# Patient Record
Sex: MALE | Race: WHITE | Employment: OTHER | URBAN - NONMETROPOLITAN AREA
[De-identification: names, ages, dates, MRNs, and addresses within clinical notes are randomized per-mention and may not be internally consistent; named-entity substitution may affect disease eponyms.]

---

## 2017-02-01 ENCOUNTER — OFFICE VISIT (OUTPATIENT)
Dept: FAMILY MEDICINE CLINIC | Age: 67
End: 2017-02-01
Payer: MEDICARE

## 2017-02-01 ENCOUNTER — HOSPITAL ENCOUNTER (OUTPATIENT)
Dept: GENERAL RADIOLOGY | Age: 67
Discharge: HOME | End: 2017-02-01
Payer: MEDICARE

## 2017-02-01 DIAGNOSIS — M48.9 CERVICAL SPINE DISEASE: ICD-10-CM

## 2017-02-01 DIAGNOSIS — Z91.09 ENVIRONMENTAL ALLERGIES: ICD-10-CM

## 2017-02-01 DIAGNOSIS — M54.50 LUMBAR SPINE PAIN: ICD-10-CM

## 2017-02-01 DIAGNOSIS — E55.9 VITAMIN D DEFICIENCY: ICD-10-CM

## 2017-02-01 PROCEDURE — 99214 OFFICE O/P EST MOD 30 MIN: CPT | Performed by: FAMILY MEDICINE

## 2017-02-01 PROCEDURE — G8484 FLU IMMUNIZE NO ADMIN: HCPCS | Performed by: FAMILY MEDICINE

## 2017-02-01 PROCEDURE — 3017F COLORECTAL CA SCREEN DOC REV: CPT | Performed by: FAMILY MEDICINE

## 2017-02-01 PROCEDURE — 72131 CT LUMBAR SPINE W/O DYE: CPT

## 2017-02-01 PROCEDURE — 1123F ACP DISCUSS/DSCN MKR DOCD: CPT | Performed by: FAMILY MEDICINE

## 2017-02-01 PROCEDURE — G8427 DOCREV CUR MEDS BY ELIG CLIN: HCPCS | Performed by: FAMILY MEDICINE

## 2017-02-01 PROCEDURE — 1036F TOBACCO NON-USER: CPT | Performed by: FAMILY MEDICINE

## 2017-02-01 PROCEDURE — G8419 CALC BMI OUT NRM PARAM NOF/U: HCPCS | Performed by: FAMILY MEDICINE

## 2017-02-01 PROCEDURE — 4040F PNEUMOC VAC/ADMIN/RCVD: CPT | Performed by: FAMILY MEDICINE

## 2017-02-01 RX ORDER — ERGOCALCIFEROL 1.25 MG/1
50000 CAPSULE ORAL
Qty: 30 CAPSULE | Refills: 0 | Status: SHIPPED | OUTPATIENT
Start: 2017-02-01 | End: 2017-05-02 | Stop reason: SDUPTHER

## 2017-02-01 RX ORDER — HYDROCODONE BITARTRATE AND ACETAMINOPHEN 7.5; 325 MG/1; MG/1
1 TABLET ORAL EVERY 4 HOURS PRN
Qty: 150 TABLET | Refills: 0 | Status: SHIPPED | OUTPATIENT
Start: 2017-02-01 | End: 2017-02-21 | Stop reason: SDUPTHER

## 2017-02-01 RX ORDER — FLUTICASONE PROPIONATE 50 MCG
SPRAY, SUSPENSION (ML) NASAL
Qty: 3 BOTTLE | Refills: 1 | Status: SHIPPED | OUTPATIENT
Start: 2017-02-01 | End: 2017-07-10 | Stop reason: SDUPTHER

## 2017-02-01 RX ORDER — AZITHROMYCIN 250 MG/1
TABLET, FILM COATED ORAL
Qty: 1 PACKET | Refills: 0 | Status: SHIPPED | OUTPATIENT
Start: 2017-02-01 | End: 2017-02-11

## 2017-02-01 RX ORDER — GABAPENTIN 400 MG/1
400 CAPSULE ORAL 4 TIMES DAILY
Qty: 360 CAPSULE | Refills: 1 | Status: SHIPPED | OUTPATIENT
Start: 2017-02-01 | End: 2017-07-25 | Stop reason: SDUPTHER

## 2017-02-17 DIAGNOSIS — I10 ESSENTIAL HYPERTENSION: ICD-10-CM

## 2017-02-17 RX ORDER — LISINOPRIL 20 MG/1
TABLET ORAL
Qty: 90 TABLET | Refills: 0 | Status: SHIPPED | OUTPATIENT
Start: 2017-02-17 | End: 2017-05-02 | Stop reason: SDUPTHER

## 2017-02-21 ENCOUNTER — HOSPITAL ENCOUNTER (OUTPATIENT)
Dept: PAIN MANAGEMENT | Age: 67
Discharge: HOME | End: 2017-02-21
Payer: MEDICARE

## 2017-02-21 VITALS
OXYGEN SATURATION: 91 % | DIASTOLIC BLOOD PRESSURE: 82 MMHG | BODY MASS INDEX: 19.99 KG/M2 | SYSTOLIC BLOOD PRESSURE: 132 MMHG | HEART RATE: 101 BPM | RESPIRATION RATE: 20 BRPM | TEMPERATURE: 98.9 F | WEIGHT: 120 LBS | HEIGHT: 65 IN

## 2017-02-21 DIAGNOSIS — M25.519 NECK AND SHOULDER PAIN: ICD-10-CM

## 2017-02-21 DIAGNOSIS — M54.2 NECK AND SHOULDER PAIN: ICD-10-CM

## 2017-02-21 DIAGNOSIS — R51.9 FREQUENT HEADACHES: ICD-10-CM

## 2017-02-21 DIAGNOSIS — R51.9 CHRONIC DAILY HEADACHE: ICD-10-CM

## 2017-02-21 DIAGNOSIS — G44.229 CHRONIC TENSION-TYPE HEADACHE, NOT INTRACTABLE: ICD-10-CM

## 2017-02-21 DIAGNOSIS — M48.9 CERVICAL SPINE DISEASE: ICD-10-CM

## 2017-02-21 DIAGNOSIS — M54.50 LUMBAR SPINE PAIN: ICD-10-CM

## 2017-02-21 PROCEDURE — 64646 CHEMODENERV TRUNK MUSC 1-5: CPT

## 2017-02-21 PROCEDURE — 64405 NJX AA&/STRD GR OCPL NRV: CPT

## 2017-02-21 PROCEDURE — 2500000003 HC RX 250 WO HCPCS

## 2017-02-21 PROCEDURE — 6360000002 HC RX W HCPCS

## 2017-02-21 PROCEDURE — 64616 CHEMODENERV MUSC NECK DYSTON: CPT

## 2017-02-21 RX ORDER — HYDROCODONE BITARTRATE AND ACETAMINOPHEN 7.5; 325 MG/1; MG/1
1 TABLET ORAL EVERY 4 HOURS PRN
Qty: 150 TABLET | Refills: 0 | Status: SHIPPED | OUTPATIENT
Start: 2017-03-03 | End: 2017-04-26 | Stop reason: SDUPTHER

## 2017-02-21 RX ORDER — TIZANIDINE 4 MG/1
TABLET ORAL
Qty: 270 TABLET | Refills: 2 | Status: SHIPPED | OUTPATIENT
Start: 2017-02-21 | End: 2018-03-28 | Stop reason: SDUPTHER

## 2017-02-21 RX ORDER — BUPIVACAINE HYDROCHLORIDE 2.5 MG/ML
INJECTION, SOLUTION EPIDURAL; INFILTRATION; INTRACAUDAL
Status: COMPLETED | OUTPATIENT
Start: 2017-02-21 | End: 2017-02-21

## 2017-02-21 RX ADMIN — BUPIVACAINE HYDROCHLORIDE 20 ML: 2.5 INJECTION, SOLUTION EPIDURAL; INFILTRATION; INTRACAUDAL at 15:05

## 2017-02-21 ASSESSMENT — PAIN DESCRIPTION - PAIN TYPE: TYPE: CHRONIC PAIN

## 2017-02-21 ASSESSMENT — PAIN DESCRIPTION - LOCATION: LOCATION: NECK;HEAD

## 2017-02-21 ASSESSMENT — PAIN DESCRIPTION - ONSET: ONSET: ON-GOING

## 2017-02-21 ASSESSMENT — PAIN SCALES - GENERAL: PAINLEVEL_OUTOF10: 7

## 2017-02-21 ASSESSMENT — PAIN DESCRIPTION - PROGRESSION: CLINICAL_PROGRESSION: NOT CHANGED

## 2017-02-21 ASSESSMENT — PAIN DESCRIPTION - DESCRIPTORS: DESCRIPTORS: ACHING;CONSTANT;RADIATING;THROBBING

## 2017-02-21 ASSESSMENT — PAIN DESCRIPTION - FREQUENCY: FREQUENCY: CONTINUOUS

## 2017-02-21 ASSESSMENT — PAIN DESCRIPTION - ORIENTATION: ORIENTATION: UPPER

## 2017-02-28 RX ORDER — PRAVASTATIN SODIUM 40 MG
TABLET ORAL
Qty: 90 TABLET | Refills: 0 | Status: SHIPPED | OUTPATIENT
Start: 2017-02-28 | End: 2017-05-02 | Stop reason: SDUPTHER

## 2017-02-28 RX ORDER — LEVOTHYROXINE SODIUM 0.07 MG/1
TABLET ORAL
Qty: 90 TABLET | Refills: 0 | Status: SHIPPED | OUTPATIENT
Start: 2017-02-28 | End: 2017-03-30 | Stop reason: SDUPTHER

## 2017-03-03 ASSESSMENT — ENCOUNTER SYMPTOMS
BACK PAIN: 1
SORE THROAT: 0
SINUS PRESSURE: 1
BOWEL INCONTINENCE: 0
ABDOMINAL PAIN: 0
TROUBLE SWALLOWING: 0
COUGH: 1

## 2017-03-07 ENCOUNTER — OFFICE VISIT (OUTPATIENT)
Dept: FAMILY MEDICINE CLINIC | Age: 67
End: 2017-03-07
Payer: MEDICARE

## 2017-03-07 ENCOUNTER — HOSPITAL ENCOUNTER (OUTPATIENT)
Dept: GENERAL RADIOLOGY | Age: 67
Discharge: HOME | End: 2017-03-07
Payer: MEDICARE

## 2017-03-07 VITALS
HEART RATE: 83 BPM | OXYGEN SATURATION: 79 % | DIASTOLIC BLOOD PRESSURE: 66 MMHG | HEIGHT: 65 IN | TEMPERATURE: 98.4 F | BODY MASS INDEX: 20.16 KG/M2 | WEIGHT: 121 LBS | SYSTOLIC BLOOD PRESSURE: 110 MMHG

## 2017-03-07 DIAGNOSIS — R79.81 LOW O2 SATURATION: ICD-10-CM

## 2017-03-07 DIAGNOSIS — R05.9 COUGH: ICD-10-CM

## 2017-03-07 DIAGNOSIS — J01.10 ACUTE FRONTAL SINUSITIS, RECURRENCE NOT SPECIFIED: ICD-10-CM

## 2017-03-07 DIAGNOSIS — R05.9 COUGH: Primary | ICD-10-CM

## 2017-03-07 PROCEDURE — 99213 OFFICE O/P EST LOW 20 MIN: CPT | Performed by: FAMILY MEDICINE

## 2017-03-07 PROCEDURE — 1036F TOBACCO NON-USER: CPT | Performed by: FAMILY MEDICINE

## 2017-03-07 PROCEDURE — G8484 FLU IMMUNIZE NO ADMIN: HCPCS | Performed by: FAMILY MEDICINE

## 2017-03-07 PROCEDURE — G8427 DOCREV CUR MEDS BY ELIG CLIN: HCPCS | Performed by: FAMILY MEDICINE

## 2017-03-07 PROCEDURE — G8419 CALC BMI OUT NRM PARAM NOF/U: HCPCS | Performed by: FAMILY MEDICINE

## 2017-03-07 PROCEDURE — 3017F COLORECTAL CA SCREEN DOC REV: CPT | Performed by: FAMILY MEDICINE

## 2017-03-07 PROCEDURE — 1123F ACP DISCUSS/DSCN MKR DOCD: CPT | Performed by: FAMILY MEDICINE

## 2017-03-07 PROCEDURE — 71020 XR CHEST STANDARD TWO VW: CPT

## 2017-03-07 PROCEDURE — 4040F PNEUMOC VAC/ADMIN/RCVD: CPT | Performed by: FAMILY MEDICINE

## 2017-03-07 RX ORDER — CEPHALEXIN 500 MG/1
500 CAPSULE ORAL 3 TIMES DAILY
Qty: 30 CAPSULE | Refills: 0 | Status: SHIPPED | OUTPATIENT
Start: 2017-03-07 | End: 2017-05-02 | Stop reason: ALTCHOICE

## 2017-03-07 ASSESSMENT — ENCOUNTER SYMPTOMS
DIARRHEA: 0
SORE THROAT: 1
SHORTNESS OF BREATH: 1
BACK PAIN: 1
COUGH: 1
RHINORRHEA: 1
ABDOMINAL PAIN: 0
SINUS PRESSURE: 1
CONSTIPATION: 0

## 2017-04-03 RX ORDER — LEVOTHYROXINE SODIUM 0.07 MG/1
TABLET ORAL
Qty: 30 TABLET | Refills: 0 | Status: SHIPPED | OUTPATIENT
Start: 2017-04-03 | End: 2017-05-02 | Stop reason: SDUPTHER

## 2017-04-26 ENCOUNTER — HOSPITAL ENCOUNTER (OUTPATIENT)
Dept: PAIN MANAGEMENT | Age: 67
Discharge: HOME | End: 2017-04-26
Payer: MEDICARE

## 2017-04-26 VITALS
OXYGEN SATURATION: 89 % | RESPIRATION RATE: 20 BRPM | SYSTOLIC BLOOD PRESSURE: 145 MMHG | HEART RATE: 88 BPM | TEMPERATURE: 97.6 F | BODY MASS INDEX: 19.97 KG/M2 | WEIGHT: 117 LBS | HEIGHT: 64 IN | DIASTOLIC BLOOD PRESSURE: 77 MMHG

## 2017-04-26 DIAGNOSIS — M48.9 CERVICAL SPINE DISEASE: ICD-10-CM

## 2017-04-26 DIAGNOSIS — R51.9 FREQUENT HEADACHES: ICD-10-CM

## 2017-04-26 DIAGNOSIS — M25.519 NECK AND SHOULDER PAIN: ICD-10-CM

## 2017-04-26 DIAGNOSIS — M54.50 LUMBAR SPINE PAIN: ICD-10-CM

## 2017-04-26 DIAGNOSIS — M54.2 NECK AND SHOULDER PAIN: ICD-10-CM

## 2017-04-26 DIAGNOSIS — G44.229 CHRONIC TENSION-TYPE HEADACHE, NOT INTRACTABLE: ICD-10-CM

## 2017-04-26 DIAGNOSIS — R51.9 CHRONIC DAILY HEADACHE: ICD-10-CM

## 2017-04-26 PROCEDURE — 99213 OFFICE O/P EST LOW 20 MIN: CPT

## 2017-04-26 PROCEDURE — 80307 DRUG TEST PRSMV CHEM ANLYZR: CPT

## 2017-04-26 RX ORDER — HYDROCODONE BITARTRATE AND ACETAMINOPHEN 7.5; 325 MG/1; MG/1
1 TABLET ORAL EVERY 4 HOURS PRN
Qty: 150 TABLET | Refills: 0 | Status: SHIPPED | OUTPATIENT
Start: 2017-04-26 | End: 2017-06-05 | Stop reason: SDUPTHER

## 2017-04-26 ASSESSMENT — ACTIVITIES OF DAILY LIVING (ADL): EFFECT OF PAIN ON DAILY ACTIVITIES: DAILY ACTIVITY

## 2017-04-26 ASSESSMENT — PAIN DESCRIPTION - ORIENTATION: ORIENTATION: LOWER;MID

## 2017-04-26 ASSESSMENT — PAIN SCALES - GENERAL: PAINLEVEL_OUTOF10: 8

## 2017-04-26 ASSESSMENT — PAIN DESCRIPTION - DESCRIPTORS: DESCRIPTORS: ACHING;CONSTANT;THROBBING;HEADACHE

## 2017-04-26 ASSESSMENT — PAIN DESCRIPTION - PROGRESSION: CLINICAL_PROGRESSION: NOT CHANGED

## 2017-04-26 ASSESSMENT — PAIN DESCRIPTION - FREQUENCY: FREQUENCY: CONTINUOUS

## 2017-04-26 ASSESSMENT — PAIN DESCRIPTION - LOCATION: LOCATION: NECK;HEAD;BACK

## 2017-04-26 ASSESSMENT — PAIN DESCRIPTION - ONSET: ONSET: ON-GOING

## 2017-04-26 ASSESSMENT — PAIN DESCRIPTION - DIRECTION: RADIATING_TOWARDS: HEADACHES

## 2017-04-26 ASSESSMENT — PAIN DESCRIPTION - PAIN TYPE: TYPE: CHRONIC PAIN

## 2017-04-28 LAB
AMPHETAMINES, URINE: NEGATIVE NG/ML
BARBITURATES, URINE: NEGATIVE NG/ML
BENZODIAZEPINES, URINE: NEGATIVE NG/ML
CANNABINOIDS, URINE: NEGATIVE NG/ML
COCAINE METABOLITE, URINE: NEGATIVE NG/ML
CREATININE, URINE: 97.1 MG/DL (ref 20–300)
ETHANOL U, QUAN: NEGATIVE %
FENTANYL URINE: NEGATIVE PG/ML
MEPERIDINE, UR: NEGATIVE NG/ML
METHADONE SCREEN, URINE: NEGATIVE NG/ML
OPIATES, URINE: NEGATIVE NG/ML
OXYCODONE/OXYMORPHONE, UR: NEGATIVE NG/ML
PH, URINE: 5.6 (ref 4.5–8.9)
PHENCYCLIDINE, URINE: NEGATIVE NG/ML
PROPOXYPHENE, URINE: NEGATIVE NG/ML

## 2017-05-02 ENCOUNTER — OFFICE VISIT (OUTPATIENT)
Dept: FAMILY MEDICINE CLINIC | Age: 67
End: 2017-05-02
Payer: MEDICARE

## 2017-05-02 VITALS
SYSTOLIC BLOOD PRESSURE: 108 MMHG | TEMPERATURE: 98.1 F | WEIGHT: 118 LBS | BODY MASS INDEX: 20.25 KG/M2 | OXYGEN SATURATION: 91 % | HEART RATE: 98 BPM | DIASTOLIC BLOOD PRESSURE: 54 MMHG | RESPIRATION RATE: 16 BRPM

## 2017-05-02 DIAGNOSIS — Z12.5 SCREENING FOR PROSTATE CANCER: ICD-10-CM

## 2017-05-02 DIAGNOSIS — E78.5 HYPERLIPIDEMIA, UNSPECIFIED HYPERLIPIDEMIA TYPE: ICD-10-CM

## 2017-05-02 DIAGNOSIS — E55.9 VITAMIN D DEFICIENCY: ICD-10-CM

## 2017-05-02 DIAGNOSIS — E03.8 OTHER SPECIFIED HYPOTHYROIDISM: Primary | ICD-10-CM

## 2017-05-02 DIAGNOSIS — I10 ESSENTIAL HYPERTENSION: ICD-10-CM

## 2017-05-02 PROCEDURE — 4040F PNEUMOC VAC/ADMIN/RCVD: CPT | Performed by: FAMILY MEDICINE

## 2017-05-02 PROCEDURE — 1123F ACP DISCUSS/DSCN MKR DOCD: CPT | Performed by: FAMILY MEDICINE

## 2017-05-02 PROCEDURE — G8427 DOCREV CUR MEDS BY ELIG CLIN: HCPCS | Performed by: FAMILY MEDICINE

## 2017-05-02 PROCEDURE — G8419 CALC BMI OUT NRM PARAM NOF/U: HCPCS | Performed by: FAMILY MEDICINE

## 2017-05-02 PROCEDURE — 1036F TOBACCO NON-USER: CPT | Performed by: FAMILY MEDICINE

## 2017-05-02 PROCEDURE — 3017F COLORECTAL CA SCREEN DOC REV: CPT | Performed by: FAMILY MEDICINE

## 2017-05-02 PROCEDURE — 99214 OFFICE O/P EST MOD 30 MIN: CPT | Performed by: FAMILY MEDICINE

## 2017-05-02 RX ORDER — LISINOPRIL 20 MG/1
TABLET ORAL
Qty: 90 TABLET | Refills: 3 | Status: SHIPPED | OUTPATIENT
Start: 2017-05-02 | End: 2018-05-07 | Stop reason: SDUPTHER

## 2017-05-02 RX ORDER — PRAVASTATIN SODIUM 40 MG
TABLET ORAL
Qty: 90 TABLET | Refills: 3 | Status: SHIPPED | OUTPATIENT
Start: 2017-05-02 | End: 2018-06-04 | Stop reason: SDUPTHER

## 2017-05-02 RX ORDER — LEVOTHYROXINE SODIUM 0.07 MG/1
TABLET ORAL
Qty: 90 TABLET | Refills: 3 | Status: SHIPPED | OUTPATIENT
Start: 2017-05-02 | End: 2018-02-01

## 2017-05-02 RX ORDER — ERGOCALCIFEROL 1.25 MG/1
50000 CAPSULE ORAL
Qty: 30 CAPSULE | Refills: 0 | Status: SHIPPED | OUTPATIENT
Start: 2017-05-02 | End: 2019-04-23

## 2017-05-02 ASSESSMENT — ENCOUNTER SYMPTOMS
TROUBLE SWALLOWING: 0
BLURRED VISION: 0
COUGH: 0
SINUS PRESSURE: 0
DIARRHEA: 0
SHORTNESS OF BREATH: 0
CONSTIPATION: 0
SORE THROAT: 0
BOWEL INCONTINENCE: 0
BACK PAIN: 1

## 2017-05-02 ASSESSMENT — COPD QUESTIONNAIRES: COPD: 1

## 2017-05-23 DIAGNOSIS — E78.5 HYPERLIPIDEMIA, UNSPECIFIED HYPERLIPIDEMIA TYPE: ICD-10-CM

## 2017-05-23 DIAGNOSIS — I10 ESSENTIAL HYPERTENSION: ICD-10-CM

## 2017-05-23 DIAGNOSIS — E03.8 OTHER SPECIFIED HYPOTHYROIDISM: ICD-10-CM

## 2017-05-23 DIAGNOSIS — E55.9 VITAMIN D DEFICIENCY: ICD-10-CM

## 2017-05-23 DIAGNOSIS — Z12.5 SCREENING FOR PROSTATE CANCER: ICD-10-CM

## 2017-05-23 LAB
ALBUMIN SERPL-MCNC: 3.7 G/DL (ref 3.5–5.2)
ALP BLD-CCNC: 80 U/L (ref 40–130)
ALT SERPL-CCNC: 13 U/L (ref 5–41)
ANION GAP SERPL CALCULATED.3IONS-SCNC: 12 MMOL/L (ref 7–19)
AST SERPL-CCNC: 21 U/L (ref 5–40)
BACTERIA: NEGATIVE /HPF
BASOPHILS ABSOLUTE: 0.1 K/UL (ref 0–0.2)
BASOPHILS RELATIVE PERCENT: 0.7 % (ref 0–1)
BILIRUB SERPL-MCNC: <0.2 MG/DL (ref 0.2–1.2)
BILIRUBIN URINE: NEGATIVE
BLOOD, URINE: NEGATIVE
BUN BLDV-MCNC: 11 MG/DL (ref 8–23)
CALCIUM SERPL-MCNC: 9.1 MG/DL (ref 8.8–10.2)
CHLORIDE BLD-SCNC: 104 MMOL/L (ref 98–111)
CHOLESTEROL, TOTAL: 130 MG/DL (ref 160–199)
CLARITY: CLEAR
CO2: 28 MMOL/L (ref 22–29)
COLOR: YELLOW
CREAT SERPL-MCNC: 1 MG/DL (ref 0.5–1.2)
EOSINOPHILS ABSOLUTE: 0.3 K/UL (ref 0–0.6)
EOSINOPHILS RELATIVE PERCENT: 3.9 % (ref 0–5)
EPITHELIAL CELLS, UA: 0 /HPF (ref 0–5)
GFR NON-AFRICAN AMERICAN: >60
GLUCOSE BLD-MCNC: 121 MG/DL (ref 74–109)
GLUCOSE URINE: NEGATIVE MG/DL
HCT VFR BLD CALC: 40.8 % (ref 42–52)
HDLC SERPL-MCNC: 65 MG/DL (ref 55–121)
HEMOGLOBIN: 12.6 G/DL (ref 14–18)
HYALINE CASTS: 0 /HPF (ref 0–8)
KETONES, URINE: NEGATIVE MG/DL
LDL CHOLESTEROL CALCULATED: 39 MG/DL
LEUKOCYTE ESTERASE, URINE: ABNORMAL
LYMPHOCYTES ABSOLUTE: 1.9 K/UL (ref 1.1–4.5)
LYMPHOCYTES RELATIVE PERCENT: 26.5 % (ref 20–40)
MCH RBC QN AUTO: 29.2 PG (ref 27–31)
MCHC RBC AUTO-ENTMCNC: 30.9 G/DL (ref 33–37)
MCV RBC AUTO: 94.7 FL (ref 80–94)
MONOCYTES ABSOLUTE: 0.7 K/UL (ref 0–0.9)
MONOCYTES RELATIVE PERCENT: 9.3 % (ref 0–10)
NEUTROPHILS ABSOLUTE: 4.2 K/UL (ref 1.5–7.5)
NEUTROPHILS RELATIVE PERCENT: 59.5 % (ref 50–65)
NITRITE, URINE: NEGATIVE
PDW BLD-RTO: 13.8 % (ref 11.5–14.5)
PH UA: 6
PLATELET # BLD: 203 K/UL (ref 130–400)
PMV BLD AUTO: 11 FL (ref 7.4–10.4)
POTASSIUM SERPL-SCNC: 4 MMOL/L (ref 3.5–5)
PROSTATE SPECIFIC ANTIGEN: 3.3 NG/ML (ref 0–4)
PROTEIN UA: NEGATIVE MG/DL
RBC # BLD: 4.31 M/UL (ref 4.7–6.1)
RBC UA: 0 /HPF (ref 0–4)
SODIUM BLD-SCNC: 144 MMOL/L (ref 136–145)
SPECIFIC GRAVITY UA: 1.02
T4 FREE: 1.2 NG/ML (ref 0.9–1.7)
TOTAL PROTEIN: 6.5 G/DL (ref 6.6–8.7)
TRIGL SERPL-MCNC: 128 MG/DL (ref 150–199)
TSH SERPL DL<=0.05 MIU/L-ACNC: 1.42 UIU/ML (ref 0.27–4.2)
UROBILINOGEN, URINE: 1 E.U./DL
VITAMIN D 25-HYDROXY: 60 NG/ML
WBC # BLD: 7.1 K/UL (ref 4.8–10.8)
WBC UA: 0 /HPF (ref 0–5)

## 2017-05-25 LAB — URINE CULTURE, ROUTINE: NORMAL

## 2017-05-26 ENCOUNTER — TELEPHONE (OUTPATIENT)
Dept: FAMILY MEDICINE CLINIC | Age: 67
End: 2017-05-26

## 2017-06-05 ENCOUNTER — HOSPITAL ENCOUNTER (OUTPATIENT)
Dept: PAIN MANAGEMENT | Age: 67
Discharge: HOME | End: 2017-06-05
Payer: MEDICARE

## 2017-06-05 VITALS
DIASTOLIC BLOOD PRESSURE: 70 MMHG | HEIGHT: 65 IN | RESPIRATION RATE: 20 BRPM | OXYGEN SATURATION: 95 % | HEART RATE: 88 BPM | TEMPERATURE: 99 F | BODY MASS INDEX: 19.66 KG/M2 | SYSTOLIC BLOOD PRESSURE: 130 MMHG | WEIGHT: 118 LBS

## 2017-06-05 DIAGNOSIS — M54.50 LUMBAR SPINE PAIN: ICD-10-CM

## 2017-06-05 DIAGNOSIS — M48.9 CERVICAL SPINE DISEASE: ICD-10-CM

## 2017-06-05 DIAGNOSIS — R51.9 CHRONIC DAILY HEADACHE: Chronic | ICD-10-CM

## 2017-06-05 PROCEDURE — 64405 NJX AA&/STRD GR OCPL NRV: CPT

## 2017-06-05 PROCEDURE — 64646 CHEMODENERV TRUNK MUSC 1-5: CPT

## 2017-06-05 PROCEDURE — 64612 DESTROY NERVE FACE MUSCLE: CPT

## 2017-06-05 PROCEDURE — 64616 CHEMODENERV MUSC NECK DYSTON: CPT

## 2017-06-05 PROCEDURE — 2500000003 HC RX 250 WO HCPCS

## 2017-06-05 RX ORDER — BUPIVACAINE HYDROCHLORIDE 2.5 MG/ML
INJECTION, SOLUTION EPIDURAL; INFILTRATION; INTRACAUDAL
Status: COMPLETED | OUTPATIENT
Start: 2017-06-05 | End: 2017-06-05

## 2017-06-05 RX ORDER — HYDROCODONE BITARTRATE AND ACETAMINOPHEN 7.5; 325 MG/1; MG/1
1 TABLET ORAL 2 TIMES DAILY PRN
Qty: 60 TABLET | Refills: 0 | Status: SHIPPED | OUTPATIENT
Start: 2017-06-05 | End: 2017-07-12 | Stop reason: SDUPTHER

## 2017-06-05 RX ORDER — HYDROCODONE BITARTRATE AND ACETAMINOPHEN 7.5; 325 MG/1; MG/1
1 TABLET ORAL EVERY 4 HOURS PRN
Qty: 150 TABLET | Refills: 0 | Status: SHIPPED | OUTPATIENT
Start: 2017-06-05 | End: 2017-06-05 | Stop reason: DRUGHIGH

## 2017-06-05 RX ADMIN — BUPIVACAINE HYDROCHLORIDE 20 ML: 2.5 INJECTION, SOLUTION EPIDURAL; INFILTRATION; INTRACAUDAL at 13:53

## 2017-06-05 ASSESSMENT — PAIN DESCRIPTION - DESCRIPTORS: DESCRIPTORS: ACHING;CONSTANT;RADIATING

## 2017-06-05 ASSESSMENT — PAIN DESCRIPTION - LOCATION: LOCATION: NECK;HEAD

## 2017-06-05 ASSESSMENT — PAIN DESCRIPTION - ORIENTATION: ORIENTATION: UPPER

## 2017-06-05 ASSESSMENT — PAIN DESCRIPTION - ONSET: ONSET: ON-GOING

## 2017-06-05 ASSESSMENT — PAIN DESCRIPTION - PAIN TYPE: TYPE: CHRONIC PAIN

## 2017-06-05 ASSESSMENT — PAIN DESCRIPTION - FREQUENCY: FREQUENCY: CONTINUOUS

## 2017-06-05 ASSESSMENT — PAIN DESCRIPTION - PROGRESSION: CLINICAL_PROGRESSION: NOT CHANGED

## 2017-06-05 ASSESSMENT — PAIN SCALES - GENERAL: PAINLEVEL_OUTOF10: 3

## 2017-06-23 ENCOUNTER — OFFICE VISIT (OUTPATIENT)
Dept: OTOLARYNGOLOGY | Facility: CLINIC | Age: 67
End: 2017-06-23

## 2017-06-23 VITALS
WEIGHT: 115 LBS | DIASTOLIC BLOOD PRESSURE: 78 MMHG | HEIGHT: 64 IN | SYSTOLIC BLOOD PRESSURE: 134 MMHG | BODY MASS INDEX: 19.63 KG/M2 | TEMPERATURE: 98 F | RESPIRATION RATE: 20 BRPM | HEART RATE: 82 BPM

## 2017-06-23 DIAGNOSIS — H61.23 BILATERAL IMPACTED CERUMEN: Primary | ICD-10-CM

## 2017-06-23 PROBLEM — H61.20 CERUMEN IMPACTION: Status: ACTIVE | Noted: 2017-06-23

## 2017-06-23 PROCEDURE — 69210 REMOVE IMPACTED EAR WAX UNI: CPT | Performed by: PHYSICIAN ASSISTANT

## 2017-06-23 RX ORDER — GUAIFENESIN AND DEXTROMETHORPHAN HYDROBROMIDE 400; 20 MG/1; MG/1
TABLET ORAL
COMMUNITY

## 2017-06-23 RX ORDER — LISINOPRIL 20 MG/1
TABLET ORAL
COMMUNITY
Start: 2017-05-02

## 2017-06-23 RX ORDER — ERGOCALCIFEROL 1.25 MG/1
CAPSULE ORAL
COMMUNITY
Start: 2017-05-02

## 2017-06-23 RX ORDER — TIZANIDINE 4 MG/1
TABLET ORAL
COMMUNITY
Start: 2017-02-21

## 2017-06-23 RX ORDER — GUAIFENESIN 400 MG/1
TABLET ORAL
COMMUNITY

## 2017-06-23 RX ORDER — LEVOTHYROXINE SODIUM 0.07 MG/1
TABLET ORAL
COMMUNITY
Start: 2017-05-02

## 2017-06-23 RX ORDER — ALBUTEROL SULFATE 90 UG/1
AEROSOL, METERED RESPIRATORY (INHALATION)
COMMUNITY
Start: 2017-02-28

## 2017-06-23 RX ORDER — ASCORBIC ACID 500 MG
TABLET ORAL
COMMUNITY

## 2017-06-23 RX ORDER — HYDROCODONE BITARTRATE AND ACETAMINOPHEN 7.5; 325 MG/1; MG/1
TABLET ORAL
COMMUNITY
Start: 2017-06-05

## 2017-06-23 RX ORDER — GABAPENTIN 400 MG/1
CAPSULE ORAL
COMMUNITY
Start: 2017-02-01

## 2017-06-23 RX ORDER — PRAVASTATIN SODIUM 40 MG
TABLET ORAL
COMMUNITY
Start: 2017-05-02

## 2017-06-23 RX ORDER — FLUTICASONE PROPIONATE 50 MCG
SPRAY, SUSPENSION (ML) NASAL
COMMUNITY
Start: 2017-02-01

## 2017-06-23 NOTE — PROGRESS NOTES
Procedure Note    Pre-operative Diagnosis: Cerumen impaction/bilateral    Post-operative Diagnosis: same    Anesthesia: none    Procedure:  Binocular ear microscopy with cerumen removal    Procedure Details:    The patient was placed supine on the procedure table. Using a speculum, the ear was examined with a microscope.     Condition:  Stable.  Patient tolerated procedure well.    Complications:  None

## 2017-07-10 DIAGNOSIS — Z91.09 ENVIRONMENTAL ALLERGIES: ICD-10-CM

## 2017-07-10 RX ORDER — FLUTICASONE PROPIONATE 50 MCG
SPRAY, SUSPENSION (ML) NASAL
Qty: 3 BOTTLE | Refills: 0 | Status: SHIPPED | OUTPATIENT
Start: 2017-07-10 | End: 2017-09-29 | Stop reason: SDUPTHER

## 2017-07-12 ENCOUNTER — HOSPITAL ENCOUNTER (OUTPATIENT)
Dept: PAIN MANAGEMENT | Age: 67
Discharge: HOME | End: 2017-07-12
Payer: MEDICARE

## 2017-07-12 VITALS
SYSTOLIC BLOOD PRESSURE: 111 MMHG | WEIGHT: 110 LBS | RESPIRATION RATE: 18 BRPM | DIASTOLIC BLOOD PRESSURE: 60 MMHG | OXYGEN SATURATION: 91 % | BODY MASS INDEX: 18.78 KG/M2 | HEIGHT: 64 IN | TEMPERATURE: 98.1 F | HEART RATE: 73 BPM

## 2017-07-12 DIAGNOSIS — G44.229 CHRONIC TENSION-TYPE HEADACHE, NOT INTRACTABLE: ICD-10-CM

## 2017-07-12 DIAGNOSIS — M25.519 NECK AND SHOULDER PAIN: ICD-10-CM

## 2017-07-12 DIAGNOSIS — R51.9 CHRONIC DAILY HEADACHE: ICD-10-CM

## 2017-07-12 DIAGNOSIS — R51.9 FREQUENT HEADACHES: ICD-10-CM

## 2017-07-12 DIAGNOSIS — M54.2 NECK AND SHOULDER PAIN: ICD-10-CM

## 2017-07-12 PROCEDURE — 99213 OFFICE O/P EST LOW 20 MIN: CPT

## 2017-07-12 RX ORDER — HYDROCODONE BITARTRATE AND ACETAMINOPHEN 7.5; 325 MG/1; MG/1
1 TABLET ORAL 2 TIMES DAILY PRN
Qty: 60 TABLET | Refills: 0 | Status: SHIPPED | OUTPATIENT
Start: 2017-07-12 | End: 2017-09-07 | Stop reason: SDUPTHER

## 2017-07-12 ASSESSMENT — PAIN DESCRIPTION - FREQUENCY: FREQUENCY: CONTINUOUS

## 2017-07-12 ASSESSMENT — PAIN DESCRIPTION - ONSET: ONSET: ON-GOING

## 2017-07-12 ASSESSMENT — PAIN DESCRIPTION - PROGRESSION: CLINICAL_PROGRESSION: NOT CHANGED

## 2017-07-12 ASSESSMENT — PAIN SCALES - GENERAL: PAINLEVEL_OUTOF10: 2

## 2017-07-12 ASSESSMENT — ACTIVITIES OF DAILY LIVING (ADL): EFFECT OF PAIN ON DAILY ACTIVITIES: LIMITS ACTIVITY

## 2017-07-12 ASSESSMENT — PAIN DESCRIPTION - DIRECTION: RADIATING_TOWARDS: HEADACHES

## 2017-07-12 ASSESSMENT — PAIN DESCRIPTION - PAIN TYPE: TYPE: CHRONIC PAIN

## 2017-07-12 ASSESSMENT — PAIN DESCRIPTION - DESCRIPTORS: DESCRIPTORS: ACHING;CONSTANT;THROBBING;HEADACHE

## 2017-07-12 ASSESSMENT — PAIN DESCRIPTION - ORIENTATION: ORIENTATION: LOWER;MID

## 2017-07-12 ASSESSMENT — PAIN DESCRIPTION - LOCATION: LOCATION: BACK;NECK;HEAD

## 2017-07-25 ENCOUNTER — HOSPITAL ENCOUNTER (OUTPATIENT)
Dept: GENERAL RADIOLOGY | Age: 67
Discharge: HOME | End: 2017-07-25
Payer: MEDICARE

## 2017-07-25 ENCOUNTER — OFFICE VISIT (OUTPATIENT)
Dept: FAMILY MEDICINE CLINIC | Age: 67
End: 2017-07-25
Payer: MEDICARE

## 2017-07-25 VITALS
DIASTOLIC BLOOD PRESSURE: 70 MMHG | OXYGEN SATURATION: 95 % | WEIGHT: 115 LBS | HEART RATE: 89 BPM | HEIGHT: 63 IN | BODY MASS INDEX: 20.38 KG/M2 | SYSTOLIC BLOOD PRESSURE: 138 MMHG | TEMPERATURE: 98.7 F | RESPIRATION RATE: 16 BRPM

## 2017-07-25 DIAGNOSIS — J44.9 CHRONIC OBSTRUCTIVE PULMONARY DISEASE, UNSPECIFIED COPD TYPE (HCC): ICD-10-CM

## 2017-07-25 DIAGNOSIS — R89.9 ABNORMAL LABORATORY TEST: ICD-10-CM

## 2017-07-25 DIAGNOSIS — I10 ESSENTIAL HYPERTENSION: ICD-10-CM

## 2017-07-25 DIAGNOSIS — R94.31 ABNORMAL EKG: ICD-10-CM

## 2017-07-25 DIAGNOSIS — M54.50 LUMBAR SPINE PAIN: ICD-10-CM

## 2017-07-25 DIAGNOSIS — M81.0 OSTEOPOROSIS: ICD-10-CM

## 2017-07-25 DIAGNOSIS — M25.572 LEFT ANKLE PAIN, UNSPECIFIED CHRONICITY: ICD-10-CM

## 2017-07-25 DIAGNOSIS — J32.9 OTHER SINUSITIS: ICD-10-CM

## 2017-07-25 DIAGNOSIS — R09.89 DECREASED CAROTID PULSE: ICD-10-CM

## 2017-07-25 DIAGNOSIS — J44.9 CHRONIC OBSTRUCTIVE PULMONARY DISEASE, UNSPECIFIED COPD TYPE (HCC): Primary | ICD-10-CM

## 2017-07-25 LAB
BILIRUBIN URINE: NEGATIVE
BLOOD, URINE: NEGATIVE
CLARITY: CLEAR
COLOR: YELLOW
GLUCOSE URINE: NEGATIVE MG/DL
KETONES, URINE: NEGATIVE MG/DL
LEUKOCYTE ESTERASE, URINE: NEGATIVE
NITRITE, URINE: NEGATIVE
PH UA: 6
PROTEIN UA: NEGATIVE MG/DL
SPECIFIC GRAVITY UA: 1.01
UROBILINOGEN, URINE: 0.2 E.U./DL

## 2017-07-25 PROCEDURE — 71020 XR CHEST STANDARD TWO VW: CPT

## 2017-07-25 PROCEDURE — 99397 PER PM REEVAL EST PAT 65+ YR: CPT | Performed by: FAMILY MEDICINE

## 2017-07-25 PROCEDURE — 93000 ELECTROCARDIOGRAM COMPLETE: CPT | Performed by: FAMILY MEDICINE

## 2017-07-25 PROCEDURE — 73610 X-RAY EXAM OF ANKLE: CPT

## 2017-07-25 RX ORDER — GABAPENTIN 400 MG/1
400 CAPSULE ORAL DAILY
Qty: 90 CAPSULE | Refills: 1 | Status: SHIPPED | OUTPATIENT
Start: 2017-07-25 | End: 2017-09-27 | Stop reason: ALTCHOICE

## 2017-07-25 RX ORDER — GABAPENTIN 100 MG/1
100 CAPSULE ORAL 4 TIMES DAILY
COMMUNITY
End: 2017-07-25 | Stop reason: SDUPTHER

## 2017-07-25 RX ORDER — ALBUTEROL SULFATE 90 UG/1
AEROSOL, METERED RESPIRATORY (INHALATION)
Qty: 54 G | Refills: 5 | Status: SHIPPED | OUTPATIENT
Start: 2017-07-25 | End: 2018-02-01 | Stop reason: SDUPTHER

## 2017-07-25 RX ORDER — GABAPENTIN 100 MG/1
100 CAPSULE ORAL 4 TIMES DAILY
Qty: 360 CAPSULE | Refills: 1 | Status: SHIPPED | OUTPATIENT
Start: 2017-07-25 | End: 2018-02-01 | Stop reason: SDUPTHER

## 2017-07-25 ASSESSMENT — ENCOUNTER SYMPTOMS
EYES NEGATIVE: 1
SHORTNESS OF BREATH: 1
CHEST TIGHTNESS: 0
ABDOMINAL PAIN: 0
BACK PAIN: 1
SINUS PRESSURE: 1
GASTROINTESTINAL NEGATIVE: 1
COUGH: 1

## 2017-07-26 RX ORDER — AZITHROMYCIN 250 MG/1
TABLET, FILM COATED ORAL
Qty: 1 PACKET | Refills: 0 | Status: SHIPPED | OUTPATIENT
Start: 2017-07-26 | End: 2017-08-05

## 2017-08-08 ENCOUNTER — TELEPHONE (OUTPATIENT)
Dept: FAMILY MEDICINE CLINIC | Age: 67
End: 2017-08-08

## 2017-08-08 DIAGNOSIS — Z79.899 MEDICATION MANAGEMENT: ICD-10-CM

## 2017-08-08 DIAGNOSIS — E55.9 VITAMIN D DEFICIENCY: Primary | ICD-10-CM

## 2017-08-09 ENCOUNTER — TELEPHONE (OUTPATIENT)
Dept: FAMILY MEDICINE CLINIC | Age: 67
End: 2017-08-09

## 2017-08-24 ENCOUNTER — HOSPITAL ENCOUNTER (OUTPATIENT)
Dept: GENERAL RADIOLOGY | Age: 67
Discharge: HOME | End: 2017-08-24
Payer: MEDICARE

## 2017-08-24 DIAGNOSIS — R09.89 DECREASED CAROTID PULSE: ICD-10-CM

## 2017-08-24 DIAGNOSIS — Z79.899 MEDICATION MANAGEMENT: ICD-10-CM

## 2017-08-24 DIAGNOSIS — E55.9 VITAMIN D DEFICIENCY: ICD-10-CM

## 2017-08-24 LAB
ALBUMIN SERPL-MCNC: 3.7 G/DL (ref 3.5–5.2)
ALP BLD-CCNC: 76 U/L (ref 40–130)
ALT SERPL-CCNC: 12 U/L (ref 5–41)
AST SERPL-CCNC: 18 U/L (ref 5–40)
BILIRUB SERPL-MCNC: <0.2 MG/DL (ref 0.2–1.2)
BILIRUBIN DIRECT: 0.1 MG/DL (ref 0–0.3)
BILIRUBIN, INDIRECT: 0.1 MG/DL (ref 0.1–1)
CHOLESTEROL, TOTAL: 156 MG/DL (ref 160–199)
HDLC SERPL-MCNC: 61 MG/DL (ref 55–121)
LDL CHOLESTEROL CALCULATED: 80 MG/DL
TOTAL PROTEIN: 7.2 G/DL (ref 6.6–8.7)
TRIGL SERPL-MCNC: 77 MG/DL (ref 150–199)
VITAMIN D 25-HYDROXY: 47.3 NG/ML

## 2017-08-24 PROCEDURE — 93880 EXTRACRANIAL BILAT STUDY: CPT

## 2017-09-06 ENCOUNTER — TELEPHONE (OUTPATIENT)
Dept: CARDIOLOGY | Age: 67
End: 2017-09-06

## 2017-09-07 ENCOUNTER — HOSPITAL ENCOUNTER (OUTPATIENT)
Dept: PAIN MANAGEMENT | Age: 67
Discharge: HOME | End: 2017-09-07
Payer: MEDICARE

## 2017-09-07 VITALS
HEIGHT: 64 IN | BODY MASS INDEX: 20.32 KG/M2 | TEMPERATURE: 98.6 F | DIASTOLIC BLOOD PRESSURE: 75 MMHG | WEIGHT: 119 LBS | RESPIRATION RATE: 20 BRPM | SYSTOLIC BLOOD PRESSURE: 125 MMHG | OXYGEN SATURATION: 93 % | HEART RATE: 81 BPM

## 2017-09-07 DIAGNOSIS — R51.9 FREQUENT HEADACHES: ICD-10-CM

## 2017-09-07 DIAGNOSIS — M54.2 NECK AND SHOULDER PAIN: ICD-10-CM

## 2017-09-07 DIAGNOSIS — R51.9 CHRONIC DAILY HEADACHE: ICD-10-CM

## 2017-09-07 DIAGNOSIS — M25.519 NECK AND SHOULDER PAIN: ICD-10-CM

## 2017-09-07 DIAGNOSIS — G44.229 CHRONIC TENSION-TYPE HEADACHE, NOT INTRACTABLE: ICD-10-CM

## 2017-09-07 PROCEDURE — 2500000003 HC RX 250 WO HCPCS

## 2017-09-07 PROCEDURE — 6360000002 HC RX W HCPCS

## 2017-09-07 PROCEDURE — 64616 CHEMODENERV MUSC NECK DYSTON: CPT

## 2017-09-07 PROCEDURE — 20553 NJX 1/MLT TRIGGER POINTS 3/>: CPT

## 2017-09-07 PROCEDURE — 64646 CHEMODENERV TRUNK MUSC 1-5: CPT

## 2017-09-07 RX ORDER — TRIAMCINOLONE ACETONIDE 40 MG/ML
INJECTION, SUSPENSION INTRA-ARTICULAR; INTRAMUSCULAR
Status: COMPLETED | OUTPATIENT
Start: 2017-09-07 | End: 2017-09-07

## 2017-09-07 RX ORDER — BUPIVACAINE HYDROCHLORIDE 2.5 MG/ML
INJECTION, SOLUTION EPIDURAL; INFILTRATION; INTRACAUDAL
Status: COMPLETED | OUTPATIENT
Start: 2017-09-07 | End: 2017-09-07

## 2017-09-07 RX ORDER — BUPIVACAINE HYDROCHLORIDE 5 MG/ML
INJECTION, SOLUTION EPIDURAL; INTRACAUDAL
Status: COMPLETED | OUTPATIENT
Start: 2017-09-07 | End: 2017-09-07

## 2017-09-07 RX ORDER — HYDROCODONE BITARTRATE AND ACETAMINOPHEN 7.5; 325 MG/1; MG/1
1 TABLET ORAL 2 TIMES DAILY PRN
Qty: 60 TABLET | Refills: 0 | Status: SHIPPED | OUTPATIENT
Start: 2017-09-07 | End: 2017-10-10 | Stop reason: SDUPTHER

## 2017-09-07 RX ADMIN — TRIAMCINOLONE ACETONIDE 40 MG: 40 INJECTION, SUSPENSION INTRA-ARTICULAR; INTRAMUSCULAR at 15:15

## 2017-09-07 RX ADMIN — BUPIVACAINE HYDROCHLORIDE 10 ML: 2.5 INJECTION, SOLUTION EPIDURAL; INFILTRATION; INTRACAUDAL at 15:20

## 2017-09-07 RX ADMIN — BUPIVACAINE HYDROCHLORIDE 19 ML: 5 INJECTION, SOLUTION EPIDURAL; INTRACAUDAL at 15:15

## 2017-09-07 ASSESSMENT — PAIN - FUNCTIONAL ASSESSMENT: PAIN_FUNCTIONAL_ASSESSMENT: 0-10

## 2017-09-07 ASSESSMENT — ACTIVITIES OF DAILY LIVING (ADL): EFFECT OF PAIN ON DAILY ACTIVITIES: DAILY CHORES AND ACTIVITIES

## 2017-09-07 ASSESSMENT — PAIN DESCRIPTION - DESCRIPTORS: DESCRIPTORS: ACHING;CONSTANT;THROBBING;RADIATING

## 2017-09-20 ENCOUNTER — OFFICE VISIT (OUTPATIENT)
Dept: PRIMARY CARE CLINIC | Age: 67
End: 2017-09-20
Payer: MEDICARE

## 2017-09-20 VITALS
OXYGEN SATURATION: 90 % | HEART RATE: 85 BPM | BODY MASS INDEX: 19.33 KG/M2 | DIASTOLIC BLOOD PRESSURE: 74 MMHG | HEIGHT: 65 IN | WEIGHT: 116 LBS | SYSTOLIC BLOOD PRESSURE: 138 MMHG | TEMPERATURE: 97.9 F

## 2017-09-20 DIAGNOSIS — Z23 NEED FOR INFLUENZA VACCINATION: ICD-10-CM

## 2017-09-20 DIAGNOSIS — H90.3 SENSORINEURAL HEARING LOSS (SNHL) OF BOTH EARS: ICD-10-CM

## 2017-09-20 DIAGNOSIS — H54.40 BLIND RIGHT EYE: ICD-10-CM

## 2017-09-20 DIAGNOSIS — K40.90 RIGHT INGUINAL HERNIA: ICD-10-CM

## 2017-09-20 DIAGNOSIS — E78.2 MIXED HYPERLIPIDEMIA: ICD-10-CM

## 2017-09-20 DIAGNOSIS — I10 ESSENTIAL HYPERTENSION: ICD-10-CM

## 2017-09-20 DIAGNOSIS — R09.02 HYPOXIA: ICD-10-CM

## 2017-09-20 DIAGNOSIS — N18.2 CKD (CHRONIC KIDNEY DISEASE), STAGE II: ICD-10-CM

## 2017-09-20 DIAGNOSIS — Z92.89 HISTORY OF USE OF HEARING AID IN RIGHT EAR: ICD-10-CM

## 2017-09-20 DIAGNOSIS — J44.9 CHRONIC OBSTRUCTIVE PULMONARY DISEASE, UNSPECIFIED COPD TYPE (HCC): Primary | ICD-10-CM

## 2017-09-20 PROCEDURE — 4040F PNEUMOC VAC/ADMIN/RCVD: CPT | Performed by: PEDIATRICS

## 2017-09-20 PROCEDURE — G0008 ADMIN INFLUENZA VIRUS VAC: HCPCS | Performed by: PEDIATRICS

## 2017-09-20 PROCEDURE — G8427 DOCREV CUR MEDS BY ELIG CLIN: HCPCS | Performed by: PEDIATRICS

## 2017-09-20 PROCEDURE — 1036F TOBACCO NON-USER: CPT | Performed by: PEDIATRICS

## 2017-09-20 PROCEDURE — G8926 SPIRO NO PERF OR DOC: HCPCS | Performed by: PEDIATRICS

## 2017-09-20 PROCEDURE — 3023F SPIROM DOC REV: CPT | Performed by: PEDIATRICS

## 2017-09-20 PROCEDURE — G8420 CALC BMI NORM PARAMETERS: HCPCS | Performed by: PEDIATRICS

## 2017-09-20 PROCEDURE — 90662 IIV NO PRSV INCREASED AG IM: CPT | Performed by: PEDIATRICS

## 2017-09-20 PROCEDURE — 99215 OFFICE O/P EST HI 40 MIN: CPT | Performed by: PEDIATRICS

## 2017-09-20 PROCEDURE — 1123F ACP DISCUSS/DSCN MKR DOCD: CPT | Performed by: PEDIATRICS

## 2017-09-20 PROCEDURE — 3017F COLORECTAL CA SCREEN DOC REV: CPT | Performed by: PEDIATRICS

## 2017-09-20 ASSESSMENT — ENCOUNTER SYMPTOMS
DIARRHEA: 0
CHEST TIGHTNESS: 0
COUGH: 0
SORE THROAT: 0
VOMITING: 0
WHEEZING: 0
NAUSEA: 0
SHORTNESS OF BREATH: 0
BACK PAIN: 0
ABDOMINAL PAIN: 0

## 2017-09-26 ENCOUNTER — TELEPHONE (OUTPATIENT)
Dept: CARDIOLOGY | Age: 67
End: 2017-09-26

## 2017-09-27 ENCOUNTER — OFFICE VISIT (OUTPATIENT)
Dept: CARDIOLOGY | Age: 67
End: 2017-09-27
Payer: MEDICARE

## 2017-09-27 VITALS
SYSTOLIC BLOOD PRESSURE: 132 MMHG | WEIGHT: 117 LBS | DIASTOLIC BLOOD PRESSURE: 60 MMHG | HEIGHT: 64 IN | BODY MASS INDEX: 19.97 KG/M2 | HEART RATE: 84 BPM

## 2017-09-27 DIAGNOSIS — Z82.49 FAMILY HISTORY OF EARLY CAD: ICD-10-CM

## 2017-09-27 DIAGNOSIS — I10 ESSENTIAL HYPERTENSION: Primary | ICD-10-CM

## 2017-09-27 DIAGNOSIS — R06.02 SOB (SHORTNESS OF BREATH): ICD-10-CM

## 2017-09-27 PROCEDURE — 1123F ACP DISCUSS/DSCN MKR DOCD: CPT | Performed by: CLINICAL NURSE SPECIALIST

## 2017-09-27 PROCEDURE — 1036F TOBACCO NON-USER: CPT | Performed by: CLINICAL NURSE SPECIALIST

## 2017-09-27 PROCEDURE — 4040F PNEUMOC VAC/ADMIN/RCVD: CPT | Performed by: CLINICAL NURSE SPECIALIST

## 2017-09-27 PROCEDURE — G8427 DOCREV CUR MEDS BY ELIG CLIN: HCPCS | Performed by: CLINICAL NURSE SPECIALIST

## 2017-09-27 PROCEDURE — 3017F COLORECTAL CA SCREEN DOC REV: CPT | Performed by: CLINICAL NURSE SPECIALIST

## 2017-09-27 PROCEDURE — G8420 CALC BMI NORM PARAMETERS: HCPCS | Performed by: CLINICAL NURSE SPECIALIST

## 2017-09-27 PROCEDURE — 99203 OFFICE O/P NEW LOW 30 MIN: CPT | Performed by: CLINICAL NURSE SPECIALIST

## 2017-09-29 DIAGNOSIS — Z91.09 ENVIRONMENTAL ALLERGIES: ICD-10-CM

## 2017-09-29 RX ORDER — FLUTICASONE PROPIONATE 50 MCG
SPRAY, SUSPENSION (ML) NASAL
Qty: 3 BOTTLE | Refills: 3 | Status: SHIPPED | OUTPATIENT
Start: 2017-09-29 | End: 2017-10-11 | Stop reason: SDUPTHER

## 2017-10-10 ENCOUNTER — HOSPITAL ENCOUNTER (OUTPATIENT)
Dept: PAIN MANAGEMENT | Age: 67
Discharge: HOME | End: 2017-10-10
Payer: MEDICARE

## 2017-10-10 VITALS
BODY MASS INDEX: 19.66 KG/M2 | HEART RATE: 84 BPM | WEIGHT: 118 LBS | RESPIRATION RATE: 20 BRPM | HEIGHT: 65 IN | DIASTOLIC BLOOD PRESSURE: 70 MMHG | SYSTOLIC BLOOD PRESSURE: 112 MMHG | OXYGEN SATURATION: 93 % | TEMPERATURE: 98.9 F

## 2017-10-10 PROCEDURE — 99213 OFFICE O/P EST LOW 20 MIN: CPT

## 2017-10-10 RX ORDER — HYDROCODONE BITARTRATE AND ACETAMINOPHEN 7.5; 325 MG/1; MG/1
1 TABLET ORAL 2 TIMES DAILY PRN
Qty: 60 TABLET | Refills: 0 | Status: SHIPPED | OUTPATIENT
Start: 2017-10-10 | End: 2018-03-28 | Stop reason: SDUPTHER

## 2017-10-10 ASSESSMENT — PAIN SCALES - GENERAL: PAINLEVEL_OUTOF10: 8

## 2017-10-10 ASSESSMENT — ACTIVITIES OF DAILY LIVING (ADL): EFFECT OF PAIN ON DAILY ACTIVITIES: LIMITS ACTIVITIES

## 2017-10-10 ASSESSMENT — PAIN DESCRIPTION - PROGRESSION: CLINICAL_PROGRESSION: NOT CHANGED

## 2017-10-10 ASSESSMENT — PAIN DESCRIPTION - PAIN TYPE: TYPE: CHRONIC PAIN

## 2017-10-10 ASSESSMENT — PAIN DESCRIPTION - FREQUENCY: FREQUENCY: CONTINUOUS

## 2017-10-10 ASSESSMENT — PAIN DESCRIPTION - DESCRIPTORS: DESCRIPTORS: ACHING

## 2017-10-10 ASSESSMENT — PAIN DESCRIPTION - ONSET: ONSET: ON-GOING

## 2017-10-10 ASSESSMENT — PAIN DESCRIPTION - LOCATION: LOCATION: NECK

## 2017-10-10 NOTE — PROGRESS NOTES
Nursing Admission Record    Current Issues / Falls / ER Visits:  No    Percentage of Pain Relief after Last Procedure:  50 %    How long lasted:  kamila [id number]    Radiology exams received during the last 12 months: No       When                                               Where       Imaging on chart: No         Imaging records requested: No  MRI exams received in the past 2 years:  No  Physical therapy during the last 6 months: No       When:                                              Where   Labs during the last 12 months: No    Education Provided:  [x] Review of Kim Kurtis  [] Agreement Review  [] Compliance Issues Discussed    [] Cognitive Behavior Needs [x] Exercise [] Review of Test [] Financial Issues  [x] Tobacco/Alcohol Use [x] Teaching [] New Patient [] Picture Obtained    Physician Plan:  [] Outgoing Referral  [] Pharmacy Consult  [] Test Ordered   [] Obtained Test Results / Consult Notes  [] UDS due at next visit, verified per EPIC      [] Suspected Physical Abuse or Suicide Risk assessed - IF YES COMPLETE QUESTIONS BELOW    If any of the following questions are answered yes - contact attending physician for referral:    Has been considering harming self to escape stress, pain problems? [] YES  [x] NO  Has a suicide plan? [] YES  [] NO  Has attempted suicide in the past?   [] YES  [] NO  Has a close friend or family member who committed suicide? [] YES  [] NO    Patient Referred To :      Additional Notes:    Assessment Completed by:  Electronically signed by Gregg Sorenson RN on 10/10/2017 at 2:35 PM

## 2017-10-11 DIAGNOSIS — J44.9 CHRONIC OBSTRUCTIVE PULMONARY DISEASE, UNSPECIFIED COPD TYPE (HCC): Primary | ICD-10-CM

## 2017-10-11 DIAGNOSIS — Z91.09 ENVIRONMENTAL ALLERGIES: ICD-10-CM

## 2017-10-11 DIAGNOSIS — R06.02 SHORTNESS OF BREATH: ICD-10-CM

## 2017-10-11 RX ORDER — FLUTICASONE PROPIONATE 50 MCG
2 SPRAY, SUSPENSION (ML) NASAL DAILY
Qty: 3 BOTTLE | Refills: 3 | Status: SHIPPED | OUTPATIENT
Start: 2017-10-11 | End: 2018-02-01 | Stop reason: SDUPTHER

## 2017-10-12 ENCOUNTER — HOSPITAL ENCOUNTER (OUTPATIENT)
Dept: GENERAL RADIOLOGY | Age: 67
Discharge: HOME | End: 2017-10-12
Payer: MEDICARE

## 2017-10-12 DIAGNOSIS — R06.02 SHORTNESS OF BREATH: ICD-10-CM

## 2017-10-12 DIAGNOSIS — J44.9 CHRONIC OBSTRUCTIVE PULMONARY DISEASE, UNSPECIFIED COPD TYPE (HCC): ICD-10-CM

## 2017-10-12 PROCEDURE — 71020 XR CHEST STANDARD TWO VW: CPT

## 2017-10-16 ENCOUNTER — TELEPHONE (OUTPATIENT)
Dept: PRIMARY CARE CLINIC | Age: 67
End: 2017-10-16

## 2018-02-01 ENCOUNTER — OFFICE VISIT (OUTPATIENT)
Dept: PRIMARY CARE CLINIC | Age: 68
End: 2018-02-01
Payer: MEDICARE

## 2018-02-01 VITALS
WEIGHT: 123 LBS | BODY MASS INDEX: 21 KG/M2 | HEIGHT: 64 IN | TEMPERATURE: 97.9 F | SYSTOLIC BLOOD PRESSURE: 120 MMHG | DIASTOLIC BLOOD PRESSURE: 78 MMHG | HEART RATE: 77 BPM | OXYGEN SATURATION: 96 %

## 2018-02-01 DIAGNOSIS — F33.41 RECURRENT MAJOR DEPRESSIVE DISORDER, IN PARTIAL REMISSION (HCC): ICD-10-CM

## 2018-02-01 DIAGNOSIS — Z12.5 SCREENING FOR PROSTATE CANCER: ICD-10-CM

## 2018-02-01 DIAGNOSIS — Z13.6 SCREENING FOR AAA (ABDOMINAL AORTIC ANEURYSM): ICD-10-CM

## 2018-02-01 DIAGNOSIS — M81.8 OTHER OSTEOPOROSIS WITHOUT CURRENT PATHOLOGICAL FRACTURE: ICD-10-CM

## 2018-02-01 DIAGNOSIS — J44.9 CHRONIC OBSTRUCTIVE PULMONARY DISEASE, UNSPECIFIED COPD TYPE (HCC): ICD-10-CM

## 2018-02-01 DIAGNOSIS — Z91.09 ENVIRONMENTAL ALLERGIES: ICD-10-CM

## 2018-02-01 DIAGNOSIS — E78.2 MIXED HYPERLIPIDEMIA: ICD-10-CM

## 2018-02-01 DIAGNOSIS — E55.9 VITAMIN D DEFICIENCY: ICD-10-CM

## 2018-02-01 DIAGNOSIS — M54.50 LUMBAR SPINE PAIN: ICD-10-CM

## 2018-02-01 DIAGNOSIS — N18.2 CKD (CHRONIC KIDNEY DISEASE), STAGE II: ICD-10-CM

## 2018-02-01 DIAGNOSIS — I10 ESSENTIAL HYPERTENSION: Primary | ICD-10-CM

## 2018-02-01 DIAGNOSIS — F41.9 ANXIETY: ICD-10-CM

## 2018-02-01 DIAGNOSIS — E03.9 ACQUIRED HYPOTHYROIDISM: ICD-10-CM

## 2018-02-01 DIAGNOSIS — M47.26 OSTEOARTHRITIS OF SPINE WITH RADICULOPATHY, LUMBAR REGION: ICD-10-CM

## 2018-02-01 PROCEDURE — 3017F COLORECTAL CA SCREEN DOC REV: CPT | Performed by: PEDIATRICS

## 2018-02-01 PROCEDURE — 99214 OFFICE O/P EST MOD 30 MIN: CPT | Performed by: PEDIATRICS

## 2018-02-01 PROCEDURE — 1036F TOBACCO NON-USER: CPT | Performed by: PEDIATRICS

## 2018-02-01 PROCEDURE — 1123F ACP DISCUSS/DSCN MKR DOCD: CPT | Performed by: PEDIATRICS

## 2018-02-01 PROCEDURE — 3023F SPIROM DOC REV: CPT | Performed by: PEDIATRICS

## 2018-02-01 PROCEDURE — G8484 FLU IMMUNIZE NO ADMIN: HCPCS | Performed by: PEDIATRICS

## 2018-02-01 PROCEDURE — G8427 DOCREV CUR MEDS BY ELIG CLIN: HCPCS | Performed by: PEDIATRICS

## 2018-02-01 PROCEDURE — G8420 CALC BMI NORM PARAMETERS: HCPCS | Performed by: PEDIATRICS

## 2018-02-01 PROCEDURE — G8926 SPIRO NO PERF OR DOC: HCPCS | Performed by: PEDIATRICS

## 2018-02-01 PROCEDURE — 4040F PNEUMOC VAC/ADMIN/RCVD: CPT | Performed by: PEDIATRICS

## 2018-02-01 RX ORDER — LEVOTHYROXINE SODIUM 0.07 MG/1
TABLET ORAL
Qty: 90 TABLET | Refills: 3 | Status: SHIPPED | OUTPATIENT
Start: 2018-02-01 | End: 2019-01-31 | Stop reason: SDUPTHER

## 2018-02-01 RX ORDER — GABAPENTIN 100 MG/1
100 CAPSULE ORAL 4 TIMES DAILY
Qty: 360 CAPSULE | Refills: 1 | Status: SHIPPED | OUTPATIENT
Start: 2018-02-01 | End: 2018-08-01 | Stop reason: SDUPTHER

## 2018-02-01 RX ORDER — ALBUTEROL SULFATE 90 UG/1
AEROSOL, METERED RESPIRATORY (INHALATION)
Qty: 54 G | Refills: 5 | Status: SHIPPED | OUTPATIENT
Start: 2018-02-01 | End: 2018-08-01 | Stop reason: SDUPTHER

## 2018-02-01 RX ORDER — FLUTICASONE PROPIONATE 50 MCG
2 SPRAY, SUSPENSION (ML) NASAL DAILY
Qty: 3 BOTTLE | Refills: 3 | Status: SHIPPED | OUTPATIENT
Start: 2018-02-01 | End: 2019-05-23

## 2018-02-01 ASSESSMENT — ENCOUNTER SYMPTOMS
ABDOMINAL PAIN: 0
DIARRHEA: 0
CHEST TIGHTNESS: 0
VOMITING: 0
BACK PAIN: 0
SHORTNESS OF BREATH: 0
COUGH: 0
SORE THROAT: 0
WHEEZING: 0
NAUSEA: 0

## 2018-02-01 NOTE — PROGRESS NOTES
[address] [facility], [address]  Phone [phone]   Fax [phone]      OFFICE VISIT: 2018    Monique Schroeder- : 1950      [facility]  Reason For Visit:  Taz Jin is a 79 y.o. Health Maintenance pneumo-done  AAA-needs  Date of Most Recent Physical:  2017  Medicare Health Risk Assessment Form completed and in chart? 2017    The patient presents today for 6 month follow up    Wants you to take over his Norco   He has been going to pain management for this. Subjective:   Monique Schroeder is a 79 y.o. male with hypertension. Current Outpatient Prescriptions   Medication Sig Dispense Refill    fluticasone (FLONASE) 50 MCG/ACT nasal spray 2 sprays by Nasal route daily 3 Bottle 3    gabapentin (NEURONTIN) 100 MG capsule Take 1 capsule by mouth 4 times daily for 90 days. 360 capsule 1    levothyroxine (SYNTHROID) 75 MCG tablet TAKE 1 TABLET BY MOUTH DAILY 90 tablet 3    albuterol sulfate HFA (VENTOLIN HFA) 108 (90 Base) MCG/ACT inhaler INHALE 2 PUFFS BY MOUTH EVERY 6 HOURS AS NEEDED FOR WHEEZING 54 g 5    tiotropium (SPIRIVA HANDIHALER) 18 MCG inhalation capsule Inhale 1 capsule into the lungs daily 30 capsule 3    HYDROcodone-acetaminophen (NORCO) 7.5-325 MG per tablet Take 1 tablet by mouth 2 times daily as needed for Pain . 60 tablet 0    pravastatin (PRAVACHOL) 40 MG tablet TAKE 1 TABLET BY MOUTH DAILY 90 tablet 3    lisinopril (PRINIVIL;ZESTRIL) 20 MG tablet TAKE 1 TABLET BY MOUTH DAILY 90 tablet 3    vitamin D (ERGOCALCIFEROL) 17395 UNITS CAPS capsule Take 1 capsule by mouth every 14 days 30 capsule 0    tiZANidine (ZANAFLEX) 4 MG tablet TAKE 1 TABLET BY MOUTH EVERY 8 HOURS AS NEEDED 270 tablet 2    ADVAIR DISKUS 250-50 MCG/DOSE AEPB Inhale 1 puff into the lungs 2 times daily  12    diclofenac sodium 1 % GEL Apply 4 g topically 4 times daily as needed 1 Tube 5    guaiFENesin 400 MG tablet Take 400 mg by mouth daily       OXYGEN Inhale  into the lungs. treatment until next office visit  I have reviewed the following with the Mr. Mejia   Lab Review   Orders Only on 08/24/2017   Component Date Value    Total Protein 08/24/2017 7.2     Alb 08/24/2017 3.7     Alkaline Phosphatase 08/24/2017 76     ALT 08/24/2017 12     AST 08/24/2017 18     Total Bilirubin 08/24/2017 <0.2     Bilirubin, Direct 08/24/2017 0.1     Bilirubin, Indirect 08/24/2017 0.1     Cholesterol, Total 08/24/2017 156*    Triglycerides 08/24/2017 77*    HDL 08/24/2017 61     LDL Calculated 08/24/2017 80     Vit D, 25-Hydroxy 08/24/2017 47.3      Copies of these are in the chart. Current Outpatient Prescriptions   Medication Sig Dispense Refill    fluticasone (FLONASE) 50 MCG/ACT nasal spray 2 sprays by Nasal route daily 3 Bottle 3    gabapentin (NEURONTIN) 100 MG capsule Take 1 capsule by mouth 4 times daily for 90 days. 360 capsule 1    levothyroxine (SYNTHROID) 75 MCG tablet TAKE 1 TABLET BY MOUTH DAILY 90 tablet 3    albuterol sulfate HFA (VENTOLIN HFA) 108 (90 Base) MCG/ACT inhaler INHALE 2 PUFFS BY MOUTH EVERY 6 HOURS AS NEEDED FOR WHEEZING 54 g 5    tiotropium (SPIRIVA HANDIHALER) 18 MCG inhalation capsule Inhale 1 capsule into the lungs daily 30 capsule 3    HYDROcodone-acetaminophen (NORCO) 7.5-325 MG per tablet Take 1 tablet by mouth 2 times daily as needed for Pain .  60 tablet 0    pravastatin (PRAVACHOL) 40 MG tablet TAKE 1 TABLET BY MOUTH DAILY 90 tablet 3    lisinopril (PRINIVIL;ZESTRIL) 20 MG tablet TAKE 1 TABLET BY MOUTH DAILY 90 tablet 3    vitamin D (ERGOCALCIFEROL) 51352 UNITS CAPS capsule Take 1 capsule by mouth every 14 days 30 capsule 0    tiZANidine (ZANAFLEX) 4 MG tablet TAKE 1 TABLET BY MOUTH EVERY 8 HOURS AS NEEDED 270 tablet 2    ADVAIR DISKUS 250-50 MCG/DOSE AEPB Inhale 1 puff into the lungs 2 times daily  12    diclofenac sodium 1 % GEL Apply 4 g topically 4 times daily as needed 1 Tube 5    guaiFENesin 400 MG tablet Take 400 mg by mouth daily Right Ear: External ear normal.   Left Ear: External ear normal.   Nose: Nose normal.   Mouth/Throat: Oropharynx is clear and moist.   Eyes: Conjunctivae and EOM are normal. Pupils are equal, round, and reactive to light. No scleral icterus. Neck: Normal range of motion. Neck supple. No JVD present. Carotid bruit is not present. No thyromegaly present. Cardiovascular: Normal rate, regular rhythm, S1 normal, S2 normal and normal heart sounds. No extrasystoles are present. PMI is not displaced. Exam reveals no gallop and no friction rub. No murmur heard. Pulmonary/Chest: Effort normal and breath sounds normal. No respiratory distress. He has no wheezes. He has no rhonchi. He has no rales. Abdominal: Soft. Bowel sounds are normal. There is no hepatosplenomegaly. There is no tenderness. There is no rebound, no guarding and no CVA tenderness. Genitourinary:   Genitourinary Comments: Exam deferred   Musculoskeletal: Normal range of motion. He exhibits no edema or tenderness. Lymphadenopathy:     He has no cervical adenopathy. Neurological: He is alert and oriented to person, place, and time. He has normal strength. No sensory deficit (no numbness or tingling). Skin: Skin is warm and dry. No rash noted. Psychiatric: He has a normal mood and affect. ASSESSMENT      ICD-10-CM ICD-9-CM    1. Essential hypertension I10 401.9 Comprehensive Metabolic Panel      CBC Auto Differential   2. Chronic obstructive pulmonary disease, unspecified COPD type (HCC) J44.9 496 albuterol sulfate HFA (VENTOLIN HFA) 108 (90 Base) MCG/ACT inhaler   3. Mixed hyperlipidemia E78.2 272.2 Lipid Panel   4. CKD (chronic kidney disease), stage II N18.2 585.2    5. Osteoarthritis of spine with radiculopathy, lumbar region M47.26 721.3    6. Acquired hypothyroidism E03.9 244.9 TSH without Reflex      T4, Free      levothyroxine (SYNTHROID) 75 MCG tablet   7.  Other osteoporosis without current pathological fracture M81.8 733.09    8. Vitamin D deficiency E55.9 268.9    9. Recurrent major depressive disorder, in partial remission (HCC) F33.41 296.35    10. Anxiety F41.9 300.00    11. Screening for AAA (abdominal aortic aneurysm) Z13.6 V81.2 US SCREENING FOR AAA   12. Screening for prostate cancer Z12.5 V76.44 PSA, Diagnostic   13. Environmental allergies Z91.09 V15.09 fluticasone (FLONASE) 50 MCG/ACT nasal spray   14. Lumbar spine pain M54.5 724.2 gabapentin (NEURONTIN) 100 MG capsule       PLAN      ICD-10-CM ICD-9-CM    1. Essential hypertension I10 401.9 Comprehensive Metabolic Panel      CBC Auto Differential   2. Chronic obstructive pulmonary disease, unspecified COPD type ([facility]) J44.9 496 albuterol sulfate HFA (VENTOLIN HFA) 108 (90 Base) MCG/ACT inhaler   3. Mixed hyperlipidemia E78.2 272.2 Lipid Panel   4. CKD (chronic kidney disease), stage II N18.2 585.2    5. Osteoarthritis of spine with radiculopathy, lumbar region M47.26 721.3    6. Acquired hypothyroidism E03.9 244.9 TSH without Reflex      T4, Free      levothyroxine (SYNTHROID) 75 MCG tablet   7. Other osteoporosis without current pathological fracture M81.8 733.09    8. Vitamin D deficiency E55.9 268.9    9. Recurrent major depressive disorder, in partial remission (HCC) F33.41 296.35    10. Anxiety F41.9 300.00    11. Screening for AAA (abdominal aortic aneurysm) Z13.6 V81.2 US SCREENING FOR AAA   12. Screening for prostate cancer Z12.5 V76.44 PSA, Diagnostic   13. Environmental allergies Z91.09 V15.09 fluticasone (FLONASE) 50 MCG/ACT nasal spray   14. Lumbar spine pain M54.5 724.2 gabapentin (NEURONTIN) 100 MG capsule       Orders Placed This Encounter   Procedures    US SCREENING FOR AAA    Comprehensive Metabolic Panel    CBC Auto Differential    TSH without Reflex    T4, Free    PSA, Diagnostic    Lipid Panel        Return in about 6 months (around 8/1/2018). There are no Patient Instructions on file for this visit.                 Additional

## 2018-02-13 ENCOUNTER — HOSPITAL ENCOUNTER (OUTPATIENT)
Dept: GENERAL RADIOLOGY | Age: 68
Discharge: HOME | End: 2018-02-13
Payer: MEDICARE

## 2018-02-13 ENCOUNTER — TELEPHONE (OUTPATIENT)
Dept: PRIMARY CARE CLINIC | Age: 68
End: 2018-02-13

## 2018-02-13 DIAGNOSIS — Z12.5 SCREENING FOR PROSTATE CANCER: ICD-10-CM

## 2018-02-13 DIAGNOSIS — Z13.6 SCREENING FOR AAA (ABDOMINAL AORTIC ANEURYSM): ICD-10-CM

## 2018-02-13 DIAGNOSIS — E03.9 ACQUIRED HYPOTHYROIDISM: ICD-10-CM

## 2018-02-13 DIAGNOSIS — I10 ESSENTIAL HYPERTENSION: ICD-10-CM

## 2018-02-13 DIAGNOSIS — E78.2 MIXED HYPERLIPIDEMIA: ICD-10-CM

## 2018-02-13 LAB
ALBUMIN SERPL-MCNC: 3.9 G/DL (ref 3.5–5.2)
ALP BLD-CCNC: 102 U/L (ref 40–130)
ALT SERPL-CCNC: 12 U/L (ref 5–41)
ANION GAP SERPL CALCULATED.3IONS-SCNC: 15 MMOL/L (ref 7–19)
AST SERPL-CCNC: 23 U/L (ref 5–40)
BASOPHILS ABSOLUTE: 0 K/UL (ref 0–0.2)
BASOPHILS RELATIVE PERCENT: 0.6 % (ref 0–1)
BILIRUB SERPL-MCNC: 0.3 MG/DL (ref 0.2–1.2)
BUN BLDV-MCNC: 14 MG/DL (ref 8–23)
CALCIUM SERPL-MCNC: 9.2 MG/DL (ref 8.8–10.2)
CHLORIDE BLD-SCNC: 99 MMOL/L (ref 98–111)
CHOLESTEROL, TOTAL: 139 MG/DL (ref 160–199)
CO2: 27 MMOL/L (ref 22–29)
CREAT SERPL-MCNC: 1.2 MG/DL (ref 0.5–1.2)
EOSINOPHILS ABSOLUTE: 0.2 K/UL (ref 0–0.6)
EOSINOPHILS RELATIVE PERCENT: 3.1 % (ref 0–5)
GFR NON-AFRICAN AMERICAN: >60
GLUCOSE BLD-MCNC: 71 MG/DL (ref 74–109)
HCT VFR BLD CALC: 40.1 % (ref 42–52)
HDLC SERPL-MCNC: 57 MG/DL (ref 55–121)
HEMOGLOBIN: 12.4 G/DL (ref 14–18)
LDL CHOLESTEROL CALCULATED: 70 MG/DL
LYMPHOCYTES ABSOLUTE: 1.7 K/UL (ref 1.1–4.5)
LYMPHOCYTES RELATIVE PERCENT: 27.4 % (ref 20–40)
MCH RBC QN AUTO: 29 PG (ref 27–31)
MCHC RBC AUTO-ENTMCNC: 30.9 G/DL (ref 33–37)
MCV RBC AUTO: 93.7 FL (ref 80–94)
MONOCYTES ABSOLUTE: 0.7 K/UL (ref 0–0.9)
MONOCYTES RELATIVE PERCENT: 10.5 % (ref 0–10)
NEUTROPHILS ABSOLUTE: 3.6 K/UL (ref 1.5–7.5)
NEUTROPHILS RELATIVE PERCENT: 58.2 % (ref 50–65)
PDW BLD-RTO: 13.8 % (ref 11.5–14.5)
PLATELET # BLD: 180 K/UL (ref 130–400)
PMV BLD AUTO: 10.6 FL (ref 9.4–12.4)
POTASSIUM SERPL-SCNC: 4.6 MMOL/L (ref 3.5–5)
PROSTATE SPECIFIC ANTIGEN: 3.24 NG/ML (ref 0–4)
RBC # BLD: 4.28 M/UL (ref 4.7–6.1)
SODIUM BLD-SCNC: 141 MMOL/L (ref 136–145)
T4 FREE: 1.3 NG/DL (ref 0.9–1.7)
TOTAL PROTEIN: 7 G/DL (ref 6.6–8.7)
TRIGL SERPL-MCNC: 61 MG/DL (ref 0–149)
TSH SERPL DL<=0.05 MIU/L-ACNC: 1.58 UIU/ML (ref 0.27–4.2)
WBC # BLD: 6.2 K/UL (ref 4.8–10.8)

## 2018-02-13 PROCEDURE — 76706 US ABDL AORTA SCREEN AAA: CPT

## 2018-02-15 ENCOUNTER — TELEPHONE (OUTPATIENT)
Dept: PRIMARY CARE CLINIC | Age: 68
End: 2018-02-15

## 2018-02-15 NOTE — TELEPHONE ENCOUNTER
----- Message from Reina Cooper DO sent at 2/13/2018  8:14 PM CST -----  Your metabolic profile is normal.  This includes kidney and liver functions as well as electrolytes. Lipids are normal.  Thyroid is normal.  PSA is 3.2 for which is similar to 8 months ago.   CBC is stable

## 2018-03-14 NOTE — TELEPHONE ENCOUNTER
Received fax from pharmacy requesting refill on pts medication(s). Pt was last seen in office on 2/1/2018  and has a follow up scheduled for 8/1/2018. Will send request to  Dr. Keisha Lara  for authorization.      Requested Prescriptions     Pending Prescriptions Disp Refills    tiotropium (SPIRIVA HANDIHALER) 18 MCG inhalation capsule 30 capsule 3     Sig: Inhale 1 capsule into the lungs daily

## 2018-03-28 ENCOUNTER — HOSPITAL ENCOUNTER (OUTPATIENT)
Dept: PAIN MANAGEMENT | Age: 68
Discharge: HOME | End: 2018-03-28
Payer: MEDICARE

## 2018-03-28 VITALS
RESPIRATION RATE: 20 BRPM | WEIGHT: 125 LBS | HEIGHT: 65 IN | OXYGEN SATURATION: 94 % | TEMPERATURE: 97.6 F | SYSTOLIC BLOOD PRESSURE: 159 MMHG | HEART RATE: 74 BPM | DIASTOLIC BLOOD PRESSURE: 84 MMHG | BODY MASS INDEX: 20.83 KG/M2

## 2018-03-28 DIAGNOSIS — M54.50 LUMBAR SPINE PAIN: ICD-10-CM

## 2018-03-28 DIAGNOSIS — G44.229 CHRONIC TENSION-TYPE HEADACHE, NOT INTRACTABLE: ICD-10-CM

## 2018-03-28 DIAGNOSIS — M25.519 NECK AND SHOULDER PAIN: ICD-10-CM

## 2018-03-28 DIAGNOSIS — R51.9 CHRONIC DAILY HEADACHE: ICD-10-CM

## 2018-03-28 DIAGNOSIS — R51.9 FREQUENT HEADACHES: ICD-10-CM

## 2018-03-28 DIAGNOSIS — M54.2 NECK AND SHOULDER PAIN: ICD-10-CM

## 2018-03-28 PROCEDURE — 99213 OFFICE O/P EST LOW 20 MIN: CPT

## 2018-03-28 PROCEDURE — 80307 DRUG TEST PRSMV CHEM ANLYZR: CPT

## 2018-03-28 RX ORDER — HYDROCODONE BITARTRATE AND ACETAMINOPHEN 7.5; 325 MG/1; MG/1
1 TABLET ORAL 2 TIMES DAILY PRN
Qty: 60 TABLET | Refills: 0 | Status: SHIPPED | OUTPATIENT
Start: 2018-03-28 | End: 2018-04-24 | Stop reason: SDUPTHER

## 2018-03-28 RX ORDER — TIZANIDINE 4 MG/1
TABLET ORAL
Qty: 270 TABLET | Refills: 2 | Status: SHIPPED | OUTPATIENT
Start: 2018-03-28 | End: 2019-08-20

## 2018-03-28 ASSESSMENT — PAIN DESCRIPTION - ORIENTATION: ORIENTATION: LOWER

## 2018-03-28 ASSESSMENT — PAIN DESCRIPTION - LOCATION: LOCATION: BACK;NECK;HEAD

## 2018-03-28 ASSESSMENT — PAIN DESCRIPTION - FREQUENCY: FREQUENCY: CONTINUOUS

## 2018-03-28 ASSESSMENT — PAIN DESCRIPTION - ONSET: ONSET: ON-GOING

## 2018-03-28 ASSESSMENT — PAIN DESCRIPTION - PROGRESSION: CLINICAL_PROGRESSION: NOT CHANGED

## 2018-03-28 ASSESSMENT — PAIN SCALES - GENERAL: PAINLEVEL_OUTOF10: 8

## 2018-03-28 ASSESSMENT — PAIN DESCRIPTION - PAIN TYPE: TYPE: CHRONIC PAIN

## 2018-03-28 ASSESSMENT — ACTIVITIES OF DAILY LIVING (ADL): EFFECT OF PAIN ON DAILY ACTIVITIES: DAILY CHORES AND ACTIVITIES

## 2018-03-28 NOTE — PROCEDURES
MCG/ACT nasal spray 2 sprays by Nasal route daily 3 Bottle 3    gabapentin (NEURONTIN) 100 MG capsule Take 1 capsule by mouth 4 times daily for 90 days. 360 capsule 1    levothyroxine (SYNTHROID) 75 MCG tablet TAKE 1 TABLET BY MOUTH DAILY 90 tablet 3    albuterol sulfate HFA (VENTOLIN HFA) 108 (90 Base) MCG/ACT inhaler INHALE 2 PUFFS BY MOUTH EVERY 6 HOURS AS NEEDED FOR WHEEZING 54 g 5    HYDROcodone-acetaminophen (NORCO) 7.5-325 MG per tablet Take 1 tablet by mouth 2 times daily as needed for Pain . 60 tablet 0    pravastatin (PRAVACHOL) 40 MG tablet TAKE 1 TABLET BY MOUTH DAILY 90 tablet 3    lisinopril (PRINIVIL;ZESTRIL) 20 MG tablet TAKE 1 TABLET BY MOUTH DAILY 90 tablet 3    vitamin D (ERGOCALCIFEROL) 24441 UNITS CAPS capsule Take 1 capsule by mouth every 14 days 30 capsule 0    tiZANidine (ZANAFLEX) 4 MG tablet TAKE 1 TABLET BY MOUTH EVERY 8 HOURS AS NEEDED 270 tablet 2    ADVAIR DISKUS 250-50 MCG/DOSE AEPB Inhale 1 puff into the lungs 2 times daily  12    diclofenac sodium 1 % GEL Apply 4 g topically 4 times daily as needed 1 Tube 5    guaiFENesin 400 MG tablet Take 400 mg by mouth daily       OXYGEN Inhale  into the lungs. 2 liters per nasal cannula at hs      mupirocin (BACTROBAN) 2 % ointment Apply topically 3 times daily. 15 g 0     No current facility-administered medications for this encounter. Allergies  Asa [aspirin]    Family History  family history includes Cancer in his sister; Diabetes in his mother, sister, and sister; High Blood Pressure in his mother and sister; Pacemaker in his maternal aunt; Stroke in his maternal aunt. Social History  Social History     Social History    Marital status:       Spouse name: N/A    Number of children: N/A    Years of education: N/A     Social History Main Topics    Smoking status: Former Smoker     Types: Cigarettes     Quit date: 5/16/2012    Smokeless tobacco: Never Used    Alcohol use No    Drug use: No    Sexual

## 2018-03-28 NOTE — PROGRESS NOTES
Nursing Admission Record    Current Issues / Falls / ER Visits:  No    Percentage of Pain Relief after Last Procedure:  85 %    How long lasted:  2 months    Radiology exams received during the last 12 months: No       When na                                              Where na       Imaging on chart: No         Imaging records requested: No  MRI exams received in the past 2 years:  No  Physical therapy during the last 6 months: No       When: na                                             Where  na  Labs during the last 12 months: Yes    Education Provided:  [x] Review of [place]  [] Agreement Review  [] Compliance Issues Discussed    [] Cognitive Behavior Needs [x] Exercise [] Review of Test [] Financial Issues  [x] Tobacco/Alcohol Use [x] Teaching [] New Patient [] Picture Obtained    Physician Plan:  [] Outgoing Referral  [] Pharmacy Consult  [x] Test Ordered   [] Obtained Test Results / Consult Notes  [] UDS due at next visit, verified per EPIC      [] Suspected Physical Abuse or Suicide Risk assessed - IF YES COMPLETE QUESTIONS BELOW    If any of the following questions are answered yes - contact attending physician for referral:    Has been considering harming self to escape stress, pain problems? [] YES  [x] NO  Has a suicide plan? [] YES  [x] NO  Has attempted suicide in the past?   [] YES  [x] NO  Has a close friend or family member who committed suicide? [] YES  [x] NO    Patient Referred To :      Additional Notes:    Assessment Completed by:  Electronically signed by Cosme Jefferson RN on 3/28/2018 at 1:44 PM

## 2018-03-30 LAB
AMPHETAMINES, URINE: NEGATIVE NG/ML
BARBITURATES, URINE: NEGATIVE NG/ML
BENZODIAZEPINES, URINE: NEGATIVE NG/ML
CANNABINOIDS, URINE: NEGATIVE NG/ML
COCAINE METABOLITE, URINE: NEGATIVE NG/ML
CREATININE, URINE: 23.8 MG/DL (ref 20–300)
ETHANOL U, QUAN: NEGATIVE %
FENTANYL URINE: NEGATIVE PG/ML
MEPERIDINE, UR: NEGATIVE NG/ML
METHADONE SCREEN, URINE: NEGATIVE NG/ML
OPIATES, URINE: NEGATIVE NG/ML
OXYCODONE/OXYMORPHONE, UR: NEGATIVE NG/ML
PH, URINE: 6 (ref 4.5–8.9)
PHENCYCLIDINE, URINE: NEGATIVE NG/ML
PROPOXYPHENE, URINE: NEGATIVE NG/ML

## 2018-04-17 LAB
ANION GAP SERPL CALCULATED.3IONS-SCNC: 14 MMOL/L (ref 7–19)
BASOPHILS ABSOLUTE: 0 K/UL (ref 0–0.2)
BASOPHILS RELATIVE PERCENT: 0.6 % (ref 0–1)
BUN BLDV-MCNC: 11 MG/DL (ref 8–23)
CALCIUM SERPL-MCNC: 9.5 MG/DL (ref 8.8–10.2)
CHLORIDE BLD-SCNC: 93 MMOL/L (ref 98–111)
CO2: 27 MMOL/L (ref 22–29)
CREAT SERPL-MCNC: 1 MG/DL (ref 0.5–1.2)
CREATININE URINE: 62.8 MG/DL (ref 4.2–622)
EOSINOPHILS ABSOLUTE: 0.2 K/UL (ref 0–0.6)
EOSINOPHILS RELATIVE PERCENT: 2.3 % (ref 0–5)
GFR NON-AFRICAN AMERICAN: >60
GLUCOSE BLD-MCNC: 73 MG/DL (ref 74–109)
HCT VFR BLD CALC: 41.6 % (ref 42–52)
HEMOGLOBIN: 12.9 G/DL (ref 14–18)
LYMPHOCYTES ABSOLUTE: 1.2 K/UL (ref 1.1–4.5)
LYMPHOCYTES RELATIVE PERCENT: 18.1 % (ref 20–40)
MCH RBC QN AUTO: 28.8 PG (ref 27–31)
MCHC RBC AUTO-ENTMCNC: 31 G/DL (ref 33–37)
MCV RBC AUTO: 92.9 FL (ref 80–94)
MICROALBUMIN UR-MCNC: 3.3 MG/DL (ref 0–19)
MICROALBUMIN/CREAT UR-RTO: 52.5 MG/G
MONOCYTES ABSOLUTE: 0.6 K/UL (ref 0–0.9)
MONOCYTES RELATIVE PERCENT: 9.3 % (ref 0–10)
NEUTROPHILS ABSOLUTE: 4.5 K/UL (ref 1.5–7.5)
NEUTROPHILS RELATIVE PERCENT: 69.2 % (ref 50–65)
PDW BLD-RTO: 12.8 % (ref 11.5–14.5)
PLATELET # BLD: 203 K/UL (ref 130–400)
PMV BLD AUTO: 10.3 FL (ref 9.4–12.4)
POTASSIUM SERPL-SCNC: 4 MMOL/L (ref 3.5–5)
RBC # BLD: 4.48 M/UL (ref 4.7–6.1)
SODIUM BLD-SCNC: 134 MMOL/L (ref 136–145)
WBC # BLD: 6.5 K/UL (ref 4.8–10.8)

## 2018-04-24 ENCOUNTER — HOSPITAL ENCOUNTER (OUTPATIENT)
Dept: PAIN MANAGEMENT | Age: 68
Discharge: HOME | End: 2018-04-24
Payer: MEDICARE

## 2018-04-24 VITALS
HEIGHT: 60 IN | TEMPERATURE: 99 F | OXYGEN SATURATION: 97 % | WEIGHT: 118 LBS | DIASTOLIC BLOOD PRESSURE: 86 MMHG | BODY MASS INDEX: 23.16 KG/M2 | HEART RATE: 68 BPM | SYSTOLIC BLOOD PRESSURE: 159 MMHG

## 2018-04-24 PROCEDURE — 99213 OFFICE O/P EST LOW 20 MIN: CPT

## 2018-04-24 ASSESSMENT — PAIN DESCRIPTION - ONSET: ONSET: ON-GOING

## 2018-04-24 ASSESSMENT — PAIN DESCRIPTION - FREQUENCY: FREQUENCY: CONTINUOUS

## 2018-04-24 ASSESSMENT — PAIN SCALES - GENERAL: PAINLEVEL_OUTOF10: 8

## 2018-04-24 ASSESSMENT — PAIN DESCRIPTION - ORIENTATION: ORIENTATION: LOWER

## 2018-04-24 ASSESSMENT — ACTIVITIES OF DAILY LIVING (ADL): EFFECT OF PAIN ON DAILY ACTIVITIES: DAILY CHORES AND ACTIVITIES

## 2018-04-24 ASSESSMENT — PAIN DESCRIPTION - PAIN TYPE: TYPE: CHRONIC PAIN

## 2018-04-24 ASSESSMENT — PAIN DESCRIPTION - LOCATION: LOCATION: BACK;NECK;HEAD

## 2018-04-24 ASSESSMENT — PAIN DESCRIPTION - PROGRESSION: CLINICAL_PROGRESSION: NOT CHANGED

## 2018-04-26 ENCOUNTER — CARE COORDINATION (OUTPATIENT)
Dept: CARE COORDINATION | Age: 68
End: 2018-04-26

## 2018-05-01 ENCOUNTER — CARE COORDINATION (OUTPATIENT)
Dept: CARE COORDINATION | Age: 68
End: 2018-05-01

## 2018-05-04 ENCOUNTER — CARE COORDINATION (OUTPATIENT)
Dept: CARE COORDINATION | Age: 68
End: 2018-05-04

## 2018-05-07 ENCOUNTER — OFFICE VISIT (OUTPATIENT)
Dept: PRIMARY CARE CLINIC | Age: 68
End: 2018-05-07
Payer: MEDICARE

## 2018-05-07 ENCOUNTER — CARE COORDINATION (OUTPATIENT)
Dept: CARE COORDINATION | Age: 68
End: 2018-05-07

## 2018-05-07 VITALS
WEIGHT: 118 LBS | BODY MASS INDEX: 20.14 KG/M2 | HEART RATE: 116 BPM | TEMPERATURE: 98.6 F | OXYGEN SATURATION: 90 % | SYSTOLIC BLOOD PRESSURE: 128 MMHG | DIASTOLIC BLOOD PRESSURE: 74 MMHG | HEIGHT: 64 IN

## 2018-05-07 DIAGNOSIS — Z11.1 SCREENING FOR TUBERCULOSIS: ICD-10-CM

## 2018-05-07 DIAGNOSIS — I10 ESSENTIAL HYPERTENSION: Primary | ICD-10-CM

## 2018-05-07 PROCEDURE — 1036F TOBACCO NON-USER: CPT | Performed by: NURSE PRACTITIONER

## 2018-05-07 PROCEDURE — 1123F ACP DISCUSS/DSCN MKR DOCD: CPT | Performed by: NURSE PRACTITIONER

## 2018-05-07 PROCEDURE — 86580 TB INTRADERMAL TEST: CPT | Performed by: NURSE PRACTITIONER

## 2018-05-07 PROCEDURE — 3017F COLORECTAL CA SCREEN DOC REV: CPT | Performed by: NURSE PRACTITIONER

## 2018-05-07 PROCEDURE — G8420 CALC BMI NORM PARAMETERS: HCPCS | Performed by: NURSE PRACTITIONER

## 2018-05-07 PROCEDURE — 4040F PNEUMOC VAC/ADMIN/RCVD: CPT | Performed by: NURSE PRACTITIONER

## 2018-05-07 PROCEDURE — G8427 DOCREV CUR MEDS BY ELIG CLIN: HCPCS | Performed by: NURSE PRACTITIONER

## 2018-05-07 PROCEDURE — 99213 OFFICE O/P EST LOW 20 MIN: CPT | Performed by: NURSE PRACTITIONER

## 2018-05-07 RX ORDER — LISINOPRIL 20 MG/1
TABLET ORAL
Qty: 90 TABLET | Refills: 3 | Status: SHIPPED | OUTPATIENT
Start: 2018-05-07 | End: 2019-02-02 | Stop reason: SDUPTHER

## 2018-05-07 ASSESSMENT — ENCOUNTER SYMPTOMS
WHEEZING: 0
COUGH: 1
SHORTNESS OF BREATH: 1
BACK PAIN: 0
DIARRHEA: 0
ABDOMINAL PAIN: 0
VOMITING: 0
SORE THROAT: 0
NAUSEA: 0
CHEST TIGHTNESS: 0

## 2018-05-08 ENCOUNTER — CARE COORDINATION (OUTPATIENT)
Dept: CARE COORDINATION | Age: 68
End: 2018-05-08

## 2018-05-10 ENCOUNTER — CARE COORDINATION (OUTPATIENT)
Dept: CARE COORDINATION | Age: 68
End: 2018-05-10

## 2018-05-23 ENCOUNTER — CARE COORDINATION (OUTPATIENT)
Dept: CARE COORDINATION | Age: 68
End: 2018-05-23

## 2018-06-04 DIAGNOSIS — E78.5 HYPERLIPIDEMIA, UNSPECIFIED HYPERLIPIDEMIA TYPE: ICD-10-CM

## 2018-06-04 RX ORDER — PRAVASTATIN SODIUM 40 MG
TABLET ORAL
Qty: 90 TABLET | Refills: 0 | Status: SHIPPED | OUTPATIENT
Start: 2018-06-04 | End: 2019-04-23

## 2018-07-10 ENCOUNTER — HOSPITAL ENCOUNTER (OUTPATIENT)
Dept: PAIN MANAGEMENT | Age: 68
Discharge: HOME | End: 2018-07-10
Payer: MEDICARE

## 2018-07-10 VITALS
SYSTOLIC BLOOD PRESSURE: 132 MMHG | HEART RATE: 71 BPM | RESPIRATION RATE: 20 BRPM | OXYGEN SATURATION: 93 % | WEIGHT: 115 LBS | TEMPERATURE: 97.7 F | DIASTOLIC BLOOD PRESSURE: 80 MMHG | HEIGHT: 60 IN | BODY MASS INDEX: 22.58 KG/M2

## 2018-07-10 DIAGNOSIS — M50.30 DDD (DEGENERATIVE DISC DISEASE), CERVICAL: Primary | ICD-10-CM

## 2018-07-10 DIAGNOSIS — R51.9 CHRONIC DAILY HEADACHE: Chronic | ICD-10-CM

## 2018-07-10 PROCEDURE — 2500000003 HC RX 250 WO HCPCS

## 2018-07-10 PROCEDURE — 64405 NJX AA&/STRD GR OCPL NRV: CPT

## 2018-07-10 PROCEDURE — 6360000002 HC RX W HCPCS

## 2018-07-10 PROCEDURE — 64612 DESTROY NERVE FACE MUSCLE: CPT

## 2018-07-10 PROCEDURE — 64646 CHEMODENERV TRUNK MUSC 1-5: CPT

## 2018-07-10 PROCEDURE — 64616 CHEMODENERV MUSC NECK DYSTON: CPT

## 2018-07-10 RX ORDER — HYDROCODONE BITARTRATE AND ACETAMINOPHEN 7.5; 325 MG/1; MG/1
1 TABLET ORAL DAILY PRN
Qty: 30 TABLET | Refills: 0 | Status: SHIPPED | OUTPATIENT
Start: 2018-07-10 | End: 2018-08-09

## 2018-07-10 RX ORDER — BUPIVACAINE HYDROCHLORIDE 2.5 MG/ML
INJECTION, SOLUTION EPIDURAL; INFILTRATION; INTRACAUDAL
Status: COMPLETED | OUTPATIENT
Start: 2018-07-10 | End: 2018-07-10

## 2018-07-10 RX ADMIN — BUPIVACAINE HYDROCHLORIDE 20 ML: 2.5 INJECTION, SOLUTION EPIDURAL; INFILTRATION; INTRACAUDAL at 13:55

## 2018-07-10 ASSESSMENT — PAIN - FUNCTIONAL ASSESSMENT: PAIN_FUNCTIONAL_ASSESSMENT: 0-10

## 2018-07-10 NOTE — PROCEDURES
Marissa Guerrero is a 79 y.o. male patient. 1. Chronic daily headache      Past Medical History:   Diagnosis Date    Arthritis     CKD (chronic kidney disease), stage II     COPD [facility]chronic obstructive pulmonary disease) [facility])     Frequent headaches 2/10/2016    Head trauma     golf club injury at age 11-16 years old    Hypertension     Hypertension     Hypothyroidism     Metal plate in skull     no mri's    Mixed hyperlipidemia 9/20/2017    Neck and shoulder pain 2/10/2016    Osteoporosis     Polio     hx    Vitamin D deficiency      Blood pressure 132/80, pulse 71, temperature 97.7 °F (36.5 °C), resp. rate 20, height 5' (1.524 m), weight 115 lb (52.2 kg), SpO2 93 %. Procedures  Date: 7/10/2018    REASON FOR VISIT: Principal Problem:    Chronic daily headache  Resolved Problems:    * No resolved hospital problems. *      PROCEDURE:  Botox Injection  [] Moderate Sedation    DESCRIPTION OF PROCEDURE:    After obtaining informed consent, the skin was sterilely prepped. Then [] 10ml [x] 20ml [] 30ml of [x]  0.25% Bupivacaine [] Saline was mixed with with 200  units of [x] Botox A []  Botox B     This was divided into tense spasmic areas or trigger points in the trunk:     []  Erector spinae   []  Latissimus   []  Paraspinous  []  Rhomboids  [x]  Trapezius  []  Supraspinatus  []  infraspinatus      This was divided into tense spasmic areas or trigger points in the neck:     [x] Splenius  [x] Scalenes  [x] Trapezius     [] Right [] Left [x] Bilateral occipital nerves. Facial nerve distribution:  [] Right [] Left [x] Bilateral Procerus  [] Right [] Left [x] Bilateral   [] Right [] Left [x] Bilateral Frontalis  [] Right [] Left [x] BilateralTemporalis    There were no complications.         DIAGNOSES:  [] Muscle tension and spasms   [] Myofascial Pain Syndrome  [] Cervicalgia  [x] Chronic Daily Tension Headaches  [] Migraine Headaches  [] Other    PLAN:  [x] Will return to office in 3 month(s)

## 2018-07-10 NOTE — PROGRESS NOTES
Procedure:  Level of Consciousness: [x]Alert []Oriented []Disoriented []Lethargic  Anxiety Level: [x]Calm []Anxious []Depressed []Other  Skin: [x]Warm [x]Dry []Cool []Moist []Intact []Other  Cardiovascular: []Palpitations: [x]Never []Occasionally []Frequently  Chest Pain: [x]No []Yes  Respiratory:  []Unlabored []Labored []Cough ([] Productive []Unproductive)  HCG Required: []No []Yes   Results: []Negative []Positive  Knowledge Level:        [x]Patient/Other verbalized understanding of pre-procedure instructions. []Assessment of post-op care needs (transportation, responsible caregiver)        []Able to discuss health care problems and how to deal with it.   Factors that Affect Teaching:        Language Barrier: [x]No []Yes - why:        Hearing Loss:        []No [x]Yes            Corrective Device:  [x]Yes []No        Vision Loss:           []No [x]Yes            Corrective Device:  [x]Yes []No        Memory Loss:       [x]No []Yes            []Short Term []Long Term  Motivational Level:  []Asks Questions                  []Extremely Anxious       []Seems Interested               []Seems Uninterested                  []Denies need for Education  Risk for Injury:  [x]Patient oriented to person, place and time  []History of frequent falls/loss of balance  Nutritional:  []Change in appetite   []Weight Gain   []Weight Loss  Functional:  []Requires assistance with ADL's

## 2018-08-01 ENCOUNTER — OFFICE VISIT (OUTPATIENT)
Dept: PRIMARY CARE CLINIC | Age: 68
End: 2018-08-01
Payer: MEDICARE

## 2018-08-01 VITALS
OXYGEN SATURATION: 94 % | HEIGHT: 65 IN | HEART RATE: 80 BPM | TEMPERATURE: 100 F | BODY MASS INDEX: 20.23 KG/M2 | DIASTOLIC BLOOD PRESSURE: 78 MMHG | SYSTOLIC BLOOD PRESSURE: 128 MMHG | WEIGHT: 121.4 LBS

## 2018-08-01 DIAGNOSIS — Z91.09 ENVIRONMENTAL ALLERGIES: ICD-10-CM

## 2018-08-01 DIAGNOSIS — E78.2 MIXED HYPERLIPIDEMIA: ICD-10-CM

## 2018-08-01 DIAGNOSIS — J44.1 COPD EXACERBATION (HCC): ICD-10-CM

## 2018-08-01 DIAGNOSIS — M54.50 LUMBAR SPINE PAIN: ICD-10-CM

## 2018-08-01 DIAGNOSIS — I10 ESSENTIAL HYPERTENSION: ICD-10-CM

## 2018-08-01 DIAGNOSIS — J44.9 CHRONIC OBSTRUCTIVE PULMONARY DISEASE, UNSPECIFIED COPD TYPE (HCC): Primary | ICD-10-CM

## 2018-08-01 DIAGNOSIS — E03.9 ACQUIRED HYPOTHYROIDISM: ICD-10-CM

## 2018-08-01 DIAGNOSIS — F41.9 ANXIETY: ICD-10-CM

## 2018-08-01 DIAGNOSIS — M50.30 DDD (DEGENERATIVE DISC DISEASE), CERVICAL: ICD-10-CM

## 2018-08-01 PROCEDURE — 1123F ACP DISCUSS/DSCN MKR DOCD: CPT | Performed by: PEDIATRICS

## 2018-08-01 PROCEDURE — G8427 DOCREV CUR MEDS BY ELIG CLIN: HCPCS | Performed by: PEDIATRICS

## 2018-08-01 PROCEDURE — 99214 OFFICE O/P EST MOD 30 MIN: CPT | Performed by: PEDIATRICS

## 2018-08-01 PROCEDURE — 1036F TOBACCO NON-USER: CPT | Performed by: PEDIATRICS

## 2018-08-01 PROCEDURE — 3023F SPIROM DOC REV: CPT | Performed by: PEDIATRICS

## 2018-08-01 PROCEDURE — 1101F PT FALLS ASSESS-DOCD LE1/YR: CPT | Performed by: PEDIATRICS

## 2018-08-01 PROCEDURE — G8420 CALC BMI NORM PARAMETERS: HCPCS | Performed by: PEDIATRICS

## 2018-08-01 PROCEDURE — 4040F PNEUMOC VAC/ADMIN/RCVD: CPT | Performed by: PEDIATRICS

## 2018-08-01 PROCEDURE — 3017F COLORECTAL CA SCREEN DOC REV: CPT | Performed by: PEDIATRICS

## 2018-08-01 PROCEDURE — G8926 SPIRO NO PERF OR DOC: HCPCS | Performed by: PEDIATRICS

## 2018-08-01 RX ORDER — DOXYCYCLINE HYCLATE 100 MG/1
100 CAPSULE ORAL 2 TIMES DAILY
Qty: 20 CAPSULE | Refills: 0 | Status: SHIPPED | OUTPATIENT
Start: 2018-08-01 | End: 2018-08-11

## 2018-08-01 RX ORDER — ALBUTEROL SULFATE 90 UG/1
AEROSOL, METERED RESPIRATORY (INHALATION)
Qty: 54 G | Refills: 5 | Status: SHIPPED | OUTPATIENT
Start: 2018-08-01 | End: 2018-11-30 | Stop reason: ALTCHOICE

## 2018-08-01 RX ORDER — GABAPENTIN 100 MG/1
100 CAPSULE ORAL 4 TIMES DAILY
Qty: 360 CAPSULE | Refills: 1 | Status: SHIPPED | OUTPATIENT
Start: 2018-08-01 | End: 2019-02-02 | Stop reason: SDUPTHER

## 2018-08-01 ASSESSMENT — ENCOUNTER SYMPTOMS
NAUSEA: 0
BACK PAIN: 0
ABDOMINAL PAIN: 0
WHEEZING: 0
SORE THROAT: 0
CHEST TIGHTNESS: 0
DIARRHEA: 0
SHORTNESS OF BREATH: 1
COUGH: 1
VOMITING: 0

## 2018-08-01 NOTE — PROGRESS NOTES
normal and normal heart sounds. No extrasystoles are present. PMI is not displaced. Exam reveals no gallop and no friction rub. No murmur heard. Pulmonary/Chest: Effort normal and breath sounds normal. No respiratory distress. He has no wheezes. He has no rhonchi. He has no rales. Abdominal: Soft. Bowel sounds are normal. There is no hepatosplenomegaly. There is no tenderness. There is no rebound, no guarding and no CVA tenderness. Genitourinary:   Genitourinary Comments: Exam deferred   Musculoskeletal: Normal range of motion. He exhibits no edema or tenderness. Lymphadenopathy:     He has no cervical adenopathy. Neurological: He is alert and oriented to person, place, and time. He has normal strength. No sensory deficit (no numbness or tingling). Skin: Skin is warm and dry. No rash noted. Psychiatric: He has a normal mood and affect. ASSESSMENT      ICD-10-CM ICD-9-CM    1. Chronic obstructive pulmonary disease, unspecified COPD type (HCC) J44.9 496 albuterol sulfate HFA (VENTOLIN HFA) 108 (90 Base) MCG/ACT inhaler   2. COPD exacerbation (HCC) J44.1 491.21 doxycycline hyclate (VIBRAMYCIN) 100 MG capsule   3. Essential hypertension I10 401.9 CBC Auto Differential      Comprehensive Metabolic Panel      Microalbumin / Creatinine Urine Ratio   4. Mixed hyperlipidemia E78.2 272.2 Lipid Panel   5. Acquired hypothyroidism E03.9 244.9 T4, Free      TSH without Reflex   6. DDD (degenerative disc disease), cervical M50.30 722.4    7. Lumbar spine pain M54.5 724.2 gabapentin (NEURONTIN) 100 MG capsule   8. Anxiety F41.9 300.00    9. Environmental allergies Z91.09 V15.09        PLAN      ICD-10-CM ICD-9-CM    1. Chronic obstructive pulmonary disease, unspecified COPD type (HCC) J44.9 496 albuterol sulfate HFA (VENTOLIN HFA) 108 (90 Base) MCG/ACT inhaler   2. COPD exacerbation (HCC) J44.1 491.21 doxycycline hyclate (VIBRAMYCIN) 100 MG capsule   3.  Essential hypertension I10 401.9 CBC Auto Differential      Comprehensive Metabolic Panel      Microalbumin / Creatinine Urine Ratio   4. Mixed hyperlipidemia E78.2 272.2 Lipid Panel   5. Acquired hypothyroidism E03.9 244.9 T4, Free      TSH without Reflex   6. DDD (degenerative disc disease), cervical M50.30 722.4 Stable on present regimen   7. Lumbar spine pain M54.5 724.2 gabapentin (NEURONTIN) 100 MG capsule   8. Anxiety F41.9 300.00 Occasional occurance   9. Environmental allergies Z91.09 V15.09 Doing ok with flonase. Orders Placed This Encounter   Procedures    CBC Auto Differential    Comprehensive Metabolic Panel    Lipid Panel    T4, Free    TSH without Reflex    Microalbumin / Creatinine Urine Ratio        No Follow-up on file.

## 2018-08-24 ENCOUNTER — CARE COORDINATION (OUTPATIENT)
Dept: CARE COORDINATION | Age: 68
End: 2018-08-24

## 2018-08-24 NOTE — CARE COORDINATION
8/1/18   B Yevette Baumgarten, DO   pravastatin (PRAVACHOL) 40 MG tablet TAKE 1 TABLET BY MOUTH DAILY 6/4/18   Lyla Im, APRAZRA   lisinopril (PRINIVIL;ZESTRIL) 20 MG tablet TAKE 1 TABLET BY MOUTH DAILY 5/7/18   Radha Bonilla Paniagua, APRN   Umeclidinium Bromide 62.5 MCG/INH AEPB Inhale 1 puff into the lungs daily 4/11/18   JanessaGELY Maier   tiZANidine (ZANAFLEX) 4 MG tablet TAKE 1 TABLET BY MOUTH EVERY 8 HOURS AS NEEDED 3/28/18   Norma Cyr MD   fluticasone [facility]) 50 MCG/ACT nasal spray 2 sprays by Nasal route daily 2/1/18   B Yevette Baumgarten, DO   levothyroxine (SYNTHROID) 75 MCG tablet TAKE 1 TABLET BY MOUTH DAILY 2/1/18   B Yevette Baumgarten, DO   vitamin D (ERGOCALCIFEROL) 66857 UNITS CAPS capsule Take 1 capsule by mouth every 14 days 5/2/17   Favian Mckeon MD   ADVAIR DISKUS 250-50 MCG/DOSE AEPB Inhale 1 puff into the lungs 2 times daily 9/21/16   Historical Provider, MD   diclofenac sodium 1 % GEL Apply 4 g topically 4 times daily as needed 6/23/16   Favian Mckeon MD   guaiFENesin 400 MG tablet Take 400 mg by mouth daily     Historical Provider, MD   OXYGEN Inhale  into the lungs. 2 liters per nasal cannula at hs    Historical Provider, MD   mupirocin (BACTROBAN) 2 % ointment Apply topically 3 times daily.  5/16/14   Favian Mckeon MD       Future Appointments  Date Time Provider Beba Dawson   10/30/2018 1:30 PM Concepcion Merrill, [address] [place] [address]

## 2018-09-10 ENCOUNTER — CARE COORDINATION (OUTPATIENT)
Dept: CARE COORDINATION | Age: 68
End: 2018-09-10

## 2018-09-10 NOTE — CARE COORDINATION
Ambulatory Care Coordination Note  9/10/2018  CM Risk Score: 2  Niko Mortality Risk Score: 3.27    ACC: Gillian Quintanilla, RN    Summary Note: Keegan Londono is satisfied living at [facility] with his mother. His aunt, Nico Traore, is assisting him with Medicaid application for pt's mother. Keegan Londono is caring for his mother and also keeping his own medical appts and paying their bills. Michelle Day stated she is supporting Keegan Londono and his mother as he and his mother will allow. Care Coordination Interventions    Program Enrollment:  Rising Risk  Referral from Primary Care Provider:  No  Suggested Interventions and Community Resources  Transportation Support:  Completed  Zone Management Tools: In Process (Comment: 9/10/18: Michelle Shay reports she is trying to help settle some medical costs for Veena's time at [facility]. She is working)  Other Services or Interventions:  9/10/18: Per Elzie Bamberger is doing well and is diligently taking care of his mother. Goals Addressed             Most Recent    [facility] Goal   On track (9/10/2018)             I will complete application for assistance to Transportation Assistance, Medication Assistance and public housing assistance for medical transport, med cost help, improved housing. Barriers: impairment:  Pt unable to read, financial and lack of support  Plan for overcoming my barriers:   [address] [facility], [place] [address], [facility] as able in order to assist with completion of applications  Confidence: 10/10  Anticipated Goal Completion Date: 7/25/18            Prior to Admission medications    Medication Sig Start Date End Date Taking? Authorizing Provider   gabapentin (NEURONTIN) 100 MG capsule Take 1 capsule by mouth 4 times daily for 90 days. . 8/1/18 10/30/18  B Yazmin Janice, DO   albuterol sulfate HFA (VENTOLIN HFA) 108 (90 Base) MCG/ACT inhaler INHALE 2 PUFFS BY MOUTH EVERY 6 HOURS AS NEEDED FOR WHEEZING 8/1/18   B Yazmin Janice, DO   pravastatin (PRAVACHOL) 40 MG tablet TAKE 1 TABLET BY MOUTH DAILY 6/4/18   GELY Mccollum   lisinopril (PRINIVIL;ZESTRIL) 20 MG tablet TAKE 1 TABLET BY MOUTH DAILY 5/7/18   Tom PaniaguaGELY   Umeclidinium Bromide 62.5 MCG/INH AEPB Inhale 1 puff into the lungs daily 4/11/18   GELY Steele   tiZANidine (ZANAFLEX) 4 MG tablet TAKE 1 TABLET BY MOUTH EVERY 8 HOURS AS NEEDED 3/28/18   Eleni Lewis MD   fluticasone Amara Addie) 50 MCG/ACT nasal spray 2 sprays by Nasal route daily 2/1/18   B Dearl Beecham, DO   levothyroxine (SYNTHROID) 75 MCG tablet TAKE 1 TABLET BY MOUTH DAILY 2/1/18   B Dearl Beecham, DO   vitamin D (ERGOCALCIFEROL) 57454 UNITS CAPS capsule Take 1 capsule by mouth every 14 days 5/2/17   Yoli Parrish MD   ADVAIR DISKUS 250-50 MCG/DOSE AEPB Inhale 1 puff into the lungs 2 times daily 9/21/16   Historical Provider, MD   diclofenac sodium 1 % GEL Apply 4 g topically 4 times daily as needed 6/23/16   Yoli Parrish MD   guaiFENesin 400 MG tablet Take 400 mg by mouth daily     Historical Provider, MD   OXYGEN Inhale  into the lungs. 2 liters per nasal cannula at hs    Historical Provider, MD   mupirocin (BACTROBAN) 2 % ointment Apply topically 3 times daily.  5/16/14   Yoli Parrish MD       Future Appointments  Date Time Provider Beba Dawson   10/30/2018 1:30 PM [address] [facility],[address], [address] [facility]

## 2018-10-11 RX ORDER — UMECLIDINIUM 62.5 UG/1
AEROSOL, POWDER ORAL
Qty: 1 EACH | Refills: 11 | Status: SHIPPED | OUTPATIENT
Start: 2018-10-11 | End: 2018-11-10 | Stop reason: SDUPTHER

## 2018-10-30 ENCOUNTER — OFFICE VISIT (OUTPATIENT)
Dept: PRIMARY CARE CLINIC | Age: 68
End: 2018-10-30
Payer: MEDICARE

## 2018-10-30 ENCOUNTER — CARE COORDINATION (OUTPATIENT)
Dept: CARE COORDINATION | Age: 68
End: 2018-10-30

## 2018-10-30 VITALS
TEMPERATURE: 99.6 F | DIASTOLIC BLOOD PRESSURE: 80 MMHG | WEIGHT: 121.25 LBS | HEIGHT: 65 IN | HEART RATE: 75 BPM | SYSTOLIC BLOOD PRESSURE: 150 MMHG | BODY MASS INDEX: 20.2 KG/M2 | OXYGEN SATURATION: 75 %

## 2018-10-30 DIAGNOSIS — J44.9 CHRONIC OBSTRUCTIVE PULMONARY DISEASE, UNSPECIFIED COPD TYPE (HCC): ICD-10-CM

## 2018-10-30 DIAGNOSIS — Z23 NEED FOR INFLUENZA VACCINATION: ICD-10-CM

## 2018-10-30 DIAGNOSIS — E03.9 ACQUIRED HYPOTHYROIDISM: ICD-10-CM

## 2018-10-30 DIAGNOSIS — E78.2 MIXED HYPERLIPIDEMIA: ICD-10-CM

## 2018-10-30 DIAGNOSIS — Z00.00 VISIT FOR PREVENTIVE HEALTH EXAMINATION: Primary | ICD-10-CM

## 2018-10-30 DIAGNOSIS — M47.26 OSTEOARTHRITIS OF SPINE WITH RADICULOPATHY, LUMBAR REGION: ICD-10-CM

## 2018-10-30 DIAGNOSIS — Z91.09 ENVIRONMENTAL ALLERGIES: ICD-10-CM

## 2018-10-30 DIAGNOSIS — I10 ESSENTIAL HYPERTENSION: ICD-10-CM

## 2018-10-30 DIAGNOSIS — Z87.891 PERSONAL HISTORY OF TOBACCO USE: ICD-10-CM

## 2018-10-30 PROBLEM — R51.9 CHRONIC DAILY HEADACHE: Chronic | Status: RESOLVED | Noted: 2017-09-07 | Resolved: 2018-10-30

## 2018-10-30 PROBLEM — R51.9 CHRONIC DAILY HEADACHE: Chronic | Status: RESOLVED | Noted: 2017-06-05 | Resolved: 2018-10-30

## 2018-10-30 PROBLEM — R51.9 CHRONIC DAILY HEADACHE: Chronic | Status: RESOLVED | Noted: 2017-02-21 | Resolved: 2018-10-30

## 2018-10-30 PROCEDURE — G0008 ADMIN INFLUENZA VIRUS VAC: HCPCS | Performed by: PEDIATRICS

## 2018-10-30 PROCEDURE — 99214 OFFICE O/P EST MOD 30 MIN: CPT | Performed by: PEDIATRICS

## 2018-10-30 PROCEDURE — G8420 CALC BMI NORM PARAMETERS: HCPCS | Performed by: PEDIATRICS

## 2018-10-30 PROCEDURE — 3017F COLORECTAL CA SCREEN DOC REV: CPT | Performed by: PEDIATRICS

## 2018-10-30 PROCEDURE — 4040F PNEUMOC VAC/ADMIN/RCVD: CPT | Performed by: PEDIATRICS

## 2018-10-30 PROCEDURE — 1036F TOBACCO NON-USER: CPT | Performed by: PEDIATRICS

## 2018-10-30 PROCEDURE — 3023F SPIROM DOC REV: CPT | Performed by: PEDIATRICS

## 2018-10-30 PROCEDURE — 90662 IIV NO PRSV INCREASED AG IM: CPT | Performed by: PEDIATRICS

## 2018-10-30 PROCEDURE — G8926 SPIRO NO PERF OR DOC: HCPCS | Performed by: PEDIATRICS

## 2018-10-30 PROCEDURE — G8427 DOCREV CUR MEDS BY ELIG CLIN: HCPCS | Performed by: PEDIATRICS

## 2018-10-30 PROCEDURE — 1101F PT FALLS ASSESS-DOCD LE1/YR: CPT | Performed by: PEDIATRICS

## 2018-10-30 PROCEDURE — 1123F ACP DISCUSS/DSCN MKR DOCD: CPT | Performed by: PEDIATRICS

## 2018-10-30 PROCEDURE — G0439 PPPS, SUBSEQ VISIT: HCPCS | Performed by: PEDIATRICS

## 2018-10-30 PROCEDURE — G8482 FLU IMMUNIZE ORDER/ADMIN: HCPCS | Performed by: PEDIATRICS

## 2018-10-30 ASSESSMENT — ENCOUNTER SYMPTOMS
DIARRHEA: 0
SHORTNESS OF BREATH: 1
BACK PAIN: 0
VOMITING: 0
CHEST TIGHTNESS: 0
WHEEZING: 0
NAUSEA: 0
ABDOMINAL PAIN: 0
DYSPNEA ASSOCIATED WITH: EXERTION
SORE THROAT: 0
COUGH: 1

## 2018-10-30 ASSESSMENT — PATIENT HEALTH QUESTIONNAIRE - PHQ9
SUM OF ALL RESPONSES TO PHQ QUESTIONS 1-9: 0
SUM OF ALL RESPONSES TO PHQ QUESTIONS 1-9: 0

## 2018-10-30 ASSESSMENT — LIFESTYLE VARIABLES: HOW OFTEN DO YOU HAVE A DRINK CONTAINING ALCOHOL: 0

## 2018-10-30 ASSESSMENT — ANXIETY QUESTIONNAIRES: GAD7 TOTAL SCORE: 0

## 2018-10-30 NOTE — PROGRESS NOTES
Psychiatric: He has a normal mood and affect. ASSESSMENT      ICD-10-CM    1. Visit for preventive health examination Z00.00    2. Chronic obstructive pulmonary disease, unspecified COPD type ([facility] J44.9 CT chest diagnostic low dose   3. Essential hypertension I10    4. Mixed hyperlipidemia E78.2    5. Acquired hypothyroidism E03.9    6. Osteoarthritis of spine with radiculopathy, lumbar region M47.26    7. Environmental allergies Z91.09    8. Personal history of tobacco use Z87.891    9. Need for influenza vaccination Z23        PLAN      ICD-10-CM    1. Visit for preventive health examination Z00.00 Routine age-appropriate anticipatory guidance was provided. Annual wellness visit was performed in a separate note and issues were addressed as identified. He does have labs pending as of 8/1/2018 and 4/17/2018      2. Chronic obstructive pulmonary disease, unspecified COPD type ([facility] J44.9 CT chest diagnostic low dose  He has significant COPD and tobacco abuse history. 3. Essential hypertension I10 Blood pressure was elevated today as well. He does not monitor his blood pressure. There are some discrepancies in his blood pressure medication. He believes he is taking it correctly   We'll keep things the same for now and if still elevated on next evaluation, we will adjust his medications appropriately    4. Mixed hyperlipidemia E78.2 Need to recheck lipids   As of last evaluation in February 2018, his lipids were excellently controlled. 5. Acquired hypothyroidism E03.9 This was normalized also as of February 2018    6. Osteoarthritis of spine with radiculopathy, lumbar region M47.26 He is not on any nonsteroidal anti-inflammatories due to aspirin allergy and potential cross-reactivity. He is no longer using his diclofenac gel topically daily  He feels that his arthritis is controlled well enough. He does not feel that he needs any additional medications    7.  Environmental allergies Z91.09 Controlled at present    8. Personal history of tobacco use Z87.891 CTs screening of the lungs        Orders Placed This Encounter   Procedures    CT chest diagnostic low dose    INFLUENZA, HIGH DOSE, 65 YRS +, IM, PF, PREFILL SYR, 0.5ML (FLUZONE HD)        Return in about 3 months (around 2019) for Medicare Annual Wellness Visit in 1 year, 27. Medicare Annual Wellness Visit  Name: Donald Snell Date: 10/30/2018   MRN: [id number] Sex: Male   Age: 76 y.o. Ethnicity: Non-/Non    : 1950 Race: Murlean Opitz is here for Medicare AWV; COPD (3 month follow up. ); Hypertension (Pt reports his BP is alright. He hasn't been checking it. ); Flu Vaccine (Would like to get this today. ); Foot Problem (Pt c/o bilateral foot burning. ); and Health Maintenance (Flu shot, Pneumonia shot, )    Screenings for behavioral, psychosocial and functional/safety risks, and cognitive dysfunction are all negative except as indicated below. These results, as well as other patient data from the [employer] form, are documented in Flowsheets linked to this Encounter. Allergies   Allergen Reactions    Asa [Aspirin] Nausea Only       Prior to Visit Medications    Medication Sig Taking? Authorizing Provider   INCRUSE ELLIPTA 62.5 MCG/INH AEPB INAHLE 1 PUFF INTO THE LUNGS DAILY Yes VITOR Castellanos DO   gabapentin (NEURONTIN) 100 MG capsule Take 1 capsule by mouth 4 times daily for 90 days. Radha Anderson, DO   albuterol sulfate HFA (VENTOLIN HFA) 108 (90 Base) MCG/ACT inhaler INHALE 2 PUFFS BY MOUTH EVERY 6 HOURS AS NEEDED FOR WHEEZING Yes VITOR Castellanos, DO   pravastatin (PRAVACHOL) 40 MG tablet TAKE 1 TABLET BY MOUTH DAILY Yes GELY Aldana   lisinopril (PRINIVIL;ZESTRIL) 20 MG tablet TAKE 1 TABLET BY MOUTH DAILY Yes GELY Sullivan   tiZANidine (ZANAFLEX) 4 MG tablet TAKE 1 TABLET BY MOUTH EVERY 8 HOURS AS NEEDED Yes Serge Brenner eyesight?: No  Have you had an eye exam within the past year?: Yes  Hearing/Vision Interventions:  · Hearing concerns: Additional information was provided an audiology referral was offered    ADL:  ADLs  In the past 7 days, did you need help from others to perform any of the following everyday activities?: None  In the past 7 days, did you need help from others to take care of any of the following?: XZERES, Telephone Use  ADL Interventions:  · Referred for Care Coordination    Personalized Preventive Plan   Current Health Maintenance Status  Immunization History   Administered Date(s) Administered    Influenza Vaccine, unspecified formulation 10/08/2015    Influenza, High Dose (Fluzone 65 yrs and older) 09/20/2017, 10/30/2018    Influenza, Stacia Daniele, 3 yrs and older, IM, PF (Fluzone 3 yrs and older or Afluria 5 yrs and older) 10/20/2016    PPD Test 05/07/2018    Pneumococcal 13-valent Conjugate ([id number]) 11/17/2016    Pneumococcal Polysaccharide ([id number]) 09/27/2011        Health Maintenance   Topic Date Due    DTaP/Tdap/Td vaccine (1 - Tdap) 10/17/1969    Shingles Vaccine (1 of 2 - 2 Dose Series) 10/17/2000    Low dose CT lung screening  10/17/2005    Pneumococcal low/med risk (2 of 2 - PPSV23) 11/17/2017    TSH testing  02/13/2019    Potassium monitoring  04/17/2019    Creatinine monitoring  04/17/2019    Lipid screen  02/13/2023    Colon cancer screen colonoscopy  10/21/2023    Flu vaccine  Completed    AAA screen  Completed    Hepatitis C screen  Addressed     Recommendations for Preventive Services Due: see orders and patient instructions/AVS.  .   Recommended screening schedule for the next 5-10 years is provided to the patient in written form: see Patient Instructions/AVS.

## 2018-11-01 DIAGNOSIS — Z87.891 PERSONAL HISTORY OF TOBACCO USE: Primary | ICD-10-CM

## 2018-11-01 PROCEDURE — G0296 VISIT TO DETERM LDCT ELIG: HCPCS | Performed by: PEDIATRICS

## 2018-11-02 ENCOUNTER — TELEPHONE (OUTPATIENT)
Dept: PRIMARY CARE CLINIC | Age: 68
End: 2018-11-02

## 2018-11-06 ENCOUNTER — TELEPHONE (OUTPATIENT)
Dept: PRIMARY CARE CLINIC | Age: 68
End: 2018-11-06

## 2018-11-06 ENCOUNTER — CARE COORDINATION (OUTPATIENT)
Dept: CARE COORDINATION | Age: 68
End: 2018-11-06

## 2018-11-06 NOTE — CARE COORDINATION
Ambulatory Care Coordination Note  11/6/2018  CM Risk Score: 2  Niko Mortality Risk Score: 4    ACC: Maura Shields, RN    Summary Note: ACC received call from THE [facility] and his mother about a test that was canceled. ACC reviewed chart, explained to pt and his mother that provider will need to order a different test for his lung screening. ACC further contacted pt's cousin, Michelle Gallo, to verify her awareness of this change. Michelle Gallo requested to be contacted for appts, procedures, tests, etc. She gave permission for her phone # for be placed in Snap Shot with note for contact purposes. She lives in [place] and must make arrangements to get to pt and carry pt to appts. ACC placed note in EMR and notified radiology and phone MA for Dr. Aquilino Chin office. Care Coordination Interventions    Program Enrollment:  Rising Risk  Referral from Primary Care Provider:  No  Suggested Interventions and Community Resources  Transportation Support:  Completed  Zone Management Tools: In Process (Comment: 10/30/18: Met w THE [facility] and cousin, Michelle Gallo during his appt today. Wearing oxygen, but travel pack is empty. Placed on office tank per PCP.)  Other Services or Interventions:  11/6/18: Pt concerned about a canceled test         Goals Addressed             Most Recent     COMPLETED: Community Resource Goal   No change (11/6/2018)             I will meet with  for Medicare. Barriers: impairment:  hearing  Plan for overcoming my barriers:   Pt gave permission for [facility] to speak with his cousin, Michelle Gallo  Confidence: 10/10  Anticipated Goal Completion Date: 1/30/18       Wellness Goal                Patient Self-Management Goal for Health Maintenance  Goal: I will schedule a yearly preventative care visit.   Barriers: impairment:  hearing  Plan for overcoming my barriers:   Pt will contact [facility] for concerns about his healthcare  Pt will allow contact with his mother or his cousin for appts (he is [facility])  Confidence: 9/10  Anticipated

## 2018-11-12 NOTE — TELEPHONE ENCOUNTER
Pt seen 10/30/18 with a follow up on 11/4/19.       Requested Prescriptions     Pending Prescriptions Disp Refills    INCRUSE ELLIPTA 62.5 MCG/INH AEPB [Pharmacy Med Name: Savannah Dickson 62.5 MCG INH]  3     Sig: INAHLE 1 PUFF INTO THE LUNGS DAILY

## 2018-11-27 ENCOUNTER — CARE COORDINATION (OUTPATIENT)
Dept: CARE COORDINATION | Age: 68
End: 2018-11-27

## 2018-11-27 NOTE — CARE COORDINATION
Ambulatory Care Coordination Note  11/27/2018  CM Risk Score: 2  Niko Mortality Risk Score: 4    ACC: Ramírez Larson, RN    Summary Note: ACC spoke to pt's mother, Rosina Lopez. Pt was asleep at time of call. Per Rosina Lopez, pt has been coughing more. [place] told his mother he did not feel good this morning. Rosina Lopez stated [place] went without his oxygen supply for about 3 days until it was delivered. Pt now has oxygen. ACC asked pt's mother to have Stone call [facility] if he is having increased resp symptoms. Mother voiced understanding and stated she will have pt call ACC. Care Coordination Interventions    Program Enrollment:  Rising Risk  Referral from Primary Care Provider:  No  Suggested Interventions and Community Resources  Other Services: In Process (Comment: 11/27/18: Called radiology re: pt to have CT lung screening, left VM about scheduling this for ptl)  Transportation Support:  Completed  Zone Management Tools: In Process (Comment: 11/27/18: Pt's mother discussed pt's COPD sx and oxygen use.)         Goals Addressed             Most Recent     Wellness Goal   On track (11/27/2018)             Patient Self-Management Goal for Health Maintenance  Goal: I will schedule a yearly preventative care visit. Barriers: impairment:  hearing  Plan for overcoming my barriers:   Pt will contact [facility] for concerns about his healthcare  Pt will allow contact with his mother or his cousin for appts (he is [facility])  Confidence: 9/10  Anticipated Goal Completion Date: 2/6/18            Prior to Admission medications    Medication Sig Start Date End Date Taking? Authorizing Provider   Umeclidinium Bromide (INCRUSE ELLIPTA) 62.5 MCG/INH AEPB Inhale 1 puff into the lungs daily 11/12/18   GELY Jefferson   gabapentin (NEURONTIN) 100 MG capsule Take 1 capsule by mouth 4 times daily for 90 days. . 8/1/18 10/30/18  VITOR Arreguin DO   albuterol sulfate HFA (VENTOLIN HFA) 108 (90 Base) MCG/ACT inhaler INHALE 2 PUFFS BY MOUTH EVERY

## 2018-11-30 ENCOUNTER — CARE COORDINATION (OUTPATIENT)
Dept: CARE COORDINATION | Age: 68
End: 2018-11-30

## 2018-11-30 RX ORDER — ALBUTEROL SULFATE 90 UG/1
2 AEROSOL, METERED RESPIRATORY (INHALATION) 4 TIMES DAILY PRN
Qty: 1 INHALER | Refills: 5 | Status: SHIPPED | OUTPATIENT
Start: 2018-11-30 | End: 2019-04-23 | Stop reason: SDUPTHER

## 2018-12-17 ENCOUNTER — CARE COORDINATION (OUTPATIENT)
Dept: CARE COORDINATION | Age: 68
End: 2018-12-17

## 2018-12-20 ENCOUNTER — OFFICE VISIT (OUTPATIENT)
Dept: PRIMARY CARE CLINIC | Age: 68
End: 2018-12-20
Payer: MEDICARE

## 2018-12-20 ENCOUNTER — CARE COORDINATION (OUTPATIENT)
Dept: CARE COORDINATION | Age: 68
End: 2018-12-20

## 2018-12-20 VITALS
HEART RATE: 85 BPM | TEMPERATURE: 100 F | HEIGHT: 65 IN | WEIGHT: 124.5 LBS | DIASTOLIC BLOOD PRESSURE: 74 MMHG | SYSTOLIC BLOOD PRESSURE: 128 MMHG | BODY MASS INDEX: 20.74 KG/M2

## 2018-12-20 DIAGNOSIS — J01.00 ACUTE NON-RECURRENT MAXILLARY SINUSITIS: Primary | ICD-10-CM

## 2018-12-20 DIAGNOSIS — J44.1 COPD EXACERBATION (HCC): ICD-10-CM

## 2018-12-20 PROCEDURE — 1123F ACP DISCUSS/DSCN MKR DOCD: CPT | Performed by: PEDIATRICS

## 2018-12-20 PROCEDURE — 3023F SPIROM DOC REV: CPT | Performed by: PEDIATRICS

## 2018-12-20 PROCEDURE — G8482 FLU IMMUNIZE ORDER/ADMIN: HCPCS | Performed by: PEDIATRICS

## 2018-12-20 PROCEDURE — 4040F PNEUMOC VAC/ADMIN/RCVD: CPT | Performed by: PEDIATRICS

## 2018-12-20 PROCEDURE — G8427 DOCREV CUR MEDS BY ELIG CLIN: HCPCS | Performed by: PEDIATRICS

## 2018-12-20 PROCEDURE — 1101F PT FALLS ASSESS-DOCD LE1/YR: CPT | Performed by: PEDIATRICS

## 2018-12-20 PROCEDURE — G8926 SPIRO NO PERF OR DOC: HCPCS | Performed by: PEDIATRICS

## 2018-12-20 PROCEDURE — 99213 OFFICE O/P EST LOW 20 MIN: CPT | Performed by: PEDIATRICS

## 2018-12-20 PROCEDURE — G8420 CALC BMI NORM PARAMETERS: HCPCS | Performed by: PEDIATRICS

## 2018-12-20 PROCEDURE — 3017F COLORECTAL CA SCREEN DOC REV: CPT | Performed by: PEDIATRICS

## 2018-12-20 PROCEDURE — 1036F TOBACCO NON-USER: CPT | Performed by: PEDIATRICS

## 2018-12-20 RX ORDER — DOXYCYCLINE HYCLATE 100 MG/1
100 CAPSULE ORAL 2 TIMES DAILY
Qty: 20 CAPSULE | Refills: 0 | Status: SHIPPED | OUTPATIENT
Start: 2018-12-20 | End: 2018-12-30

## 2018-12-20 RX ORDER — METHYLPREDNISOLONE 4 MG/1
TABLET ORAL
Qty: 1 KIT | Refills: 0 | Status: SHIPPED | OUTPATIENT
Start: 2018-12-20 | End: 2018-12-26

## 2018-12-20 ASSESSMENT — ENCOUNTER SYMPTOMS
ABDOMINAL PAIN: 0
SINUS PAIN: 1
COUGH: 1
SORE THROAT: 1
NAUSEA: 0
SHORTNESS OF BREATH: 1
CHEST TIGHTNESS: 1
RHINORRHEA: 1
DYSPNEA ASSOCIATED WITH: EXERTION
BACK PAIN: 1
WHEEZING: 1
VOMITING: 0
SINUS PRESSURE: 1
DIARRHEA: 0
GASTROINTESTINAL NEGATIVE: 1

## 2018-12-20 NOTE — PROGRESS NOTES
[address] [facility], [address]  Phone [phone]   Fax [phone]      OFFICE VISIT: 2018    Bernadine Mejia-: 1950      HPI  Reason For Visit:  Carlton Larios is a 76 y.o. Health Maintenance    Headache (started Tuesday. Pt states that he was out doing laundry Monday night and woke up sick Tuesday morning. ); Otalgia (right ear pain); Pharyngitis (this has been present since Tuesday morning. ); Nasal Congestion (bloody mucus); Cough (pt is coughing up yellow phlegm); and Shortness of Breath (can't walk very fast cause he gets out of breath. Gets tired out really easy. )      Patient presents with complaints of headache, ear pain, sore throat, nasal congestion with bloody drainage and yellow thick phlegm. He also complains of significant shortness of breath since the onset of this illness. He has baseline severe COPD with frequent exacerbations. He states that he does not smoke at all  He smells strong of cigarette smoke. height is 5' 4.5\" (1.638 m) and weight is 124 lb 8 oz (56.5 kg). His temporal temperature is 100 °F (37.8 °C). His blood pressure is 128/74 and his pulse is 85. Body mass index is 21.04 kg/m². I have reviewed the following with the Mr. Mejia   Lab Review  No visits with results within 6 Month(s) from this visit. Latest known visit with results is:   Orders Only on 2018   Component Date Value    Sodium 2018 134*    Potassium 2018 4.0     Chloride 2018 93*    CO2 2018 27     Anion Gap 2018 14     Glucose 2018 73*    BUN 2018 11     CREATININE 2018 1.0     GFR Non- 2018 >60     Calcium 2018 9.5      Copies of these are in the chart.     Current Outpatient Prescriptions   Medication Sig Dispense Refill    doxycycline hyclate (VIBRAMYCIN) 100 MG capsule Take 1 capsule by mouth 2 times daily for 10 days 20 capsule 0    methylPREDNISolone (MEDROL

## 2018-12-20 NOTE — CARE COORDINATION
Ambulatory Care Coordination Note  12/20/2018  CM Risk Score: 2  Niko Mortality Risk Score: 4    ACC: Hunter Monson, BRYANT    Summary Note: ACC spoke to THE [facility] by phone. Review:  THE [facility] reported he has had increased drainage and now is having some bloody nasal discharge. He reported increased coughing. THE [facility] has COPD. Plan:  ACC advised pt to schedule and appt and he agreed. Assisted to get pt Same Day with PCP this afternoon. Pt repeated back his appt time. ACC called back a second time and reminded pt's mother of pt's appt this afternoon. Care Coordination Interventions    Program Enrollment:  Rising Risk  Referral from Primary Care Provider:  No  Suggested Interventions and Community Resources  Transportation Support:  Completed  Zone Management Tools: In Process (Comment: 12/20/18: Spoke on the phone w THE [facility] about URI sx.)         Goals Addressed             Most Recent     Wellness Goal   On track (12/20/2018)             Patient Self-Management Goal for Health Maintenance  Goal: I will schedule a yearly preventative care visit. Barriers: impairment:  hearing  Plan for overcoming my barriers:   Pt will contact [facility] for concerns about his healthcare  Pt will allow contact with his mother or his cousin for appts (he is [facility])  Confidence: 9/10  Anticipated Goal Completion Date: 2/6/18            Prior to Admission medications    Medication Sig Start Date End Date Taking? Authorizing Provider   albuterol sulfate  (90 Base) MCG/ACT inhaler Inhale 2 puffs into the lungs 4 times daily as needed for Wheezing 11/30/18   B Neoma Sabine DO   Umeclidinium Bromide (INCRUSE ELLIPTA) 62.5 MCG/INH AEPB Inhale 1 puff into the lungs daily 11/12/18   Eloisa Cooks, APRN   gabapentin (NEURONTIN) 100 MG capsule Take 1 capsule by mouth 4 times daily for 90 days. . 8/1/18 10/30/18  VITOR Regalado DO   pravastatin (PRAVACHOL) 40 MG tablet TAKE 1 TABLET BY MOUTH DAILY 6/4/18   Goyo Tineo

## 2018-12-24 ENCOUNTER — CARE COORDINATION (OUTPATIENT)
Dept: CARE COORDINATION | Age: 68
End: 2018-12-24

## 2019-01-08 ENCOUNTER — CARE COORDINATION (OUTPATIENT)
Dept: CARE COORDINATION | Age: 69
End: 2019-01-08

## 2019-01-31 DIAGNOSIS — E03.9 ACQUIRED HYPOTHYROIDISM: ICD-10-CM

## 2019-01-31 RX ORDER — LEVOTHYROXINE SODIUM 0.07 MG/1
75 TABLET ORAL DAILY
Qty: 90 TABLET | Refills: 3 | Status: SHIPPED | OUTPATIENT
Start: 2019-01-31

## 2019-02-02 DIAGNOSIS — M54.50 LUMBAR SPINE PAIN: ICD-10-CM

## 2019-02-02 DIAGNOSIS — I10 ESSENTIAL HYPERTENSION: ICD-10-CM

## 2019-02-04 RX ORDER — GABAPENTIN 100 MG/1
100 CAPSULE ORAL 4 TIMES DAILY
Qty: 360 CAPSULE | Refills: 1 | Status: SHIPPED | OUTPATIENT
Start: 2019-02-04 | End: 2019-04-26

## 2019-02-04 RX ORDER — LISINOPRIL 20 MG/1
20 TABLET ORAL DAILY
Qty: 90 TABLET | Refills: 3 | Status: SHIPPED | OUTPATIENT
Start: 2019-02-04 | End: 2019-09-05 | Stop reason: SDUPTHER

## 2019-02-25 ENCOUNTER — HOSPITAL ENCOUNTER (OUTPATIENT)
Dept: GENERAL RADIOLOGY | Age: 69
Discharge: HOME | End: 2019-02-25
Payer: MEDICARE

## 2019-02-25 DIAGNOSIS — Z87.891 PERSONAL HISTORY OF TOBACCO USE: ICD-10-CM

## 2019-02-25 PROCEDURE — G0297 LDCT FOR LUNG CA SCREEN: HCPCS

## 2019-02-26 ENCOUNTER — TELEPHONE (OUTPATIENT)
Dept: PRIMARY CARE CLINIC | Age: 69
End: 2019-02-26

## 2019-02-26 NOTE — TELEPHONE ENCOUNTER
I have attempted without success to contact this patient by phone to discuss results and I left a message on answering machine.

## 2019-04-17 ENCOUNTER — CARE COORDINATION (OUTPATIENT)
Dept: CARE COORDINATION | Age: 69
End: 2019-04-17

## 2019-04-17 NOTE — CARE COORDINATION
ACC received call from Stone's cousin/emergency contact: Bhanu Fails:    General Assessment    Do you have any symptoms that are causing concern?:  Yes  Progression since Onset:  Gradually Worsening  Reported Symptoms:  Shortness of Breath, Other (Comment: Wheezing, poor exertional tolerance.)       Review:  Rodrigo Santacruz said Heidi Worrell had puffy face and feet at her visit to [facility] on 4/17. Stone told Rodrigo Santacruz he is out of oxygen. Pt cannot carry the portable oxygen for himself. Rodrigo Santacruz said it is too heavy for him to carry. Rodrigo Santacruz tried to assist with some financial needs, pay bills and f/u on bank account change per Stone's request.  She too him to [employer] [facility] office. Rodrigo Santacruz is concerned that neither Stone or his mother can adequately care for their own daily needs - both physically and financially. She cannot provide additional support and feels they may need intervention/ongoing supervision to remain safe. Plan:  ACC will speak with [facility] for options to provide or request support for Stone and his mother. ACC will f/u with Rodrigo Santacruz.

## 2019-04-18 ENCOUNTER — CARE COORDINATION (OUTPATIENT)
Dept: CARE COORDINATION | Age: 69
End: 2019-04-18

## 2019-04-18 ASSESSMENT — ENCOUNTER SYMPTOMS: DYSPNEA ASSOCIATED WITH: MINIMAL EXERTION

## 2019-04-18 NOTE — CARE COORDINATION
ACC notified PCP, Dr. Rodrigo Huang, of recent conversation with Stone's emergency contact, Nimo Stinson. Relayed her concern that pt is not well appearing but he declined to schedule a sooner appt for evaluation. Also relayed the documentation in chart that Maame Cid felt pt is not able to provide adequate care for his elderly mother who he lives with. Pt does have a scheduled f/u on 4/23 and ACC relayed to PCP that if deficits or concerns are valid at time of that evaluation, referral can be made for state level assessment of guardianship need by office staff. Dr. Rodrigo Huang voiced understanding. ACC attempted to reach THE [facility] again this afternoon, left a second voicemail and reminded pt of his appt next week.     Future Appointments   Date Time Provider Beba Dawson   4/23/2019  3:30 PM DO Haley Tran [facility]   11/4/2019  1:30 PM [address], [facility]
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
## 2019-04-19 ENCOUNTER — CARE COORDINATION (OUTPATIENT)
Dept: CARE COORDINATION | Age: 69
End: 2019-04-19

## 2019-04-19 ASSESSMENT — ENCOUNTER SYMPTOMS: DYSPNEA ASSOCIATED WITH: EXERTION

## 2019-04-19 NOTE — CARE COORDINATION
get f/u appt and screening lung CT - both overdue)            Prior to Admission medications    Medication Sig Start Date End Date Taking? Authorizing Provider   lisinopril (PRINIVIL;ZESTRIL) 20 MG tablet Take 1 tablet by mouth daily 2/4/19   B Simone Peres, DO   gabapentin (NEURONTIN) 100 MG capsule Take 1 capsule by mouth 4 times daily for 180 days. . 2/4/19 8/3/19  B Simone Faster, DO   levothyroxine (SYNTHROID) 75 MCG tablet Take 1 tablet by mouth Daily TAKE 1 TABLET BY MOUTH DAILY 1/31/19   VITOR Peres, DO   albuterol sulfate  (90 Base) MCG/ACT inhaler Inhale 2 puffs into the lungs 4 times daily as needed for Wheezing 11/30/18   VITOR Peres, DO   Umeclidinium Bromide (INCRUSE ELLIPTA) 62.5 MCG/INH AEPB Inhale 1 puff into the lungs daily 11/12/18   GELY Peres   pravastatin (PRAVACHOL) 40 MG tablet TAKE 1 TABLET BY MOUTH DAILY 6/4/18   GELY Skinner   tiZANidine (ZANAFLEX) 4 MG tablet TAKE 1 TABLET BY MOUTH EVERY 8 HOURS AS NEEDED 3/28/18   Layne Luther MD   fluticasone [facility]) 50 MCG/ACT nasal spray 2 sprays by Nasal route daily 2/1/18   VITOR Peres DO   vitamin D (ERGOCALCIFEROL) 08665 UNITS CAPS capsule Take 1 capsule by mouth every 14 days 5/2/17   Lynette Ruelas MD   ADVAIR DISKUS 250-50 MCG/DOSE AEPB Inhale 1 puff into the lungs 2 times daily 9/21/16   Historical Provider, MD   guaiFENesin 400 MG tablet Take 400 mg by mouth daily     Historical Provider, MD   OXYGEN Inhale  into the lungs. 2 liters per nasal cannula at     Historical Provider, MD   mupirocin (BACTROBAN) 2 % ointment Apply topically 3 times daily.  5/16/14   Lynette Ruelas MD       Future Appointments   Date Time Provider Beba Dawson   4/23/2019  3:30 PM DO Haley Salvador [facility]   11/4/2019  1:30 PM VITOR Peres DO [place]      and   COPD Assessment    Does the patient understand envrionmental exposure?:  Yes  Is the patient able to verbalize Rescue vs. Long Acting medications?:  Yes  Does the patient have a nebulizer?:  No  Does the patient use a space with inhaled medications?:  No            Symptoms:      Symptom course:  no change  Breathlessness:  exertion           Feet swollen, been taking water pills  - mther's.   Took 2 yesterday  Said feeling better

## 2019-04-23 ENCOUNTER — OFFICE VISIT (OUTPATIENT)
Dept: PRIMARY CARE CLINIC | Age: 69
End: 2019-04-23
Payer: MEDICARE

## 2019-04-23 ENCOUNTER — CARE COORDINATION (OUTPATIENT)
Dept: CARE COORDINATION | Age: 69
End: 2019-04-23

## 2019-04-23 VITALS
TEMPERATURE: 100.4 F | DIASTOLIC BLOOD PRESSURE: 70 MMHG | HEART RATE: 107 BPM | OXYGEN SATURATION: 91 % | BODY MASS INDEX: 20.83 KG/M2 | WEIGHT: 125 LBS | HEIGHT: 65 IN | SYSTOLIC BLOOD PRESSURE: 146 MMHG

## 2019-04-23 DIAGNOSIS — I49.9 IRREGULARLY IRREGULAR HEART RHYTHM: ICD-10-CM

## 2019-04-23 DIAGNOSIS — J44.9 CHRONIC OBSTRUCTIVE PULMONARY DISEASE, UNSPECIFIED COPD TYPE (HCC): ICD-10-CM

## 2019-04-23 DIAGNOSIS — R60.9 PERIPHERAL EDEMA: Primary | ICD-10-CM

## 2019-04-23 DIAGNOSIS — E78.2 MIXED HYPERLIPIDEMIA: ICD-10-CM

## 2019-04-23 DIAGNOSIS — E03.9 ACQUIRED HYPOTHYROIDISM: ICD-10-CM

## 2019-04-23 DIAGNOSIS — R73.9 HYPERGLYCEMIA: ICD-10-CM

## 2019-04-23 DIAGNOSIS — I10 ESSENTIAL HYPERTENSION: ICD-10-CM

## 2019-04-23 DIAGNOSIS — F33.41 RECURRENT MAJOR DEPRESSIVE DISORDER IN PARTIAL REMISSION (HCC): ICD-10-CM

## 2019-04-23 DIAGNOSIS — E55.9 VITAMIN D DEFICIENCY: ICD-10-CM

## 2019-04-23 DIAGNOSIS — K59.09 CHRONIC CONSTIPATION: ICD-10-CM

## 2019-04-23 PROCEDURE — G8926 SPIRO NO PERF OR DOC: HCPCS | Performed by: PEDIATRICS

## 2019-04-23 PROCEDURE — 99214 OFFICE O/P EST MOD 30 MIN: CPT | Performed by: PEDIATRICS

## 2019-04-23 PROCEDURE — G8420 CALC BMI NORM PARAMETERS: HCPCS | Performed by: PEDIATRICS

## 2019-04-23 PROCEDURE — 1036F TOBACCO NON-USER: CPT | Performed by: PEDIATRICS

## 2019-04-23 PROCEDURE — 4040F PNEUMOC VAC/ADMIN/RCVD: CPT | Performed by: PEDIATRICS

## 2019-04-23 PROCEDURE — 3017F COLORECTAL CA SCREEN DOC REV: CPT | Performed by: PEDIATRICS

## 2019-04-23 PROCEDURE — 1123F ACP DISCUSS/DSCN MKR DOCD: CPT | Performed by: PEDIATRICS

## 2019-04-23 PROCEDURE — 3023F SPIROM DOC REV: CPT | Performed by: PEDIATRICS

## 2019-04-23 PROCEDURE — G8428 CUR MEDS NOT DOCUMENT: HCPCS | Performed by: PEDIATRICS

## 2019-04-23 PROCEDURE — 93000 ELECTROCARDIOGRAM COMPLETE: CPT | Performed by: PEDIATRICS

## 2019-04-23 RX ORDER — FUROSEMIDE 40 MG/1
40 TABLET ORAL DAILY
Qty: 60 TABLET | Refills: 3 | Status: SHIPPED | OUTPATIENT
Start: 2019-04-23

## 2019-04-23 RX ORDER — METOPROLOL SUCCINATE 25 MG/1
25 TABLET, EXTENDED RELEASE ORAL DAILY
Qty: 30 TABLET | Refills: 3 | Status: SHIPPED | OUTPATIENT
Start: 2019-04-23 | End: 2019-10-01 | Stop reason: SDUPTHER

## 2019-04-23 RX ORDER — ALBUTEROL SULFATE 90 UG/1
2 AEROSOL, METERED RESPIRATORY (INHALATION) 4 TIMES DAILY PRN
Qty: 1 INHALER | Refills: 5 | Status: SHIPPED | OUTPATIENT
Start: 2019-04-23

## 2019-04-23 ASSESSMENT — ENCOUNTER SYMPTOMS
CHEST TIGHTNESS: 1
SINUS PRESSURE: 1
SHORTNESS OF BREATH: 1
VOMITING: 0
DYSPNEA ASSOCIATED WITH: MINIMAL EXERTION
EYE DISCHARGE: 0
NAUSEA: 0
GASTROINTESTINAL NEGATIVE: 1
ABDOMINAL PAIN: 0
EYE REDNESS: 1
SORE THROAT: 1
FACIAL SWELLING: 1
WHEEZING: 1
RHINORRHEA: 1
DIARRHEA: 0
BACK PAIN: 1
COUGH: 1
SINUS PAIN: 1
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
## 2019-04-23 NOTE — CARE COORDINATION
Ambulatory Care Coordination Note  4/23/2019  CM Risk Score: 5  Niko Mortality Risk Score: 4    ACC: Magaly Dos Santos, RN    Summary Note: ACC met with Shahnaz Anaya today during his appt. Review:  Shahnaz Anaya brought his medications today. He is using his portable oxygen but it is not continuous and pt was noted to be [place] walking to exam room, MA placed pt on continuous oxygen - SpO2 at 91% on 3L. Shahnaz Anaya said he uses his regular oxygen when he is at home and it is continuous flow which works better for him. Shahnaz Anaya stated he is not smoking, though clothing has strong smell of smoke. He has noticed new onset edema of his face and BLE, worse in right lower leg. Shahnaz Anaya has not had any labs done since last summer. Plan:  ACC reviewed with PCP that Shahnaz Anaya took a couple of Lasix last week from his mother's supply and felt this helped him feel better. PCP discussed need to reduce salt in diet and stop adding salt when he cooks. Stone voiced understanding. ACC urged that pt obtain his labs asap as PCP has placed new orders today - very important to help evaluate why pt has new edema onset. Provided pt with basic guideline for reducing salt in his diet. Will review at next encounter. ACC will f/u on pt end of week for symptom mgmt, verify pt is aware of results and recommendations - he is extremely [place] and does not communicate well over the phone. Care Coordination Interventions    Program Enrollment:  Rising Risk  Referral from Primary Care Provider:  No  Suggested Interventions and Community Resources  Transportation Support:  Completed  Zone Management Tools: In Process (Comment: 4/19/19: ACC attempted to review pt's resp sx and reports of observed facial and feet swelling.  Difficult conversation r/t pt very [place].)         Goals Addressed                 This Visit's Progress     Wellness Goal   Improving     Patient Self-Management Goal for Health Maintenance  Goal: I will schedule a yearly preventative care

## 2019-04-26 ENCOUNTER — APPOINTMENT (OUTPATIENT)
Dept: ULTRASOUND IMAGING | Age: 69
DRG: 812 | End: 2019-04-26
Payer: MEDICARE

## 2019-04-26 ENCOUNTER — APPOINTMENT (OUTPATIENT)
Dept: CT IMAGING | Age: 69
DRG: 812 | End: 2019-04-26
Payer: MEDICARE

## 2019-04-26 ENCOUNTER — HOSPITAL ENCOUNTER (INPATIENT)
Age: 69
LOS: 2 days | Discharge: HOME | DRG: 812 | End: 2019-04-28
Attending: INTERNAL MEDICINE | Admitting: HOSPITALIST
Payer: MEDICARE

## 2019-04-26 ENCOUNTER — APPOINTMENT (OUTPATIENT)
Dept: GENERAL RADIOLOGY | Age: 69
DRG: 812 | End: 2019-04-26
Payer: MEDICARE

## 2019-04-26 DIAGNOSIS — D64.9 ANEMIA, UNSPECIFIED TYPE: Primary | ICD-10-CM

## 2019-04-26 PROBLEM — R79.89 ELEVATED BRAIN NATRIURETIC PEPTIDE (BNP) LEVEL: Status: ACTIVE | Noted: 2019-04-26

## 2019-04-26 PROBLEM — I49.9 IRREGULAR HEART RHYTHM: Status: ACTIVE | Noted: 2019-04-26

## 2019-04-26 LAB
ABO/RH: NORMAL
ALBUMIN SERPL-MCNC: 3.2 G/DL (ref 3.5–5.2)
ALP BLD-CCNC: 73 U/L (ref 40–130)
ALT SERPL-CCNC: 15 U/L (ref 5–41)
ANION GAP SERPL CALCULATED.3IONS-SCNC: 12 MMOL/L (ref 7–19)
ANISOCYTOSIS: ABNORMAL
ANTIBODY SCREEN: NORMAL
APTT: 31 SEC (ref 26–36.2)
AST SERPL-CCNC: 30 U/L (ref 5–40)
BASE EXCESS ARTERIAL: 9.2 MMOL/L (ref -2–2)
BASOPHILS ABSOLUTE: 0 K/UL (ref 0–0.2)
BASOPHILS RELATIVE PERCENT: 0.5 % (ref 0–1)
BILIRUB SERPL-MCNC: 0.3 MG/DL (ref 0.2–1.2)
BILIRUBIN URINE: NEGATIVE
BLOOD BANK DISPENSE STATUS: NORMAL
BLOOD BANK DISPENSE STATUS: NORMAL
BLOOD BANK PRODUCT CODE: NORMAL
BLOOD BANK PRODUCT CODE: NORMAL
BLOOD, URINE: NEGATIVE
BPU ID: NORMAL
BPU ID: NORMAL
BUN BLDV-MCNC: 27 MG/DL (ref 8–23)
CALCIUM SERPL-MCNC: 8.3 MG/DL (ref 8.8–10.2)
CARBOXYHEMOGLOBIN ARTERIAL: 4.6 % (ref 0–5)
CHLORIDE BLD-SCNC: 93 MMOL/L (ref 98–111)
CLARITY: CLEAR
CO2: 32 MMOL/L (ref 22–29)
COLOR: YELLOW
CREAT SERPL-MCNC: 1.4 MG/DL (ref 0.5–1.2)
DESCRIPTION BLOOD BANK: NORMAL
DESCRIPTION BLOOD BANK: NORMAL
EKG P AXIS: 64 DEGREES
EKG P-R INTERVAL: 120 MS
EKG Q-T INTERVAL: 380 MS
EKG QRS DURATION: 80 MS
EKG QTC CALCULATION (BAZETT): 372 MS
EKG T AXIS: 42 DEGREES
EOSINOPHILS ABSOLUTE: 0.1 K/UL (ref 0–0.6)
EOSINOPHILS RELATIVE PERCENT: 1.8 % (ref 0–5)
FERRITIN: 5.6 NG/ML (ref 30–400)
GFR NON-AFRICAN AMERICAN: 50
GLUCOSE BLD-MCNC: 82 MG/DL (ref 74–109)
GLUCOSE URINE: NEGATIVE MG/DL
HCO3 ARTERIAL: 35.9 MMOL/L (ref 22–26)
HCT VFR BLD CALC: 21.4 % (ref 42–52)
HCT VFR BLD CALC: 22.4 % (ref 42–52)
HCT VFR BLD CALC: 33.8 % (ref 42–52)
HEMOGLOBIN, ART, EXTENDED: 5.5 G/DL (ref 14–18)
HEMOGLOBIN: 5.6 G/DL (ref 14–18)
HEMOGLOBIN: 9.7 G/DL (ref 14–18)
HYPOCHROMIA: ABNORMAL
INR BLD: 1.24 (ref 0.88–1.18)
IRON SATURATION: 4 % (ref 14–50)
IRON: 15 UG/DL (ref 59–158)
KETONES, URINE: NEGATIVE MG/DL
LACTIC ACID: 1.7 MMOL/L (ref 0.5–1.9)
LEUKOCYTE ESTERASE, URINE: NEGATIVE
LV EF: 55 %
LVEF MODALITY: NORMAL
LYMPHOCYTES ABSOLUTE: 0.9 K/UL (ref 1.1–4.5)
LYMPHOCYTES RELATIVE PERCENT: 14.4 % (ref 20–40)
MCH RBC QN AUTO: 19.6 PG (ref 27–31)
MCH RBC QN AUTO: 22.8 PG (ref 27–31)
MCHC RBC AUTO-ENTMCNC: 26.2 G/DL (ref 33–37)
MCHC RBC AUTO-ENTMCNC: 28.7 G/DL (ref 33–37)
MCV RBC AUTO: 74.8 FL (ref 80–94)
MCV RBC AUTO: 79.3 FL (ref 80–94)
METHEMOGLOBIN ARTERIAL: 1.5 %
MICROCYTES: ABNORMAL
MONOCYTES ABSOLUTE: 0.7 K/UL (ref 0–0.9)
MONOCYTES RELATIVE PERCENT: 10.8 % (ref 0–10)
NEUTROPHILS ABSOLUTE: 4.4 K/UL (ref 1.5–7.5)
NEUTROPHILS RELATIVE PERCENT: 72.2 % (ref 50–65)
NITRITE, URINE: NEGATIVE
O2 CONTENT ARTERIAL: 7.4 ML/DL
O2 SAT, ARTERIAL: 93.3 %
O2 THERAPY: ABNORMAL
OVALOCYTES: ABNORMAL
PCO2 ARTERIAL: 68 MMHG (ref 35–45)
PDW BLD-RTO: 16 % (ref 11.5–14.5)
PDW BLD-RTO: 19.9 % (ref 11.5–14.5)
PH ARTERIAL: 7.33 (ref 7.35–7.45)
PH UA: 6 (ref 5–8)
PLATELET # BLD: 115 K/UL (ref 130–400)
PLATELET # BLD: 118 K/UL (ref 130–400)
PLATELET SLIDE REVIEW: ABNORMAL
PLATELET SLIDE REVIEW: ABNORMAL
PMV BLD AUTO: 11.8 FL (ref 9.4–12.4)
PMV BLD AUTO: 13 FL (ref 9.4–12.4)
PO2 ARTERIAL: 92 MMHG (ref 80–100)
POLYCHROMASIA: ABNORMAL
POTASSIUM REFLEX MAGNESIUM: 4.8 MMOL/L (ref 3.5–5)
POTASSIUM, WHOLE BLOOD: 4.3
PRO-BNP: 1193 PG/ML (ref 0–900)
PROTEIN UA: NEGATIVE MG/DL
PROTHROMBIN TIME: 15 SEC (ref 12–14.6)
RBC # BLD: 2.86 M/UL (ref 4.7–6.1)
RBC # BLD: 4.26 M/UL (ref 4.7–6.1)
RETICULOCYTE ABSOLUTE COUNT: 0.04 M/UL (ref 0.03–0.12)
RETICULOCYTE COUNT PCT: 1.34 % (ref 0.5–1.5)
SODIUM BLD-SCNC: 137 MMOL/L (ref 136–145)
SPECIFIC GRAVITY UA: 1.01 (ref 1–1.03)
TOTAL IRON BINDING CAPACITY: 410 UG/DL (ref 250–400)
TOTAL PROTEIN: 5.6 G/DL (ref 6.6–8.7)
TROPONIN: <0.01 NG/ML (ref 0–0.03)
URINE REFLEX TO CULTURE: NORMAL
UROBILINOGEN, URINE: 1 E.U./DL
WBC # BLD: 6.1 K/UL (ref 4.8–10.8)
WBC # BLD: 7.5 K/UL (ref 4.8–10.8)

## 2019-04-26 PROCEDURE — 2580000003 HC RX 258: Performed by: NURSE PRACTITIONER

## 2019-04-26 PROCEDURE — 86850 RBC ANTIBODY SCREEN: CPT

## 2019-04-26 PROCEDURE — 83880 ASSAY OF NATRIURETIC PEPTIDE: CPT

## 2019-04-26 PROCEDURE — 2700000000 HC OXYGEN THERAPY PER DAY

## 2019-04-26 PROCEDURE — 85027 COMPLETE CBC AUTOMATED: CPT

## 2019-04-26 PROCEDURE — 85610 PROTHROMBIN TIME: CPT

## 2019-04-26 PROCEDURE — 76700 US EXAM ABDOM COMPLETE: CPT

## 2019-04-26 PROCEDURE — 80053 COMPREHEN METABOLIC PANEL: CPT

## 2019-04-26 PROCEDURE — 6360000002 HC RX W HCPCS: Performed by: INTERNAL MEDICINE

## 2019-04-26 PROCEDURE — 94664 DEMO&/EVAL PT USE INHALER: CPT

## 2019-04-26 PROCEDURE — 99285 EMERGENCY DEPT VISIT HI MDM: CPT

## 2019-04-26 PROCEDURE — C9113 INJ PANTOPRAZOLE SODIUM, VIA: HCPCS | Performed by: INTERNAL MEDICINE

## 2019-04-26 PROCEDURE — 83540 ASSAY OF IRON: CPT

## 2019-04-26 PROCEDURE — 71045 X-RAY EXAM CHEST 1 VIEW: CPT

## 2019-04-26 PROCEDURE — 1210000000 HC MED SURG R&B

## 2019-04-26 PROCEDURE — 85045 AUTOMATED RETICULOCYTE COUNT: CPT

## 2019-04-26 PROCEDURE — 6360000004 HC RX CONTRAST MEDICATION: Performed by: NURSE PRACTITIONER

## 2019-04-26 PROCEDURE — 82803 BLOOD GASES ANY COMBINATION: CPT

## 2019-04-26 PROCEDURE — 85730 THROMBOPLASTIN TIME PARTIAL: CPT

## 2019-04-26 PROCEDURE — 84484 ASSAY OF TROPONIN QUANT: CPT

## 2019-04-26 PROCEDURE — 84132 ASSAY OF SERUM POTASSIUM: CPT

## 2019-04-26 PROCEDURE — 2580000003 HC RX 258: Performed by: INTERNAL MEDICINE

## 2019-04-26 PROCEDURE — 6360000004 HC RX CONTRAST MEDICATION: Performed by: HOSPITALIST

## 2019-04-26 PROCEDURE — 81003 URINALYSIS AUTO W/O SCOPE: CPT

## 2019-04-26 PROCEDURE — 93005 ELECTROCARDIOGRAM TRACING: CPT

## 2019-04-26 PROCEDURE — 6360000002 HC RX W HCPCS: Performed by: PHYSICIAN ASSISTANT

## 2019-04-26 PROCEDURE — C8928 TTE W OR W/O FOL W/CON,STRES: HCPCS

## 2019-04-26 PROCEDURE — 30233N1 TRANSFUSION OF NONAUTOLOGOUS RED BLOOD CELLS INTO PERIPHERAL VEIN, PERCUTANEOUS APPROACH: ICD-10-PCS | Performed by: HOSPITALIST

## 2019-04-26 PROCEDURE — P9016 RBC LEUKOCYTES REDUCED: HCPCS

## 2019-04-26 PROCEDURE — 86900 BLOOD TYPING SEROLOGIC ABO: CPT

## 2019-04-26 PROCEDURE — 94640 AIRWAY INHALATION TREATMENT: CPT

## 2019-04-26 PROCEDURE — 99222 1ST HOSP IP/OBS MODERATE 55: CPT | Performed by: PHYSICIAN ASSISTANT

## 2019-04-26 PROCEDURE — 83550 IRON BINDING TEST: CPT

## 2019-04-26 PROCEDURE — 99221 1ST HOSP IP/OBS SF/LOW 40: CPT | Performed by: INTERNAL MEDICINE

## 2019-04-26 PROCEDURE — 86901 BLOOD TYPING SEROLOGIC RH(D): CPT

## 2019-04-26 PROCEDURE — 83605 ASSAY OF LACTIC ACID: CPT

## 2019-04-26 PROCEDURE — 36430 TRANSFUSION BLD/BLD COMPNT: CPT

## 2019-04-26 PROCEDURE — 36600 WITHDRAWAL OF ARTERIAL BLOOD: CPT

## 2019-04-26 PROCEDURE — 2580000003 HC RX 258: Performed by: PHYSICIAN ASSISTANT

## 2019-04-26 PROCEDURE — 99285 EMERGENCY DEPT VISIT HI MDM: CPT | Performed by: NURSE PRACTITIONER

## 2019-04-26 PROCEDURE — 74177 CT ABD & PELVIS W/CONTRAST: CPT

## 2019-04-26 PROCEDURE — 85025 COMPLETE CBC W/AUTO DIFF WBC: CPT

## 2019-04-26 PROCEDURE — 82728 ASSAY OF FERRITIN: CPT

## 2019-04-26 PROCEDURE — 6370000000 HC RX 637 (ALT 250 FOR IP): Performed by: PHYSICIAN ASSISTANT

## 2019-04-26 PROCEDURE — C8929 TTE W OR WO FOL WCON,DOPPLER: HCPCS

## 2019-04-26 PROCEDURE — 6370000000 HC RX 637 (ALT 250 FOR IP): Performed by: NURSE PRACTITIONER

## 2019-04-26 PROCEDURE — 36415 COLL VENOUS BLD VENIPUNCTURE: CPT

## 2019-04-26 PROCEDURE — 86923 COMPATIBILITY TEST ELECTRIC: CPT

## 2019-04-26 RX ORDER — IPRATROPIUM BROMIDE AND ALBUTEROL SULFATE 2.5; .5 MG/3ML; MG/3ML
1 SOLUTION RESPIRATORY (INHALATION)
Status: DISCONTINUED | OUTPATIENT
Start: 2019-04-26 | End: 2019-04-26

## 2019-04-26 RX ORDER — 0.9 % SODIUM CHLORIDE 0.9 %
1000 INTRAVENOUS SOLUTION INTRAVENOUS ONCE
Status: COMPLETED | OUTPATIENT
Start: 2019-04-26 | End: 2019-04-26

## 2019-04-26 RX ORDER — FLUTICASONE PROPIONATE 50 MCG
2 SPRAY, SUSPENSION (ML) NASAL DAILY
Status: DISCONTINUED | OUTPATIENT
Start: 2019-04-26 | End: 2019-04-28 | Stop reason: HOSPADM

## 2019-04-26 RX ORDER — SODIUM CHLORIDE 0.9 % (FLUSH) 0.9 %
10 SYRINGE (ML) INJECTION PRN
Status: DISCONTINUED | OUTPATIENT
Start: 2019-04-26 | End: 2019-04-28 | Stop reason: HOSPADM

## 2019-04-26 RX ORDER — ONDANSETRON 2 MG/ML
4 INJECTION INTRAMUSCULAR; INTRAVENOUS EVERY 6 HOURS PRN
Status: DISCONTINUED | OUTPATIENT
Start: 2019-04-26 | End: 2019-04-28 | Stop reason: HOSPADM

## 2019-04-26 RX ORDER — PANTOPRAZOLE SODIUM 40 MG/10ML
40 INJECTION, POWDER, LYOPHILIZED, FOR SOLUTION INTRAVENOUS 2 TIMES DAILY
Status: DISCONTINUED | OUTPATIENT
Start: 2019-04-26 | End: 2019-04-28 | Stop reason: HOSPADM

## 2019-04-26 RX ORDER — SODIUM CHLORIDE 0.9 % (FLUSH) 0.9 %
10 SYRINGE (ML) INJECTION EVERY 12 HOURS
Status: DISCONTINUED | OUTPATIENT
Start: 2019-04-26 | End: 2019-04-26

## 2019-04-26 RX ORDER — LEVOTHYROXINE SODIUM 0.07 MG/1
75 TABLET ORAL DAILY
Status: DISCONTINUED | OUTPATIENT
Start: 2019-04-26 | End: 2019-04-28 | Stop reason: HOSPADM

## 2019-04-26 RX ORDER — 0.9 % SODIUM CHLORIDE 0.9 %
10 VIAL (ML) INJECTION DAILY
Status: DISCONTINUED | OUTPATIENT
Start: 2019-04-26 | End: 2019-04-28 | Stop reason: HOSPADM

## 2019-04-26 RX ORDER — GUAIFENESIN 200 MG/1
400 TABLET ORAL DAILY
Status: DISCONTINUED | OUTPATIENT
Start: 2019-04-26 | End: 2019-04-28 | Stop reason: HOSPADM

## 2019-04-26 RX ORDER — 0.9 % SODIUM CHLORIDE 0.9 %
250 INTRAVENOUS SOLUTION INTRAVENOUS ONCE
Status: DISCONTINUED | OUTPATIENT
Start: 2019-04-26 | End: 2019-04-28 | Stop reason: HOSPADM

## 2019-04-26 RX ORDER — ALBUTEROL SULFATE 2.5 MG/3ML
2.5 SOLUTION RESPIRATORY (INHALATION) 4 TIMES DAILY
Status: DISCONTINUED | OUTPATIENT
Start: 2019-04-26 | End: 2019-04-28 | Stop reason: HOSPADM

## 2019-04-26 RX ORDER — SODIUM CHLORIDE 0.9 % (FLUSH) 0.9 %
10 SYRINGE (ML) INJECTION EVERY 12 HOURS SCHEDULED
Status: DISCONTINUED | OUTPATIENT
Start: 2019-04-26 | End: 2019-04-28 | Stop reason: HOSPADM

## 2019-04-26 RX ORDER — IPRATROPIUM BROMIDE AND ALBUTEROL SULFATE 2.5; .5 MG/3ML; MG/3ML
1 SOLUTION RESPIRATORY (INHALATION) EVERY 4 HOURS PRN
Status: DISCONTINUED | OUTPATIENT
Start: 2019-04-26 | End: 2019-04-28 | Stop reason: HOSPADM

## 2019-04-26 RX ORDER — PANTOPRAZOLE SODIUM 40 MG/10ML
80 INJECTION, POWDER, LYOPHILIZED, FOR SOLUTION INTRAVENOUS ONCE
Status: COMPLETED | OUTPATIENT
Start: 2019-04-26 | End: 2019-04-26

## 2019-04-26 RX ORDER — 0.9 % SODIUM CHLORIDE 0.9 %
250 INTRAVENOUS SOLUTION INTRAVENOUS ONCE
Status: COMPLETED | OUTPATIENT
Start: 2019-04-26 | End: 2019-04-26

## 2019-04-26 RX ORDER — IPRATROPIUM BROMIDE AND ALBUTEROL SULFATE 2.5; .5 MG/3ML; MG/3ML
1 SOLUTION RESPIRATORY (INHALATION) ONCE
Status: COMPLETED | OUTPATIENT
Start: 2019-04-26 | End: 2019-04-26

## 2019-04-26 RX ORDER — ACETAMINOPHEN 325 MG/1
650 TABLET ORAL EVERY 4 HOURS PRN
Status: DISCONTINUED | OUTPATIENT
Start: 2019-04-26 | End: 2019-04-28 | Stop reason: HOSPADM

## 2019-04-26 RX ORDER — BUDESONIDE 0.25 MG/2ML
0.5 INHALANT ORAL 2 TIMES DAILY
Status: DISCONTINUED | OUTPATIENT
Start: 2019-04-26 | End: 2019-04-28 | Stop reason: HOSPADM

## 2019-04-26 RX ORDER — ALBUTEROL SULFATE 90 UG/1
2 AEROSOL, METERED RESPIRATORY (INHALATION) 4 TIMES DAILY PRN
Status: DISCONTINUED | OUTPATIENT
Start: 2019-04-26 | End: 2019-04-28 | Stop reason: HOSPADM

## 2019-04-26 RX ORDER — TIZANIDINE 4 MG/1
4 TABLET ORAL EVERY 8 HOURS PRN
Status: DISCONTINUED | OUTPATIENT
Start: 2019-04-26 | End: 2019-04-28 | Stop reason: HOSPADM

## 2019-04-26 RX ADMIN — SODIUM CHLORIDE 250 ML: 9 INJECTION, SOLUTION INTRAVENOUS at 06:26

## 2019-04-26 RX ADMIN — ALBUTEROL SULFATE 2.5 MG: 2.5 SOLUTION RESPIRATORY (INHALATION) at 11:12

## 2019-04-26 RX ADMIN — TIOTROPIUM BROMIDE INHALATION SPRAY 2 PUFF: 3.12 SPRAY, METERED RESPIRATORY (INHALATION) at 16:19

## 2019-04-26 RX ADMIN — FLUTICASONE PROPIONATE 2 SPRAY: 50 SPRAY, METERED NASAL at 14:34

## 2019-04-26 RX ADMIN — PERFLUTREN 1.65 MG: 6.52 INJECTION, SUSPENSION INTRAVENOUS at 13:30

## 2019-04-26 RX ADMIN — BUDESONIDE 250 MCG: 0.25 SUSPENSION RESPIRATORY (INHALATION) at 19:22

## 2019-04-26 RX ADMIN — IPRATROPIUM BROMIDE AND ALBUTEROL SULFATE 1 AMPULE: .5; 3 SOLUTION RESPIRATORY (INHALATION) at 01:04

## 2019-04-26 RX ADMIN — IOPAMIDOL 90 ML: 755 INJECTION, SOLUTION INTRAVENOUS at 01:23

## 2019-04-26 RX ADMIN — PANTOPRAZOLE SODIUM 8 MG/HR: 40 INJECTION, POWDER, FOR SOLUTION INTRAVENOUS at 05:48

## 2019-04-26 RX ADMIN — ALBUTEROL SULFATE 2.5 MG: 2.5 SOLUTION RESPIRATORY (INHALATION) at 19:21

## 2019-04-26 RX ADMIN — PANTOPRAZOLE SODIUM 40 MG: 40 INJECTION, POWDER, FOR SOLUTION INTRAVENOUS at 22:24

## 2019-04-26 RX ADMIN — PANTOPRAZOLE SODIUM 40 MG: 40 INJECTION, POWDER, FOR SOLUTION INTRAVENOUS at 14:34

## 2019-04-26 RX ADMIN — ALBUTEROL SULFATE 2.5 MG: 2.5 SOLUTION RESPIRATORY (INHALATION) at 15:14

## 2019-04-26 RX ADMIN — SODIUM CHLORIDE 1000 ML: 9 INJECTION, SOLUTION INTRAVENOUS at 01:23

## 2019-04-26 RX ADMIN — PANTOPRAZOLE SODIUM 80 MG: 40 INJECTION, POWDER, FOR SOLUTION INTRAVENOUS at 06:08

## 2019-04-26 RX ADMIN — BUDESONIDE 500 MCG: 0.25 SUSPENSION RESPIRATORY (INHALATION) at 11:13

## 2019-04-26 RX ADMIN — LEVOTHYROXINE SODIUM 75 MCG: 75 TABLET ORAL at 14:34

## 2019-04-26 RX ADMIN — Medication 10 ML: at 22:25

## 2019-04-26 RX ADMIN — Medication 10 ML: at 06:28

## 2019-04-26 RX ADMIN — GUAIFENESIN 400 MG: 200 TABLET ORAL at 14:34

## 2019-04-26 RX ADMIN — Medication 10 ML: at 14:36

## 2019-04-26 ASSESSMENT — ENCOUNTER SYMPTOMS
PHOTOPHOBIA: 0
CONSTIPATION: 0
STRIDOR: 0
APNEA: 0
EYE DISCHARGE: 0
ABDOMINAL PAIN: 0
BACK PAIN: 0
NAUSEA: 0
COUGH: 1
SHORTNESS OF BREATH: 1
COLOR CHANGE: 0
RECTAL PAIN: 0
EYE REDNESS: 0
ABDOMINAL DISTENTION: 0
DIARRHEA: 0
EYE PAIN: 0
VOMITING: 0
SINUS PAIN: 0
WHEEZING: 0
SORE THROAT: 0
BLOOD IN STOOL: 0
CHEST TIGHTNESS: 0
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
## 2019-04-26 NOTE — PROGRESS NOTES
4/26/2019  2:05 AM - Madina Enrique Incoming Lab Results From [employer] Value Ref Range & Units Status Collected Lab   pH, Arterial 7.330Low   7.350 - 7.450 Final 04/26/2019  2:04 AM [facility] Lab   pCO2, Arterial 68. 0High   35.0 - 45.0 mmHg Final 04/26/2019  2:04 AM [facility] Lab   pO2, Arterial 92.0  80.0 - 100.0 mmHg Final 04/26/2019  2:04 AM [facility] Lab   HCO3, Arterial 35. 9High   22.0 - 26.0 mmol/L Final 04/26/2019  2:04 AM [facility] Excess, Arterial 9.2High   -2.0 - 2.0 mmol/L Final 04/26/2019  2:04 AM [facility] Lab   Hemoglobin, Art, Extended 5.5Low Panic   14.0 - 18.0 g/dL Final 04/26/2019  2:04 AM [facility] Lab   O2 Sat, Arterial 93.3  >92 % Final 04/26/2019  2:04 AM [facility] Lab   Carboxyhgb, Arterial 4.6  0.0 - 5.0 % Final 04/26/2019  2:04 AM [facility] Lab        0.0-1.5   (Smokers 1.5-5.0)    Methemoglobin, Arterial 1.5  <1.5 % Final 04/26/2019  2:04 AM [facility] Lab   O2 Content, Arterial 7.4  Not Established mL/dL Final 04/26/2019  2:04 AM [facility]   O2 Therapy Unknown   Final 04/26/2019  2:04 AM [facility] Lab   Testing Performed By     Lab - Abbreviation     3 LPM N/C   RIGHT RADIAL AT+

## 2019-04-26 NOTE — ED PROVIDER NOTES
I have seen the patient with Henri. NP. 76year old ill appearing male presents to the ED with dyspnea. Labs reviewed a hemoglobin/hemocrit of 5.4/21.4. He denies any dark tarry stools, No obvious cause of anemia. His abdomen is soft and non tender. No hematuria. He denies any colostomy or endoscopy. No anticoagulation. He does report taking ibuprofen daily. Hemoccult negative, CT shows no specific findings. He will be infused with 2 units of packed red blood cell. We will add iron studies we will do a TICB, Ferritin studies. Reticulate. Patient will be admitted for IV blood transfusion. I discussed the case with Dr. Sandie Acevedo he agrees for hospital admission.           Eriberto Conn, APRN  04/26/19 [id number]

## 2019-04-26 NOTE — CONSULTS
[facility] OF [facility] JOAN Mendoza 78, [address] [facility]                                  CONSULTATION    PATIENT NAME: Eloy Stephenson                       :        1950  MED REC NO:   [id number]                              ROOM:       [facility]  ACCOUNT NO:   [de-identified]                           ADMIT DATE: 2019  PROVIDER:     Frances Mir MD    CONSULT DATE:  2019    HISTORY OF PRESENT ILLNESS:  This is a very pleasant 49-year-old white  male, who came to the emergency room complaining of dyspnea, weakness,  and fatigue that had progressed over the last several weeks to months. He felt like his heart rate was irregular and was sensing some  palpitations and swelling in his distal lower extremities. He could not  walk across the room and his friends encouraged him to seek medical  attention in the ER. In the ER, initial workup revealed a hemoglobin of  5.6 with an elevated BMP, thrombocytopenia, and iron deficiency  including low iron and ferritin. The patient states that his stools  have been brown, he has not seen any blood in the stool; however, he has  never had a GI evaluation. Hemoccult stool in the emergency room was  reported as negative. GI was asked to see for anemia/iron deficient. PAST MEDICAL HISTORY:  The patient's past medical history is significant  for arthritis, chronic kidney disease, and COPD. The patient's COPD is  advanced and he requires oxygen at home. Previous head trauma,  hypertension, hypothyroidism, osteoporosis, hyperlipidemia, history of  polio, history of vitamin D deficiency. PAST SURGICAL HISTORY:  Includes previous ankle surgery. MEDICATIONS AT HOME:  Include albuterol, Toprol, Lasix, lisinopril,  Synthroid, Ellipta, Zanaflex, Flonase, guaifenesin, home oxygen. ALLERGIES:  Include ASPIRIN. SOCIAL AND FAMILY HISTORY:  Well outlined in Epic and reviewed.   He has  a significant smoking history and continues to smoke despite his  advanced COPD. He denies use of alcohol. REVIEW OF SYSTEMS:  CONSTITUTIONAL:  Weak, tired and fatigue. Weight has been stable. There has been no syncope. EYES:  Denies eye pain, visual changes, double vision, blurred vision. EAR, NOSE, AND THROAT:  Denies ringing in ears, nose bleeds, sore  throat, pain with swallowing. CARDIOVASCULAR:  Denies chest pain, pain with exertion, palpitations,  fainting. RESPIRATORY:  Significant shortness of breath, oxygen dependent with  cough and sputum production. GASTROINTESTINAL:  No history of GI evaluation. URINARY:  Denies incontinence, frequency, urgency, nocturia, pain,  discomfort. MUSCULOSKELETAL:  Muscle weakness and edema. NEUROLOGICAL:  Denies seizure, focal paralysis, numbness or tingling. HEMATOLOGIC/LYMPHATIC:  Denies known history of anemia, easy bleeding or  bruising, petechia. SKIN:  Denies itching, rashes, nodules, eczema. LABORATORY STUDIES:  Otherwise not mentioned in the HPI include a low  ferritin of 5.6, iron of 15. INR of 1.25. Sodium is 137, potassium is  4.8, BUN is 27, creatinine is 1.4. Liver function tests are within  normal limits. CBC:  His white count is 6100, hemoglobin and hematocrit  is 5.6 and 21.4, platelet count is decreased at 115,000. Differential  was significant for elevated neutrophils, decreased lymphocytes,  elevated monocytes with anisocytosis, microcytosis, polychromasia,  hypochromasia, and occasional ovalocytes. PHYSICAL EXAMINATION:  GENERAL:  On exam, he is a thin, chronically ill-appearing 68-year-old  white male, who is short of breath at rest with nasal oxygen. VITAL SIGNS:  Stable. He is afebrile with temperature of 98.6,  respirations 18, /60, pulse 52. HEENT:  Head is normocephalic, atraumatic. Pupils are equal, reactive  to light and accommodation. EOMs are intact. Conjunctivae are pale. Sclerae are nonicteric. NECK:  Supple without thyromegaly.   No carotid bruits. Trachea is in  the midline. LUNGS:  With decreased breath sounds bilaterally. There are inspiratory  and expiratory wheezes. Respiratory excursion is equal.  HEART:  Cardiovascular reveals regular rate and rhythm with occasional  premature beat. ABDOMEN:  Soft, scaphoid with active bowel sounds. No masses are  appreciated. No areas of tenderness are noted. EXTREMITIES:  Without edema. SKIN:  Warm and dry without rashes. IMPRESSION:  1. Profound anemia with abnormal differential and thrombocytopenia. This combined with minimally elevated INR would suggest either  nutritional deficiency versus underlying occult cirrhosis. 2.  Significant advanced COPD. The patient is currently not a candidate  for conscious sedation and endoscopic evaluation pending improvement in  his pulmonary status. 3.  Hemoccult negative stools. We will repeat x3 given iron deficiency. PLAN:  1. Maximize cardiopulmonary status. 2.  Agree with transfusion. 3.  Consider Hematology evaluation. 4.  Repeat stool Hemoccult, possible panendoscopy when pulmonary status  improved. Thank you for asking me to see this patient.           Carlton Ly MD    D: 04/26/2019 11:51:48      T: 04/26/2019 11:54:23     [email or web address]  Job#: [id number]     Doc#: [id number]    CC:
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
## 2019-04-26 NOTE — ED NOTES
Bed: 09  Expected date:   Expected time:   Means of arrival: [facility]  Comments:  EMS: puneet Reyes RN  04/26/19 7604

## 2019-04-26 NOTE — CARE COORDINATION
[address] Transitions Interview     2019    Patient: Duc Quach Patient : 1950   MRN: [id number]    RARS: Readmission Risk Score: 13         Spoke with:Stone Mejia        Readmission Risk  Patient Active Problem List   Diagnosis    Hypertension    Hypothyroidism    Osteoporosis    CKD (chronic kidney disease), stage II    Vitamin D deficiency    Lumbar spine pain    Cervical spine disease    COPD (chronic obstructive pulmonary disease) (HCC)    Anxiety    Depression    OA (osteoarthritis of spine)    Neck and shoulder pain    Frequent headaches    Mixed hyperlipidemia    Chronic daily headache    Anemia    Irregular heart rhythm    Elevated brain natriuretic peptide (BNP) level       Inpatient Assessment  Care Transitions Summary    Care Transitions Inpatient Review  Medication Review  Do you have all of your prescriptions and are they filled?:  Yes   Barriers to Medication Adherence:  None  Are you able to afford your medications?:  With Assistance  What assistance programs is the patient using?:  Patient assistance with pharmaceutical company  How often do you have difficulty taking your medications?:  I always take them as prescribed. Housing Review  Who do you live with?:  Parent(s)  Are you an active caregiver in your home?:  Yes  For whom are you the caregiver?:  mother  Does the person that you care for see a [facility] PCP?:  Yes  Who is the PCP of the person that you care for?:  Cyndi Wyatt, [address] you have a ?:  No  Do you have a [facility]?:  No  Durable Medical Equipment  Patient Home Equipment:  Oxygen  Functional Review  Ability to seek help/take action for Emergent/Urgent situations i.e. fire, crime, inclement weather or health crisis. :  Independent  Ability handle personal hygiene needs (bathing/dressing/grooming): Independent  Ability to manage medications:   Independent  Ability to prepare food:  Needs Assistance  Ability to maintain home (clean home, laundry):  Needs Assistance  Ability to drive and/or has transportation:  Independent  Ability to do shopping:  Needs Assistance  Ability to manage finances:  Needs Assistance  Is patient able to live independently?:  Yes  Hearing and Vision  Visual Impairment:  Visual impairment (Glasses/contacts)  Care Transitions Interventions         Follow Up: Met at bedside with patient and discussed CTC process. Contact information given. Patient is agreeable with appropriate follow up as indicated after discharge. Patient had been sleeping and was not fully awake. Was not very talkative and did not offer a lot of information. Will follow along while here and after discharge as indicated. Future Appointments   Date Time Provider Beba Dawson   5/23/2019  4:30 PM [address] [facility] [place] [facility]   11/4/2019  1:30 PM B Felix Escalona  [place] [facility]  There are no preventive care reminders to display for this patient.     Adal Lui RN

## 2019-04-26 NOTE — ED NOTES
Consent for blood signed by PT     Melvin Mendoza, [facility]  04/26/19 9619

## 2019-04-27 LAB
ANION GAP SERPL CALCULATED.3IONS-SCNC: 11 MMOL/L (ref 7–19)
BUN BLDV-MCNC: 21 MG/DL (ref 8–23)
CALCIUM SERPL-MCNC: 8.8 MG/DL (ref 8.8–10.2)
CHLORIDE BLD-SCNC: 93 MMOL/L (ref 98–111)
CO2: 33 MMOL/L (ref 22–29)
CREAT SERPL-MCNC: 1.1 MG/DL (ref 0.5–1.2)
GFR NON-AFRICAN AMERICAN: >60
GLUCOSE BLD-MCNC: 131 MG/DL (ref 74–109)
HCT VFR BLD CALC: 33.5 % (ref 42–52)
HEMOGLOBIN: 9.5 G/DL (ref 14–18)
MCH RBC QN AUTO: 22.3 PG (ref 27–31)
MCHC RBC AUTO-ENTMCNC: 28.4 G/DL (ref 33–37)
MCV RBC AUTO: 78.6 FL (ref 80–94)
PDW BLD-RTO: 19.9 % (ref 11.5–14.5)
PLATELET # BLD: 124 K/UL (ref 130–400)
PMV BLD AUTO: 10.9 FL (ref 9.4–12.4)
POTASSIUM SERPL-SCNC: 3.9 MMOL/L (ref 3.5–5)
RBC # BLD: 4.26 M/UL (ref 4.7–6.1)
SODIUM BLD-SCNC: 137 MMOL/L (ref 136–145)
TSH SERPL DL<=0.05 MIU/L-ACNC: 3.18 UIU/ML (ref 0.27–4.2)
WBC # BLD: 8 K/UL (ref 4.8–10.8)

## 2019-04-27 PROCEDURE — 1210000000 HC MED SURG R&B

## 2019-04-27 PROCEDURE — 2700000000 HC OXYGEN THERAPY PER DAY

## 2019-04-27 PROCEDURE — C9113 INJ PANTOPRAZOLE SODIUM, VIA: HCPCS | Performed by: INTERNAL MEDICINE

## 2019-04-27 PROCEDURE — 6360000002 HC RX W HCPCS: Performed by: PHYSICIAN ASSISTANT

## 2019-04-27 PROCEDURE — 2580000003 HC RX 258: Performed by: INTERNAL MEDICINE

## 2019-04-27 PROCEDURE — 99232 SBSQ HOSP IP/OBS MODERATE 35: CPT | Performed by: INTERNAL MEDICINE

## 2019-04-27 PROCEDURE — 6360000002 HC RX W HCPCS: Performed by: INTERNAL MEDICINE

## 2019-04-27 PROCEDURE — 85027 COMPLETE CBC AUTOMATED: CPT

## 2019-04-27 PROCEDURE — 80048 BASIC METABOLIC PNL TOTAL CA: CPT

## 2019-04-27 PROCEDURE — 2580000003 HC RX 258: Performed by: PHYSICIAN ASSISTANT

## 2019-04-27 PROCEDURE — 6370000000 HC RX 637 (ALT 250 FOR IP): Performed by: HOSPITALIST

## 2019-04-27 PROCEDURE — 94640 AIRWAY INHALATION TREATMENT: CPT

## 2019-04-27 PROCEDURE — 6370000000 HC RX 637 (ALT 250 FOR IP): Performed by: PHYSICIAN ASSISTANT

## 2019-04-27 PROCEDURE — 99232 SBSQ HOSP IP/OBS MODERATE 35: CPT | Performed by: HOSPITALIST

## 2019-04-27 PROCEDURE — 84443 ASSAY THYROID STIM HORMONE: CPT

## 2019-04-27 RX ORDER — METOPROLOL SUCCINATE 25 MG/1
25 TABLET, EXTENDED RELEASE ORAL DAILY
Status: DISCONTINUED | OUTPATIENT
Start: 2019-04-27 | End: 2019-04-28 | Stop reason: HOSPADM

## 2019-04-27 RX ORDER — LISINOPRIL 20 MG/1
20 TABLET ORAL DAILY
Status: DISCONTINUED | OUTPATIENT
Start: 2019-04-27 | End: 2019-04-28 | Stop reason: HOSPADM

## 2019-04-27 RX ADMIN — PANTOPRAZOLE SODIUM 40 MG: 40 INJECTION, POWDER, FOR SOLUTION INTRAVENOUS at 08:07

## 2019-04-27 RX ADMIN — Medication 10 ML: at 10:43

## 2019-04-27 RX ADMIN — LEVOTHYROXINE SODIUM 75 MCG: 75 TABLET ORAL at 05:33

## 2019-04-27 RX ADMIN — PANTOPRAZOLE SODIUM 40 MG: 40 INJECTION, POWDER, FOR SOLUTION INTRAVENOUS at 22:14

## 2019-04-27 RX ADMIN — FLUTICASONE PROPIONATE 2 SPRAY: 50 SPRAY, METERED NASAL at 08:07

## 2019-04-27 RX ADMIN — ALBUTEROL SULFATE 2.5 MG: 2.5 SOLUTION RESPIRATORY (INHALATION) at 11:00

## 2019-04-27 RX ADMIN — ACETAMINOPHEN 650 MG: 325 TABLET ORAL at 05:33

## 2019-04-27 RX ADMIN — BUDESONIDE 250 MCG: 0.25 SUSPENSION RESPIRATORY (INHALATION) at 07:17

## 2019-04-27 RX ADMIN — METOPROLOL SUCCINATE 25 MG: 25 TABLET, EXTENDED RELEASE ORAL at 22:09

## 2019-04-27 RX ADMIN — TIOTROPIUM BROMIDE INHALATION SPRAY 2 PUFF: 3.12 SPRAY, METERED RESPIRATORY (INHALATION) at 08:07

## 2019-04-27 RX ADMIN — LISINOPRIL 20 MG: 20 TABLET ORAL at 22:09

## 2019-04-27 RX ADMIN — ACETAMINOPHEN 650 MG: 325 TABLET ORAL at 22:09

## 2019-04-27 RX ADMIN — ONDANSETRON 4 MG: 2 INJECTION INTRAMUSCULAR; INTRAVENOUS at 10:43

## 2019-04-27 RX ADMIN — Medication 10 ML: at 08:07

## 2019-04-27 RX ADMIN — ALBUTEROL SULFATE 2.5 MG: 2.5 SOLUTION RESPIRATORY (INHALATION) at 14:57

## 2019-04-27 RX ADMIN — ALBUTEROL SULFATE 2.5 MG: 2.5 SOLUTION RESPIRATORY (INHALATION) at 07:16

## 2019-04-27 RX ADMIN — GUAIFENESIN 400 MG: 200 TABLET ORAL at 08:07

## 2019-04-27 RX ADMIN — ALBUTEROL SULFATE 2.5 MG: 2.5 SOLUTION RESPIRATORY (INHALATION) at 19:25

## 2019-04-27 RX ADMIN — BUDESONIDE 500 MCG: 0.25 SUSPENSION RESPIRATORY (INHALATION) at 19:26

## 2019-04-27 RX ADMIN — Medication 10 ML: at 08:08

## 2019-04-27 RX ADMIN — Medication 10 ML: at 22:15

## 2019-04-27 ASSESSMENT — PAIN SCALES - GENERAL
PAINLEVEL_OUTOF10: 5
PAINLEVEL_OUTOF10: 4
PAINLEVEL_OUTOF10: 0

## 2019-04-27 NOTE — PROGRESS NOTES
Nebulization BID    And    albuterol  2.5 mg Nebulization 4x daily    pantoprazole  40 mg Intravenous BID    And    sodium chloride (PF)  10 mL Intravenous Daily    tiotropium  2 puff Inhalation Daily     ipratropium-albuterol, sodium chloride flush, acetaminophen, ondansetron, albuterol sulfate HFA, tiZANidine  DIET CLEAR LIQUID;         Lab and other Data:     Recent Labs     04/26/19  0021 04/26/19  1053 04/27/19  0223   WBC 6.1 7.5 8.0   HGB 5.6* 9.7* 9.5*   * 118* 124*     Recent Labs     04/26/19  0021 04/26/19  0204 04/27/19  0223     --  137   K 4.8 4.3 3.9   CL 93*  --  93*   CO2 32*  --  33*   BUN 27*  --  21   CREATININE 1.4*  --  1.1   GLUCOSE 82  --  131*     Recent Labs     04/26/19 0021   AST 30   ALT 15   BILITOT 0.3   ALKPHOS 73     Troponin T:   Recent Labs     04/26/19 0021   TROPONINI <0.01     Pro-BNP: No results for input(s): BNP in the last 72 hours. INR:   Recent Labs     04/26/19 0021   INR 1.24*     ABGs:   Lab Results   Component Value Date    PHART 7.330 04/26/2019    PO2ART 92.0 04/26/2019    [id number] 68.0 04/26/2019     UA:  Recent Labs     04/26/19  0103   COLORU YELLOW   PHUR 6.0   CLARITYU Clear   SPECGRAV 1.012   LEUKOCYTESUR Negative   UROBILINOGEN 1.0   BILIRUBINUR Negative   BLOODU Negative   GLUCOSEU Negative       Imaging:   US ABDOMEN COMPLETE   Final Result   1. Negative abdominal ultrasound. Signed by Dr Cosme Aguiar on 4/26/2019 3:29 PM      CT ABDOMEN PELVIS W IV CONTRAST Additional Contrast? None   Final Result   The changes in and around the gallbladder may represent   acalculus cholecystitis. Further follow-up is recommended. A nonobstructed right inguinal hernia. Enlarged prostate. Other findings as above. Signed by Dr Destiny Villa on 4/26/2019 8:21 AM      XR CHEST PORTABLE   Final Result   1. No radiographic evidence of acute cardiopulmonary process.        Signed by Dr Kelsey Caldwell on 4/26/2019 6:57 AM        Micro: No results found

## 2019-04-27 NOTE — PROGRESS NOTES
GI  - PROGRESS NOTE    Admit Date: 4/26/2019             Chief Complaint: H/H 9.5 after 2 units PRBC. Patient being seen for:       DIET CLEAR LIQUID;                         Bowel movement: no black or bloody stools    Pain:None  Nausea:None    Intake/Output Summary (Last 24 hours) at 4/27/2019 1315  Last data filed at 4/27/2019 1045  Gross per 24 hour   Intake 1755 ml   Output 1400 ml   Net 355 ml               Lab /Radiology:   Scheduled Meds: Reviewed          -----------------------------------------------------------------          Recent Labs     04/26/19  0021 04/26/19  0405 04/26/19  1053 04/27/19 0223   WBC 6.1  --  7.5 8.0   RBC 2.86*  --  4.26* 4.26*   HGB 5.6*  --  9.7* 9.5*   HCT 21.4* 22.4* 33.8* 33.5*   *  --  118* 124*     Recent Labs     04/26/19  0021 04/26/19  0204 04/27/19 0223     --  137   K 4.8 4.3 3.9   ANIONGAP 12  --  11   CL 93*  --  93*   CO2 32*  --  33*   BUN 27*  --  21   CREATININE 1.4*  --  1.1   GLUCOSE 82  --  131*   CALCIUM 8.3*  --  8.8     Recent Labs     04/26/19 0021   AST 30   ALT 15   BILITOT 0.3   ALKPHOS 73     No results for input(s): AMYLASE, LIPASE in the last 72 hours. Troponin T:   Recent Labs     04/26/19 0021   TROPONINI <0.01     Pro-BNP: No results for input(s): BNP in the last 72 hours. INR:   Recent Labs     04/26/19 0021   INR 1.24*             Physical Exam:   Vitals: /70   Pulse 95   Temp 98.1 °F (36.7 °C) (Temporal)   Resp 18   Ht 5' 4\" (1.626 m)   Wt 115 lb 9.6 oz (52.4 kg)   SpO2 (!) 87%   BMI 19.84 kg/m²     HEENT: Pupils equal, round, reactive to light       Neck supple. Trachea midline. No crepitus  Lungs: breath sounds bilaterally. Normal excursion  Heart[de-identified]    RRR without heave or thrill  Abdomen: soft, non-tender; bowel sounds normal; no masses,  no organomegaly. No encarcerated hernia detected.   No organomegaly detected  Extremities: no cyanosis or clubbing  Lymphatic: No significant lymph node enlargement papable in the neck , groin or umbilicus  Neurologic: alert. oriented        Impression:     1. Profound iron def. anemia     2. Severe COPD    Plan:   1. Would watch at least another 24 hrs   2. He absolutely needs OP egd/Colon when pulm status improved . Will set him up with OP GI F/U in D/C [id number]        Silvia Arriaga M.D.   Gastroenterology

## 2019-04-28 VITALS
BODY MASS INDEX: 19.74 KG/M2 | DIASTOLIC BLOOD PRESSURE: 82 MMHG | TEMPERATURE: 99.4 F | HEIGHT: 64 IN | HEART RATE: 72 BPM | WEIGHT: 115.6 LBS | SYSTOLIC BLOOD PRESSURE: 166 MMHG | RESPIRATION RATE: 16 BRPM | OXYGEN SATURATION: 99 %

## 2019-04-28 LAB
HCT VFR BLD CALC: 30.9 % (ref 42–52)
HEMOGLOBIN: 8.8 G/DL (ref 14–18)
MCH RBC QN AUTO: 22.8 PG (ref 27–31)
MCHC RBC AUTO-ENTMCNC: 28.5 G/DL (ref 33–37)
MCV RBC AUTO: 80.1 FL (ref 80–94)
PDW BLD-RTO: 19.9 % (ref 11.5–14.5)
PLATELET # BLD: 108 K/UL (ref 130–400)
PMV BLD AUTO: 11.4 FL (ref 9.4–12.4)
RBC # BLD: 3.86 M/UL (ref 4.7–6.1)
WBC # BLD: 6.6 K/UL (ref 4.8–10.8)

## 2019-04-28 PROCEDURE — 2700000000 HC OXYGEN THERAPY PER DAY

## 2019-04-28 PROCEDURE — 97161 PT EVAL LOW COMPLEX 20 MIN: CPT

## 2019-04-28 PROCEDURE — 6370000000 HC RX 637 (ALT 250 FOR IP): Performed by: PHYSICIAN ASSISTANT

## 2019-04-28 PROCEDURE — C9113 INJ PANTOPRAZOLE SODIUM, VIA: HCPCS | Performed by: INTERNAL MEDICINE

## 2019-04-28 PROCEDURE — 2580000003 HC RX 258: Performed by: INTERNAL MEDICINE

## 2019-04-28 PROCEDURE — 6370000000 HC RX 637 (ALT 250 FOR IP): Performed by: HOSPITALIST

## 2019-04-28 PROCEDURE — 94640 AIRWAY INHALATION TREATMENT: CPT

## 2019-04-28 PROCEDURE — 85027 COMPLETE CBC AUTOMATED: CPT

## 2019-04-28 PROCEDURE — 2580000003 HC RX 258: Performed by: PHYSICIAN ASSISTANT

## 2019-04-28 PROCEDURE — 99238 HOSP IP/OBS DSCHRG MGMT 30/<: CPT | Performed by: HOSPITALIST

## 2019-04-28 PROCEDURE — 36415 COLL VENOUS BLD VENIPUNCTURE: CPT

## 2019-04-28 PROCEDURE — 6360000002 HC RX W HCPCS: Performed by: INTERNAL MEDICINE

## 2019-04-28 PROCEDURE — 6360000002 HC RX W HCPCS: Performed by: PHYSICIAN ASSISTANT

## 2019-04-28 RX ADMIN — PANTOPRAZOLE SODIUM 40 MG: 40 INJECTION, POWDER, FOR SOLUTION INTRAVENOUS at 08:37

## 2019-04-28 RX ADMIN — LISINOPRIL 20 MG: 20 TABLET ORAL at 08:37

## 2019-04-28 RX ADMIN — LEVOTHYROXINE SODIUM 75 MCG: 75 TABLET ORAL at 06:56

## 2019-04-28 RX ADMIN — GUAIFENESIN 400 MG: 200 TABLET ORAL at 08:37

## 2019-04-28 RX ADMIN — TIOTROPIUM BROMIDE INHALATION SPRAY 2 PUFF: 3.12 SPRAY, METERED RESPIRATORY (INHALATION) at 08:37

## 2019-04-28 RX ADMIN — Medication 10 ML: at 08:38

## 2019-04-28 RX ADMIN — FLUTICASONE PROPIONATE 2 SPRAY: 50 SPRAY, METERED NASAL at 08:37

## 2019-04-28 RX ADMIN — BUDESONIDE 500 MCG: 0.25 SUSPENSION RESPIRATORY (INHALATION) at 07:42

## 2019-04-28 RX ADMIN — ALBUTEROL SULFATE 2.5 MG: 2.5 SOLUTION RESPIRATORY (INHALATION) at 11:36

## 2019-04-28 RX ADMIN — ALBUTEROL SULFATE 2.5 MG: 2.5 SOLUTION RESPIRATORY (INHALATION) at 07:42

## 2019-04-28 RX ADMIN — ACETAMINOPHEN 650 MG: 325 TABLET ORAL at 08:37

## 2019-04-28 RX ADMIN — METOPROLOL SUCCINATE 25 MG: 25 TABLET, EXTENDED RELEASE ORAL at 08:37

## 2019-04-28 RX ADMIN — Medication 10 ML: at 08:40

## 2019-04-28 NOTE — CARE COORDINATION
Spoke with patient regarding MD orders for [facility] services. Patient agreeable and has chosen [facility]. Referral Faxed. [address] [place] [address] [phone]. [phone]. Please notify [address] [place] [address] when patient discharges and fax DC Summary,  DC med list and any new [facility] orders.

## 2019-04-28 NOTE — DISCHARGE SUMMARY
Hospitalist   Discharge Summary    Shilo Mccray  :  1950  MRN:  [id number]    Admit date:  2019  Discharge date:  2019    Admitting Physician:  Sintia Villegas MD    Advance Directive: Full Code    Consults: GI    Primary Care Physician:  Jaziel Rios DO    Discharge Diagnoses:  Principal Problem:    Anemia  Active Problems:    Hypertension    Hypothyroidism    CKD (chronic kidney disease), stage II    Vitamin D deficiency    COPD (chronic obstructive pulmonary disease) (HCC)    Mixed hyperlipidemia    Irregular heart rhythm    Elevated brain natriuretic peptide (BNP) level  Resolved Problems:    * No resolved hospital problems. *      Significant Diagnostic Studies:   Us Abdomen Complete    Result Date: 2019  EXAMINATION:  US ABDOMEN COMPLETE  2019 3:21 PM HISTORY: Cholecystitis COMPARISON: None TECHNIQUE: Ultrasound evaluation of the abdomen was performed. FINDINGS: The gallbladder is observed without gallstones, gallbladder wall thickening or pericholecystic fluid. The liver is homogeneous. There is no hepatic masses. Normal echogenicity observed. There is no intra or extrahepatic biliary ductal dilatation. Pancreas is not well seen. There are no abnormal masses or fluid collections in the pancreas region. Aorta and inferior vena cava are imaged appropriately. The right kidney measures 10 cm in koml-jw-jklj length. Left kidney measures 8.8 cm in rwyz-pk-dqez length. Normal echogenicity renal cortex is observed without renal masses. There is no pelvic caliectasis to indicate obstruction. There are no perinephric abnormalities. Urinary bladder is incompletely distended without bladder wall abnormality. The spleen is normal without enlargement. 1. Negative abdominal ultrasound. Signed by Dr Juan M Vaughan on 2019 3:29 PM    Ct Abdomen Pelvis W Iv Contrast Additional Contrast? None    Result Date: 2019  Examination. CT ABDOMEN PELVIS W IV CONTRAST History: Anemia.  DLP: 691 mGycm. The CT scan of the abdomen and pelvis is performed after intravenous contrast enhancement. The images are acquired in axial plane with subsequent reconstruction in coronal and sagittal planes. There is no previous study for comparison. The lung bases included in the study show extensive chronic emphysema and scarring. There is a mild bronchiectasis and bronchial wall thickening in the lower lungs. Atheromatous changes of coronary arteries are noted. The remaining visualized cardiomediastinal structures are unremarkable. There is periportal edema. No focal abnormality or a mass in the liver. Several small calcified granulomas in the otherwise normal spleen are seen. There is moderate thickening and enhancement of the wall of the gallbladder with surrounding fluid in the gallbladder fossa. No gallstones. Common bile duct is not significantly enlarged. The pancreas appear normal. The adrenal glands are poorly evaluated but unremarkable. 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 No evidence of mass or hydronephrosis. The urinary bladder is poorly distended and may not be optimally evaluated. No focal intrinsic abnormality. The prostate is enlarged. There is a fat-containing small inguinal hernia. There is a right inguinal hernia containing a loop of large bowel. No evidence of obstruction. The stomach duodenum and small bowel appear unremarkable. Normal appendix is seen. Moderate gas and stool are seen in the colon. The distal colon is decompressed. There are no finding to suggest colonic obstruction. No pericolonic peritoneal stranding or infiltration. Severe atheromatous changes of the tortuous abdominal aorta are seen. No aneurysmal dilatation. There is no evidence of abdominal or pelvic lymphadenopathy. Chronic degenerative changes of the thoracolumbar spine are seen with a mild S-shaped scoliosis.  There are scattered areas of osteopenia in the posterior iliac bones bilaterally which probably represent small bone cyst or a chronic degenerative process. No focal bony lesion or bone destruction. A smoothly corticated bony fragment is seen in the left hip joint medially which may represent a loose intraosseous body? . This was noted in the previous radiograph of the lumbar spine in April 2016. This needs to be clinically correlated and further evaluated. The changes in and around the gallbladder may represent acalculus cholecystitis. Further follow-up is recommended. A nonobstructed right inguinal hernia. Enlarged prostate. Other findings as above. Signed by Dr Nikki Avery on 4/26/2019 8:21 AM    Xr Chest Portable    Result Date: 4/26/2019  XR CHEST PORTABLE 4/25/2019 11:45 PM HISTORY: Short of air COMPARISON: October 12, 2017. FINDINGS: The lungs are clear. The cardiomediastinal silhouette and pulmonary vascularity are within normal limits. The osseous structures and surrounding soft tissues demonstrate no acute abnormality. 1. No radiographic evidence of acute cardiopulmonary process. Signed by Dr Idalmis Cole on 4/26/2019 6:57 AM      Labs:    CBC:   Recent Labs     04/26/19  1053 04/27/19  0223 04/28/19  0247   WBC 7.5 8.0 6.6   HGB 9.7* 9.5* 8.8*   * 124* 108*     BMP:    Recent Labs     04/26/19  0021 04/26/19  0204 04/27/19 0223     --  137   K 4.8 4.3 3.9   CL 93*  --  93*   CO2 32*  --  33*   BUN 27*  --  21   CREATININE 1.4*  --  1.1   GLUCOSE 82  --  131*     Hepatic:   Recent Labs     04/26/19 0021   AST 30   ALT 15   BILITOT 0.3   ALKPHOS 73     Troponin:   Recent Labs     04/26/19 0021   TROPONINI <0.01     BNP: No results for input(s): BNP in the last 72 hours. Lipids: No results for input(s): CHOL, HDL in the last 72 hours.     Invalid input(s): LDLCALCU  INR:   Recent Labs     04/26/19 0021   INR 1.24*     ABG:   Recent Labs     04/26/19 0204   PHART 7.330*   OWC9YQX 68.0*   PO2ART 92.0   WHM4RSW 35.9*   A2XJIAKR 93.3   BEART 9.2*       30 Day lookback of cultures:    Blood Culture Recent: No results for input(s): BC in the last 720 hours. Gram Stain Recent: No results for input(s): LABGRAM in the last 720 hours. Resp Culture Recent: No results for input(s): CULTRESP in the last 720 hours. Body Fluid Recent : No results for input(s): BFCX in the last 720 hours. MRSA Recent : No results for input(s): [address] in the last 720 hours. Urine Culture Recent : No results for input(s): LABURIN in the last 720 hours. Organism Recent : No results for input(s): ORG in the last 720 hours. Hospital Course: This is a very pleasant 70-year-old white  male, who came to the emergency room complaining of dyspnea, weakness,  and fatigue that had progressed over the last several weeks to months. He felt like his heart rate was irregular and was sensing some  palpitations and swelling in his distal lower extremities. He could not  walk across the room and his friends encouraged him to seek medical  attention in the ER. In the ER, initial workup revealed a hemoglobin of  5.6 with an elevated BMP, thrombocytopenia, and iron deficiency  including low iron and ferritin. The patient states that his stools  have been brown, he has not seen any blood in the stool; however, he has  never had a GI evaluation. Hemoccult stool in the emergency room was  reported as negative. GI was asked to see for anemia/iron deficient. Pt received 2 units PRBC, he is iron deficient as well and will require replacement. Monitor with serial H/H. He was on a protonix gtt  He has never had a Colonoscopy however bc of his severe COPD  He would be too high risk at this moment. H.H stable and he has not had any more bloody movements. He absolutely needs OP egd/Colon when pulm status improved .  Will set him up with OP GI F/U in D/C instructions         Physical Exam:    Vitals: BP (!) 166/82   Pulse 72   Temp 99.4 °F (37.4 °C) (Temporal)   Resp 16   Ht 5' 4\" (1.626 m)   Wt 115 lb 9.6 oz (52.4 kg)   SpO2 99%   BMI 19.84 Bromide (INCRUSE ELLIPTA) 62.5 MCG/INH AEPB  Inhale 1 puff into the lungs daily                 Discharge Instructions: Follow up with Fabio Boykin DO in 7 days. Take medications as directed. Resume activity as tolerated. Diet: DIET CLEAR LIQUID;     Disposition: Patient is medically stable and will be discharged to home. Time spent on discharge 15 minutes.     Signed:  Pearl Bee MD  Hospitalist service  4/28/2019  10:56 AM

## 2019-04-29 ENCOUNTER — CARE COORDINATION (OUTPATIENT)
Dept: CASE MANAGEMENT | Age: 69
End: 2019-04-29

## 2019-04-29 LAB
EKG P AXIS: 69 DEGREES
EKG P-R INTERVAL: 114 MS
EKG Q-T INTERVAL: 412 MS
EKG QRS DURATION: 78 MS
EKG QTC CALCULATION (BAZETT): 405 MS
EKG T AXIS: 50 DEGREES

## 2019-04-30 ENCOUNTER — TELEPHONE (OUTPATIENT)
Dept: PRIMARY CARE CLINIC | Age: 69
End: 2019-04-30

## 2019-04-30 ENCOUNTER — CARE COORDINATION (OUTPATIENT)
Dept: CASE MANAGEMENT | Age: 69
End: 2019-04-30

## 2019-04-30 ENCOUNTER — CARE COORDINATION (OUTPATIENT)
Dept: CARE COORDINATION | Age: 69
End: 2019-04-30

## 2019-04-30 NOTE — CARE COORDINATION
Margie 45 Transitions Follow Up Call    2019    Patient: Guillermo Brunson  Patient : 1950   MRN: [id number]  Reason for Admission:   Discharge Date: 19 RARS: Readmission Risk Score: 13         Spoke with: N/A    Care Transitions Subsequent and Final Call    Subsequent and Final Calls  Care Transitions Interventions  Other Interventions: Follow Up : Attempt #2 to reach patient for follow up phone call. No answer, unable to leave message or CTC contact information. Will try again next business day.    Future Appointments   Date Time Provider Beba Dawson   2019  4:30 PM [address] [facility],[address], DO Mercy Enamorado [facility]   2019  1:30 PM [address] [facility] [address], [address] [place] [address]
yes

## 2019-04-30 NOTE — CARE COORDINATION
Ambulatory Care Coordination Note  4/30/2019  CM Risk Score: 5  Niko Mortality Risk Score: 4    ACC: Giovanna Khan, RN    Summary Note: ACC unable to reach pt or his mother, spoke to pt's cousin - Lizet Blake. Review:  Joanna Estrada is stable per Caryn's last check in with him since discharge. He has been referred for home health services through [facility]. Stone's AVS from [facility] indicates he needs a 1 week f/u w Dr. Lorrine Meckel. Acomni Richy was not at home and could not schedule appt during the call. Plan:  ACC contacted [facility], spoke to Maricel Ragland. ACC transferred to [facility]. Aidee stated pt was contacted and told [facility] he did not want to be admitted. ACC requested fax note from their EMR to scan to our records. Acomni Richy will contact Stone, instruct that he needs to schedule a f/u appt in the next week. ACC will attempt f/u call to pt if no appt made for this week. Care Coordination Interventions    Program Enrollment:  Rising Risk  Referral from Primary Care Provider:  No  Suggested Interventions and Community Resources  Transportation Support:  Completed  Zone Management Tools: In Process (Comment: 4/30/19: spoke to pt's cousin today, unable to reach pt or his mother. Stone needs hfu appt.)         Goals Addressed                 This Visit's Progress     Wellness Goal   No change     Patient Self-Management Goal for Health Maintenance  Goal: I will schedule a yearly preventative care visit. Barriers: impairment:  hearing  Plan for overcoming my barriers:   Pt will contact [facility] for concerns about his healthcare  Joanna Estrada will schedule his appts as instructed by his provider  Pt will allow contact with his mother or his cousin for appts (he is [facility])  Confidence: 9/10  Anticipated Goal Completion Date: 6/25/19  Padmini Verduzco has new onset swelling of face and BLE. F/u for definitive dx of cause)            Prior to Admission medications    Medication Sig Start Date End Date Taking?  Authorizing Provider   glycerin-hypromellose- 0.2-0.2-1 % SOLN opthalmic solution Place 1 drop into both eyes 2 times daily    Historical Provider, MD   albuterol sulfate  (90 Base) MCG/ACT inhaler Inhale 2 puffs into the lungs 4 times daily as needed for Wheezing 4/23/19   VITOR Fonseca DO   metoprolol succinate (TOPROL XL) 25 MG extended release tablet Take 1 tablet by mouth daily 4/23/19   VITOR Fonseca DO   furosemide (LASIX) 40 MG tablet Take 1 tablet by mouth daily 4/23/19   VITOR Fonseca DO   lisinopril (PRINIVIL;ZESTRIL) 20 MG tablet Take 1 tablet by mouth daily 2/4/19   VITOR Fonseca DO   levothyroxine (SYNTHROID) 75 MCG tablet Take 1 tablet by mouth Daily TAKE 1 TABLET BY MOUTH DAILY 1/31/19   VITOR Fonseca DO   Umeclidinium Bromide (INCRUSE ELLIPTA) 62.5 MCG/INH AEPB Inhale 1 puff into the lungs daily 11/12/18   Orrspelsv 82, APRN   tiZANidine (ZANAFLEX) 4 MG tablet TAKE 1 TABLET BY MOUTH EVERY 8 HOURS AS NEEDED 3/28/18   Jaylon Qiu MD   fluticasone [facility]) 50 MCG/ACT nasal spray 2 sprays by Nasal route daily 2/1/18   VITOR Fonseca DO   ADVAIR DISKUS 250-50 MCG/DOSE AEPB Inhale 1 puff into the lungs 2 times daily 9/21/16   Historical Provider, MD   guaiFENesin 400 MG tablet Take 400 mg by mouth daily     Historical Provider, MD   OXYGEN Inhale into the lungs 3 liters per nasal cannula continuous    Historical Provider, MD       Future Appointments   Date Time Provider Beba Dawson   5/23/2019  4:30 PM [address]   11/4/2019  1:30 PM [address] [facility],[address], [address] [facility]

## 2019-04-30 NOTE — TELEPHONE ENCOUNTER
Margie 45 Transitions Initial Follow Up Call    Outreach made within 2 business days of discharge: Yes    Patient: Alejandro Mora Patient : 1950   MRN: [id number]  Reason for Admission:  Admit date  Amemia     Discharge Date: 19       Discharge Diagnoses:  Principal Problem:    Anemia  Active Problems:    Hypertension    Hypothyroidism    CKD (chronic kidney disease), stage II    Vitamin D deficiency    COPD (chronic obstructive pulmonary disease) (HCC)    Mixed hyperlipidemia    Irregular heart rhythm    Elevated brain natriuretic peptide (BNP) level  Resolved Problems:    * No resolved hospital problems. *    Attempted for 2nd time to call patient to follow up and make appointment. Left voicemail.      Scheduled appointment with PCP within 7-14 days    Follow Up  Future Appointments   Date Time Provider Beba Dawson   2019  4:30 PM [address]   2019  1:30 PM [address] [facility],[address], [address] [facility]       Ginny Jackson LPN

## 2019-04-30 NOTE — TELEPHONE ENCOUNTER
Margie 45 Transitions Initial Follow Up Call    Outreach made within 2 business days of discharge: Yes    Patient: Navya Morales Patient : 1950   MRN: [id number]  Reason for Admission: There are no discharge diagnoses documented for the most recent discharge. Discharge Date: 19           Discharge department/facility: [facility]  Discharge Diagnoses:  Principal Problem:    Anemia  Active Problems:    Hypertension    Hypothyroidism    CKD (chronic kidney disease), stage II    Vitamin D deficiency    COPD (chronic obstructive pulmonary disease) (HCC)    Mixed hyperlipidemia    Irregular heart rhythm    Elevated brain natriuretic peptide (BNP) level  Resolved Problems:    * No resolved hospital problems. *     Attempted to call patient and schedule follow up appointment after hospital discharge.    Left voicemail for patient to return call      Follow Up  Future Appointments   Date Time Provider Beba Dawson   2019  4:30 PM [address] [facility],[address], [address] [facility]   2019  1:30 PM [address] [facility] [address], [address] [facility]       Geraldo Unger LPN

## 2019-05-01 ENCOUNTER — CARE COORDINATION (OUTPATIENT)
Dept: CASE MANAGEMENT | Age: 69
End: 2019-05-01

## 2019-05-01 ENCOUNTER — TELEPHONE (OUTPATIENT)
Dept: PRIMARY CARE CLINIC | Age: 69
End: 2019-05-01

## 2019-05-01 NOTE — CARE COORDINATION
Margie 45 Transitions Follow Up Call    2019    Patient: Brianna Drake  Patient : 1950   MRN: [id number]  Reason for Admission:   Discharge Date: 19 RARS: Readmission Risk Score: 13         Spoke with: N/A    Care Transitions Subsequent and Final Call    Subsequent and Final Calls  Care Transitions Interventions  Other Interventions: Follow Up : Attempt #3 to contact patient for follow up after discharge call with no answer, unable to leave message or contact information. Will discharge from CTC services at this time due to inability to contact patient.   Future Appointments   Date Time Provider Beba Dawson   2019  4:30 PM [address] [facility],[address], DO Mercy Enamorado [facility]   2019  1:30 PM [address] [facility] [address], [address] [place] [address]

## 2019-05-01 NOTE — TELEPHONE ENCOUNTER
Debra Zee called, they got a referral for [facility], but he is not homebound, so they could not admit him at this time. We did not order this.

## 2019-05-06 ENCOUNTER — CARE COORDINATION (OUTPATIENT)
Dept: CARE COORDINATION | Age: 69
End: 2019-05-06

## 2019-05-16 RX ORDER — FLUTICASONE PROPIONATE 50 MCG
2 SPRAY, SUSPENSION (ML) NASAL DAILY
Qty: 3 BOTTLE | Refills: 3 | Status: SHIPPED | OUTPATIENT
Start: 2019-05-16

## 2019-05-17 ENCOUNTER — CARE COORDINATION (OUTPATIENT)
Dept: CARE COORDINATION | Age: 69
End: 2019-05-17

## 2019-05-17 RX ORDER — MOMETASONE FUROATE 50 UG/1
2 SPRAY, METERED NASAL DAILY
Qty: 1 INHALER | Refills: 3 | Status: SHIPPED | OUTPATIENT
Start: 2019-05-17 | End: 2019-10-01 | Stop reason: ALTCHOICE

## 2019-05-17 ASSESSMENT — ENCOUNTER SYMPTOMS: DYSPNEA ASSOCIATED WITH: MINIMAL EXERTION

## 2019-05-17 NOTE — CARE COORDINATION
Ambulatory Care Coordination Note  5/17/2019  CM Risk Score: 5  Niko Mortality Risk Score: 5    ACC: Kylie Jimenes, RN    Summary Note: ACC spoke to THE [facility] by phone today. Review:  THE [facility] said he cannot get his Flonase refilled until the end of the month. He c/o can't breathe without nose spray. Pt was noted to be coughing during call. He has not used his rescue inhaler today. THE [facility] said he is using his oxygen. Plan:  ACC reminded Stone to use his rescue inhaler up to 4 x daily while he is coughing more and when SOA. If Stone's sx become unmanageable with home resources, go to ED for evaluation. ACC inst I will send request to phone nurse about getting something for Stone's sinus sx. ACC spoke to PCP about nose spray, phone MA will f/u on this for pt. ACC reminded THE [facility] he has an appt next Thursday with PCP - pt wrote down date and time, voiced understanding. Care Coordination Interventions    Program Enrollment:  Rising Risk  Referral from Primary Care Provider:  No  Suggested Interventions and Community Resources  Transportation Support:  Completed  Zone Management Tools: In Process (Comment: 5/17/19: THE [facility] called about his nose spray. Reminded pt of COPD mgmt - use his oxygen, use rescue inhaler. If unable to breathe, worsening SOA - go to ED for evaluation!)         Goals Addressed                 This Visit's Progress     Wellness Goal   On track     Patient Self-Management Goal for Health Maintenance  Goal: I will schedule a yearly preventative care visit. Barriers: impairment:  hearing  Plan for overcoming my barriers:   Pt will contact [facility] for concerns about his healthcare  THE [facility] will schedule his appts as instructed by his provider  Pt will allow contact with his mother or his cousin for appts (he is [facility])  Confidence: 9/10  Anticipated Goal Completion Date: 6/25/19  Lenore Summers has new onset swelling of face and BLE.  F/u for definitive dx of cause)            Prior to Admission medications the patient have a nebulizer?:  No  Does the patient use a space with inhaled medications?:  No     Shortness of breath (worse than baseline), Increase in cough         Symptoms:   None:  Yes      Symptom course:  no change  Breathlessness:  minimal exertion  Increase use of rapid acting/rescue inhaled medications?:  Yes  Change in chronic cough?:  Increased  Change in sputum?:  Increased  Sputum characteristics:   Thick, White, Yellow  Self Monitoring - SaO2:  No  Have you had a recent diagnosis of pneumonia either by PCP or at a hospital?:  No

## 2019-05-17 NOTE — TELEPHONE ENCOUNTER
BRYANT Weiner MA Carylon Early,   I spoke to [place] this morning. He tried to refill his nose spray and said the pharmacy told him it's too early. [place] is having sinus drainage - can you ask if he could get something else to take until his Flonase can be filled?      Thank you - Deb Bar

## 2019-05-21 ENCOUNTER — CARE COORDINATION (OUTPATIENT)
Dept: CARE COORDINATION | Age: 69
End: 2019-05-21

## 2019-05-21 ASSESSMENT — ENCOUNTER SYMPTOMS: DYSPNEA ASSOCIATED WITH: MINIMAL EXERTION

## 2019-05-23 ENCOUNTER — OFFICE VISIT (OUTPATIENT)
Dept: PRIMARY CARE CLINIC | Age: 69
End: 2019-05-23
Payer: MEDICARE

## 2019-05-23 VITALS
BODY MASS INDEX: 19.63 KG/M2 | OXYGEN SATURATION: 89 % | WEIGHT: 114.38 LBS | SYSTOLIC BLOOD PRESSURE: 132 MMHG | HEART RATE: 78 BPM | DIASTOLIC BLOOD PRESSURE: 70 MMHG | TEMPERATURE: 99.2 F

## 2019-05-23 DIAGNOSIS — R60.9 PERIPHERAL EDEMA: ICD-10-CM

## 2019-05-23 DIAGNOSIS — J44.9 CHRONIC OBSTRUCTIVE PULMONARY DISEASE, UNSPECIFIED COPD TYPE (HCC): Primary | ICD-10-CM

## 2019-05-23 DIAGNOSIS — L28.1 PRURIGO NODULARIS: ICD-10-CM

## 2019-05-23 PROCEDURE — 4004F PT TOBACCO SCREEN RCVD TLK: CPT | Performed by: PEDIATRICS

## 2019-05-23 PROCEDURE — 99214 OFFICE O/P EST MOD 30 MIN: CPT | Performed by: PEDIATRICS

## 2019-05-23 PROCEDURE — 4040F PNEUMOC VAC/ADMIN/RCVD: CPT | Performed by: PEDIATRICS

## 2019-05-23 PROCEDURE — 1111F DSCHRG MED/CURRENT MED MERGE: CPT | Performed by: PEDIATRICS

## 2019-05-23 PROCEDURE — G8926 SPIRO NO PERF OR DOC: HCPCS | Performed by: PEDIATRICS

## 2019-05-23 PROCEDURE — G8420 CALC BMI NORM PARAMETERS: HCPCS | Performed by: PEDIATRICS

## 2019-05-23 PROCEDURE — 1123F ACP DISCUSS/DSCN MKR DOCD: CPT | Performed by: PEDIATRICS

## 2019-05-23 PROCEDURE — 3017F COLORECTAL CA SCREEN DOC REV: CPT | Performed by: PEDIATRICS

## 2019-05-23 PROCEDURE — G8427 DOCREV CUR MEDS BY ELIG CLIN: HCPCS | Performed by: PEDIATRICS

## 2019-05-23 PROCEDURE — 3023F SPIROM DOC REV: CPT | Performed by: PEDIATRICS

## 2019-05-23 RX ORDER — TRIAMCINOLONE ACETONIDE 1 MG/G
CREAM TOPICAL
Qty: 80 G | Refills: 5 | Status: SHIPPED | OUTPATIENT
Start: 2019-05-23

## 2019-05-23 ASSESSMENT — ENCOUNTER SYMPTOMS
BACK PAIN: 0
CHEST TIGHTNESS: 0
NAUSEA: 0
SORE THROAT: 0
SHORTNESS OF BREATH: 1
ABDOMINAL PAIN: 0
VOMITING: 0
COUGH: 1
WHEEZING: 0
DIARRHEA: 0
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
## 2019-05-24 ENCOUNTER — CARE COORDINATION (OUTPATIENT)
Dept: CARE COORDINATION | Age: 69
End: 2019-05-24

## 2019-05-24 ASSESSMENT — ENCOUNTER SYMPTOMS: DYSPNEA ASSOCIATED WITH: MINIMAL EXERTION

## 2019-05-24 NOTE — CARE COORDINATION
Ambulatory Care Coordination Note  5/23/2019  CM Risk Score: 5  Niko Mortality Risk Score: 5    ACC: Maximilian Nance RN    Summary Note: ACC met with Franko Pérez during his appt on 5/23/19. Also spoke with PCP. Review:  Franko Pérez came in today with his mother and his cousin, Adal Coughlin. 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 Franko Pérez has severe COPD, wear oxygen continuously. He does smoke and is not ready to quit at this time. Stone's stamina and SOA have worsened in the past 6 months. He is aware of this and reported it has impacted his ability to care for his mother. His cousin, Adal Coughlin, plans to move closer to him in order to increase her availability to Franko Pérez and his mother. Franko Pérez is quick to say he understands his condition and importance of managing worsening COPD symptoms quickly. He verbalized he is compliant to take his medications and will call when he needs help with refills. Since Franko Pérez started taking his diuretic, his edema has been stable and controlled. Plan:  Stone's barriers to keeping appts have been improved by the presence and support of his cousin. He has been compliant to keep his own appointments and health maintenance labs/procedures. Though his general baseline has declined some in the past 6 months, he is currently stable and compliant and has met his Coral Reel is ready for graduation from care coordination. ACC will send chart to PCP for approval / sign off to discharge pt from [address] [facility]. Care Coordination Interventions    Program Enrollment:  Rising Risk  Referral from Primary Care Provider:  No  Suggested Interventions and Community Resources  Transportation Support:  Completed  Zone Management Tools: In Process (Comment: 5/23/19: Pt here for appt, remains stable and reporting compliance with his medication. Edema of BLE is noticeably improved on diuretic.  COPD at baseline, pt aware of importance GELY Guzman   tiZANidine (ZANAFLEX) 4 MG tablet TAKE 1 TABLET BY MOUTH EVERY 8 HOURS AS NEEDED 3/28/18   Ryann Lewis MD   ADVAIR DISKUS 250-50 MCG/DOSE AEPB Inhale 1 puff into the lungs 2 times daily 16   Historical Provider, MD   guaiFENesin 400 MG tablet Take 400 mg by mouth daily     Historical Provider, MD   OXYGEN Inhale into the lungs 3 liters per nasal cannula continuous    Historical Provider, MD       Future Appointments   Date Time Provider Beba Dawson   [facility]  [facility]00 [facility]   2019  1:30 PM VITOR Solomon DO [place]      and   COPD Assessment    Does the patient understand envrionmental exposure?:  Yes  Is the patient able to verbalize Rescue vs. Long Acting medications?:  Yes  Does the patient have a nebulizer?:  No  Does the patient use a space with inhaled medications?:  No            Symptoms:   COPD associated increased fatigue: Pos, COPD associated weight loss: Pos      Symptom course:  no change  Breathlessness:  minimal exertion  Increase use of rapid acting/rescue inhaled medications?:  No  Change in chronic cough?:  No/At Baseline  Change in sputum?:  No/At Baseline  Sputum characteristics:  Clear  Self Monitoring - SaO2:  No  Baseline SaO2 Readin (Comment: at office appt today)  Have you had a recent diagnosis of pneumonia either by PCP or at a hospital?:  No

## 2019-05-24 NOTE — CARE COORDINATION
PCP was unable to sign off for graduation from care coordination. His screen was \"grayed out\" and he could not check his box. ACC resumed responsibility for the chart and with PCP present, ACC checked agreement box for graduation from [facility].

## 2019-06-20 ENCOUNTER — CARE COORDINATION (OUTPATIENT)
Dept: CARE COORDINATION | Age: 69
End: 2019-06-20

## 2019-06-20 NOTE — CARE COORDINATION
Pt asked to speak with ACM today during my call to check on his mother. Shahnaz Anaya stated he cannot drive his car right now and his ride to his appt is not there. Plan:  Shahnaz Anaya verified he has phone number for his . [facility] encouraged Stone to call and find out if she is on her way. Stone voiced understanding.

## 2019-06-25 ENCOUNTER — CARE COORDINATION (OUTPATIENT)
Dept: CARE COORDINATION | Age: 69
End: 2019-06-25

## 2019-06-25 NOTE — CARE COORDINATION
Stone called [facility] today, stated he would like this office to order his [employer] company to deliver more portable oxygen tanks to his apt. Moe Perez stated he cannot drive his car to [facility] and  / exchange his oxygen. He is currently using a large O2 tank at his apt, stated he has 2 other large tanks. Moe Perez stated that an employee of the [facility] complex already called [employer] [facility] and asked them to deliver more tanks and was told they will deliver every 90 days - no delivery until July. [facility] spoke to Donovan Forbes - an employee of [employer]. [facility] reported that Stone asked her to call [facility] [facility] and she did call to request delivery of portable oxygen tanks as pt has used up all of his small/portable tanks. She stated patient has large tanks and a concentrator that he can use at home. [facility] called [facility] and spoke to Angélica John about above. Angélica John stated their contract allows delivery every 90 days, that the patient may come in / or a designated friend or family member may come in and exchange tanks for Moe Perez any time during the 90 day cycle. However, Angélica John did state that Moe Perez should have 2-3 large tanks and he confirmed that pt has a concentrator at home as well. [facility] notified Nimo Stinson of pt's request and that ACM / office cannot alter the delivery cycle and that pt should have oxygen at home until next delivery due date in July. Maame Cid is unable presently to assist Stone due to her health issues. [facility] called back to Stone at home # and his mobile #, but did not reach pt. [facility] left voicemail on both phones and instructed that Moe Perez may call back to work cell: [phone].

## 2019-06-26 ENCOUNTER — CARE COORDINATION (OUTPATIENT)
Dept: CARE COORDINATION | Age: 69
End: 2019-06-26

## 2019-06-26 ASSESSMENT — ENCOUNTER SYMPTOMS: DYSPNEA ASSOCIATED WITH: MINIMAL EXERTION
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
## 2019-06-26 NOTE — CARE COORDINATION
[facility] notified [facility] office of pt's car not currently running and his interest in getting portable oxygen tanks exchanged. .  Informed Christina cresencio that pt has oxygen in apt he can use, but he will have to arrange transportation for himself or a friend/familymember to exchange his portable tanks out. Oxygen supplier is not able to deliver more often than every 90 days. AC further instructed that if pt is in danger of any physical / medical harm by their direct observation, call 911 or may need to contact APS if pt is daily unable to provide adequate care for himself or his mother that he lives with. Christina Chavarria voiced understanding. Provided my phone number for call back or concerns.

## 2019-07-01 ENCOUNTER — CARE COORDINATION (OUTPATIENT)
Dept: CARE COORDINATION | Age: 69
End: 2019-07-01

## 2019-07-01 ASSESSMENT — ENCOUNTER SYMPTOMS: DYSPNEA ASSOCIATED WITH: REST

## 2019-07-02 ENCOUNTER — CARE COORDINATION (OUTPATIENT)
Dept: CARE COORDINATION | Age: 69
End: 2019-07-02

## 2019-07-05 ENCOUNTER — CARE COORDINATION (OUTPATIENT)
Dept: CARE COORDINATION | Age: 69
End: 2019-07-05

## 2019-08-06 ENCOUNTER — CARE COORDINATION (OUTPATIENT)
Dept: CARE COORDINATION | Age: 69
End: 2019-08-06

## 2019-08-12 ENCOUNTER — CARE COORDINATION (OUTPATIENT)
Dept: CARE COORDINATION | Age: 69
End: 2019-08-12

## 2019-08-13 ENCOUNTER — CARE COORDINATION (OUTPATIENT)
Dept: CARE COORDINATION | Age: 69
End: 2019-08-13

## 2019-08-13 NOTE — CARE COORDINATION
ACM returned call to patient. Oralia Lewis stated that his lower legs and feet are swelling. He reported increased anxiety. Pt would like a sooner appointment wit his provider. JAVI instructed pt that I will talk to office tomorrow morning and contact pt with appt date and time. He voiced understanding.

## 2019-08-14 ENCOUNTER — CARE COORDINATION (OUTPATIENT)
Dept: CARE COORDINATION | Age: 69
End: 2019-08-14

## 2019-08-14 NOTE — CARE COORDINATION
JAVI spoke to Nicole Oleary in the office of [facility]. Requested she have Stone contact JAVI at [phone]. She voiced understanding and stated someone will get the message to [facility].     General Assessment    Do you have any symptoms that are causing concern?:  Yes  Reported Symptoms:   (Comment: BLE edema)       Electronically signed by Shahnaz Shook RN on 8/14/2019 at 9:44 AM

## 2019-08-15 ENCOUNTER — CARE COORDINATION (OUTPATIENT)
Dept: CARE COORDINATION | Age: 69
End: 2019-08-15

## 2019-08-16 ENCOUNTER — CARE COORDINATION (OUTPATIENT)
Dept: CARE COORDINATION | Age: 69
End: 2019-08-16

## 2019-08-16 ASSESSMENT — ENCOUNTER SYMPTOMS: DYSPNEA ASSOCIATED WITH: MINIMAL EXERTION

## 2019-08-16 NOTE — CARE COORDINATION
Ambulatory Care Coordination Note  8/16/2019  CM Risk Score: 5  Charlson 10 Year Mortality Risk Score: 79%     ACC: Joe Silverio, RN    Summary Note: ACM spoke to pt's mother, Parth Claros. Cherelle Rodriguez has severe COPD. He uses oxygen continuously. Cherelle Rodriguez also smokes but does not admit to this usually. He has called to say that his foot is swollen, but was hesitant to come in for an appointment due to the weather. However, ACM assisted pt to schedule an appointment for 8/15/19. Stone missed  the scheduled f/u with Dr. Karan Louie yesterday. Parth Claros stated Cherelle Rodriguez fell yesterday and now he is resting. She stated he did not come to his appt because the weather is too hot. ACM asked if Cherelle Rodriguez has his medications and his mother stated pt does not have all his medications, that he has not been able to get them. Pt's mother could not say exactly why Cherelle Rodriguez could not get his meds. Cherelle Rodriguez has a car and he does drive, but he is careful to stay indoors when the weather is humid and hot. Sometimes he does not have the funds to put gas in his car. Sometimes Stone's car has repair needs as well. Plan:  ACM instructed pt's mother that I will call and f/u on Stone next week. ACM will attempt again to have patient schedule a f/u appt with his PCP. Encouraged mother to call my cell # - she has it saved in her phone contacts - if she or Cherelle Rodriguez needs to talk to me before next week. Parth Claros voiced understanding. ACM enrolled pt for support to improve his self-care and mgmt of his disease processes.       Ambulatory Care Coordination Assessment    Care Coordination Protocol  Program Enrollment:  Complex Care  Referral from Primary Care Provider:  No  Week 1 - Initial Assessment     Do you have all of your prescriptions and are they filled?:  Yes  Barriers to medication adherence:  None  Are you able to afford your medications?:  With Assistance  Medication Assistance Program:  Patient assistance with pharmaceutical company  How often do you have trouble taking your medications the way you have been told to take them?:  Sometimes I take them as prescribed. Do you have Home O2 Therapy?:  Yes   Oxygen Regimen:  Continuous Flow - Enter rate/FIO2:  2   Method of Delivery:  Nasal Cannula   CPAP Use:  None      Ability to seek help/take action for Emergent Urgent situations i.e. fire, crime, inclement weather or health crisis. :  Independent  Ability to ambulate to restroom:  Independent  Ability handle personal hygeine needs (bathing/dressing/grooming): Independent  Ability to manage Medications:   Independent  Ability to prepare Food Preparation:  Needs Assistance  Ability to maintain home (clean home, laundry):  Needs Assistance  Ability to drive and/or has transportation:  Independent  Ability to do shopping:  Needs Assistance  Ability to manage finances:  Needs Assistance  Is patient able to live independently?:  Yes     Current Housing:  Private Residence, Apartment  For whom are you the caregiver?:  mother  Does the person that you care for see a [facility] PCP?:  Yes        Per the Fall Risk Screening, did the patient have 2 or more falls or 1 fall with injury in the past year?:  No     Frequent urination at night?:  Yes  Do you use rails/bars?:  Yes  Do you have a non-slip tub mat?:  Yes     Are you experiencing loss of meaning?:  No  Are you experiencing loss of hope and peace?:  No     Thinking about your patient's physical health needs, are there any symptoms or problems (risk indicators) you are unsure about that require further investigation?:  Moderate to severe symptoms or problems that impact on daily life   Are the patients physical health problems impacting on their mental well-being?:  Mild impact on mental well-being e.g. \"\"feeling fed-up\"\", \"\"reduced enjoyment\"\"   Are there any problems with your patients lifestyle behaviors (alcohol, drugs, diet, exercise) that are impacting on physical or mental well-being?:  Severe impact on well-being with additional potential impact on others   Do you have any other concerns about your patients mental well-being? How would you rate their severity and impact on the patient?:  Mild problems - don't interfere with function   How would you rate their home environment in terms of safety and stability (including domestic violence, insecure housing, neighbor harassment)?:  Consistently safe, supportive, stable, no identified problems   How do daily activities impact on the patient's well-being? (include current or anticipated unemployment, work, caregiving, access to transportation or other):  Some general dissatisfaction but no concern   How would you rate their social network (family, work, friends)?:  Adequate participation with social networks   How would you rate their financial resources (including ability to afford all required medical care)?:  Financially secure, some resource challenges   How wells does the patient now understand their health and well-being (symptoms, signs or risk factors) and what they need to do to manage their health?:  Little understanding which impacts on their ability to undertake better management   How well do you think your patient can engage in healthcare discussions? (Barriers include language, deafness, aphasia, alcohol or drug problems, learning difficulties, concentration):  Some difficulties in communication with or without moderate barriers   Do other services need to be involved to help this patient?:  Other care/services not in place and required   Are current services involved with this patient well-coordinated? (Include coordination with other services you are now recommendation):  Required care/services missing and/or fragmented   Suggested Interventions and Community Resources   Zone Management Tools:   In Process (Comment: 8/16/19: Pt enrolled today re: ongoing COPD sx, difficulty with f/u appts, medication compliance.)         Set up/Review an Education Plan, Set up/Review Goals          Goals      Conditions and Symptoms      I will schedule office visits, as directed by my provider. I will keep my appointment or reschedule if I have to cancel. I will follow my Zone Management tool to seek urgent or emergent care. I will notify my provider of any symptoms that indicate a worsening of my condition. Barriers: lack of support, overwhelmed by complexity of regimen, stress and resistance to help  Plan for overcoming my barriers:     Confidence: 6/10  Anticipated Goal Completion Date: 11/16/19                Prior to Admission medications    Medication Sig Start Date End Date Taking? Authorizing Provider   triamcinolone (KENALOG) 0.1 % cream Apply topically 2 times daily.  5/23/19   VITOR Burt DO   mometasone (NASONEX) 50 MCG/ACT nasal spray 2 sprays by Nasal route daily 5/17/19   VITOR Burt DO   fluticasone [facility]) 50 MCG/ACT nasal spray 2 sprays by Nasal route daily 5/16/19   VITOR Burt DO   glycerin-hypromellose- 0.2-0.2-1 % SOLN opthalmic solution Place 1 drop into both eyes 2 times daily    Historical Provider, MD   albuterol sulfate  (90 Base) MCG/ACT inhaler Inhale 2 puffs into the lungs 4 times daily as needed for Wheezing 4/23/19   VITOR Burt DO   metoprolol succinate (TOPROL XL) 25 MG extended release tablet Take 1 tablet by mouth daily 4/23/19   VITOR Burt DO   furosemide (LASIX) 40 MG tablet Take 1 tablet by mouth daily 4/23/19   VITOR Burt DO   lisinopril (PRINIVIL;ZESTRIL) 20 MG tablet Take 1 tablet by mouth daily 2/4/19   VITOR Burt DO   levothyroxine (SYNTHROID) 75 MCG tablet Take 1 tablet by mouth Daily TAKE 1 TABLET BY MOUTH DAILY 1/31/19   VITOR Burt DO   Umeclidinium Bromide (INCRUSE ELLIPTA) 62.5 MCG/INH AEPB Inhale 1 puff into the lungs daily 11/12/18   GELY Grant   tiZANidine (ZANAFLEX) 4 MG tablet TAKE 1 TABLET BY MOUTH EVERY 8 HOURS AS NEEDED 3/28/18   Mateo Ivy MD   ADVAIR DISKUS 250-50 MCG/DOSE AEPB Inhale 1 puff into the lungs 2 times daily 9/21/16   Historical Provider, MD   guaiFENesin 400 MG tablet Take 400 mg by mouth daily     Historical Provider, MD   OXYGEN Inhale into the lungs 3 liters per nasal cannula continuous    Historical Provider, MD       Future Appointments   Date Time Provider Beba Dawson   8/26/2019  1:00 PM [address] [facility],[address], [address] [facility]   11/4/2019  1:30 PM [facility]      and   COPD Assessment    Does the patient understand envrionmental exposure?:  Yes  Is the patient able to verbalize Rescue vs. Long Acting medications?:  Yes  Does the patient have a nebulizer?:  No  Does the patient use a space with inhaled medications?:  No            Symptoms:   COPD associated increased fatigue: Pos      Symptom course:  no change  Breathlessness:  minimal exertion  Increase use of rapid acting/rescue inhaled medications?:  Yes  Change in chronic cough?:  No/At Baseline  Change in sputum?:  No/At Baseline  Sputum characteristics:   Thick  Self Monitoring - SaO2:  No  Have you had a recent diagnosis of pneumonia either by PCP or at a hospital?:  No

## 2019-08-19 ENCOUNTER — HOSPITAL ENCOUNTER (INPATIENT)
Age: 69
LOS: 4 days | Discharge: HOME | DRG: 189 | End: 2019-08-24
Attending: EMERGENCY MEDICINE | Admitting: FAMILY MEDICINE
Payer: MEDICARE

## 2019-08-19 ENCOUNTER — APPOINTMENT (OUTPATIENT)
Dept: GENERAL RADIOLOGY | Age: 69
DRG: 189 | End: 2019-08-19
Payer: MEDICARE

## 2019-08-19 DIAGNOSIS — D64.9 ANEMIA, UNSPECIFIED TYPE: Primary | ICD-10-CM

## 2019-08-19 DIAGNOSIS — N17.9 ACUTE RENAL FAILURE, UNSPECIFIED ACUTE RENAL FAILURE TYPE (HCC): ICD-10-CM

## 2019-08-19 DIAGNOSIS — J96.22 ACUTE ON CHRONIC RESPIRATORY FAILURE WITH HYPOXIA AND HYPERCAPNIA (HCC): ICD-10-CM

## 2019-08-19 DIAGNOSIS — J96.21 ACUTE ON CHRONIC RESPIRATORY FAILURE WITH HYPOXIA AND HYPERCAPNIA (HCC): ICD-10-CM

## 2019-08-19 DIAGNOSIS — J44.1 COPD EXACERBATION (HCC): ICD-10-CM

## 2019-08-19 DIAGNOSIS — I50.9 CONGESTIVE HEART FAILURE, UNSPECIFIED HF CHRONICITY, UNSPECIFIED HEART FAILURE TYPE (HCC): ICD-10-CM

## 2019-08-19 LAB
BASE EXCESS ARTERIAL: 21.2 MMOL/L (ref -2–2)
CARBOXYHEMOGLOBIN ARTERIAL: 9 % (ref 0–5)
D DIMER: 2.11 UG/ML FEU (ref 0–0.48)
HCO3 ARTERIAL: 48.4 MMOL/L (ref 22–26)
HCT VFR BLD CALC: 25.6 % (ref 42–52)
HEMOGLOBIN, ART, EXTENDED: 6.7 G/DL (ref 14–18)
HEMOGLOBIN: 6.4 G/DL (ref 14–18)
MCH RBC QN AUTO: 19.3 PG (ref 27–31)
MCHC RBC AUTO-ENTMCNC: 25 G/DL (ref 33–37)
MCV RBC AUTO: 77.3 FL (ref 80–94)
METHEMOGLOBIN ARTERIAL: 1.3 %
O2 CONTENT ARTERIAL: 8.1 ML/DL
O2 SAT, ARTERIAL: 85.5 %
O2 THERAPY: ABNORMAL
PCO2 ARTERIAL: 80 MMHG (ref 35–45)
PDW BLD-RTO: 17 % (ref 11.5–14.5)
PH ARTERIAL: 7.39 (ref 7.35–7.45)
PLATELET # BLD: 251 K/UL (ref 130–400)
PMV BLD AUTO: 10.1 FL (ref 9.4–12.4)
PO2 ARTERIAL: 54 MMHG (ref 80–100)
POTASSIUM, WHOLE BLOOD: 4.6
RBC # BLD: 3.31 M/UL (ref 4.7–6.1)
WBC # BLD: 8.1 K/UL (ref 4.8–10.8)

## 2019-08-19 PROCEDURE — 36600 WITHDRAWAL OF ARTERIAL BLOOD: CPT

## 2019-08-19 PROCEDURE — 94640 AIRWAY INHALATION TREATMENT: CPT

## 2019-08-19 PROCEDURE — 93005 ELECTROCARDIOGRAM TRACING: CPT

## 2019-08-19 PROCEDURE — 99285 EMERGENCY DEPT VISIT HI MDM: CPT

## 2019-08-19 PROCEDURE — 71045 X-RAY EXAM CHEST 1 VIEW: CPT

## 2019-08-19 PROCEDURE — 6370000000 HC RX 637 (ALT 250 FOR IP): Performed by: EMERGENCY MEDICINE

## 2019-08-19 RX ORDER — IPRATROPIUM BROMIDE AND ALBUTEROL SULFATE 2.5; .5 MG/3ML; MG/3ML
1 SOLUTION RESPIRATORY (INHALATION) ONCE
Status: COMPLETED | OUTPATIENT
Start: 2019-08-19 | End: 2019-08-19

## 2019-08-19 RX ADMIN — IPRATROPIUM BROMIDE AND ALBUTEROL SULFATE 1 AMPULE: .5; 3 SOLUTION RESPIRATORY (INHALATION) at 23:50

## 2019-08-20 ENCOUNTER — APPOINTMENT (OUTPATIENT)
Dept: NUCLEAR MEDICINE | Age: 69
DRG: 189 | End: 2019-08-20
Payer: MEDICARE

## 2019-08-20 ENCOUNTER — OUTSIDE FACILITY SERVICE (OUTPATIENT)
Dept: PULMONOLOGY | Facility: CLINIC | Age: 69
End: 2019-08-20

## 2019-08-20 PROBLEM — J96.20 ACUTE ON CHRONIC RESPIRATORY FAILURE (HCC): Status: ACTIVE | Noted: 2019-08-20

## 2019-08-20 PROBLEM — J96.92 HYPERCAPNIC RESPIRATORY FAILURE (HCC): Status: ACTIVE | Noted: 2019-08-20

## 2019-08-20 PROBLEM — N17.9 AKI (ACUTE KIDNEY INJURY) (HCC): Status: ACTIVE | Noted: 2019-08-20

## 2019-08-20 LAB
ABO/RH: NORMAL
ALBUMIN SERPL-MCNC: 3.8 G/DL (ref 3.5–5.2)
ALP BLD-CCNC: 80 U/L (ref 40–130)
ALT SERPL-CCNC: 13 U/L (ref 5–41)
ANION GAP SERPL CALCULATED.3IONS-SCNC: 11 MMOL/L (ref 7–19)
ANTIBODY SCREEN: NORMAL
ANTIBODY SCREEN: NORMAL
AST SERPL-CCNC: 19 U/L (ref 5–40)
BASE EXCESS ARTERIAL: 21 MMOL/L (ref -2–2)
BILIRUB SERPL-MCNC: <0.2 MG/DL (ref 0.2–1.2)
BLOOD BANK DISPENSE STATUS: NORMAL
BLOOD BANK PRODUCT CODE: NORMAL
BPU ID: NORMAL
BUN BLDV-MCNC: 49 MG/DL (ref 8–23)
CALCIUM SERPL-MCNC: 9 MG/DL (ref 8.8–10.2)
CARBOXYHEMOGLOBIN ARTERIAL: 6 % (ref 0–5)
CHLORIDE BLD-SCNC: 88 MMOL/L (ref 98–111)
CO2: 38 MMOL/L (ref 22–29)
CREAT SERPL-MCNC: 1.7 MG/DL (ref 0.5–1.2)
DESCRIPTION BLOOD BANK: NORMAL
GFR NON-AFRICAN AMERICAN: 40
GLUCOSE BLD-MCNC: 113 MG/DL (ref 74–109)
HCO3 ARTERIAL: 49.1 MMOL/L (ref 22–26)
HCT VFR BLD CALC: 27.9 % (ref 42–52)
HCT VFR BLD CALC: 30.4 % (ref 42–52)
HEMOGLOBIN, ART, EXTENDED: 7.8 G/DL (ref 14–18)
HEMOGLOBIN: 7.9 G/DL (ref 14–18)
IRON SATURATION: 86 % (ref 14–50)
IRON: 397 UG/DL (ref 59–158)
MCH RBC QN AUTO: 19.8 PG (ref 27–31)
MCHC RBC AUTO-ENTMCNC: 26 G/DL (ref 33–37)
MCV RBC AUTO: 76.4 FL (ref 80–94)
METHEMOGLOBIN ARTERIAL: 1.1 %
O2 CONTENT ARTERIAL: 9.5 ML/DL
O2 SAT, ARTERIAL: 86.1 %
O2 THERAPY: ABNORMAL
PCO2 ARTERIAL: 87 MMHG (ref 35–45)
PDW BLD-RTO: 17.3 % (ref 11.5–14.5)
PH ARTERIAL: 7.36 (ref 7.35–7.45)
PLATELET # BLD: 208 K/UL (ref 130–400)
PMV BLD AUTO: 10.2 FL (ref 9.4–12.4)
PO2 ARTERIAL: 53 MMHG (ref 80–100)
POTASSIUM SERPL-SCNC: 4.6 MMOL/L (ref 3.5–5)
POTASSIUM, WHOLE BLOOD: 4.6
PRO-BNP: 1626 PG/ML (ref 0–900)
RBC # BLD: 3.98 M/UL (ref 4.7–6.1)
RETICULOCYTE ABSOLUTE COUNT: 0.06 M/UL (ref 0.03–0.12)
RETICULOCYTE COUNT PCT: 1.65 % (ref 0.5–1.5)
SODIUM BLD-SCNC: 137 MMOL/L (ref 136–145)
TOTAL IRON BINDING CAPACITY: 464 UG/DL (ref 250–400)
TOTAL PROTEIN: 6.7 G/DL (ref 6.6–8.7)
TROPONIN: <0.01 NG/ML (ref 0–0.03)
WBC # BLD: 6 K/UL (ref 4.8–10.8)

## 2019-08-20 PROCEDURE — 36415 COLL VENOUS BLD VENIPUNCTURE: CPT

## 2019-08-20 PROCEDURE — 96375 TX/PRO/DX INJ NEW DRUG ADDON: CPT

## 2019-08-20 PROCEDURE — 6370000000 HC RX 637 (ALT 250 FOR IP): Performed by: INTERNAL MEDICINE

## 2019-08-20 PROCEDURE — 2580000003 HC RX 258: Performed by: EMERGENCY MEDICINE

## 2019-08-20 PROCEDURE — 36600 WITHDRAWAL OF ARTERIAL BLOOD: CPT

## 2019-08-20 PROCEDURE — 84484 ASSAY OF TROPONIN QUANT: CPT

## 2019-08-20 PROCEDURE — 83880 ASSAY OF NATRIURETIC PEPTIDE: CPT

## 2019-08-20 PROCEDURE — C9113 INJ PANTOPRAZOLE SODIUM, VIA: HCPCS | Performed by: INTERNAL MEDICINE

## 2019-08-20 PROCEDURE — 6360000002 HC RX W HCPCS: Performed by: EMERGENCY MEDICINE

## 2019-08-20 PROCEDURE — 96374 THER/PROPH/DIAG INJ IV PUSH: CPT

## 2019-08-20 PROCEDURE — 85379 FIBRIN DEGRADATION QUANT: CPT

## 2019-08-20 PROCEDURE — 51798 US URINE CAPACITY MEASURE: CPT

## 2019-08-20 PROCEDURE — 86923 COMPATIBILITY TEST ELECTRIC: CPT

## 2019-08-20 PROCEDURE — 6370000000 HC RX 637 (ALT 250 FOR IP): Performed by: EMERGENCY MEDICINE

## 2019-08-20 PROCEDURE — 86900 BLOOD TYPING SEROLOGIC ABO: CPT

## 2019-08-20 PROCEDURE — A9540 TC99M MAA: HCPCS | Performed by: EMERGENCY MEDICINE

## 2019-08-20 PROCEDURE — 2580000003 HC RX 258: Performed by: INTERNAL MEDICINE

## 2019-08-20 PROCEDURE — 85027 COMPLETE CBC AUTOMATED: CPT

## 2019-08-20 PROCEDURE — 83540 ASSAY OF IRON: CPT

## 2019-08-20 PROCEDURE — 86901 BLOOD TYPING SEROLOGIC RH(D): CPT

## 2019-08-20 PROCEDURE — 80053 COMPREHEN METABOLIC PANEL: CPT

## 2019-08-20 PROCEDURE — 84132 ASSAY OF SERUM POTASSIUM: CPT

## 2019-08-20 PROCEDURE — P9016 RBC LEUKOCYTES REDUCED: HCPCS

## 2019-08-20 PROCEDURE — 85045 AUTOMATED RETICULOCYTE COUNT: CPT

## 2019-08-20 PROCEDURE — 6360000002 HC RX W HCPCS: Performed by: INTERNAL MEDICINE

## 2019-08-20 PROCEDURE — 78580 LUNG PERFUSION IMAGING: CPT

## 2019-08-20 PROCEDURE — 94640 AIRWAY INHALATION TREATMENT: CPT

## 2019-08-20 PROCEDURE — 94660 CPAP INITIATION&MGMT: CPT

## 2019-08-20 PROCEDURE — 99223 1ST HOSP IP/OBS HIGH 75: CPT | Performed by: INTERNAL MEDICINE

## 2019-08-20 PROCEDURE — 2700000000 HC OXYGEN THERAPY PER DAY

## 2019-08-20 PROCEDURE — 2000000000 HC ICU R&B

## 2019-08-20 PROCEDURE — 82803 BLOOD GASES ANY COMBINATION: CPT

## 2019-08-20 PROCEDURE — 83550 IRON BINDING TEST: CPT

## 2019-08-20 PROCEDURE — 86850 RBC ANTIBODY SCREEN: CPT

## 2019-08-20 PROCEDURE — 3430000000 HC RX DIAGNOSTIC RADIOPHARMACEUTICAL: Performed by: EMERGENCY MEDICINE

## 2019-08-20 PROCEDURE — 36430 TRANSFUSION BLD/BLD COMPNT: CPT

## 2019-08-20 RX ORDER — POTASSIUM CHLORIDE 7.45 MG/ML
10 INJECTION INTRAVENOUS PRN
Status: DISCONTINUED | OUTPATIENT
Start: 2019-08-20 | End: 2019-08-24 | Stop reason: HOSPADM

## 2019-08-20 RX ORDER — ACETAMINOPHEN 325 MG/1
650 TABLET ORAL EVERY 4 HOURS PRN
Status: DISCONTINUED | OUTPATIENT
Start: 2019-08-20 | End: 2019-08-24 | Stop reason: HOSPADM

## 2019-08-20 RX ORDER — FUROSEMIDE 10 MG/ML
40 INJECTION INTRAMUSCULAR; INTRAVENOUS ONCE
Status: DISCONTINUED | OUTPATIENT
Start: 2019-08-20 | End: 2019-08-20

## 2019-08-20 RX ORDER — 0.9 % SODIUM CHLORIDE 0.9 %
250 INTRAVENOUS SOLUTION INTRAVENOUS ONCE
Status: COMPLETED | OUTPATIENT
Start: 2019-08-20 | End: 2019-08-20

## 2019-08-20 RX ORDER — PREDNISONE 20 MG/1
40 TABLET ORAL DAILY
Status: DISCONTINUED | OUTPATIENT
Start: 2019-08-21 | End: 2019-08-23

## 2019-08-20 RX ORDER — IPRATROPIUM BROMIDE AND ALBUTEROL SULFATE 2.5; .5 MG/3ML; MG/3ML
1 SOLUTION RESPIRATORY (INHALATION) EVERY 4 HOURS
Status: DISCONTINUED | OUTPATIENT
Start: 2019-08-20 | End: 2019-08-24 | Stop reason: HOSPADM

## 2019-08-20 RX ORDER — POTASSIUM CHLORIDE 20 MEQ/1
40 TABLET, EXTENDED RELEASE ORAL PRN
Status: DISCONTINUED | OUTPATIENT
Start: 2019-08-20 | End: 2019-08-24 | Stop reason: HOSPADM

## 2019-08-20 RX ORDER — SODIUM CHLORIDE 0.9 % (FLUSH) 0.9 %
10 SYRINGE (ML) INJECTION PRN
Status: DISCONTINUED | OUTPATIENT
Start: 2019-08-20 | End: 2019-08-24 | Stop reason: HOSPADM

## 2019-08-20 RX ORDER — FUROSEMIDE 10 MG/ML
20 INJECTION INTRAMUSCULAR; INTRAVENOUS ONCE
Status: DISCONTINUED | OUTPATIENT
Start: 2019-08-20 | End: 2019-08-20

## 2019-08-20 RX ORDER — XENON XE-133 10 MCI/1
10 GAS RESPIRATORY (INHALATION)
Status: DISCONTINUED | OUTPATIENT
Start: 2019-08-20 | End: 2019-08-20

## 2019-08-20 RX ORDER — METHYLPREDNISOLONE SODIUM SUCCINATE 125 MG/2ML
125 INJECTION, POWDER, LYOPHILIZED, FOR SOLUTION INTRAMUSCULAR; INTRAVENOUS ONCE
Status: COMPLETED | OUTPATIENT
Start: 2019-08-20 | End: 2019-08-20

## 2019-08-20 RX ORDER — LEVOTHYROXINE SODIUM 0.07 MG/1
75 TABLET ORAL DAILY
Status: DISCONTINUED | OUTPATIENT
Start: 2019-08-20 | End: 2019-08-24 | Stop reason: HOSPADM

## 2019-08-20 RX ORDER — PANTOPRAZOLE SODIUM 40 MG/10ML
40 INJECTION, POWDER, LYOPHILIZED, FOR SOLUTION INTRAVENOUS 2 TIMES DAILY
Status: DISCONTINUED | OUTPATIENT
Start: 2019-08-20 | End: 2019-08-24 | Stop reason: HOSPADM

## 2019-08-20 RX ORDER — SODIUM CHLORIDE 0.9 % (FLUSH) 0.9 %
10 SYRINGE (ML) INJECTION EVERY 12 HOURS SCHEDULED
Status: DISCONTINUED | OUTPATIENT
Start: 2019-08-20 | End: 2019-08-24 | Stop reason: HOSPADM

## 2019-08-20 RX ORDER — ONDANSETRON 2 MG/ML
4 INJECTION INTRAMUSCULAR; INTRAVENOUS EVERY 6 HOURS PRN
Status: DISCONTINUED | OUTPATIENT
Start: 2019-08-20 | End: 2019-08-24 | Stop reason: HOSPADM

## 2019-08-20 RX ORDER — IPRATROPIUM BROMIDE AND ALBUTEROL SULFATE 2.5; .5 MG/3ML; MG/3ML
1 SOLUTION RESPIRATORY (INHALATION) ONCE
Status: COMPLETED | OUTPATIENT
Start: 2019-08-20 | End: 2019-08-20

## 2019-08-20 RX ORDER — FUROSEMIDE 10 MG/ML
20 INJECTION INTRAMUSCULAR; INTRAVENOUS ONCE
Status: COMPLETED | OUTPATIENT
Start: 2019-08-20 | End: 2019-08-20

## 2019-08-20 RX ORDER — LISINOPRIL 20 MG/1
20 TABLET ORAL DAILY
Status: DISCONTINUED | OUTPATIENT
Start: 2019-08-20 | End: 2019-08-24 | Stop reason: HOSPADM

## 2019-08-20 RX ADMIN — METHYLPREDNISOLONE SODIUM SUCCINATE 125 MG: 125 INJECTION, POWDER, FOR SOLUTION INTRAMUSCULAR; INTRAVENOUS at 07:21

## 2019-08-20 RX ADMIN — IRON SUCROSE 200 MG: 20 INJECTION, SOLUTION INTRAVENOUS at 15:04

## 2019-08-20 RX ADMIN — PANTOPRAZOLE SODIUM 40 MG: 40 INJECTION, POWDER, FOR SOLUTION INTRAVENOUS at 19:42

## 2019-08-20 RX ADMIN — FUROSEMIDE 20 MG: 10 INJECTION, SOLUTION INTRAVENOUS at 07:54

## 2019-08-20 RX ADMIN — SODIUM CHLORIDE 250 ML: 9 INJECTION, SOLUTION INTRAVENOUS at 03:12

## 2019-08-20 RX ADMIN — IPRATROPIUM BROMIDE AND ALBUTEROL SULFATE 1 AMPULE: .5; 3 SOLUTION RESPIRATORY (INHALATION) at 04:26

## 2019-08-20 RX ADMIN — PANTOPRAZOLE SODIUM 40 MG: 40 INJECTION, POWDER, FOR SOLUTION INTRAVENOUS at 12:27

## 2019-08-20 RX ADMIN — IPRATROPIUM BROMIDE AND ALBUTEROL SULFATE 1 AMPULE: .5; 3 SOLUTION RESPIRATORY (INHALATION) at 22:33

## 2019-08-20 RX ADMIN — IPRATROPIUM BROMIDE AND ALBUTEROL SULFATE 1 AMPULE: .5; 3 SOLUTION RESPIRATORY (INHALATION) at 07:31

## 2019-08-20 RX ADMIN — IPRATROPIUM BROMIDE AND ALBUTEROL SULFATE 1 AMPULE: .5; 3 SOLUTION RESPIRATORY (INHALATION) at 11:16

## 2019-08-20 RX ADMIN — IPRATROPIUM BROMIDE AND ALBUTEROL SULFATE 1 AMPULE: .5; 3 SOLUTION RESPIRATORY (INHALATION) at 19:33

## 2019-08-20 RX ADMIN — LISINOPRIL 20 MG: 20 TABLET ORAL at 12:27

## 2019-08-20 RX ADMIN — IPRATROPIUM BROMIDE AND ALBUTEROL SULFATE 1 AMPULE: .5; 3 SOLUTION RESPIRATORY (INHALATION) at 14:59

## 2019-08-20 RX ADMIN — Medication 10 ML: at 19:42

## 2019-08-20 RX ADMIN — Medication 10 ML: at 12:27

## 2019-08-20 RX ADMIN — ACETAMINOPHEN 650 MG: 325 TABLET ORAL at 17:14

## 2019-08-20 RX ADMIN — Medication 5 MILLICURIE: at 02:08

## 2019-08-20 RX ADMIN — LEVOTHYROXINE SODIUM 75 MCG: 75 TABLET ORAL at 12:27

## 2019-08-20 ASSESSMENT — PAIN SCALES - GENERAL
PAINLEVEL_OUTOF10: 7
PAINLEVEL_OUTOF10: 0
PAINLEVEL_OUTOF10: 0

## 2019-08-20 ASSESSMENT — ENCOUNTER SYMPTOMS
DIARRHEA: 0
COUGH: 1
EYE PAIN: 0
ABDOMINAL PAIN: 0
SHORTNESS OF BREATH: 1
VOMITING: 0

## 2019-08-20 NOTE — PROGRESS NOTES
4 Eyes Skin Assessment    Alvina Soulier is being assessed upon: Admission    I agree that I, Esteban Sainz, along with Oneida Irby RN (either 2 RN's or 1 LPN and 1 RN) have performed a thorough Head to Toe Skin Assessment on the patient. ALL assessment sites listed below have been assessed. Areas assessed by both nurses:     [x]   Head, Face, and Ears   [x]   Shoulders, Back, and Chest  [x]   Arms, Elbows, and Hands   [x]   Coccyx, Sacrum, and Ischium  [x]   Legs, Feet, and Heels    Does the Patient have Skin Breakdown? Yes, wound(s) noted upon assessment. It is the responsibility of the Primary Nurse to assure that the following documentation, preventions, orders, and consults are complete on the above noted wound(s): Wound LDA initiated. LDA Flowsheet Documentation includes the Maia-wound, Wound Assessment, Measurements, Dressing Treatment, Drainage, and Color.    3 open areas on upper back, scattered scratches and bruises    Darnell Prevention initiated: Yes  Wound Care Orders initiated: Yes    [address]  nurse consulted for Pressure Injury (Stage 3,4, Unstageable, DTI, NWPT, and Complex wounds) and New or Established Ostomies: NA        Primary Nurse eSignature: Esteban Sainz RN on 8/20/2019 at 9:42 AM      Co-Signer eSignature: {Esignature:[id number]}

## 2019-08-20 NOTE — H&P
nasal spray 2 sprays by Nasal route daily 5/16/19  Yes VITOR Campbell,    glycerin-hypromellose- 0.2-0.2-1 % SOLN opthalmic solution Place 1 drop into both eyes 2 times daily   Yes Historical Provider, MD   albuterol sulfate  (90 Base) MCG/ACT inhaler Inhale 2 puffs into the lungs 4 times daily as needed for Wheezing 4/23/19  Yes [facility]   metoprolol succinate (TOPROL XL) 25 MG extended release tablet Take 1 tablet by mouth daily 4/23/19  [facility]   furosemide (LASIX) 40 MG tablet Take 1 tablet by mouth daily 4/23/19  Yes VITOR Armendariz WellfordDO   lisinopril (PRINIVIL;ZESTRIL) 20 MG tablet Take 1 tablet by mouth daily 2/4/19  Yes [facility]   levothyroxine (SYNTHROID) 75 MCG tablet Take 1 tablet by mouth Daily TAKE 1 TABLET BY MOUTH DAILY 1/31/19  Yes [facility]   Umeclidinium Bromide (INCRUSE ELLIPTA) 62.5 MCG/INH AEPB Inhale 1 puff into the lungs daily 11/12/18  Yes Elenora Kayser Fiessinger, APRN   ADVAIR DISKUS 250-50 MCG/DOSE AEPB Inhale 1 puff into the lungs 2 times daily 9/21/16  Yes Historical Provider, MD   guaiFENesin 400 MG tablet Take 400 mg by mouth daily    Yes Historical Provider, MD   OXYGEN Inhale into the lungs 3 liters per nasal cannula continuous   Yes Historical Provider, MD       Allergies:    Asa [aspirin]    Social History:    Tobacco:   reports that he has been smoking pipe and cigarettes. He has a 49.00 pack-year smoking history. He has never used smokeless tobacco.  Alcohol:   reports that he does not drink alcohol.   Illicit Drugs: Denies     Family History:      Problem Relation Age of Onset    Diabetes Mother     High Blood Pressure Mother     High Blood Pressure Sister     Diabetes Sister     Pacemaker Maternal Aunt     Stroke Maternal Aunt     Diabetes Sister     Cancer Sister          Physical Examination:  /68   Pulse 82   Temp 98.5 °F (36.9 °C) (Temporal)   Resp 27   Ht 5' 4\" (1.626 m)

## 2019-08-20 NOTE — ED NOTES
Pt again requesting to get OOB to urinate. Pt encouraged to stay in bed d/t respiratory distress with activity. Urinal placed for pt.      Karen Fuller RN  08/20/19 [id number]

## 2019-08-20 NOTE — ED NOTES
Pt verbalizes consent for blood transfusion and consent signed by pt. VSS.        Caorl Quiroga RN  08/20/19 2955

## 2019-08-21 ENCOUNTER — OUTSIDE FACILITY SERVICE (OUTPATIENT)
Dept: PULMONOLOGY | Facility: CLINIC | Age: 69
End: 2019-08-21

## 2019-08-21 PROBLEM — Z51.5 PALLIATIVE CARE PATIENT: Status: ACTIVE | Noted: 2019-08-21

## 2019-08-21 LAB
ANION GAP SERPL CALCULATED.3IONS-SCNC: 8 MMOL/L (ref 7–19)
ANISOCYTOSIS: ABNORMAL
BASOPHILS ABSOLUTE: 0 K/UL (ref 0–0.2)
BASOPHILS RELATIVE PERCENT: 0.2 % (ref 0–1)
BUN BLDV-MCNC: 38 MG/DL (ref 8–23)
CALCIUM SERPL-MCNC: 9.3 MG/DL (ref 8.8–10.2)
CHLORIDE BLD-SCNC: 89 MMOL/L (ref 98–111)
CO2: 40 MMOL/L (ref 22–29)
CREAT SERPL-MCNC: 1 MG/DL (ref 0.5–1.2)
EKG P AXIS: 62 DEGREES
EKG P-R INTERVAL: 110 MS
EKG Q-T INTERVAL: 336 MS
EKG QRS DURATION: 86 MS
EKG QTC CALCULATION (BAZETT): 369 MS
EKG T AXIS: 40 DEGREES
EOSINOPHILS ABSOLUTE: 0 K/UL (ref 0–0.6)
EOSINOPHILS RELATIVE PERCENT: 0.2 % (ref 0–5)
GFR NON-AFRICAN AMERICAN: >60
GLUCOSE BLD-MCNC: 94 MG/DL (ref 74–109)
HCT VFR BLD CALC: 27.3 % (ref 42–52)
HEMOGLOBIN: 7.2 G/DL (ref 14–18)
HYPOCHROMIA: ABNORMAL
IMMATURE GRANULOCYTES #: 0 K/UL
LYMPHOCYTES ABSOLUTE: 0.8 K/UL (ref 1.1–4.5)
LYMPHOCYTES RELATIVE PERCENT: 13.2 % (ref 20–40)
MCH RBC QN AUTO: 20.1 PG (ref 27–31)
MCHC RBC AUTO-ENTMCNC: 26.4 G/DL (ref 33–37)
MCV RBC AUTO: 76.3 FL (ref 80–94)
MONOCYTES ABSOLUTE: 0.9 K/UL (ref 0–0.9)
MONOCYTES RELATIVE PERCENT: 13.8 % (ref 0–10)
NEUTROPHILS ABSOLUTE: 4.5 K/UL (ref 1.5–7.5)
NEUTROPHILS RELATIVE PERCENT: 72.3 % (ref 50–65)
OVALOCYTES: ABNORMAL
PDW BLD-RTO: 17.8 % (ref 11.5–14.5)
PLATELET # BLD: 164 K/UL (ref 130–400)
PLATELET SLIDE REVIEW: ADEQUATE
PMV BLD AUTO: 10.7 FL (ref 9.4–12.4)
POIKILOCYTES: ABNORMAL
POLYCHROMASIA: ABNORMAL
POTASSIUM REFLEX MAGNESIUM: 4.7 MMOL/L (ref 3.5–5)
RBC # BLD: 3.58 M/UL (ref 4.7–6.1)
SODIUM BLD-SCNC: 137 MMOL/L (ref 136–145)
TARGET CELLS: ABNORMAL
WBC # BLD: 6.2 K/UL (ref 4.8–10.8)

## 2019-08-21 PROCEDURE — 85025 COMPLETE CBC W/AUTO DIFF WBC: CPT

## 2019-08-21 PROCEDURE — 2580000003 HC RX 258: Performed by: INTERNAL MEDICINE

## 2019-08-21 PROCEDURE — 94640 AIRWAY INHALATION TREATMENT: CPT

## 2019-08-21 PROCEDURE — 99233 SBSQ HOSP IP/OBS HIGH 50: CPT | Performed by: INTERNAL MEDICINE

## 2019-08-21 PROCEDURE — 6370000000 HC RX 637 (ALT 250 FOR IP): Performed by: INTERNAL MEDICINE

## 2019-08-21 PROCEDURE — 6360000002 HC RX W HCPCS: Performed by: INTERNAL MEDICINE

## 2019-08-21 PROCEDURE — C9113 INJ PANTOPRAZOLE SODIUM, VIA: HCPCS | Performed by: INTERNAL MEDICINE

## 2019-08-21 PROCEDURE — 93005 ELECTROCARDIOGRAM TRACING: CPT

## 2019-08-21 PROCEDURE — 80048 BASIC METABOLIC PNL TOTAL CA: CPT

## 2019-08-21 PROCEDURE — 2000000000 HC ICU R&B

## 2019-08-21 PROCEDURE — 2700000000 HC OXYGEN THERAPY PER DAY

## 2019-08-21 PROCEDURE — 36415 COLL VENOUS BLD VENIPUNCTURE: CPT

## 2019-08-21 PROCEDURE — 94660 CPAP INITIATION&MGMT: CPT

## 2019-08-21 RX ADMIN — IPRATROPIUM BROMIDE AND ALBUTEROL SULFATE 1 AMPULE: .5; 3 SOLUTION RESPIRATORY (INHALATION) at 22:33

## 2019-08-21 RX ADMIN — Medication 10 ML: at 19:56

## 2019-08-21 RX ADMIN — IPRATROPIUM BROMIDE AND ALBUTEROL SULFATE 1 AMPULE: .5; 3 SOLUTION RESPIRATORY (INHALATION) at 10:51

## 2019-08-21 RX ADMIN — Medication 10 ML: at 09:37

## 2019-08-21 RX ADMIN — IPRATROPIUM BROMIDE AND ALBUTEROL SULFATE 1 AMPULE: .5; 3 SOLUTION RESPIRATORY (INHALATION) at 14:23

## 2019-08-21 RX ADMIN — ENOXAPARIN SODIUM 40 MG: 40 INJECTION SUBCUTANEOUS at 09:36

## 2019-08-21 RX ADMIN — ACETAMINOPHEN 650 MG: 325 TABLET ORAL at 23:00

## 2019-08-21 RX ADMIN — IPRATROPIUM BROMIDE AND ALBUTEROL SULFATE 1 AMPULE: .5; 3 SOLUTION RESPIRATORY (INHALATION) at 06:26

## 2019-08-21 RX ADMIN — ACETAMINOPHEN 650 MG: 325 TABLET ORAL at 17:57

## 2019-08-21 RX ADMIN — LEVOTHYROXINE SODIUM 75 MCG: 75 TABLET ORAL at 05:58

## 2019-08-21 RX ADMIN — DILTIAZEM HYDROCHLORIDE 30 MG: 30 TABLET, FILM COATED ORAL at 19:31

## 2019-08-21 RX ADMIN — PREDNISONE 40 MG: 20 TABLET ORAL at 09:34

## 2019-08-21 RX ADMIN — IPRATROPIUM BROMIDE AND ALBUTEROL SULFATE 1 AMPULE: .5; 3 SOLUTION RESPIRATORY (INHALATION) at 18:32

## 2019-08-21 RX ADMIN — PANTOPRAZOLE SODIUM 40 MG: 40 INJECTION, POWDER, FOR SOLUTION INTRAVENOUS at 09:34

## 2019-08-21 RX ADMIN — IPRATROPIUM BROMIDE AND ALBUTEROL SULFATE 1 AMPULE: .5; 3 SOLUTION RESPIRATORY (INHALATION) at 02:30

## 2019-08-21 RX ADMIN — LISINOPRIL 20 MG: 20 TABLET ORAL at 09:34

## 2019-08-21 RX ADMIN — ACETAMINOPHEN 650 MG: 325 TABLET ORAL at 13:48

## 2019-08-21 RX ADMIN — PANTOPRAZOLE SODIUM 40 MG: 40 INJECTION, POWDER, FOR SOLUTION INTRAVENOUS at 19:56

## 2019-08-21 ASSESSMENT — PAIN SCALES - GENERAL
PAINLEVEL_OUTOF10: 0
PAINLEVEL_OUTOF10: 8
PAINLEVEL_OUTOF10: 9
PAINLEVEL_OUTOF10: 7

## 2019-08-21 ASSESSMENT — ENCOUNTER SYMPTOMS
ABDOMINAL PAIN: 0
GASTROINTESTINAL NEGATIVE: 1
EYES NEGATIVE: 1
SHORTNESS OF BREATH: 1
COUGH: 1
ABDOMINAL DISTENTION: 0

## 2019-08-21 ASSESSMENT — PAIN DESCRIPTION - PAIN TYPE: TYPE: ACUTE PAIN

## 2019-08-21 ASSESSMENT — PAIN DESCRIPTION - DESCRIPTORS: DESCRIPTORS: ACHING

## 2019-08-21 ASSESSMENT — PAIN DESCRIPTION - LOCATION: LOCATION: HEAD

## 2019-08-21 NOTE — PROGRESS NOTES
(acute kidney injury) [facility])    Palliative care patient  Resolved Problems:    * No resolved hospital problems. *      PLAN:  1) Acute on chronic resp failure, resolving with diuresis and bipap, weaned off bipap this am. Cont supportive O2, bipap QHS. Possibly multifactorial including hypervolemia, possibly from LINO on CKD or acute on chronic HFpEF with underlying chronic COPD. Pulmonary consulted in ED. Appreciate further recs. Hypervolemia signs much improved. LINO resolving with diuresis.    2) Pt found to have hgb of 6.4 in ED. Unclear etiology. Pt reports some epistaxis, but denies any GI signs (BRBPR or tarry stools). No further signs of active bleeding noted. Will cont to monitor hgb. Transfuse as needed. GI on consult, rec IV iron and considering EGD/Cscope once stabilized.    3) Cont home medications for hypothyroid.    5) HTN, holding lisinopril due to LINO. Monitor and add antihypertensives as needed.      Advance Directive:Full Code    Asmita Hinojosa MD  8/21/2019

## 2019-08-21 NOTE — CARE COORDINATION
[address] Transitions Interview     2019    Patient: Radha Moreira Patient : 1950   MRN: [id number] RARS: Readmission Risk Score: 16         Spoke with:Stone Mejia    Readmission Risk  Patient Active Problem List   Diagnosis    Hypertension    Hypothyroidism    Osteoporosis    CKD (chronic kidney disease), stage II    Vitamin D deficiency    Lumbar spine pain    Cervical spine disease    COPD (chronic obstructive pulmonary disease) (HCC)    Anxiety    Depression    OA (osteoarthritis of spine)    Neck and shoulder pain    Frequent headaches    Mixed hyperlipidemia    Chronic daily headache    Anemia    Irregular heart rhythm    Elevated brain natriuretic peptide (BNP) level    Hypercapnic respiratory failure (HCC)    Acute on chronic respiratory failure (HCC)    LINO (acute kidney [facility]) ([facility]    Palliative care patient       Inpatient Assessment  Care Transitions Summary    Care Transitions Inpatient Review  Medication Review  Do you have all of your prescriptions and are they filled?:  Yes   Barriers to Medication Adherence:  None  Are you able to afford your medications?:  With Assistance  What assistance programs is the patient using?:  Patient assistance with pharmaceutical company  How often do you have difficulty taking your medications?:  Sometimes I take them as prescribed. Housing Review  Who do you live with?:  Parent(s)  Are you an active caregiver in your home?:  Yes  For whom are you the caregiver?:  mother  Does the person that you care for see a [facility] PCP?:  Yes  Who is the PCP of the person that you care for?:  Keller Opitz, [address] [facility] you have a ?:  No  Do you have a [facility] [place]?:  No  Durable Medical Equipment  Patient Home Equipment:  Oxygen  Functional Review  Ability to seek help/take action for Emergent/Urgent situations i.e. fire, crime, inclement weather or health crisis. :  Independent  Ability handle

## 2019-08-21 NOTE — PLAN OF CARE
Problem: Risk for Impaired Skin Integrity  Goal: Tissue integrity - skin and mucous membranes  Description  Structural intactness and normal physiological function of skin and  mucous membranes.   8/21/2019 0030 by Marelyn Epley, RN  Outcome: Ongoing  8/20/2019 1313 by Devorah Pimentel RN  Outcome: Ongoing     Problem: Falls - Risk of:  Goal: Will remain free from falls  Description  Will remain free from falls  8/21/2019 0030 by Marelyn Epley, RN  Outcome: Ongoing  8/20/2019 1313 by Devorah Pimentel RN  Outcome: Ongoing  Goal: Absence of physical injury  Description  Absence of physical injury  Outcome: Ongoing     Problem: OXYGENATION/RESPIRATORY FUNCTION  Goal: Patient will maintain patent airway  8/21/2019 0030 by Marelyn Epley, RN  Outcome: Ongoing  8/20/2019 1313 by Devorah Pimentel RN  Outcome: Ongoing  Goal: Patient will achieve/maintain normal respiratory rate/effort  Description  Respiratory rate and effort will be within normal limits for the patient  Outcome: Ongoing     Problem: SKIN INTEGRITY  Goal: Skin integrity is maintained or improved  Outcome: Ongoing

## 2019-08-21 NOTE — PROGRESS NOTES
exacerbation of COPD  2. Severe anemia- s/p 1 unit of PRBC in ED  3. COPD   4. Hx of Polio  5. Hx of head trauma     Recommend:   · Wean Bipap to high flow or NC to keep O2 sat >90-96%  · Out of bed today  · Continue steroids and taper as appropriate when improved   · Continue Duonebs  · Continue PPI  · Continue Lovenox prophylaxis       Electronically signed by Jaky Espinoza on 8/21/2019 at 8:34 AM    Physician Substantive portion:  Patient remains on BiPAP this morning and nods that he thinks he is doing better. He does not have any other additional complaints. Continuous dyspnea in the chest is improved for the past day alleviated by the BiPAP. He has had markedly elevated PCO2 indicating hypercapnic respiratory failure. Chest exam shows him to have frail stature and diminished breath sounds bilaterally crackles and wheezes. We will try to mobilize him try to get transitioned to nasal oxygen today. Suspect COPD is a minor player and the anemia and acute heart failure have been more significant contributors for his presentation. He does appear overall better. I have seen and examined patient personally, performing a face-to-face diagnostic evaluation with plan of care reviewed and developed with APRN and nursing staff. I have addended and/or modified the above history of present illness, physical examination, and assessment and plan to reflect my findings and impressions. Essential elements of the care plan were discussed with APRN above. Agree with findings and assessment/plan as documented above.     Electronically signed by Marcello Khan on 8/21/2019, 5:46 PM

## 2019-08-22 ENCOUNTER — OUTSIDE FACILITY SERVICE (OUTPATIENT)
Dept: PULMONOLOGY | Facility: CLINIC | Age: 69
End: 2019-08-22

## 2019-08-22 LAB
ANION GAP SERPL CALCULATED.3IONS-SCNC: 15 MMOL/L (ref 7–19)
ANISOCYTOSIS: ABNORMAL
BASOPHILIC STIPPLING: ABNORMAL
BASOPHILS ABSOLUTE: 0 K/UL (ref 0–0.2)
BASOPHILS RELATIVE PERCENT: 0.1 % (ref 0–1)
BLOOD BANK DISPENSE STATUS: NORMAL
BLOOD BANK PRODUCT CODE: NORMAL
BPU ID: NORMAL
BUN BLDV-MCNC: 23 MG/DL (ref 8–23)
CALCIUM SERPL-MCNC: 8.9 MG/DL (ref 8.8–10.2)
CHLORIDE BLD-SCNC: 89 MMOL/L (ref 98–111)
CO2: 32 MMOL/L (ref 22–29)
CREAT SERPL-MCNC: 0.9 MG/DL (ref 0.5–1.2)
DESCRIPTION BLOOD BANK: NORMAL
EKG P AXIS: 47 DEGREES
EKG P-R INTERVAL: 160 MS
EKG Q-T INTERVAL: 278 MS
EKG QRS DURATION: 80 MS
EKG QTC CALCULATION (BAZETT): 404 MS
EKG T AXIS: 54 DEGREES
EOSINOPHILS ABSOLUTE: 0 K/UL (ref 0–0.6)
EOSINOPHILS RELATIVE PERCENT: 0.2 % (ref 0–5)
GFR NON-AFRICAN AMERICAN: >60
GLUCOSE BLD-MCNC: 165 MG/DL (ref 74–109)
HCT VFR BLD CALC: 25.4 % (ref 42–52)
HCT VFR BLD CALC: 30.6 % (ref 42–52)
HEMOGLOBIN: 6.8 G/DL (ref 14–18)
HEMOGLOBIN: 8.6 G/DL (ref 14–18)
HYPOCHROMIA: ABNORMAL
IMMATURE GRANULOCYTES #: 0.1 K/UL
LYMPHOCYTES ABSOLUTE: 0.3 K/UL (ref 1.1–4.5)
LYMPHOCYTES RELATIVE PERCENT: 2.1 % (ref 20–40)
MCH RBC QN AUTO: 20 PG (ref 27–31)
MCHC RBC AUTO-ENTMCNC: 26.8 G/DL (ref 33–37)
MCV RBC AUTO: 74.7 FL (ref 80–94)
MICROCYTES: ABNORMAL
MONOCYTES ABSOLUTE: 0.3 K/UL (ref 0–0.9)
MONOCYTES RELATIVE PERCENT: 2.5 % (ref 0–10)
NEUTROPHILS ABSOLUTE: 11.5 K/UL (ref 1.5–7.5)
NEUTROPHILS RELATIVE PERCENT: 94.7 % (ref 50–65)
OVALOCYTES: ABNORMAL
PAPPENHEIMER BODIES: ABNORMAL
PDW BLD-RTO: 18.3 % (ref 11.5–14.5)
PLATELET # BLD: 157 K/UL (ref 130–400)
PLATELET SLIDE REVIEW: ADEQUATE
PMV BLD AUTO: 10.1 FL (ref 9.4–12.4)
POIKILOCYTES: ABNORMAL
POLYCHROMASIA: ABNORMAL
POTASSIUM REFLEX MAGNESIUM: 4 MMOL/L (ref 3.5–5)
RBC # BLD: 3.4 M/UL (ref 4.7–6.1)
SODIUM BLD-SCNC: 136 MMOL/L (ref 136–145)
STOMATOCYTES: ABNORMAL
TARGET CELLS: ABNORMAL
WBC # BLD: 12.2 K/UL (ref 4.8–10.8)

## 2019-08-22 PROCEDURE — 6370000000 HC RX 637 (ALT 250 FOR IP): Performed by: INTERNAL MEDICINE

## 2019-08-22 PROCEDURE — 94660 CPAP INITIATION&MGMT: CPT

## 2019-08-22 PROCEDURE — 2580000003 HC RX 258: Performed by: INTERNAL MEDICINE

## 2019-08-22 PROCEDURE — C9113 INJ PANTOPRAZOLE SODIUM, VIA: HCPCS | Performed by: INTERNAL MEDICINE

## 2019-08-22 PROCEDURE — 2700000000 HC OXYGEN THERAPY PER DAY

## 2019-08-22 PROCEDURE — 94640 AIRWAY INHALATION TREATMENT: CPT

## 2019-08-22 PROCEDURE — 80048 BASIC METABOLIC PNL TOTAL CA: CPT

## 2019-08-22 PROCEDURE — P9016 RBC LEUKOCYTES REDUCED: HCPCS

## 2019-08-22 PROCEDURE — 6360000002 HC RX W HCPCS: Performed by: INTERNAL MEDICINE

## 2019-08-22 PROCEDURE — 85018 HEMOGLOBIN: CPT

## 2019-08-22 PROCEDURE — 99232 SBSQ HOSP IP/OBS MODERATE 35: CPT | Performed by: INTERNAL MEDICINE

## 2019-08-22 PROCEDURE — 1210000000 HC MED SURG R&B

## 2019-08-22 PROCEDURE — 85014 HEMATOCRIT: CPT

## 2019-08-22 PROCEDURE — 85025 COMPLETE CBC W/AUTO DIFF WBC: CPT

## 2019-08-22 PROCEDURE — 36430 TRANSFUSION BLD/BLD COMPNT: CPT

## 2019-08-22 PROCEDURE — 36415 COLL VENOUS BLD VENIPUNCTURE: CPT

## 2019-08-22 RX ORDER — 0.9 % SODIUM CHLORIDE 0.9 %
250 INTRAVENOUS SOLUTION INTRAVENOUS ONCE
Status: COMPLETED | OUTPATIENT
Start: 2019-08-22 | End: 2019-08-22

## 2019-08-22 RX ADMIN — DILTIAZEM HYDROCHLORIDE 30 MG: 30 TABLET, FILM COATED ORAL at 12:49

## 2019-08-22 RX ADMIN — IPRATROPIUM BROMIDE AND ALBUTEROL SULFATE 1 AMPULE: .5; 3 SOLUTION RESPIRATORY (INHALATION) at 20:29

## 2019-08-22 RX ADMIN — IPRATROPIUM BROMIDE AND ALBUTEROL SULFATE 1 AMPULE: .5; 3 SOLUTION RESPIRATORY (INHALATION) at 06:52

## 2019-08-22 RX ADMIN — Medication 10 ML: at 21:15

## 2019-08-22 RX ADMIN — PANTOPRAZOLE SODIUM 40 MG: 40 INJECTION, POWDER, FOR SOLUTION INTRAVENOUS at 09:13

## 2019-08-22 RX ADMIN — ACETAMINOPHEN 650 MG: 325 TABLET ORAL at 22:31

## 2019-08-22 RX ADMIN — LEVOTHYROXINE SODIUM 75 MCG: 75 TABLET ORAL at 06:10

## 2019-08-22 RX ADMIN — ENOXAPARIN SODIUM 40 MG: 40 INJECTION SUBCUTANEOUS at 09:13

## 2019-08-22 RX ADMIN — PANTOPRAZOLE SODIUM 40 MG: 40 INJECTION, POWDER, FOR SOLUTION INTRAVENOUS at 21:14

## 2019-08-22 RX ADMIN — LISINOPRIL 20 MG: 20 TABLET ORAL at 09:13

## 2019-08-22 RX ADMIN — IPRATROPIUM BROMIDE AND ALBUTEROL SULFATE 1 AMPULE: .5; 3 SOLUTION RESPIRATORY (INHALATION) at 23:33

## 2019-08-22 RX ADMIN — IPRATROPIUM BROMIDE AND ALBUTEROL SULFATE 1 AMPULE: .5; 3 SOLUTION RESPIRATORY (INHALATION) at 02:30

## 2019-08-22 RX ADMIN — IPRATROPIUM BROMIDE AND ALBUTEROL SULFATE 1 AMPULE: .5; 3 SOLUTION RESPIRATORY (INHALATION) at 10:32

## 2019-08-22 RX ADMIN — DILTIAZEM HYDROCHLORIDE 30 MG: 30 TABLET, FILM COATED ORAL at 17:19

## 2019-08-22 RX ADMIN — SODIUM CHLORIDE 250 ML: 9 INJECTION, SOLUTION INTRAVENOUS at 12:45

## 2019-08-22 RX ADMIN — DILTIAZEM HYDROCHLORIDE 30 MG: 30 TABLET, FILM COATED ORAL at 00:43

## 2019-08-22 RX ADMIN — DILTIAZEM HYDROCHLORIDE 30 MG: 30 TABLET, FILM COATED ORAL at 06:10

## 2019-08-22 RX ADMIN — PREDNISONE 40 MG: 20 TABLET ORAL at 09:13

## 2019-08-22 RX ADMIN — DILTIAZEM HYDROCHLORIDE 30 MG: 30 TABLET, FILM COATED ORAL at 22:31

## 2019-08-22 RX ADMIN — Medication 10 ML: at 09:13

## 2019-08-22 RX ADMIN — IPRATROPIUM BROMIDE AND ALBUTEROL SULFATE 1 AMPULE: .5; 3 SOLUTION RESPIRATORY (INHALATION) at 14:23

## 2019-08-22 ASSESSMENT — ENCOUNTER SYMPTOMS
GASTROINTESTINAL NEGATIVE: 1
EYES NEGATIVE: 1
COUGH: 1
SHORTNESS OF BREATH: 1
ABDOMINAL DISTENTION: 0
ABDOMINAL PAIN: 0

## 2019-08-22 ASSESSMENT — PAIN SCALES - GENERAL
PAINLEVEL_OUTOF10: 0
PAINLEVEL_OUTOF10: 5
PAINLEVEL_OUTOF10: 0
PAINLEVEL_OUTOF10: 0
PAINLEVEL_OUTOF10: 4
PAINLEVEL_OUTOF10: 0

## 2019-08-23 ENCOUNTER — OUTSIDE FACILITY SERVICE (OUTPATIENT)
Dept: PULMONOLOGY | Facility: CLINIC | Age: 69
End: 2019-08-23

## 2019-08-23 LAB
ANION GAP SERPL CALCULATED.3IONS-SCNC: 11 MMOL/L (ref 7–19)
ANISOCYTOSIS: ABNORMAL
BASOPHILS ABSOLUTE: 0 K/UL (ref 0–0.2)
BASOPHILS RELATIVE PERCENT: 0.1 % (ref 0–1)
BUN BLDV-MCNC: 18 MG/DL (ref 8–23)
CALCIUM SERPL-MCNC: 9.1 MG/DL (ref 8.8–10.2)
CHLORIDE BLD-SCNC: 88 MMOL/L (ref 98–111)
CO2: 33 MMOL/L (ref 22–29)
CREAT SERPL-MCNC: 0.7 MG/DL (ref 0.5–1.2)
EOSINOPHILS ABSOLUTE: 0 K/UL (ref 0–0.6)
EOSINOPHILS RELATIVE PERCENT: 0.1 % (ref 0–5)
GFR NON-AFRICAN AMERICAN: >60
GLUCOSE BLD-MCNC: 74 MG/DL (ref 74–109)
HCT VFR BLD CALC: 29.8 % (ref 42–52)
HEMOGLOBIN: 8.6 G/DL (ref 14–18)
HYPOCHROMIA: ABNORMAL
IMMATURE GRANULOCYTES #: 0 K/UL
LYMPHOCYTES ABSOLUTE: 0.7 K/UL (ref 1.1–4.5)
LYMPHOCYTES RELATIVE PERCENT: 6.3 % (ref 20–40)
MCH RBC QN AUTO: 21.7 PG (ref 27–31)
MCHC RBC AUTO-ENTMCNC: 28.9 G/DL (ref 33–37)
MCV RBC AUTO: 75.1 FL (ref 80–94)
MICROCYTES: ABNORMAL
MONOCYTES ABSOLUTE: 0.8 K/UL (ref 0–0.9)
MONOCYTES RELATIVE PERCENT: 7.8 % (ref 0–10)
NEUTROPHILS ABSOLUTE: 9.1 K/UL (ref 1.5–7.5)
NEUTROPHILS RELATIVE PERCENT: 85.3 % (ref 50–65)
PDW BLD-RTO: 18.7 % (ref 11.5–14.5)
PLATELET # BLD: 160 K/UL (ref 130–400)
PLATELET SLIDE REVIEW: ADEQUATE
PMV BLD AUTO: 10.4 FL (ref 9.4–12.4)
POTASSIUM REFLEX MAGNESIUM: 3.9 MMOL/L (ref 3.5–5)
RBC # BLD: 3.97 M/UL (ref 4.7–6.1)
SODIUM BLD-SCNC: 132 MMOL/L (ref 136–145)
WBC # BLD: 10.7 K/UL (ref 4.8–10.8)

## 2019-08-23 PROCEDURE — 36415 COLL VENOUS BLD VENIPUNCTURE: CPT

## 2019-08-23 PROCEDURE — 1210000000 HC MED SURG R&B

## 2019-08-23 PROCEDURE — 85025 COMPLETE CBC W/AUTO DIFF WBC: CPT

## 2019-08-23 PROCEDURE — 6370000000 HC RX 637 (ALT 250 FOR IP): Performed by: INTERNAL MEDICINE

## 2019-08-23 PROCEDURE — 99232 SBSQ HOSP IP/OBS MODERATE 35: CPT | Performed by: INTERNAL MEDICINE

## 2019-08-23 PROCEDURE — 6370000000 HC RX 637 (ALT 250 FOR IP): Performed by: FAMILY MEDICINE

## 2019-08-23 PROCEDURE — 94640 AIRWAY INHALATION TREATMENT: CPT

## 2019-08-23 PROCEDURE — C9113 INJ PANTOPRAZOLE SODIUM, VIA: HCPCS | Performed by: INTERNAL MEDICINE

## 2019-08-23 PROCEDURE — 80048 BASIC METABOLIC PNL TOTAL CA: CPT

## 2019-08-23 PROCEDURE — 94660 CPAP INITIATION&MGMT: CPT

## 2019-08-23 PROCEDURE — 2700000000 HC OXYGEN THERAPY PER DAY

## 2019-08-23 PROCEDURE — 2580000003 HC RX 258: Performed by: INTERNAL MEDICINE

## 2019-08-23 PROCEDURE — 6360000002 HC RX W HCPCS: Performed by: INTERNAL MEDICINE

## 2019-08-23 RX ORDER — PREDNISONE 10 MG/1
20 TABLET ORAL DAILY
Status: DISCONTINUED | OUTPATIENT
Start: 2019-08-24 | End: 2019-08-24 | Stop reason: HOSPADM

## 2019-08-23 RX ORDER — FUROSEMIDE 40 MG/1
40 TABLET ORAL DAILY
Status: DISCONTINUED | OUTPATIENT
Start: 2019-08-23 | End: 2019-08-24 | Stop reason: HOSPADM

## 2019-08-23 RX ADMIN — PANTOPRAZOLE SODIUM 40 MG: 40 INJECTION, POWDER, FOR SOLUTION INTRAVENOUS at 10:37

## 2019-08-23 RX ADMIN — FUROSEMIDE 40 MG: 40 TABLET ORAL at 15:39

## 2019-08-23 RX ADMIN — LEVOTHYROXINE SODIUM 75 MCG: 75 TABLET ORAL at 04:29

## 2019-08-23 RX ADMIN — IPRATROPIUM BROMIDE AND ALBUTEROL SULFATE 1 AMPULE: .5; 3 SOLUTION RESPIRATORY (INHALATION) at 14:43

## 2019-08-23 RX ADMIN — PREDNISONE 40 MG: 20 TABLET ORAL at 10:37

## 2019-08-23 RX ADMIN — DILTIAZEM HYDROCHLORIDE 30 MG: 30 TABLET, FILM COATED ORAL at 04:28

## 2019-08-23 RX ADMIN — Medication 10 ML: at 20:46

## 2019-08-23 RX ADMIN — DILTIAZEM HYDROCHLORIDE 30 MG: 30 TABLET, FILM COATED ORAL at 18:02

## 2019-08-23 RX ADMIN — IPRATROPIUM BROMIDE AND ALBUTEROL SULFATE 1 AMPULE: .5; 3 SOLUTION RESPIRATORY (INHALATION) at 19:04

## 2019-08-23 RX ADMIN — Medication 10 ML: at 10:37

## 2019-08-23 RX ADMIN — LISINOPRIL 20 MG: 20 TABLET ORAL at 10:37

## 2019-08-23 RX ADMIN — IPRATROPIUM BROMIDE AND ALBUTEROL SULFATE 1 AMPULE: .5; 3 SOLUTION RESPIRATORY (INHALATION) at 06:30

## 2019-08-23 RX ADMIN — PANTOPRAZOLE SODIUM 40 MG: 40 INJECTION, POWDER, FOR SOLUTION INTRAVENOUS at 20:46

## 2019-08-23 RX ADMIN — ENOXAPARIN SODIUM 40 MG: 40 INJECTION SUBCUTANEOUS at 10:37

## 2019-08-23 RX ADMIN — IPRATROPIUM BROMIDE AND ALBUTEROL SULFATE 1 AMPULE: .5; 3 SOLUTION RESPIRATORY (INHALATION) at 10:37

## 2019-08-23 RX ADMIN — DILTIAZEM HYDROCHLORIDE 30 MG: 30 TABLET, FILM COATED ORAL at 15:39

## 2019-08-23 RX ADMIN — IPRATROPIUM BROMIDE AND ALBUTEROL SULFATE 1 AMPULE: .5; 3 SOLUTION RESPIRATORY (INHALATION) at 23:03

## 2019-08-23 RX ADMIN — IPRATROPIUM BROMIDE AND ALBUTEROL SULFATE 1 AMPULE: .5; 3 SOLUTION RESPIRATORY (INHALATION) at 03:09

## 2019-08-23 ASSESSMENT — ENCOUNTER SYMPTOMS
GASTROINTESTINAL NEGATIVE: 1
EYES NEGATIVE: 1
SHORTNESS OF BREATH: 1
ABDOMINAL PAIN: 0
ABDOMINAL DISTENTION: 0

## 2019-08-23 ASSESSMENT — PAIN SCALES - GENERAL: PAINLEVEL_OUTOF10: 5

## 2019-08-23 NOTE — PROGRESS NOTES
Damaris Peterson arrived to room # 426 from 150 ICU  Presented with: respatory failure  Mental Status: Patient is oriented, alert and coherent. Vitals:    08/22/19 1800   BP: 134/70   Pulse: 90   Resp: 26   Temp:    SpO2: 95%     Patient safety contract and falls prevention contract reviewed with patient Yes. Oriented Patient to room. Call light within reach. Yes.   Needs, issues or concerns expressed at this time: no.      Electronically signed by Renea Andrade RN on 8/22/2019 at 7:16 PM

## 2019-08-23 NOTE — PROGRESS NOTES
Hospitalist Progress Note  8/23/2019 2:09 PM  Subjective:   Admit Date: 8/19/2019 : B. Keller Opitz, [place]  [id number]      Chief Complaint: SOB    Subjective: Pt seen and examined sitting in bedside chair, no acute distress. Pt does have concern over trouble hearing due to no battery for hearing aid. Pt denies any active bleeding, did have a BM today. Denies HA, changes in vision, CP, palpitations, abdominal pain, N/V, fevers or chills. ROS:   14 point review of systems is negative except as specifically addressed above.     Medications:  Current Facility-Administered Medications   Medication Dose Route Frequency Provider Last Rate Last Dose    [START ON 8/24/2019] predniSONE (DELTASONE) tablet 20 mg  20 mg Oral Daily Ashok Murphy MD        diltiazem (CARDIZEM) tablet 30 mg  30 mg Oral 4 times per day Linda Estrada MD   30 mg at 08/23/19 0428    ipratropium-albuterol (DUONEB) nebulizer solution 1 ampule  1 ampule Inhalation Q4H Linda Estrada MD   1 ampule at 08/23/19 1037    pantoprazole (PROTONIX) injection 40 mg  40 mg Intravenous BID Linda Estrada MD   40 mg at 08/23/19 1037    levothyroxine (SYNTHROID) tablet 75 mcg  75 mcg Oral Daily Linda Estrada MD   75 mcg at 08/23/19 0429    lisinopril (PRINIVIL;ZESTRIL) tablet 20 mg  20 mg Oral Daily Linda Estrada MD   20 mg at 08/23/19 1037    sodium chloride flush 0.9 % injection 10 mL  10 mL Intravenous 2 times per day Linda Estrada MD   10 mL at 08/23/19 1037    sodium chloride flush 0.9 % injection 10 mL  10 mL Intravenous PRN Linda Estrada MD        magnesium hydroxide (MILK OF MAGNESIA) 400 MG/5ML suspension 30 mL  30 mL Oral Daily PRN Linda Estrada MD        [facility]) injection 4 mg  4 mg Intravenous Q6H PRN Linda Estrada MD        enoxaparin (LOVENOX) injection 40 mg  40 mg Subcutaneous Daily Linda Estrada MD   40 mg at 08/23/19 1037    potassium chloride (KLOR-CON M) extended 08/22/19  1108 08/23/19  0414    136 132*   K 4.7 4.0 3.9   ANIONGAP 8 15 11   CL 89* 89* 88*   CO2 40* 32* 33*   BUN 38* 23 18   CREATININE 1.0 0.9 0.7   GLUCOSE 94 165* 74   CALCIUM 9.3 8.9 9.1     No results for input(s): MG, PHOS in the last 72 hours. No results for input(s): AST, ALT, ALB, BILITOT, ALKPHOS, ALB in the last 72 hours. HgBA1c:  No components found for: HGBA1C  FLP:    Lab Results   Component Value Date    TRIG 61 02/13/2018    HDL 57 02/13/2018    LDLCALC 70 02/13/2018     TSH:    Lab Results   Component Value Date    TSH 3.180 04/27/2019     Troponin T:  No results for input(s): TROPONINI in the last 72 hours. INR: No results for input(s): INR in the last 72 hours. Imaging:  XR CHEST PORTABLE   Final Result   1. Slightly indistinct pulmonary vascular margins suspicious for mild   or early pulmonary vascular congestion. Signed by Dr David Acosta on 8/20/2019 6:41 AM      NM Lung Scan  Impression:     Heterogeneous radioactivity in both lungs. In the absence  of ventilation scan, the definite evaluation cannot be made. This is a  nondiagnostic study. Signed by Dr Yue Burris on 8/20/2019 7:14 AM         Impression / Plan:      Active Hospital Problems    Diagnosis Date Noted    Palliative care patient [Z51.5] 08/21/2019    Acute on chronic respiratory failure ([facility] [J96.20] 08/20/2019    [facility] [N17.9] 08/20/2019    Anemia [D64.9] 04/26/2019    Elevated brain natriuretic peptide (BNP) level [R79.89] 04/26/2019    COPD (chronic obstructive pulmonary disease) [facility] [J44.9] 05/16/2014    Depression [F32.9] 05/16/2014    Cervical spine disease [M48.9] 05/16/2014    Anxiety [F41.9] 05/16/2014    Hypertension [I10]     Hypothyroidism [E03.9]     CKD (chronic kidney disease), stage II [N18.2]      Principal Problem:    Acute on chronic respiratory failure (HCC)  Active Problems:    Hypertension    Hypothyroidism    CKD (chronic kidney disease), stage

## 2019-08-23 NOTE — PROGRESS NOTES
Pulmonary and Critical Care Progress Note [address] [facility]    Patient: Damaris Peterson  1950   MR# [id number]   Acct# [de-identified]  08/23/19   7:01 AM  Referring Provider: Juan M Osorio MD    Chief Complaint: Shortness of breath   Interval history: Patient up to chair. He is on 2.5 L NC. His 3L NC baseline at home. He is feeling better. Has complaints about burning and hurting of his feet after the swelling improved. Medications:   diltiazem, 30 mg, Oral, 4 times per day    ipratropium-albuterol, 1 ampule, Inhalation, Q4H    pantoprazole, 40 mg, Intravenous, BID    levothyroxine, 75 mcg, Oral, Daily    lisinopril, 20 mg, Oral, Daily    sodium chloride flush, 10 mL, Intravenous, 2 times per day    enoxaparin, 40 mg, Subcutaneous, Daily    predniSONE, 40 mg, Oral, Daily   Review of Systems: Review of Systems   Constitutional: Negative for chills, fatigue and fever. HENT: Negative. Eyes: Negative. Respiratory: Positive for shortness of breath (chronic but improved ). Cough: improved     Cardiovascular: Negative for chest pain and leg swelling. Gastrointestinal: Negative. Negative for abdominal distention and abdominal pain. Skin: Negative. Negative for rash and wound. Neurological: Negative. Negative for dizziness and tremors. Psychiatric/Behavioral: Negative. Negative for agitation and behavioral problems. Physical Exam:  Blood pressure (!) 141/63, pulse 77, temperature 98.8 °F (37.1 °C), temperature source Temporal, resp. rate 26, height 5' 4\" (1.626 m), weight 119 lb 3.2 oz (54.1 kg), SpO2 92 %. Intake/Output Summary (Last 24 hours) at 8/23/2019 0701  Last data filed at 8/23/2019 8520  Gross per 24 hour   Intake 2050 ml   Output 2800 ml   Net -750 ml     Physical Exam   Constitutional: He is oriented to person, place, and time. He appears well-developed and well-nourished. He has a sickly appearance. Nasal cannula in place. HENT:   Head: Normocephalic and atraumatic. breath sounds, no distress. Pt on prednisone which I will taper again today. Otherwise we will sign off. I have seen and examined patient personally, performing a face-to-face diagnostic evaluation with plan of care reviewed and developed with APRN and nursing staff. I have addended and/or modified the above history of present illness, physical examination, and assessment and plan to reflect my findings and impressions. Essential elements of the care plan were discussed with APRN above. Agree with findings and assessment/plan as documented above.     Electronically signed by Alberto Dick on 8/23/2019, 12:09 PM Normal vision: sees adequately in most situations; can see medication labels, newsprint

## 2019-08-24 VITALS
RESPIRATION RATE: 20 BRPM | HEART RATE: 63 BPM | DIASTOLIC BLOOD PRESSURE: 54 MMHG | BODY MASS INDEX: 20.35 KG/M2 | HEIGHT: 64 IN | SYSTOLIC BLOOD PRESSURE: 136 MMHG | WEIGHT: 119.2 LBS | TEMPERATURE: 98.8 F | OXYGEN SATURATION: 98 %

## 2019-08-24 LAB
ANION GAP SERPL CALCULATED.3IONS-SCNC: 12 MMOL/L (ref 7–19)
BUN BLDV-MCNC: 14 MG/DL (ref 8–23)
CALCIUM SERPL-MCNC: 8.8 MG/DL (ref 8.8–10.2)
CHLORIDE BLD-SCNC: 86 MMOL/L (ref 98–111)
CO2: 34 MMOL/L (ref 22–29)
CREAT SERPL-MCNC: 0.7 MG/DL (ref 0.5–1.2)
GFR NON-AFRICAN AMERICAN: >60
GLUCOSE BLD-MCNC: 119 MG/DL (ref 74–109)
HCT VFR BLD CALC: 30.2 % (ref 42–52)
HEMOGLOBIN: 8.4 G/DL (ref 14–18)
MCH RBC QN AUTO: 21.4 PG (ref 27–31)
MCHC RBC AUTO-ENTMCNC: 27.8 G/DL (ref 33–37)
MCV RBC AUTO: 76.8 FL (ref 80–94)
PDW BLD-RTO: 19.6 % (ref 11.5–14.5)
PLATELET # BLD: 143 K/UL (ref 130–400)
PMV BLD AUTO: 11 FL (ref 9.4–12.4)
POTASSIUM SERPL-SCNC: 3.5 MMOL/L (ref 3.5–5)
RBC # BLD: 3.93 M/UL (ref 4.7–6.1)
SODIUM BLD-SCNC: 132 MMOL/L (ref 136–145)
WBC # BLD: 9.3 K/UL (ref 4.8–10.8)

## 2019-08-24 PROCEDURE — 2580000003 HC RX 258: Performed by: INTERNAL MEDICINE

## 2019-08-24 PROCEDURE — 36415 COLL VENOUS BLD VENIPUNCTURE: CPT

## 2019-08-24 PROCEDURE — 80048 BASIC METABOLIC PNL TOTAL CA: CPT

## 2019-08-24 PROCEDURE — 2700000000 HC OXYGEN THERAPY PER DAY

## 2019-08-24 PROCEDURE — 6360000002 HC RX W HCPCS: Performed by: INTERNAL MEDICINE

## 2019-08-24 PROCEDURE — 6370000000 HC RX 637 (ALT 250 FOR IP): Performed by: INTERNAL MEDICINE

## 2019-08-24 PROCEDURE — 99231 SBSQ HOSP IP/OBS SF/LOW 25: CPT | Performed by: INTERNAL MEDICINE

## 2019-08-24 PROCEDURE — C9113 INJ PANTOPRAZOLE SODIUM, VIA: HCPCS | Performed by: INTERNAL MEDICINE

## 2019-08-24 PROCEDURE — 6370000000 HC RX 637 (ALT 250 FOR IP): Performed by: FAMILY MEDICINE

## 2019-08-24 PROCEDURE — 94640 AIRWAY INHALATION TREATMENT: CPT

## 2019-08-24 PROCEDURE — 85027 COMPLETE CBC AUTOMATED: CPT

## 2019-08-24 RX ORDER — PREDNISONE 20 MG/1
TABLET ORAL
Qty: 10 TABLET | Refills: 0 | Status: SHIPPED | OUTPATIENT
Start: 2019-08-25 | End: 2019-09-08

## 2019-08-24 RX ADMIN — POTASSIUM CHLORIDE 40 MEQ: 20 TABLET, EXTENDED RELEASE ORAL at 07:48

## 2019-08-24 RX ADMIN — FUROSEMIDE 40 MG: 40 TABLET ORAL at 07:48

## 2019-08-24 RX ADMIN — PANTOPRAZOLE SODIUM 40 MG: 40 INJECTION, POWDER, FOR SOLUTION INTRAVENOUS at 07:48

## 2019-08-24 RX ADMIN — IPRATROPIUM BROMIDE AND ALBUTEROL SULFATE 1 AMPULE: .5; 3 SOLUTION RESPIRATORY (INHALATION) at 10:34

## 2019-08-24 RX ADMIN — IPRATROPIUM BROMIDE AND ALBUTEROL SULFATE 1 AMPULE: .5; 3 SOLUTION RESPIRATORY (INHALATION) at 06:37

## 2019-08-24 RX ADMIN — DILTIAZEM HYDROCHLORIDE 30 MG: 30 TABLET, FILM COATED ORAL at 06:07

## 2019-08-24 RX ADMIN — LEVOTHYROXINE SODIUM 75 MCG: 75 TABLET ORAL at 06:07

## 2019-08-24 RX ADMIN — ENOXAPARIN SODIUM 40 MG: 40 INJECTION SUBCUTANEOUS at 07:48

## 2019-08-24 RX ADMIN — IPRATROPIUM BROMIDE AND ALBUTEROL SULFATE 1 AMPULE: .5; 3 SOLUTION RESPIRATORY (INHALATION) at 02:25

## 2019-08-24 RX ADMIN — DILTIAZEM HYDROCHLORIDE 30 MG: 30 TABLET, FILM COATED ORAL at 11:05

## 2019-08-24 RX ADMIN — Medication 10 ML: at 07:49

## 2019-08-24 RX ADMIN — PREDNISONE 20 MG: 10 TABLET ORAL at 07:48

## 2019-08-24 RX ADMIN — DILTIAZEM HYDROCHLORIDE 30 MG: 30 TABLET, FILM COATED ORAL at 00:26

## 2019-08-24 RX ADMIN — LISINOPRIL 20 MG: 20 TABLET ORAL at 07:48

## 2019-08-24 RX ADMIN — ACETAMINOPHEN 650 MG: 325 TABLET ORAL at 04:06

## 2019-08-24 ASSESSMENT — PAIN SCALES - GENERAL
PAINLEVEL_OUTOF10: 0
PAINLEVEL_OUTOF10: 3

## 2019-08-24 NOTE — DISCHARGE SUMMARY
Discharge Summary    NAME: Kristine Comer  :  1950  MRN:  [id number]    Admit date:  2019  Discharge date:      Admitting Physician:  Anu Chaudhary MD    Advance Directive: Full Code    Consults: pulmonary/intensive care and GI    Primary Care Physician:  [id number], DO    Discharge Diagnoses:  Principal Problem:    Acute on chronic respiratory failure ([facility] [address]  Active Problems:    Hypertension    Hypothyroidism    CKD (chronic kidney disease), stage II    Cervical spine disease    COPD (chronic obstructive pulmonary disease) (HCC)    Anxiety (Microcytic)    Depression    Anemia    Elevated brain natriuretic peptide (BNP) level    LINO (acute kidney injury) ([facility] [address]    Palliative care patient  Resolved Problems:    * No resolved hospital problems. *      Significant Diagnostic Studies:   Nm Lung Scan Perfusion Only    Result Date: 2019  Examination. NM LUNG SCAN PERFUSION ONLY History: Acute chest pain. After the intravenous injection of 5.23 mCi of technetium 99m macroaggregated albumin, images of the lungs are obtained in anterior, posterior, both oblique and lateral projections with help of strictures,. There is no previous study for comparison. The correlation is made with the chest radiograph dated 2019. There is irregular and heterogeneous lung perfusion bilaterally. There is no definite segmental or subsegmental defect. The ventilation scan is not available. Heterogeneous radioactivity in both lungs. In the absence of ventilation scan, the definite evaluation cannot be made. This is a nondiagnostic study. Signed by Dr Yue Burris on 2019 7:14 AM    Xr Chest Portable    Result Date: 2019  EXAMINATION: XR CHEST PORTABLE 2019 6:40 AM HISTORY: XR CHEST PORTABLE 2019 11:00 PM HISTORY: Difficulty breathing COMPARISON: 2019. FINDINGS: Pulmonary vascular margins are minimally indistinct. Mild or early pulmonary vascular congestion cannot be excluded. Li Reese respiratory insufficiency, would recommend recovering from a respiratory standpoint and having both an EGD/colonoscopy within the next 4-8 weeks as an outpatient. Instructed patient to return to the hospital is any signs of GI bleeding for endoscopy at a sooner time if required. Pt will be discharged home with continued prednisone taper and follow up with GI, and PCP. Physical Exam:  Vital Signs: BP (!) 136/54   Pulse 63   Temp 98.8 °F (37.1 °C) (Temporal)   Resp 20   Ht 5' 4\" (1.626 m)   Wt 119 lb 3.2 oz (54.1 kg)   SpO2 98%   BMI 20.46 kg/m²   General appearance:. Alert and Cooperative   HEENT: Normocephalic. Chest: clear to auscultation bilaterally without wheezes or rhonchi. Cardiac: Normal heart tones with regular rate and rhythm, S1, S2 normal. No murmurs, gallops, or rubs auscultated. Abdomen:soft, non-tender; normal bowel sounds, no masses, no organomegaly. Extremities: No clubbing or cyanosis. No peripheral edema. Peripheral pulses palpable. Neurologic: Grossly intact.     Discharge Medications:       Ort, [address] Medication Instructions OSCAR:[id number]    Printed on:08/24/19 1152   Medication Information                      ADVAIR DISKUS 250-50 MCG/DOSE AEPB  Inhale 1 puff into the lungs 2 times daily             albuterol sulfate  (90 Base) MCG/ACT inhaler  Inhale 2 puffs into the lungs 4 times daily as needed for Wheezing             fluticasone (FLONASE) 50 MCG/ACT nasal spray  2 sprays by Nasal route daily             furosemide (LASIX) 40 MG tablet  Take 1 tablet by mouth daily             glycerin-hypromellose- 0.2-0.2-1 % SOLN opthalmic solution  Place 1 drop into both eyes 2 times daily             guaiFENesin 400 MG tablet  Take 400 mg by mouth daily              levothyroxine (SYNTHROID) 75 MCG tablet  Take 1 tablet by mouth Daily TAKE 1 TABLET BY MOUTH DAILY             lisinopril (PRINIVIL;ZESTRIL) 20 MG tablet  Take 1 tablet by mouth daily
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
## 2019-08-24 NOTE — PLAN OF CARE
Nutrition Problem: Inadequate oral intake  Intervention: Food and/or Nutrient Delivery: Continue current diet, Continue current ONS  Nutritional Goals: Nutritional needs will be met via PO or STONEY with no s/s intolerance

## 2019-08-24 NOTE — CARE COORDINATION
PMF ride home, no family or friends.  72.00 Electronically signed by Gabino Congress on 8/24/2019 at 12:39 PM

## 2019-08-26 ENCOUNTER — CARE COORDINATION (OUTPATIENT)
Dept: CASE MANAGEMENT | Age: 69
End: 2019-08-26

## 2019-08-26 ENCOUNTER — TELEPHONE (OUTPATIENT)
Dept: PRIMARY CARE CLINIC | Age: 69
End: 2019-08-26

## 2019-08-26 NOTE — CARE COORDINATION
Placed a call to Rosa Escoto, and discussed with her that I had talked with the patient and he is interested in having [address] come out to see him for assessment. He also needs to reschedule a hospital follow up appointment as he missed the one he had scheduled for today. She will follow up with this and also speak with patient about needs from her standpoint.

## 2019-08-27 ENCOUNTER — CARE COORDINATION (OUTPATIENT)
Dept: CARE COORDINATION | Age: 69
End: 2019-08-27

## 2019-08-27 ASSESSMENT — ENCOUNTER SYMPTOMS: DYSPNEA ASSOCIATED WITH: MINIMAL EXERTION

## 2019-08-27 NOTE — TELEPHONE ENCOUNTER
[facility] Transitions Initial Follow Up Call    Outreach made within 2 business days of discharge: Yes    Patient: Bryan Bennett Patient : 1950   MRN: [id number]  Reason for Admission: There are no discharge diagnoses documented for the most recent discharge. Discharge Date: 19       Spoke with: no one.  Left message for pt to call back      Scheduled appointment with PCP within 7-14 days    Follow Up  Future Appointments   Date Time Provider Beba Dawson   2019  1:30 PM dariusz [address] [place] [address]

## 2019-08-27 NOTE — CARE COORDINATION
Ambulatory Care Coordination Note  8/27/2019  CM Risk Score: 5  Charlson 10 Year Mortality Risk Score: 79%     ACC: Joe Silverio, RN    Summary Note: ACM spoke to patient today. Cherelle Rodriguez stated he is doing OK, though ACM could hear pt wheezing during call. Pt denied any edema or swelling to his ankles/feet today. He stated he is using his oxygen and is taking all his medication. Pt relayed that he did not get enough prednisone and his bottle is empty. Pt is aware he missed his appt yesterday and needs another appt. Pt's mother has appt on 9/6 and he would prefer to be seen on the same day as it is difficult for him to manage physically due to his severe COPD. Cherelle Rodriguez has recurrent issues with picking up his prescriptions. He is agreeable to change his pharmacy to [facility] as his mother's scripts are already managed there. Plan:  ACM instructed I will work with Office Mgr to determine how to get pt scheduled on same day as his mother. ACM urged Stone to request assistance through the apt manager's office or from neighbor resident that may be willing to help him get Parth Claros into his vehicle for her appt. ACM spoke to Sarai Beauchamp with [facility] office re: pt needs hfu appt at time of mother's appt. ACM encouraged pt to consider [facility] services as he needs some support for recovery and self-monitoring for his COPD and his fluid retention. Cherelle Rodriguez stated he will consider this. ACM contacted [facility], reviewed and verified pt picked up full prednisone script on 8/24/19. ACM called [facility], requested to facilitate pt switching his meds to them, faxed med list to Shaila at [phone]. Shaila will get meds switched to [facility]. Gave her phone MA # if refills needed. Pharmacy info changed to [facility] in demographics. Will f/u on appt for pt.       Care Coordination Interventions    Program Enrollment:  Complex Care  Referral from Primary Care Provider:  No  Suggested Interventions and [address]:  Not

## 2019-08-28 ENCOUNTER — CARE COORDINATION (OUTPATIENT)
Dept: CARE COORDINATION | Age: 69
End: 2019-08-28

## 2019-08-28 ENCOUNTER — CARE COORDINATION (OUTPATIENT)
Dept: CASE MANAGEMENT | Age: 69
End: 2019-08-28

## 2019-08-30 ENCOUNTER — CARE COORDINATION (OUTPATIENT)
Dept: CASE MANAGEMENT | Age: 69
End: 2019-08-30

## 2019-08-30 ASSESSMENT — ENCOUNTER SYMPTOMS: DYSPNEA ASSOCIATED WITH: MINIMAL EXERTION

## 2019-08-30 NOTE — CARE COORDINATION
Reviewed chart notes since my last contact with patient, particularly those from Yenni Galdamez. Placed a call to her for update and point of view of needs. Conversed about patient for several minutes.   Will follow up with patient regarding findings, status, reinforcement of follow up, etc.

## 2019-08-30 NOTE — CARE COORDINATION
Ambulatory Care Coordination Note  8/30/2019  CM Risk Score: 9  [place]son 10 Year Mortality Risk Score: 79%     ACC: Torsten Ruelas RN    Summary Note: ACM spoke to [facility] after reviewing VM from pt's mother that pt and his mother need help. ACM also spoke to [address] Spoke with CTN at [facility] HARDY JEFFERSON. ACM gave report to intake at [facility] re: Phone message that [facility] is very sick, that pt stated in her VM that  There is no food and they cannot get help and Stone cannot take care of his mother. I spoke to [place]. ACM reached pt by phone after 2 attempts this morning - Stone stated he was able to sleep last night. He said his feet/ankles are not swollen. He reported he is using his oxygen, however, pt sounded breathless/winded during call. He stated he had come into the apt from the hallway to answer the phone and moved quickly and got winded. Pt stated he and his mother had eaten this morning. [facility] stated he is using his medications. He stated he did have some phlegm production this morning - clear and thick mucus. Plan:  ACM notified pt and mother that [facility] visit is planned today for pt's mother. ACM verified with pt's mother that she has eaten this morning. ACM gave Stone specific date and time of his appointment & his mother's appt next week with Dr. Leny Leavitt. Instructed Stone to be here at 6 am. Stressed that he bring his medications to his appointment. He voiced understanding. AC urged Stone to have a neighbor assist him to get his mother from the apt into a vehicle on Friday. [facility] stated there a a few residents at the apt complex that will help him with his mother's transportation. ACM informed Stone that his medications have been transferred to [facility] - reminded him again that he can call for RF and the pharmacy will deliver meds to him as they do for his mother.   JAVI received f/u call from CTAZRA, Cherise Gracia - informed her of recent events with pt and his mother as above. Will follow closely. Care Coordination Interventions    Program Enrollment:  Complex Care  Referral from Primary Care Provider:  No  Suggested Interventions and Community Resources  Adult Protective Services: In Process (Comment: 8/30/19: ACM reported on safety concerns r/t pt's severe COPD and general weakness, decreased IADL ability)  Home Health Services:  Not Started (Comment: 8/27/19: Must be seen to refer for [place]. Pt is agreeable to referral during call today.)  Pharmacist:  Completed (Comment: 8/30/19: Pt's med list faxed to [facility] to verify current prescriptions, they will contact office for any RF needs for pt. Pt notified of successful transfer, may have RF delivered.)  Zone Management Tools: In Process (Comment: 8/30/19: Made pt aware he has f/u appt on 9/6. Stressed to bring his medications to appt. Pt is using his inhalers, stated swelling of feet/ankles is resolved.)  Other Services or Interventions:  8/30/19: Pt will ask nearby resident to assist with getting pt's mother into his vehicle. Goals Addressed                 This Visit's Progress     Conditions and Symptoms   Improving     I will schedule office visits, as directed by my provider. I will keep my appointment or reschedule if I have to cancel. I will follow my Zone Management tool to seek urgent or emergent care. I will notify my provider of any symptoms that indicate a worsening of my condition. Barriers: lack of support, overwhelmed by complexity of regimen, stress and resistance to help  Plan for overcoming my barriers:     Confidence: 6/10  Anticipated Goal Completion Date: 11/16/19              Prior to Admission medications    Medication Sig Start Date End Date Taking? Authorizing Provider   predniSONE (DELTASONE) 20 MG tablet Take 1 tablet by mouth daily for 7 days, THEN 0.5 tablets daily for 7 days.   Patient not taking: Reported on 8/27/2019 8/25/19 9/8/19  Chance Fowler MD   triamcinolone medications?:  No            Symptoms:      Symptom course:  no change  Breathlessness:  minimal exertion  Increase use of rapid acting/rescue inhaled medications?:  Yes  Change in chronic cough?:  No/At Baseline  Change in sputum?:  No/At Baseline  Sputum characteristics:  Clear, Thick  Self Monitoring - SaO2:  No  Have you had a recent diagnosis of pneumonia either by PCP or at a hospital?:  No

## 2019-09-05 ENCOUNTER — CARE COORDINATION (OUTPATIENT)
Dept: CARE COORDINATION | Age: 69
End: 2019-09-05

## 2019-09-05 DIAGNOSIS — I10 ESSENTIAL HYPERTENSION: ICD-10-CM

## 2019-09-05 RX ORDER — LISINOPRIL 20 MG/1
20 TABLET ORAL DAILY
Qty: 30 TABLET | Refills: 11 | Status: SHIPPED | OUTPATIENT
Start: 2019-09-05

## 2019-09-06 ENCOUNTER — CARE COORDINATION (OUTPATIENT)
Dept: CARE COORDINATION | Age: 69
End: 2019-09-06

## 2019-09-06 ASSESSMENT — ENCOUNTER SYMPTOMS: DYSPNEA ASSOCIATED WITH: MINIMAL EXERTION

## 2019-09-09 ENCOUNTER — CARE COORDINATION (OUTPATIENT)
Dept: CARE COORDINATION | Age: 69
End: 2019-09-09

## 2019-09-09 ASSESSMENT — ENCOUNTER SYMPTOMS: DYSPNEA ASSOCIATED WITH: MINIMAL EXERTION

## 2019-09-10 ENCOUNTER — CARE COORDINATION (OUTPATIENT)
Dept: CARE COORDINATION | Age: 69
End: 2019-09-10

## 2019-09-10 PROBLEM — J96.20 ACUTE ON CHRONIC RESPIRATORY FAILURE (HCC): Chronic | Status: ACTIVE | Noted: 2019-08-20

## 2019-09-10 NOTE — CARE COORDINATION
Care  Referral from Primary Care Provider:  No  Suggested Interventions and Community Resources  Adult Day Program:  In Process (Comment: 9/10: Faxed [facility] for [facility] Waiver services to [facility].)  Adult Protective Services: In Process (Comment: 8/30/19: ACM reported on safety concerns r/t pt's severe COPD and general weakness, decreased IADL ability)  Home Health Services:  Not Started (Comment: 8/27/19: Must be seen to refer for [facility]. Pt is agreeable to referral during call today.)  Pharmacist:  Completed (Comment: 9/10/19: Pharmacy will deliver pt's Lasix today. ACM notified Rx that pt's mother left front door unlocked. Amber Verma voiced understanding, will inform the .)  Smoking Cessation:  Not Started  Zone Management Tools: In Process (Comment: 9/10/19: Pt reported to ACM he has not received his prescription from [facility]. pt's mother stated she had called Rx several times and was disconnected. Pt would not review his prescriptions.)         Goals Addressed                 This Visit's Progress     Conditions and Symptoms   No change     I will schedule office visits, as directed by my provider. I will keep my appointment or reschedule if I have to cancel. I will follow my Zone Management tool to seek urgent or emergent care. I will notify my provider of any symptoms that indicate a worsening of my condition. Barriers: lack of support, overwhelmed by complexity of regimen, stress and resistance to help  Plan for overcoming my barriers:     Confidence: 6/10  Anticipated Goal Completion Date: 11/16/19              Prior to Admission medications    Medication Sig Start Date End Date Taking? Authorizing Provider   OXYGEN Inhale into the lungs 3 liters per nasal cannula continuous   Yes Historical Provider, MD   lisinopril (PRINIVIL;ZESTRIL) 20 MG tablet Take 1 tablet by mouth daily 9/5/19   B Shankar Fragmin, DO   triamcinolone (KENALOG) 0.1 % cream Apply topically 2 times daily.  5/23/19   B

## 2019-09-13 ENCOUNTER — CARE COORDINATION (OUTPATIENT)
Dept: CARE COORDINATION | Age: 69
End: 2019-09-13

## 2019-09-16 ENCOUNTER — CARE COORDINATION (OUTPATIENT)
Dept: CARE COORDINATION | Age: 69
End: 2019-09-16

## 2019-09-16 ASSESSMENT — ENCOUNTER SYMPTOMS: DYSPNEA ASSOCIATED WITH: EXERTION

## 2019-09-20 ENCOUNTER — CARE COORDINATION (OUTPATIENT)
Dept: CARE COORDINATION | Age: 69
End: 2019-09-20

## 2019-09-20 ENCOUNTER — TELEPHONE (OUTPATIENT)
Dept: PRIMARY CARE CLINIC | Age: 69
End: 2019-09-20

## 2019-09-20 ASSESSMENT — ENCOUNTER SYMPTOMS: DYSPNEA ASSOCIATED WITH: EXERTION

## 2019-09-20 NOTE — CARE COORDINATION
fluticasone (FLONASE) 50 MCG/ACT nasal spray 2 sprays by Nasal route daily 5/16/19   B Lorena Fam, DO   glycerin-hypromellose- 0.2-0.2-1 % SOLN opthalmic solution Place 1 drop into both eyes 2 times daily    Historical Provider, MD   albuterol sulfate  (90 Base) MCG/ACT inhaler Inhale 2 puffs into the lungs 4 times daily as needed for Wheezing 4/23/19   B Lorena Fam, DO   metoprolol succinate (TOPROL XL) 25 MG extended release tablet Take 1 tablet by mouth daily 4/23/19   B Lorena Fam, DO   furosemide (LASIX) 40 MG tablet Take 1 tablet by mouth daily  Patient not taking: Reported on 9/10/2019 4/23/19   B Lorena Fam, DO   levothyroxine (SYNTHROID) 75 MCG tablet Take 1 tablet by mouth Daily TAKE 1 TABLET BY MOUTH DAILY 1/31/19   B Lorena Fam, DO   Umeclidinium Bromide (INCRUSE ELLIPTA) 62.5 MCG/INH AEPB Inhale 1 puff into the lungs daily 11/12/18   GELY Cordova   ADVAIR DISKUS 250-50 MCG/DOSE AEPB Inhale 1 puff into the lungs 2 times daily 9/21/16   Historical Provider, MD   guaiFENesin 400 MG tablet Take 400 mg by mouth daily     Historical Provider, MD   OXYGEN Inhale into the lungs 3 liters per nasal cannula continuous    Historical Provider, MD       Future Appointments   Date Time Provider Beba Dawson   11/4/2019  1:30 PM [address] [facility],[address], DO [place]      and   COPD Assessment    Does the patient understand envrionmental exposure?:  Yes  Is the patient able to verbalize Rescue vs. Long Acting medications?:  Yes  Does the patient have a nebulizer?:  No  Does the patient use a space with inhaled medications?:  No            Symptoms:   None:  Yes      Symptom course:  stable  Breathlessness:  exertion  Increase use of rapid acting/rescue inhaled medications?:  No  Change in chronic cough?:  Increased  Change in sputum?:  No/At Baseline  Sputum characteristics:   Thick, Thin, Clear  Self Monitoring - SaO2:  No  Have you had a recent diagnosis of pneumonia either by PCP or at a hospital?:  No

## 2019-09-23 ENCOUNTER — CARE COORDINATION (OUTPATIENT)
Dept: CARE COORDINATION | Age: 69
End: 2019-09-23

## 2019-09-24 ENCOUNTER — CARE COORDINATION (OUTPATIENT)
Dept: CARE COORDINATION | Age: 69
End: 2019-09-24

## 2019-09-24 NOTE — CARE COORDINATION
Stone complete the next portion of this application. [facility] instructed Stone's mother to expect call from Christine Ventura. She voiced understanding. Will follow. Care Coordination Interventions    Program Enrollment:  Complex Care  Referral from Primary Care Provider:  No  Suggested Interventions and Community Resources  Adult Day Program:   (Comment: 9/10: Faxed MAP 10 for 60 [place] [facility] Waiver services to [facility].)  Adult Protective Services:  Completed (Comment: Pt's case closed)  Home Care Waiver: In Process (Comment: 9/23: Spoke to Esperanza At [facility] re: process for 60 [facility] state nurse home visit/eval of pt.)  [address]:  Not Started (Comment: Pt needs appt to qualify for [facility] services)  Pharmacist:  Completed (Comment: 9/20: Spoke to Mikey Scott at [employer]. Pt has Advair Inhaler but must pick it up. Rx will make only monthly trips to deliver meds to [facility].)  Smoking Cessation:  Not Started  Other Services:  Completed (Comment: 9/24: Spoke w Juan Diego Colon regarding pt needs, appropriate support)  Zone Management Tools: In Process (Comment: 9/23: Stone asked AC to talk with his mother. Mother reported Alec Razo has been smoking \"borrowed\" cigarettes. Pt is using his oxygen continuously.)  Other Services or Interventions:  9/24: [facility]: open case on pt's mother, who lives with Alec Razo. Goals Addressed                 This Visit's Progress     Conditions and Symptoms   No change     I will schedule office visits, as directed by my provider. I will keep my appointment or reschedule if I have to cancel. I will follow my Zone Management tool to seek urgent or emergent care. I will notify my provider of any symptoms that indicate a worsening of my condition.     Barriers: lack of support, overwhelmed by complexity of regimen, stress and resistance to help  Plan for overcoming my barriers:     Confidence: 6/10  Anticipated Goal Completion Date: 11/16/19              Prior to Admission medications    Medication Sig Start Date End Date Taking? Authorizing Provider   lisinopril (PRINIVIL;ZESTRIL) 20 MG tablet Take 1 tablet by mouth daily 9/5/19   B Nithya Maradiaga, DO   triamcinolone (KENALOG) 0.1 % cream Apply topically 2 times daily.  5/23/19   B Nithya Maradiaga, DO   mometasone (NASONEX) 50 MCG/ACT nasal spray 2 sprays by Nasal route daily 5/17/19   B Nithya Victorit, DO   fluticasone Herschell Mariza) 50 MCG/ACT nasal spray 2 sprays by Nasal route daily 5/16/19   B Nithya Maradiaga, DO   glycerin-hypromellose- 0.2-0.2-1 % SOLN opthalmic solution Place 1 drop into both eyes 2 times daily    Historical Provider, MD   albuterol sulfate  (90 Base) MCG/ACT inhaler Inhale 2 puffs into the lungs 4 times daily as needed for Wheezing 4/23/19   VITOR Maradiaga, DO   metoprolol succinate (TOPROL XL) 25 MG extended release tablet Take 1 tablet by mouth daily 4/23/19   B Nithya Maradiaga, DO   furosemide (LASIX) 40 MG tablet Take 1 tablet by mouth daily  Patient not taking: Reported on 9/10/2019 4/23/19   VITOR Maradiaga DO   levothyroxine (SYNTHROID) 75 MCG tablet Take 1 tablet by mouth Daily TAKE 1 TABLET BY MOUTH DAILY 1/31/19   VITOR Maradiaga DO   Umeclidinium Bromide (INCRUSE ELLIPTA) 62.5 MCG/INH AEPB Inhale 1 puff into the lungs daily 11/12/18   GELY Pierre   ADVAIR DISKUS 250-50 MCG/DOSE AEPB Inhale 1 puff into the lungs 2 times daily 9/21/16   Historical Provider, MD   guaiFENesin 400 MG tablet Take 400 mg by mouth daily     Historical Provider, MD   OXYGEN Inhale into the lungs 3 liters per nasal cannula continuous    Historical Provider, MD       Future Appointments   Date Time Provider Beba Dawson   11/4/2019  1:30 PM Roge Driscoll, [address] [facility]

## 2019-09-24 NOTE — CARE COORDINATION
Ambulatory Care Coordination Note  9/23/2019  CM Risk Score: 9  Charlson 10 Year Mortality Risk Score: 79%     ACC: Maru Pickard RN    Summary Note: ACM returned call to THE [employer]. He requested I speak to his mother. Pt's mother said THE [facility] ZACHARIAH LEMOS has been smoking again - that a neighbor left him some partially smoked cigarettes to use. Iam Weber further stated \"we have no food\". AC questioned pt specifically about this - she stated she ate some beans from her crock pot, that she had put some beans in to cook. Iam Weber then stated THE [facility] has not eaten anything today. ACM encouraged Iam Weber to ask THE [facility] to eat some of the beans in her crock pot. Iam Weber then stated she has some flour and eggs and could make \"hot cakes\" to eat. AC instructed that THE [facility] [place] may ask the apt manager about their transportation service that carries non-driving residents into town for groceries. Iam Weber stated that THE [facility] cannot go to the grocery on the van/bus - that he cannot physically do the labor of getting groceries and getting them back to the apt. She has stated repeatedly that THE [facility] ZACHARIAH LEMOS does not have the strength or health to do everyday chores - help her with bathing, grocery shopping, transporting her to appts or pushing her in her WC. Iam Weber reported that they leave their trash outside their apt door and a neighbor takes it to the dump as Corbin1 Kathryn Apple physically do that. Iam Weber stated she doesn't know if her check has come to her bank. [facility] instructed that pt or her son may call the bank to ask if there are funds in their account (they share an account). Iam Weber asked [facility] if I would call her bank to ask about her account. [facility] explained I do not have authority to do this, that she or THE [place] [employer] must call. Reminded Iam Weber that the bank mailed a statement to her, that she reported she had gotten it last week. Iam Weber stated that she is not sure where it is, that maybe THE [facility] has it. Iam Weber stated she cannot \"get through\" to people when she makes calls.  She stated she is Provider, MD   albuterol sulfate  (90 Base) MCG/ACT inhaler Inhale 2 puffs into the lungs 4 times daily as needed for Wheezing 4/23/19   VITOR Conway DO   metoprolol succinate (TOPROL XL) 25 MG extended release tablet Take 1 tablet by mouth daily 4/23/19   B Vicky Conway DO   furosemide (LASIX) 40 MG tablet Take 1 tablet by mouth daily  Patient not taking: Reported on 9/10/2019 4/23/19   VITOR Conway DO   levothyroxine (SYNTHROID) 75 MCG tablet Take 1 tablet by mouth Daily TAKE 1 TABLET BY MOUTH DAILY 1/31/19   VITOR Conway DO   Umeclidinium Bromide (INCRUSE ELLIPTA) 62.5 MCG/INH AEPB Inhale 1 puff into the lungs daily 11/12/18   GELY Thibodeaux   ADVAIR DISKUS 250-50 MCG/DOSE AEPB Inhale 1 puff into the lungs 2 times daily 9/21/16   Historical Provider, MD   guaiFENesin 400 MG tablet Take 400 mg by mouth daily     Historical Provider, MD   OXYGEN Inhale into the lungs 3 liters per nasal cannula continuous    Historical Provider, MD       Future Appointments   Date Time Provider Beba Dawson   11/4/2019  1:30 PM [address] [facility],[address], [address] [facility]

## 2019-09-24 NOTE — CARE COORDINATION
Ambulatory Care Coordination Note  9/24/2019  CM Risk Score: 9  Charlson 10 Year Mortality Risk Score: 79%     ACC: Emiliana Raines RN    Summary Note: ACM took call from Arlen Das,  for Adult BIENVENIDO Breen. Hank Hyde plans to make a home visit to THE [facility] and his mother today. He stated he will perform a cognitive evaluation on Stone and Marine Dinero. His evaluation results along with other criteria/observations will assist to determine whether he will submit a request to the court for a state appointed guardian for both THE [facility] and his mother. Plan:  ACM notified Practice Manager of above. Informed her I will wait to contact the court regarding guardianship until Arlen Das has made his visit today and completed his evaluation. She voiced understanding. ACM notified Health , Shanika Whiteside, of same. Care Coordination Interventions    Program Enrollment:  Complex Care  Referral from Primary Care Provider:  No  Suggested Interventions and Community Resources  Adult Day Program:   (Comment: 9/10: Faxed MAP 10 for 60 [facility] Waiver services to [facility] [place] [facility].)  Adult Protective Services:  Completed (Comment: Pt's case closed)  Home Care Waiver: In Process (Comment: 9/23: Spoke to Esperanza At [facility] re: process for 60 [facility] nurse home visit/eval of pt.)  [address]:  Not Started (Comment: [facility])  Pharmacist:  Completed (Comment: 9/20: Spoke to Martha Garcia at [employer]. Pt has Advair Inhaler but must pick it up. Rx will make only monthly trips to deliver meds to [facility].)  Smoking Cessation:  Not Started  Zone Management Tools: In Process (Comment: 9/20: Urged pt again to schedule f/u appt.  Stone refused at this time, stated he won't get out in the heat and doesn't want to go without his home oxygen or use the portable oxygen.)  Other Services or Interventions:  9/24: Call from [employer] worker re: open case on pt's mother, who lives with [place]. Prior to Admission medications    Medication Sig Start Date End Date Taking? Authorizing Provider   lisinopril (PRINIVIL;ZESTRIL) 20 MG tablet Take 1 tablet by mouth daily 9/5/19   VITOR John DO   triamcinolone (KENALOG) 0.1 % cream Apply topically 2 times daily.  5/23/19   VITOR John DO   mometasone (NASONEX) 50 MCG/ACT nasal spray 2 sprays by Nasal route daily 5/17/19   VITOR John DO   fluticasone [facility]) 50 MCG/ACT nasal spray 2 sprays by Nasal route daily 5/16/19   VITOR John DO   glycerin-hypromellose- 0.2-0.2-1 % SOLN opthalmic solution Place 1 drop into both eyes 2 times daily    Historical Provider, MD   albuterol sulfate  (90 Base) MCG/ACT inhaler Inhale 2 puffs into the lungs 4 times daily as needed for Wheezing 4/23/19   VITOR John DO   metoprolol succinate (TOPROL XL) 25 MG extended release tablet Take 1 tablet by mouth daily 4/23/19   VITOR John DO   furosemide (LASIX) 40 MG tablet Take 1 tablet by mouth daily  Patient not taking: Reported on 9/10/2019 4/23/19   VITOR John DO   levothyroxine (SYNTHROID) 75 MCG tablet Take 1 tablet by mouth Daily TAKE 1 TABLET BY MOUTH DAILY 1/31/19   VITOR John DO   Umeclidinium Bromide (INCRUSE ELLIPTA) 62.5 MCG/INH AEPB Inhale 1 puff into the lungs daily 11/12/18   GELY Edwards   ADVAIR DISKUS 250-50 MCG/DOSE AEPB Inhale 1 puff into the lungs 2 times daily 9/21/16   Historical Provider, MD   guaiFENesin 400 MG tablet Take 400 mg by mouth daily     Historical Provider, MD   OXYGEN Inhale into the lungs 3 liters per nasal cannula continuous    Historical Provider, MD       Future Appointments   Date Time Provider Beba Dawson   11/4/2019  1:30 PM [address] [facility],[address], 600 [facility]

## 2019-09-27 ENCOUNTER — CARE COORDINATION (OUTPATIENT)
Dept: CARE COORDINATION | Age: 69
End: 2019-09-27

## 2019-09-30 ENCOUNTER — CARE COORDINATION (OUTPATIENT)
Dept: CARE COORDINATION | Age: 69
End: 2019-09-30

## 2019-10-01 ENCOUNTER — CARE COORDINATION (OUTPATIENT)
Dept: CARE COORDINATION | Age: 69
End: 2019-10-01

## 2019-10-01 DIAGNOSIS — K59.09 CHRONIC CONSTIPATION: ICD-10-CM

## 2019-10-01 RX ORDER — METOPROLOL SUCCINATE 25 MG/1
25 TABLET, EXTENDED RELEASE ORAL DAILY
Qty: 30 TABLET | Refills: 11 | Status: SHIPPED | OUTPATIENT
Start: 2019-10-01

## 2019-10-01 SDOH — ECONOMIC STABILITY: FOOD INSECURITY: WITHIN THE PAST 12 MONTHS, YOU WORRIED THAT YOUR FOOD WOULD RUN OUT BEFORE YOU GOT MONEY TO BUY MORE.: OFTEN TRUE

## 2019-10-01 SDOH — HEALTH STABILITY: MENTAL HEALTH
STRESS IS WHEN SOMEONE FEELS TENSE, NERVOUS, ANXIOUS, OR CAN'T SLEEP AT NIGHT BECAUSE THEIR MIND IS TROUBLED. HOW STRESSED ARE YOU?: RATHER MUCH

## 2019-10-01 SDOH — ECONOMIC STABILITY: TRANSPORTATION INSECURITY
IN THE PAST 12 MONTHS, HAS LACK OF TRANSPORTATION KEPT YOU FROM MEETINGS, WORK, OR FROM GETTING THINGS NEEDED FOR DAILY LIVING?: YES

## 2019-10-01 SDOH — HEALTH STABILITY: PHYSICAL HEALTH: ON AVERAGE, HOW MANY MINUTES DO YOU ENGAGE IN EXERCISE AT THIS LEVEL?: 0 MIN

## 2019-10-01 SDOH — HEALTH STABILITY: PHYSICAL HEALTH: ON AVERAGE, HOW MANY DAYS PER WEEK DO YOU ENGAGE IN MODERATE TO STRENUOUS EXERCISE (LIKE A BRISK WALK)?: 0 DAYS

## 2019-10-01 SDOH — ECONOMIC STABILITY: TRANSPORTATION INSECURITY
IN THE PAST 12 MONTHS, HAS THE LACK OF TRANSPORTATION KEPT YOU FROM MEDICAL APPOINTMENTS OR FROM GETTING MEDICATIONS?: YES

## 2019-10-01 SDOH — HEALTH STABILITY: MENTAL HEALTH: HOW OFTEN DO YOU HAVE A DRINK CONTAINING ALCOHOL?: NEVER

## 2019-10-01 SDOH — ECONOMIC STABILITY: FOOD INSECURITY: WITHIN THE PAST 12 MONTHS, THE FOOD YOU BOUGHT JUST DIDN'T LAST AND YOU DIDN'T HAVE MONEY TO GET MORE.: SOMETIMES TRUE

## 2019-10-01 SDOH — ECONOMIC STABILITY: INCOME INSECURITY: HOW HARD IS IT FOR YOU TO PAY FOR THE VERY BASICS LIKE FOOD, HOUSING, MEDICAL CARE, AND HEATING?: HARD

## 2019-10-01 ASSESSMENT — ENCOUNTER SYMPTOMS: DYSPNEA ASSOCIATED WITH: EXERTION

## 2019-10-03 ENCOUNTER — CARE COORDINATION (OUTPATIENT)
Dept: CARE COORDINATION | Age: 69
End: 2019-10-03

## 2019-10-07 ENCOUNTER — CARE COORDINATION (OUTPATIENT)
Dept: CARE COORDINATION | Age: 69
End: 2019-10-07

## 2019-10-07 RX ORDER — METOPROLOL SUCCINATE 25 MG/1
TABLET, EXTENDED RELEASE ORAL
Qty: 30 TABLET | Refills: 3 | OUTPATIENT
Start: 2019-10-07

## 2019-10-09 ENCOUNTER — CARE COORDINATION (OUTPATIENT)
Dept: CARE COORDINATION | Age: 69
End: 2019-10-09

## 2019-10-11 ENCOUNTER — CARE COORDINATION (OUTPATIENT)
Dept: CARE COORDINATION | Age: 69
End: 2019-10-11

## 2019-10-11 ASSESSMENT — ENCOUNTER SYMPTOMS: DYSPNEA ASSOCIATED WITH: EXERTION

## 2019-10-16 ENCOUNTER — CARE COORDINATION (OUTPATIENT)
Dept: CARE COORDINATION | Age: 69
End: 2019-10-16

## 2019-10-23 ENCOUNTER — CARE COORDINATION (OUTPATIENT)
Dept: CARE COORDINATION | Age: 69
End: 2019-10-23

## 2019-10-24 ENCOUNTER — OUTSIDE FACILITY SERVICE (OUTPATIENT)
Dept: CARDIOLOGY | Facility: CLINIC | Age: 69
End: 2019-10-24

## 2019-10-25 ENCOUNTER — CARE COORDINATION (OUTPATIENT)
Dept: CARE COORDINATION | Age: 69
End: 2019-10-25

## 2019-10-28 ENCOUNTER — CARE COORDINATION (OUTPATIENT)
Dept: CARE COORDINATION | Age: 69
End: 2019-10-28

## 2019-11-01 ENCOUNTER — CARE COORDINATION (OUTPATIENT)
Dept: CARE COORDINATION | Age: 69
End: 2019-11-01

## 2019-11-12 ENCOUNTER — CARE COORDINATION (OUTPATIENT)
Dept: CARE COORDINATION | Age: 69
End: 2019-11-12

## 2020-01-15 ENCOUNTER — HOSPITAL ENCOUNTER (OUTPATIENT)
Facility: HOSPITAL | Age: 70
Discharge: NURSING, REHAB OR LONG-TERM CARE | End: 2020-02-21
Attending: INTERNAL MEDICINE | Admitting: INTERNAL MEDICINE

## 2020-01-15 ENCOUNTER — APPOINTMENT (OUTPATIENT)
Dept: GENERAL RADIOLOGY | Facility: HOSPITAL | Age: 70
End: 2020-01-15

## 2020-01-15 DIAGNOSIS — Z43.0 ACUTE TRACHEOSTOMY MANAGEMENT (HCC): Primary | ICD-10-CM

## 2020-01-15 PROBLEM — J96.02 ACUTE RESPIRATORY FAILURE WITH HYPOXIA AND HYPERCAPNIA (HCC): Status: ACTIVE | Noted: 2020-01-15

## 2020-01-15 PROBLEM — J96.01 ACUTE RESPIRATORY FAILURE WITH HYPOXIA AND HYPERCAPNIA (HCC): Status: ACTIVE | Noted: 2020-01-15

## 2020-01-15 PROBLEM — H61.20 CERUMEN IMPACTION: Status: RESOLVED | Noted: 2017-06-23 | Resolved: 2020-01-15

## 2020-01-15 PROBLEM — Z99.11 VENTILATOR DEPENDENCE (HCC): Status: ACTIVE | Noted: 2020-01-15

## 2020-01-15 LAB
ANION GAP SERPL CALCULATED.3IONS-SCNC: 8 MMOL/L (ref 5–15)
ARTERIAL PATENCY WRIST A: ABNORMAL
ATMOSPHERIC PRESS: 752 MMHG
BASE EXCESS BLDA CALC-SCNC: 3.5 MMOL/L (ref 0–2)
BASOPHILS # BLD AUTO: 0.01 10*3/MM3 (ref 0–0.2)
BASOPHILS NFR BLD AUTO: 0.1 % (ref 0–1.5)
BDY SITE: ABNORMAL
BODY TEMPERATURE: 37 C
BUN BLD-MCNC: 36 MG/DL (ref 8–23)
BUN/CREAT SERPL: 27.3 (ref 7–25)
CALCIUM SPEC-SCNC: 8.5 MG/DL (ref 8.6–10.5)
CHLORIDE SERPL-SCNC: 96 MMOL/L (ref 98–107)
CO2 SERPL-SCNC: 27 MMOL/L (ref 22–29)
CREAT BLD-MCNC: 1.32 MG/DL (ref 0.76–1.27)
DEPRECATED RDW RBC AUTO: 51.3 FL (ref 37–54)
EOSINOPHIL # BLD AUTO: 0.08 10*3/MM3 (ref 0–0.4)
EOSINOPHIL NFR BLD AUTO: 0.8 % (ref 0.3–6.2)
ERYTHROCYTE [DISTWIDTH] IN BLOOD BY AUTOMATED COUNT: 17.2 % (ref 12.3–15.4)
GFR SERPL CREATININE-BSD FRML MDRD: 54 ML/MIN/1.73
GLUCOSE BLD-MCNC: 87 MG/DL (ref 65–99)
HCO3 BLDA-SCNC: 28.5 MMOL/L (ref 20–26)
HCT VFR BLD AUTO: 26.8 % (ref 37.5–51)
HGB BLD-MCNC: 8.5 G/DL (ref 13–17.7)
HOROWITZ INDEX BLD+IHG-RTO: 50 %
IMM GRANULOCYTES # BLD AUTO: 0.04 10*3/MM3 (ref 0–0.05)
IMM GRANULOCYTES NFR BLD AUTO: 0.4 % (ref 0–0.5)
INR PPP: 0.94 (ref 0.91–1.09)
LYMPHOCYTES # BLD AUTO: 0.31 10*3/MM3 (ref 0.7–3.1)
LYMPHOCYTES NFR BLD AUTO: 2.9 % (ref 19.6–45.3)
Lab: ABNORMAL
MCH RBC QN AUTO: 26.4 PG (ref 26.6–33)
MCHC RBC AUTO-ENTMCNC: 31.7 G/DL (ref 31.5–35.7)
MCV RBC AUTO: 83.2 FL (ref 79–97)
MODALITY: ABNORMAL
MONOCYTES # BLD AUTO: 0.66 10*3/MM3 (ref 0.1–0.9)
MONOCYTES NFR BLD AUTO: 6.2 % (ref 5–12)
NEUTROPHILS # BLD AUTO: 9.55 10*3/MM3 (ref 1.7–7)
NEUTROPHILS NFR BLD AUTO: 89.6 % (ref 42.7–76)
NRBC BLD AUTO-RTO: 0 /100 WBC (ref 0–0.2)
NT-PROBNP SERPL-MCNC: 132.2 PG/ML (ref 5–900)
NT-PROBNP SERPL-MCNC: 139.2 PG/ML (ref 5–900)
PCO2 BLDA: 44.6 MM HG (ref 35–45)
PEEP RESPIRATORY: 5 CM[H2O]
PH BLDA: 7.41 PH UNITS (ref 7.35–7.45)
PLATELET # BLD AUTO: 150 10*3/MM3 (ref 140–450)
PMV BLD AUTO: 10.2 FL (ref 6–12)
PO2 BLDA: 162 MM HG (ref 83–108)
POTASSIUM BLD-SCNC: 3.6 MMOL/L (ref 3.5–5.2)
PROTHROMBIN TIME: 12.8 SECONDS (ref 11.9–14.6)
RBC # BLD AUTO: 3.22 10*6/MM3 (ref 4.14–5.8)
SAO2 % BLDCOA: 99.9 % (ref 94–99)
SET MECH RESP RATE: 12
SODIUM BLD-SCNC: 131 MMOL/L (ref 136–145)
VENTILATOR MODE: AC
VT ON VENT VENT: 450 ML
WBC NRBC COR # BLD: 10.65 10*3/MM3 (ref 3.4–10.8)

## 2020-01-15 PROCEDURE — 99222 1ST HOSP IP/OBS MODERATE 55: CPT | Performed by: OTOLARYNGOLOGY

## 2020-01-15 PROCEDURE — 25010000002 MEROPENEM: Performed by: INTERNAL MEDICINE

## 2020-01-15 PROCEDURE — 63710000001 PREDNISONE PER 1 MG: Performed by: NURSE PRACTITIONER

## 2020-01-15 PROCEDURE — 85025 COMPLETE CBC W/AUTO DIFF WBC: CPT | Performed by: INTERNAL MEDICINE

## 2020-01-15 PROCEDURE — 83880 ASSAY OF NATRIURETIC PEPTIDE: CPT | Performed by: INTERNAL MEDICINE

## 2020-01-15 PROCEDURE — 25010000002 MIDAZOLAM PER 1 MG: Performed by: INTERNAL MEDICINE

## 2020-01-15 PROCEDURE — 71045 X-RAY EXAM CHEST 1 VIEW: CPT

## 2020-01-15 PROCEDURE — 25010000002 ENOXAPARIN PER 10 MG: Performed by: INTERNAL MEDICINE

## 2020-01-15 PROCEDURE — 87205 SMEAR GRAM STAIN: CPT | Performed by: INTERNAL MEDICINE

## 2020-01-15 PROCEDURE — 87070 CULTURE OTHR SPECIMN AEROBIC: CPT | Performed by: INTERNAL MEDICINE

## 2020-01-15 PROCEDURE — 92610 EVALUATE SWALLOWING FUNCTION: CPT | Performed by: SPEECH-LANGUAGE PATHOLOGIST

## 2020-01-15 PROCEDURE — 82803 BLOOD GASES ANY COMBINATION: CPT

## 2020-01-15 PROCEDURE — 80048 BASIC METABOLIC PNL TOTAL CA: CPT | Performed by: INTERNAL MEDICINE

## 2020-01-15 PROCEDURE — 85610 PROTHROMBIN TIME: CPT | Performed by: INTERNAL MEDICINE

## 2020-01-15 PROCEDURE — 99223 1ST HOSP IP/OBS HIGH 75: CPT | Performed by: INTERNAL MEDICINE

## 2020-01-15 RX ORDER — DONEPEZIL HYDROCHLORIDE 10 MG/1
10 TABLET, FILM COATED ORAL NIGHTLY
Status: DISCONTINUED | OUTPATIENT
Start: 2020-01-15 | End: 2020-01-25 | Stop reason: ALTCHOICE

## 2020-01-15 RX ORDER — MEMANTINE HYDROCHLORIDE 5 MG/1
10 TABLET ORAL EVERY 12 HOURS SCHEDULED
Status: DISCONTINUED | OUTPATIENT
Start: 2020-01-15 | End: 2020-01-25 | Stop reason: ALTCHOICE

## 2020-01-15 RX ORDER — HALOPERIDOL 5 MG/ML
1 INJECTION INTRAMUSCULAR EVERY 4 HOURS PRN
Status: DISCONTINUED | OUTPATIENT
Start: 2020-01-15 | End: 2020-02-21 | Stop reason: HOSPADM

## 2020-01-15 RX ORDER — HYDRALAZINE HYDROCHLORIDE 20 MG/ML
5 INJECTION INTRAMUSCULAR; INTRAVENOUS EVERY 6 HOURS PRN
Status: DISCONTINUED | OUTPATIENT
Start: 2020-01-15 | End: 2020-02-21 | Stop reason: HOSPADM

## 2020-01-15 RX ORDER — PREDNISONE 20 MG/1
20 TABLET ORAL 2 TIMES DAILY WITH MEALS
Status: DISCONTINUED | OUTPATIENT
Start: 2020-01-15 | End: 2020-01-17

## 2020-01-15 RX ORDER — ACETAZOLAMIDE 250 MG/1
250 TABLET ORAL 2 TIMES DAILY
Status: DISCONTINUED | OUTPATIENT
Start: 2020-01-15 | End: 2020-01-15

## 2020-01-15 RX ORDER — METHYLPREDNISOLONE SODIUM SUCCINATE 40 MG/ML
40 INJECTION, POWDER, LYOPHILIZED, FOR SOLUTION INTRAMUSCULAR; INTRAVENOUS EVERY 24 HOURS
Status: DISCONTINUED | OUTPATIENT
Start: 2020-01-16 | End: 2020-01-15

## 2020-01-15 RX ORDER — AMIODARONE HYDROCHLORIDE 200 MG/1
100 TABLET ORAL
Status: DISCONTINUED | OUTPATIENT
Start: 2020-01-16 | End: 2020-01-23

## 2020-01-15 RX ORDER — MORPHINE SULFATE 2 MG/ML
2 INJECTION, SOLUTION INTRAMUSCULAR; INTRAVENOUS AS NEEDED
Status: DISCONTINUED | OUTPATIENT
Start: 2020-01-15 | End: 2020-01-25

## 2020-01-15 RX ORDER — RISPERIDONE 0.5 MG/1
0.75 TABLET ORAL 3 TIMES DAILY
Status: DISCONTINUED | OUTPATIENT
Start: 2020-01-15 | End: 2020-01-25 | Stop reason: ALTCHOICE

## 2020-01-15 RX ORDER — WARFARIN SODIUM 2.5 MG/1
5 TABLET ORAL
Status: DISCONTINUED | OUTPATIENT
Start: 2020-01-15 | End: 2020-01-18

## 2020-01-15 RX ORDER — TIZANIDINE 4 MG/1
4 TABLET ORAL EVERY 8 HOURS SCHEDULED
Status: DISCONTINUED | OUTPATIENT
Start: 2020-01-15 | End: 2020-01-25 | Stop reason: ALTCHOICE

## 2020-01-15 RX ORDER — SODIUM CHLORIDE 0.9 % (FLUSH) 0.9 %
10 SYRINGE (ML) INJECTION EVERY 12 HOURS SCHEDULED
Status: DISCONTINUED | OUTPATIENT
Start: 2020-01-15 | End: 2020-02-21 | Stop reason: HOSPADM

## 2020-01-15 RX ORDER — FAMOTIDINE 20 MG/1
20 TABLET, FILM COATED ORAL
Status: DISCONTINUED | OUTPATIENT
Start: 2020-01-15 | End: 2020-01-16

## 2020-01-15 RX ORDER — ALBUTEROL SULFATE 2.5 MG/3ML
2.5 SOLUTION RESPIRATORY (INHALATION)
Status: DISCONTINUED | OUTPATIENT
Start: 2020-01-15 | End: 2020-02-21 | Stop reason: HOSPADM

## 2020-01-15 RX ORDER — SACCHAROMYCES BOULARDII 250 MG
250 CAPSULE ORAL 2 TIMES DAILY
Status: DISCONTINUED | OUTPATIENT
Start: 2020-01-15 | End: 2020-01-25 | Stop reason: ALTCHOICE

## 2020-01-15 RX ORDER — ACETAMINOPHEN 160 MG/5ML
500 SOLUTION ORAL EVERY 4 HOURS PRN
Status: DISCONTINUED | OUTPATIENT
Start: 2020-01-15 | End: 2020-01-25 | Stop reason: ALTCHOICE

## 2020-01-15 RX ORDER — FUROSEMIDE 10 MG/ML
40 INJECTION INTRAMUSCULAR; INTRAVENOUS
Status: DISCONTINUED | OUTPATIENT
Start: 2020-01-15 | End: 2020-01-15

## 2020-01-15 RX ORDER — ACETAZOLAMIDE 250 MG/1
125 TABLET ORAL 2 TIMES DAILY
Status: DISCONTINUED | OUTPATIENT
Start: 2020-01-16 | End: 2020-01-23

## 2020-01-15 RX ORDER — MIDAZOLAM HYDROCHLORIDE 1 MG/ML
2 INJECTION INTRAMUSCULAR; INTRAVENOUS AS NEEDED
Status: DISCONTINUED | OUTPATIENT
Start: 2020-01-15 | End: 2020-01-25

## 2020-01-15 RX ORDER — DIAPER,BRIEF,INFANT-TODD,DISP
EACH MISCELLANEOUS EVERY 12 HOURS SCHEDULED
Status: DISCONTINUED | OUTPATIENT
Start: 2020-01-15 | End: 2020-02-21 | Stop reason: HOSPADM

## 2020-01-15 RX ORDER — NICOTINE POLACRILEX 4 MG
15 LOZENGE BUCCAL
Status: DISCONTINUED | OUTPATIENT
Start: 2020-01-15 | End: 2020-02-15

## 2020-01-15 RX ORDER — TRAZODONE HYDROCHLORIDE 50 MG/1
50 TABLET ORAL NIGHTLY
Status: DISCONTINUED | OUTPATIENT
Start: 2020-01-15 | End: 2020-01-25 | Stop reason: ALTCHOICE

## 2020-01-15 RX ORDER — LORAZEPAM 2 MG/ML
1 INJECTION INTRAMUSCULAR AS NEEDED
Status: DISCONTINUED | OUTPATIENT
Start: 2020-01-15 | End: 2020-01-25 | Stop reason: ALTCHOICE

## 2020-01-15 RX ORDER — DEXTROSE MONOHYDRATE 25 G/50ML
25 INJECTION, SOLUTION INTRAVENOUS
Status: DISCONTINUED | OUTPATIENT
Start: 2020-01-15 | End: 2020-02-15

## 2020-01-15 RX ORDER — ENALAPRILAT 2.5 MG/2ML
0.62 INJECTION INTRAVENOUS EVERY 6 HOURS SCHEDULED
Status: DISCONTINUED | OUTPATIENT
Start: 2020-01-15 | End: 2020-01-16

## 2020-01-15 RX ORDER — FERROUS SULFATE 300 MG/5ML
300 LIQUID (ML) ORAL DAILY
Status: DISCONTINUED | OUTPATIENT
Start: 2020-01-16 | End: 2020-01-25 | Stop reason: ALTCHOICE

## 2020-01-16 ENCOUNTER — APPOINTMENT (OUTPATIENT)
Dept: GENERAL RADIOLOGY | Facility: HOSPITAL | Age: 70
End: 2020-01-16

## 2020-01-16 LAB
ALBUMIN SERPL-MCNC: 2.9 G/DL (ref 3.5–5.2)
ALBUMIN/GLOB SERPL: 1.1 G/DL
ALP SERPL-CCNC: 85 U/L (ref 39–117)
ALT SERPL W P-5'-P-CCNC: 24 U/L (ref 1–41)
ANION GAP SERPL CALCULATED.3IONS-SCNC: 9 MMOL/L (ref 5–15)
ARTERIAL PATENCY WRIST A: ABNORMAL
AST SERPL-CCNC: 25 U/L (ref 1–40)
ATMOSPHERIC PRESS: 763 MMHG
BASE EXCESS BLDA CALC-SCNC: -0.2 MMOL/L (ref 0–2)
BASOPHILS # BLD AUTO: 0.01 10*3/MM3 (ref 0–0.2)
BASOPHILS NFR BLD AUTO: 0.1 % (ref 0–1.5)
BDY SITE: ABNORMAL
BILIRUB SERPL-MCNC: 0.7 MG/DL (ref 0.2–1.2)
BODY TEMPERATURE: 37 C
BUN BLD-MCNC: 39 MG/DL (ref 8–23)
BUN/CREAT SERPL: 29.5 (ref 7–25)
CALCIUM SPEC-SCNC: 8.5 MG/DL (ref 8.6–10.5)
CHLORIDE SERPL-SCNC: 99 MMOL/L (ref 98–107)
CO2 SERPL-SCNC: 26 MMOL/L (ref 22–29)
CPAP: 7 CMH2O
CREAT BLD-MCNC: 1.32 MG/DL (ref 0.76–1.27)
DEPRECATED RDW RBC AUTO: 53 FL (ref 37–54)
EOSINOPHIL # BLD AUTO: 0.01 10*3/MM3 (ref 0–0.4)
EOSINOPHIL NFR BLD AUTO: 0.1 % (ref 0.3–6.2)
ERYTHROCYTE [DISTWIDTH] IN BLOOD BY AUTOMATED COUNT: 17 % (ref 12.3–15.4)
FERRITIN SERPL-MCNC: 289.2 NG/ML (ref 30–400)
FOLATE SERPL-MCNC: 18 NG/ML (ref 4.78–24.2)
GFR SERPL CREATININE-BSD FRML MDRD: 54 ML/MIN/1.73
GLOBULIN UR ELPH-MCNC: 2.6 GM/DL
GLUCOSE BLD-MCNC: 95 MG/DL (ref 65–99)
GLUCOSE BLDC GLUCOMTR-MCNC: 131 MG/DL (ref 70–130)
GLUCOSE BLDC GLUCOMTR-MCNC: 75 MG/DL (ref 70–130)
GLUCOSE BLDC GLUCOMTR-MCNC: 83 MG/DL (ref 70–130)
HAPTOGLOB SERPL-MCNC: 159 MG/DL (ref 30–200)
HCO3 BLDA-SCNC: 24.2 MMOL/L (ref 20–26)
HCT VFR BLD AUTO: 28 % (ref 37.5–51)
HGB BLD-MCNC: 8.9 G/DL (ref 13–17.7)
HOROWITZ INDEX BLD+IHG-RTO: 35 %
IMM GRANULOCYTES # BLD AUTO: 0.06 10*3/MM3 (ref 0–0.05)
IMM GRANULOCYTES NFR BLD AUTO: 0.5 % (ref 0–0.5)
INR PPP: 0.97 (ref 0.91–1.09)
IRON 24H UR-MRATE: 25 MCG/DL (ref 59–158)
IRON SATN MFR SERPL: 8 % (ref 20–50)
LYMPHOCYTES # BLD AUTO: 0.17 10*3/MM3 (ref 0.7–3.1)
LYMPHOCYTES NFR BLD AUTO: 1.3 % (ref 19.6–45.3)
Lab: ABNORMAL
MCH RBC QN AUTO: 26.8 PG (ref 26.6–33)
MCHC RBC AUTO-ENTMCNC: 31.8 G/DL (ref 31.5–35.7)
MCV RBC AUTO: 84.3 FL (ref 79–97)
MODALITY: ABNORMAL
MONOCYTES # BLD AUTO: 0.42 10*3/MM3 (ref 0.1–0.9)
MONOCYTES NFR BLD AUTO: 3.3 % (ref 5–12)
NEUTROPHILS # BLD AUTO: 12.02 10*3/MM3 (ref 1.7–7)
NEUTROPHILS NFR BLD AUTO: 94.7 % (ref 42.7–76)
NRBC BLD AUTO-RTO: 0 /100 WBC (ref 0–0.2)
PCO2 BLDA: 37.7 MM HG (ref 35–45)
PH BLDA: 7.42 PH UNITS (ref 7.35–7.45)
PLATELET # BLD AUTO: 175 10*3/MM3 (ref 140–450)
PMV BLD AUTO: 11.3 FL (ref 6–12)
PO2 BLDA: 84.9 MM HG (ref 83–108)
POTASSIUM BLD-SCNC: 4 MMOL/L (ref 3.5–5.2)
PREALB SERPL-MCNC: 26.2 MG/DL (ref 20–40)
PROT SERPL-MCNC: 5.5 G/DL (ref 6–8.5)
PROTHROMBIN TIME: 13.2 SECONDS (ref 11.9–14.6)
PSV: 15 CMH2O
RBC # BLD AUTO: 3.32 10*6/MM3 (ref 4.14–5.8)
RETICS # AUTO: 0.05 10*6/MM3 (ref 0.02–0.13)
RETICS/RBC NFR AUTO: 1.45 % (ref 0.7–1.9)
SAO2 % BLDCOA: 97.4 % (ref 94–99)
SODIUM BLD-SCNC: 134 MMOL/L (ref 136–145)
T3FREE SERPL-MCNC: 1.06 PG/ML (ref 2–4.4)
T4 FREE SERPL-MCNC: 1.43 NG/DL (ref 0.93–1.7)
TIBC SERPL-MCNC: 302 MCG/DL (ref 298–536)
TRANSFERRIN SERPL-MCNC: 203 MG/DL (ref 200–360)
TSH SERPL DL<=0.05 MIU/L-ACNC: 5.86 UIU/ML (ref 0.27–4.2)
VENTILATOR MODE: ABNORMAL
VIT B12 BLD-MCNC: 1173 PG/ML (ref 211–946)
WBC NRBC COR # BLD: 12.69 10*3/MM3 (ref 3.4–10.8)

## 2020-01-16 PROCEDURE — 92526 ORAL FUNCTION THERAPY: CPT

## 2020-01-16 PROCEDURE — 83010 ASSAY OF HAPTOGLOBIN QUANT: CPT | Performed by: INTERNAL MEDICINE

## 2020-01-16 PROCEDURE — 25010000002 MIDAZOLAM PER 1 MG: Performed by: INTERNAL MEDICINE

## 2020-01-16 PROCEDURE — 25010000002 FUROSEMIDE PER 20 MG: Performed by: INTERNAL MEDICINE

## 2020-01-16 PROCEDURE — 85025 COMPLETE CBC W/AUTO DIFF WBC: CPT | Performed by: INTERNAL MEDICINE

## 2020-01-16 PROCEDURE — 82746 ASSAY OF FOLIC ACID SERUM: CPT | Performed by: INTERNAL MEDICINE

## 2020-01-16 PROCEDURE — 63710000001 PREDNISONE PER 1 MG: Performed by: NURSE PRACTITIONER

## 2020-01-16 PROCEDURE — 82803 BLOOD GASES ANY COMBINATION: CPT

## 2020-01-16 PROCEDURE — 97166 OT EVAL MOD COMPLEX 45 MIN: CPT | Performed by: OCCUPATIONAL THERAPIST

## 2020-01-16 PROCEDURE — 83540 ASSAY OF IRON: CPT | Performed by: INTERNAL MEDICINE

## 2020-01-16 PROCEDURE — 97162 PT EVAL MOD COMPLEX 30 MIN: CPT | Performed by: PHYSICAL THERAPIST

## 2020-01-16 PROCEDURE — 84439 ASSAY OF FREE THYROXINE: CPT | Performed by: INTERNAL MEDICINE

## 2020-01-16 PROCEDURE — 84466 ASSAY OF TRANSFERRIN: CPT | Performed by: INTERNAL MEDICINE

## 2020-01-16 PROCEDURE — 82607 VITAMIN B-12: CPT | Performed by: INTERNAL MEDICINE

## 2020-01-16 PROCEDURE — 84443 ASSAY THYROID STIM HORMONE: CPT | Performed by: INTERNAL MEDICINE

## 2020-01-16 PROCEDURE — 84134 ASSAY OF PREALBUMIN: CPT | Performed by: INTERNAL MEDICINE

## 2020-01-16 PROCEDURE — 25010000002 MORPHINE SULFATE (PF) 2 MG/ML SOLUTION: Performed by: INTERNAL MEDICINE

## 2020-01-16 PROCEDURE — 82962 GLUCOSE BLOOD TEST: CPT

## 2020-01-16 PROCEDURE — 82728 ASSAY OF FERRITIN: CPT | Performed by: INTERNAL MEDICINE

## 2020-01-16 PROCEDURE — 85045 AUTOMATED RETICULOCYTE COUNT: CPT | Performed by: INTERNAL MEDICINE

## 2020-01-16 PROCEDURE — 99233 SBSQ HOSP IP/OBS HIGH 50: CPT | Performed by: INTERNAL MEDICINE

## 2020-01-16 PROCEDURE — 71045 X-RAY EXAM CHEST 1 VIEW: CPT

## 2020-01-16 PROCEDURE — 85610 PROTHROMBIN TIME: CPT | Performed by: INTERNAL MEDICINE

## 2020-01-16 PROCEDURE — 80053 COMPREHEN METABOLIC PANEL: CPT | Performed by: INTERNAL MEDICINE

## 2020-01-16 PROCEDURE — 84481 FREE ASSAY (FT-3): CPT | Performed by: INTERNAL MEDICINE

## 2020-01-16 PROCEDURE — 63710000001 INSULIN LISPRO (HUMAN) PER 5 UNITS: Performed by: INTERNAL MEDICINE

## 2020-01-16 PROCEDURE — 25010000002 ENOXAPARIN PER 10 MG: Performed by: INTERNAL MEDICINE

## 2020-01-16 RX ORDER — LISINOPRIL 5 MG/1
10 TABLET ORAL
Status: DISCONTINUED | OUTPATIENT
Start: 2020-01-16 | End: 2020-01-17

## 2020-01-16 RX ORDER — FUROSEMIDE 10 MG/ML
40 INJECTION INTRAMUSCULAR; INTRAVENOUS
Status: DISCONTINUED | OUTPATIENT
Start: 2020-01-16 | End: 2020-01-20

## 2020-01-16 RX ORDER — FAMOTIDINE 20 MG/1
20 TABLET, FILM COATED ORAL DAILY
Status: DISCONTINUED | OUTPATIENT
Start: 2020-01-17 | End: 2020-01-25 | Stop reason: ALTCHOICE

## 2020-01-16 RX ORDER — IPRATROPIUM BROMIDE AND ALBUTEROL SULFATE 2.5; .5 MG/3ML; MG/3ML
3 SOLUTION RESPIRATORY (INHALATION)
Status: DISCONTINUED | OUTPATIENT
Start: 2020-01-16 | End: 2020-02-21 | Stop reason: HOSPADM

## 2020-01-17 LAB
BACTERIA SPEC RESP CULT: NO GROWTH
GLUCOSE BLDC GLUCOMTR-MCNC: 111 MG/DL (ref 70–130)
GLUCOSE BLDC GLUCOMTR-MCNC: 152 MG/DL (ref 70–130)
GLUCOSE BLDC GLUCOMTR-MCNC: 162 MG/DL (ref 70–130)
GLUCOSE BLDC GLUCOMTR-MCNC: 165 MG/DL (ref 70–130)
GLUCOSE BLDC GLUCOMTR-MCNC: 198 MG/DL (ref 70–130)
GRAM STN SPEC: NORMAL
INR PPP: 2.09 (ref 0.91–1.09)
PROTHROMBIN TIME: 24.2 SECONDS (ref 11.9–14.6)

## 2020-01-17 PROCEDURE — 25010000002 ENOXAPARIN PER 10 MG: Performed by: INTERNAL MEDICINE

## 2020-01-17 PROCEDURE — 63710000001 INSULIN LISPRO (HUMAN) PER 5 UNITS: Performed by: INTERNAL MEDICINE

## 2020-01-17 PROCEDURE — 82962 GLUCOSE BLOOD TEST: CPT

## 2020-01-17 PROCEDURE — 97530 THERAPEUTIC ACTIVITIES: CPT

## 2020-01-17 PROCEDURE — 85610 PROTHROMBIN TIME: CPT | Performed by: INTERNAL MEDICINE

## 2020-01-17 PROCEDURE — 25010000002 LORAZEPAM PER 2 MG: Performed by: INTERNAL MEDICINE

## 2020-01-17 PROCEDURE — 63710000001 PREDNISONE PER 1 MG: Performed by: NURSE PRACTITIONER

## 2020-01-17 PROCEDURE — 63710000001 PREDNISONE PER 1 MG: Performed by: INTERNAL MEDICINE

## 2020-01-17 PROCEDURE — 25010000002 MORPHINE SULFATE (PF) 2 MG/ML SOLUTION: Performed by: INTERNAL MEDICINE

## 2020-01-17 PROCEDURE — 99233 SBSQ HOSP IP/OBS HIGH 50: CPT | Performed by: INTERNAL MEDICINE

## 2020-01-17 PROCEDURE — 25010000002 FUROSEMIDE PER 20 MG: Performed by: INTERNAL MEDICINE

## 2020-01-17 PROCEDURE — 25010000002 MIDAZOLAM PER 1 MG: Performed by: INTERNAL MEDICINE

## 2020-01-17 RX ORDER — LISINOPRIL 5 MG/1
5 TABLET ORAL
Status: DISCONTINUED | OUTPATIENT
Start: 2020-01-18 | End: 2020-01-23

## 2020-01-17 RX ORDER — PREDNISONE 20 MG/1
10 TABLET ORAL 2 TIMES DAILY WITH MEALS
Status: DISCONTINUED | OUTPATIENT
Start: 2020-01-17 | End: 2020-01-20

## 2020-01-18 LAB
GLUCOSE BLDC GLUCOMTR-MCNC: 127 MG/DL (ref 70–130)
GLUCOSE BLDC GLUCOMTR-MCNC: 135 MG/DL (ref 70–130)
GLUCOSE BLDC GLUCOMTR-MCNC: 218 MG/DL (ref 70–130)
HOLD SPECIMEN: NORMAL
INR PPP: 2.72 (ref 0.91–1.09)
PROTHROMBIN TIME: 29.9 SECONDS (ref 11.9–14.6)

## 2020-01-18 PROCEDURE — 63710000001 INSULIN LISPRO (HUMAN) PER 5 UNITS: Performed by: INTERNAL MEDICINE

## 2020-01-18 PROCEDURE — 82962 GLUCOSE BLOOD TEST: CPT

## 2020-01-18 PROCEDURE — 25010000002 LORAZEPAM PER 2 MG: Performed by: INTERNAL MEDICINE

## 2020-01-18 PROCEDURE — 99233 SBSQ HOSP IP/OBS HIGH 50: CPT | Performed by: INTERNAL MEDICINE

## 2020-01-18 PROCEDURE — 63710000001 PREDNISONE PER 1 MG: Performed by: INTERNAL MEDICINE

## 2020-01-18 PROCEDURE — 25010000002 FUROSEMIDE PER 20 MG: Performed by: INTERNAL MEDICINE

## 2020-01-18 PROCEDURE — 25010000002 HALOPERIDOL LACTATE PER 5 MG: Performed by: INTERNAL MEDICINE

## 2020-01-18 PROCEDURE — 25010000002 MORPHINE SULFATE (PF) 2 MG/ML SOLUTION: Performed by: INTERNAL MEDICINE

## 2020-01-18 PROCEDURE — 85610 PROTHROMBIN TIME: CPT | Performed by: INTERNAL MEDICINE

## 2020-01-18 PROCEDURE — 25010000002 MIDAZOLAM PER 1 MG: Performed by: INTERNAL MEDICINE

## 2020-01-18 RX ORDER — DIAPER,BRIEF,INFANT-TODD,DISP
EACH MISCELLANEOUS EVERY 12 HOURS SCHEDULED
Status: DISCONTINUED | OUTPATIENT
Start: 2020-01-18 | End: 2020-01-18

## 2020-01-18 RX ORDER — WARFARIN SODIUM 2 MG/1
2 TABLET ORAL
Status: DISCONTINUED | OUTPATIENT
Start: 2020-01-18 | End: 2020-01-22

## 2020-01-18 RX ORDER — DIAPER,BRIEF,INFANT-TODD,DISP
EACH MISCELLANEOUS AS NEEDED
Status: DISCONTINUED | OUTPATIENT
Start: 2020-01-18 | End: 2020-02-18 | Stop reason: SDUPTHER

## 2020-01-19 LAB
GLUCOSE BLDC GLUCOMTR-MCNC: 107 MG/DL (ref 70–130)
GLUCOSE BLDC GLUCOMTR-MCNC: 135 MG/DL (ref 70–130)
GLUCOSE BLDC GLUCOMTR-MCNC: 151 MG/DL (ref 70–130)
GLUCOSE BLDC GLUCOMTR-MCNC: 93 MG/DL (ref 70–130)
INR PPP: 2.48 (ref 0.91–1.09)
PROTHROMBIN TIME: 27.8 SECONDS (ref 11.9–14.6)

## 2020-01-19 PROCEDURE — 25010000002 MIDAZOLAM PER 1 MG: Performed by: INTERNAL MEDICINE

## 2020-01-19 PROCEDURE — 25010000002 MORPHINE SULFATE (PF) 2 MG/ML SOLUTION: Performed by: INTERNAL MEDICINE

## 2020-01-19 PROCEDURE — 25010000002 HYDRALAZINE PER 20 MG: Performed by: INTERNAL MEDICINE

## 2020-01-19 PROCEDURE — 63710000001 PREDNISONE PER 1 MG: Performed by: INTERNAL MEDICINE

## 2020-01-19 PROCEDURE — 82962 GLUCOSE BLOOD TEST: CPT

## 2020-01-19 PROCEDURE — 25010000002 FUROSEMIDE PER 20 MG: Performed by: INTERNAL MEDICINE

## 2020-01-19 PROCEDURE — 85610 PROTHROMBIN TIME: CPT | Performed by: INTERNAL MEDICINE

## 2020-01-19 PROCEDURE — 97110 THERAPEUTIC EXERCISES: CPT

## 2020-01-19 PROCEDURE — 25010000002 LORAZEPAM PER 2 MG: Performed by: INTERNAL MEDICINE

## 2020-01-19 PROCEDURE — 99233 SBSQ HOSP IP/OBS HIGH 50: CPT | Performed by: INTERNAL MEDICINE

## 2020-01-20 ENCOUNTER — APPOINTMENT (OUTPATIENT)
Dept: GENERAL RADIOLOGY | Facility: HOSPITAL | Age: 70
End: 2020-01-20

## 2020-01-20 LAB
ALBUMIN SERPL-MCNC: 3.1 G/DL (ref 3.5–5.2)
ALBUMIN/GLOB SERPL: 1.3 G/DL
ALP SERPL-CCNC: 87 U/L (ref 39–117)
ALT SERPL W P-5'-P-CCNC: 40 U/L (ref 1–41)
ANION GAP SERPL CALCULATED.3IONS-SCNC: 7 MMOL/L (ref 5–15)
AST SERPL-CCNC: 20 U/L (ref 1–40)
BASOPHILS # BLD AUTO: 0.02 10*3/MM3 (ref 0–0.2)
BASOPHILS NFR BLD AUTO: 0.2 % (ref 0–1.5)
BILIRUB SERPL-MCNC: 0.4 MG/DL (ref 0.2–1.2)
BUN BLD-MCNC: 39 MG/DL (ref 8–23)
BUN/CREAT SERPL: 30 (ref 7–25)
CALCIUM SPEC-SCNC: 8.8 MG/DL (ref 8.6–10.5)
CHLORIDE SERPL-SCNC: 98 MMOL/L (ref 98–107)
CO2 SERPL-SCNC: 32 MMOL/L (ref 22–29)
CREAT BLD-MCNC: 1.3 MG/DL (ref 0.76–1.27)
DEPRECATED RDW RBC AUTO: 52.1 FL (ref 37–54)
EOSINOPHIL # BLD AUTO: 0.04 10*3/MM3 (ref 0–0.4)
EOSINOPHIL NFR BLD AUTO: 0.5 % (ref 0.3–6.2)
ERYTHROCYTE [DISTWIDTH] IN BLOOD BY AUTOMATED COUNT: 16.8 % (ref 12.3–15.4)
GFR SERPL CREATININE-BSD FRML MDRD: 55 ML/MIN/1.73
GLOBULIN UR ELPH-MCNC: 2.3 GM/DL
GLUCOSE BLD-MCNC: 122 MG/DL (ref 65–99)
GLUCOSE BLDC GLUCOMTR-MCNC: 126 MG/DL (ref 70–130)
GLUCOSE BLDC GLUCOMTR-MCNC: 141 MG/DL (ref 70–130)
GLUCOSE BLDC GLUCOMTR-MCNC: 73 MG/DL (ref 70–130)
GLUCOSE BLDC GLUCOMTR-MCNC: 75 MG/DL (ref 70–130)
GLUCOSE BLDC GLUCOMTR-MCNC: 83 MG/DL (ref 70–130)
HCT VFR BLD AUTO: 24.7 % (ref 37.5–51)
HGB BLD-MCNC: 7.7 G/DL (ref 13–17.7)
IMM GRANULOCYTES # BLD AUTO: 0.06 10*3/MM3 (ref 0–0.05)
IMM GRANULOCYTES NFR BLD AUTO: 0.7 % (ref 0–0.5)
INR PPP: 2.53 (ref 0.91–1.09)
LYMPHOCYTES # BLD AUTO: 0.36 10*3/MM3 (ref 0.7–3.1)
LYMPHOCYTES NFR BLD AUTO: 4.5 % (ref 19.6–45.3)
MCH RBC QN AUTO: 26.6 PG (ref 26.6–33)
MCHC RBC AUTO-ENTMCNC: 31.2 G/DL (ref 31.5–35.7)
MCV RBC AUTO: 85.2 FL (ref 79–97)
MONOCYTES # BLD AUTO: 0.55 10*3/MM3 (ref 0.1–0.9)
MONOCYTES NFR BLD AUTO: 6.8 % (ref 5–12)
NEUTROPHILS # BLD AUTO: 7 10*3/MM3 (ref 1.7–7)
NEUTROPHILS NFR BLD AUTO: 87.3 % (ref 42.7–76)
NRBC BLD AUTO-RTO: 0 /100 WBC (ref 0–0.2)
NT-PROBNP SERPL-MCNC: 209.6 PG/ML (ref 5–900)
PLATELET # BLD AUTO: 151 10*3/MM3 (ref 140–450)
PMV BLD AUTO: 10.8 FL (ref 6–12)
POTASSIUM BLD-SCNC: 3.4 MMOL/L (ref 3.5–5.2)
PROT SERPL-MCNC: 5.4 G/DL (ref 6–8.5)
PROTHROMBIN TIME: 28.2 SECONDS (ref 11.9–14.6)
RBC # BLD AUTO: 2.9 10*6/MM3 (ref 4.14–5.8)
SODIUM BLD-SCNC: 137 MMOL/L (ref 136–145)
WBC NRBC COR # BLD: 8.03 10*3/MM3 (ref 3.4–10.8)

## 2020-01-20 PROCEDURE — 63710000001 PREDNISONE PER 1 MG: Performed by: INTERNAL MEDICINE

## 2020-01-20 PROCEDURE — 85610 PROTHROMBIN TIME: CPT | Performed by: INTERNAL MEDICINE

## 2020-01-20 PROCEDURE — 25010000002 FUROSEMIDE PER 20 MG: Performed by: INTERNAL MEDICINE

## 2020-01-20 PROCEDURE — 85025 COMPLETE CBC W/AUTO DIFF WBC: CPT | Performed by: INTERNAL MEDICINE

## 2020-01-20 PROCEDURE — 92526 ORAL FUNCTION THERAPY: CPT

## 2020-01-20 PROCEDURE — 97110 THERAPEUTIC EXERCISES: CPT

## 2020-01-20 PROCEDURE — 97535 SELF CARE MNGMENT TRAINING: CPT

## 2020-01-20 PROCEDURE — 25010000002 HALOPERIDOL LACTATE PER 5 MG: Performed by: INTERNAL MEDICINE

## 2020-01-20 PROCEDURE — 82962 GLUCOSE BLOOD TEST: CPT

## 2020-01-20 PROCEDURE — 25010000002 LORAZEPAM PER 2 MG: Performed by: INTERNAL MEDICINE

## 2020-01-20 PROCEDURE — 74018 RADEX ABDOMEN 1 VIEW: CPT

## 2020-01-20 PROCEDURE — 25010000002 POTASSIUM CHLORIDE PER 2 MEQ: Performed by: INTERNAL MEDICINE

## 2020-01-20 PROCEDURE — 25010000002 MORPHINE SULFATE (PF) 2 MG/ML SOLUTION: Performed by: INTERNAL MEDICINE

## 2020-01-20 PROCEDURE — 80053 COMPREHEN METABOLIC PANEL: CPT | Performed by: INTERNAL MEDICINE

## 2020-01-20 PROCEDURE — 83880 ASSAY OF NATRIURETIC PEPTIDE: CPT | Performed by: INTERNAL MEDICINE

## 2020-01-20 PROCEDURE — 99233 SBSQ HOSP IP/OBS HIGH 50: CPT | Performed by: INTERNAL MEDICINE

## 2020-01-20 RX ORDER — PREDNISONE 20 MG/1
10 TABLET ORAL DAILY
Status: DISCONTINUED | OUTPATIENT
Start: 2020-01-21 | End: 2020-01-25 | Stop reason: ALTCHOICE

## 2020-01-20 RX ORDER — FUROSEMIDE 10 MG/ML
40 INJECTION INTRAMUSCULAR; INTRAVENOUS DAILY
Status: DISCONTINUED | OUTPATIENT
Start: 2020-01-21 | End: 2020-01-22

## 2020-01-20 RX ORDER — POTASSIUM CHLORIDE 14.9 MG/ML
20 INJECTION INTRAVENOUS ONCE
Status: DISCONTINUED | OUTPATIENT
Start: 2020-01-20 | End: 2020-02-04

## 2020-01-21 ENCOUNTER — APPOINTMENT (OUTPATIENT)
Dept: GENERAL RADIOLOGY | Facility: HOSPITAL | Age: 70
End: 2020-01-21

## 2020-01-21 LAB
ANION GAP SERPL CALCULATED.3IONS-SCNC: 9 MMOL/L (ref 5–15)
BASOPHILS # BLD AUTO: 0.01 10*3/MM3 (ref 0–0.2)
BASOPHILS NFR BLD AUTO: 0.1 % (ref 0–1.5)
BUN BLD-MCNC: 36 MG/DL (ref 8–23)
BUN/CREAT SERPL: 29 (ref 7–25)
CALCIUM SPEC-SCNC: 8.9 MG/DL (ref 8.6–10.5)
CHLORIDE SERPL-SCNC: 103 MMOL/L (ref 98–107)
CO2 SERPL-SCNC: 28 MMOL/L (ref 22–29)
CREAT BLD-MCNC: 1.24 MG/DL (ref 0.76–1.27)
DEPRECATED RDW RBC AUTO: 51.5 FL (ref 37–54)
EOSINOPHIL # BLD AUTO: 0.07 10*3/MM3 (ref 0–0.4)
EOSINOPHIL NFR BLD AUTO: 0.8 % (ref 0.3–6.2)
ERYTHROCYTE [DISTWIDTH] IN BLOOD BY AUTOMATED COUNT: 16.8 % (ref 12.3–15.4)
GFR SERPL CREATININE-BSD FRML MDRD: 58 ML/MIN/1.73
GLUCOSE BLD-MCNC: 66 MG/DL (ref 65–99)
GLUCOSE BLDC GLUCOMTR-MCNC: 175 MG/DL (ref 70–130)
GLUCOSE BLDC GLUCOMTR-MCNC: 69 MG/DL (ref 70–130)
GLUCOSE BLDC GLUCOMTR-MCNC: 80 MG/DL (ref 70–130)
GLUCOSE BLDC GLUCOMTR-MCNC: 83 MG/DL (ref 70–130)
HCT VFR BLD AUTO: 24.6 % (ref 37.5–51)
HGB BLD-MCNC: 7.8 G/DL (ref 13–17.7)
IMM GRANULOCYTES # BLD AUTO: 0.03 10*3/MM3 (ref 0–0.05)
IMM GRANULOCYTES NFR BLD AUTO: 0.3 % (ref 0–0.5)
INR PPP: 2.18 (ref 0.91–1.09)
LYMPHOCYTES # BLD AUTO: 0.3 10*3/MM3 (ref 0.7–3.1)
LYMPHOCYTES NFR BLD AUTO: 3.5 % (ref 19.6–45.3)
MAGNESIUM SERPL-MCNC: 2.2 MG/DL (ref 1.6–2.4)
MCH RBC QN AUTO: 26.7 PG (ref 26.6–33)
MCHC RBC AUTO-ENTMCNC: 31.7 G/DL (ref 31.5–35.7)
MCV RBC AUTO: 84.2 FL (ref 79–97)
MONOCYTES # BLD AUTO: 0.45 10*3/MM3 (ref 0.1–0.9)
MONOCYTES NFR BLD AUTO: 5.2 % (ref 5–12)
NEUTROPHILS # BLD AUTO: 7.8 10*3/MM3 (ref 1.7–7)
NEUTROPHILS NFR BLD AUTO: 90.1 % (ref 42.7–76)
NRBC BLD AUTO-RTO: 0 /100 WBC (ref 0–0.2)
PLATELET # BLD AUTO: 160 10*3/MM3 (ref 140–450)
PMV BLD AUTO: 10.5 FL (ref 6–12)
POTASSIUM BLD-SCNC: 3.9 MMOL/L (ref 3.5–5.2)
PROTHROMBIN TIME: 25.1 SECONDS (ref 11.9–14.6)
RBC # BLD AUTO: 2.92 10*6/MM3 (ref 4.14–5.8)
SODIUM BLD-SCNC: 140 MMOL/L (ref 136–145)
WBC NRBC COR # BLD: 8.66 10*3/MM3 (ref 3.4–10.8)

## 2020-01-21 PROCEDURE — 25010000002 MIDAZOLAM PER 1 MG: Performed by: INTERNAL MEDICINE

## 2020-01-21 PROCEDURE — 83735 ASSAY OF MAGNESIUM: CPT | Performed by: INTERNAL MEDICINE

## 2020-01-21 PROCEDURE — 85610 PROTHROMBIN TIME: CPT | Performed by: INTERNAL MEDICINE

## 2020-01-21 PROCEDURE — 82962 GLUCOSE BLOOD TEST: CPT

## 2020-01-21 PROCEDURE — 85025 COMPLETE CBC W/AUTO DIFF WBC: CPT | Performed by: INTERNAL MEDICINE

## 2020-01-21 PROCEDURE — 99233 SBSQ HOSP IP/OBS HIGH 50: CPT | Performed by: INTERNAL MEDICINE

## 2020-01-21 PROCEDURE — 97530 THERAPEUTIC ACTIVITIES: CPT

## 2020-01-21 PROCEDURE — 25010000002 HALOPERIDOL LACTATE PER 5 MG: Performed by: INTERNAL MEDICINE

## 2020-01-21 PROCEDURE — 92526 ORAL FUNCTION THERAPY: CPT

## 2020-01-21 PROCEDURE — 25010000002 FUROSEMIDE PER 20 MG: Performed by: INTERNAL MEDICINE

## 2020-01-21 PROCEDURE — 25010000002 LORAZEPAM PER 2 MG: Performed by: INTERNAL MEDICINE

## 2020-01-21 PROCEDURE — 74018 RADEX ABDOMEN 1 VIEW: CPT

## 2020-01-21 PROCEDURE — 25010000002 MORPHINE SULFATE (PF) 2 MG/ML SOLUTION: Performed by: INTERNAL MEDICINE

## 2020-01-21 PROCEDURE — 80048 BASIC METABOLIC PNL TOTAL CA: CPT | Performed by: INTERNAL MEDICINE

## 2020-01-21 RX ORDER — LORAZEPAM 0.5 MG/1
0.5 TABLET ORAL 2 TIMES DAILY PRN
Status: DISCONTINUED | OUTPATIENT
Start: 2020-01-21 | End: 2020-01-25 | Stop reason: ALTCHOICE

## 2020-01-21 RX ORDER — VALPROIC ACID 250 MG/5ML
125 SOLUTION ORAL EVERY 12 HOURS SCHEDULED
Status: DISCONTINUED | OUTPATIENT
Start: 2020-01-21 | End: 2020-01-25 | Stop reason: SDUPTHER

## 2020-01-22 ENCOUNTER — APPOINTMENT (OUTPATIENT)
Dept: GENERAL RADIOLOGY | Facility: HOSPITAL | Age: 70
End: 2020-01-22

## 2020-01-22 LAB
GLUCOSE BLDC GLUCOMTR-MCNC: 145 MG/DL (ref 70–130)
GLUCOSE BLDC GLUCOMTR-MCNC: 157 MG/DL (ref 70–130)
GLUCOSE BLDC GLUCOMTR-MCNC: 79 MG/DL (ref 70–130)
GLUCOSE BLDC GLUCOMTR-MCNC: 86 MG/DL (ref 70–130)
HOLD SPECIMEN: NORMAL
INR PPP: 3.32 (ref 0.91–1.09)
PROTHROMBIN TIME: 35 SECONDS (ref 11.9–14.6)
WHOLE BLOOD HOLD SPECIMEN: NORMAL

## 2020-01-22 PROCEDURE — 74018 RADEX ABDOMEN 1 VIEW: CPT

## 2020-01-22 PROCEDURE — 25010000002 FUROSEMIDE PER 20 MG: Performed by: INTERNAL MEDICINE

## 2020-01-22 PROCEDURE — 63710000001 PREDNISONE PER 1 MG: Performed by: INTERNAL MEDICINE

## 2020-01-22 PROCEDURE — 99233 SBSQ HOSP IP/OBS HIGH 50: CPT | Performed by: INTERNAL MEDICINE

## 2020-01-22 PROCEDURE — 82962 GLUCOSE BLOOD TEST: CPT

## 2020-01-22 PROCEDURE — 63710000001 INSULIN LISPRO (HUMAN) PER 5 UNITS: Performed by: INTERNAL MEDICINE

## 2020-01-22 PROCEDURE — 97530 THERAPEUTIC ACTIVITIES: CPT

## 2020-01-22 PROCEDURE — 97110 THERAPEUTIC EXERCISES: CPT

## 2020-01-22 PROCEDURE — 92526 ORAL FUNCTION THERAPY: CPT | Performed by: SPEECH-LANGUAGE PATHOLOGIST

## 2020-01-22 PROCEDURE — 25010000002 MORPHINE SULFATE (PF) 2 MG/ML SOLUTION: Performed by: INTERNAL MEDICINE

## 2020-01-22 PROCEDURE — 25010000002 MIDAZOLAM PER 1 MG: Performed by: INTERNAL MEDICINE

## 2020-01-22 PROCEDURE — 85610 PROTHROMBIN TIME: CPT | Performed by: INTERNAL MEDICINE

## 2020-01-22 RX ORDER — WARFARIN SODIUM 2 MG/1
1 TABLET ORAL
Status: DISCONTINUED | OUTPATIENT
Start: 2020-01-23 | End: 2020-01-23

## 2020-01-22 RX ORDER — FUROSEMIDE 10 MG/ML
60 INJECTION INTRAMUSCULAR; INTRAVENOUS DAILY
Status: DISCONTINUED | OUTPATIENT
Start: 2020-01-23 | End: 2020-01-23

## 2020-01-23 LAB
ALBUMIN SERPL-MCNC: 3.2 G/DL (ref 3.5–5.2)
ALBUMIN/GLOB SERPL: 1.1 G/DL
ALP SERPL-CCNC: 91 U/L (ref 39–117)
ALT SERPL W P-5'-P-CCNC: 59 U/L (ref 1–41)
ANION GAP SERPL CALCULATED.3IONS-SCNC: 10 MMOL/L (ref 5–15)
ANION GAP SERPL CALCULATED.3IONS-SCNC: 13 MMOL/L (ref 5–15)
AST SERPL-CCNC: 23 U/L (ref 1–40)
BASOPHILS # BLD AUTO: 0.02 10*3/MM3 (ref 0–0.2)
BASOPHILS NFR BLD AUTO: 0.2 % (ref 0–1.5)
BILIRUB SERPL-MCNC: 0.5 MG/DL (ref 0.2–1.2)
BUN BLD-MCNC: 43 MG/DL (ref 8–23)
BUN BLD-MCNC: 44 MG/DL (ref 8–23)
BUN/CREAT SERPL: 25.1 (ref 7–25)
BUN/CREAT SERPL: 26.3 (ref 7–25)
CALCIUM SPEC-SCNC: 8.8 MG/DL (ref 8.6–10.5)
CALCIUM SPEC-SCNC: 8.9 MG/DL (ref 8.6–10.5)
CHLORIDE SERPL-SCNC: 101 MMOL/L (ref 98–107)
CHLORIDE SERPL-SCNC: 102 MMOL/L (ref 98–107)
CHOLEST SERPL-MCNC: 154 MG/DL (ref 0–200)
CO2 SERPL-SCNC: 28 MMOL/L (ref 22–29)
CO2 SERPL-SCNC: 31 MMOL/L (ref 22–29)
CREAT BLD-MCNC: 1.67 MG/DL (ref 0.76–1.27)
CREAT BLD-MCNC: 1.71 MG/DL (ref 0.76–1.27)
DEPRECATED RDW RBC AUTO: 53.1 FL (ref 37–54)
EOSINOPHIL # BLD AUTO: 0.08 10*3/MM3 (ref 0–0.4)
EOSINOPHIL NFR BLD AUTO: 0.9 % (ref 0.3–6.2)
ERYTHROCYTE [DISTWIDTH] IN BLOOD BY AUTOMATED COUNT: 17 % (ref 12.3–15.4)
GFR SERPL CREATININE-BSD FRML MDRD: 40 ML/MIN/1.73
GFR SERPL CREATININE-BSD FRML MDRD: 41 ML/MIN/1.73
GLOBULIN UR ELPH-MCNC: 2.8 GM/DL
GLUCOSE BLD-MCNC: 124 MG/DL (ref 65–99)
GLUCOSE BLD-MCNC: 126 MG/DL (ref 65–99)
GLUCOSE BLDC GLUCOMTR-MCNC: 123 MG/DL (ref 70–130)
GLUCOSE BLDC GLUCOMTR-MCNC: 160 MG/DL (ref 70–130)
GLUCOSE BLDC GLUCOMTR-MCNC: 160 MG/DL (ref 70–130)
GLUCOSE BLDC GLUCOMTR-MCNC: 182 MG/DL (ref 70–130)
HCT VFR BLD AUTO: 25 % (ref 37.5–51)
HEMOCCULT STL QL: POSITIVE
HGB BLD-MCNC: 7.7 G/DL (ref 13–17.7)
IMM GRANULOCYTES # BLD AUTO: 0.03 10*3/MM3 (ref 0–0.05)
IMM GRANULOCYTES NFR BLD AUTO: 0.3 % (ref 0–0.5)
INR PPP: 2.8 (ref 0.91–1.09)
LYMPHOCYTES # BLD AUTO: 0.22 10*3/MM3 (ref 0.7–3.1)
LYMPHOCYTES NFR BLD AUTO: 2.5 % (ref 19.6–45.3)
MAGNESIUM SERPL-MCNC: 2.1 MG/DL (ref 1.6–2.4)
MCH RBC QN AUTO: 26.5 PG (ref 26.6–33)
MCHC RBC AUTO-ENTMCNC: 30.8 G/DL (ref 31.5–35.7)
MCV RBC AUTO: 85.9 FL (ref 79–97)
MONOCYTES # BLD AUTO: 0.58 10*3/MM3 (ref 0.1–0.9)
MONOCYTES NFR BLD AUTO: 6.6 % (ref 5–12)
NEUTROPHILS # BLD AUTO: 7.8 10*3/MM3 (ref 1.7–7)
NEUTROPHILS NFR BLD AUTO: 89.5 % (ref 42.7–76)
NRBC BLD AUTO-RTO: 0 /100 WBC (ref 0–0.2)
NT-PROBNP SERPL-MCNC: 242.2 PG/ML (ref 5–900)
PHOSPHATE SERPL-MCNC: 3.4 MG/DL (ref 2.5–4.5)
PLATELET # BLD AUTO: 159 10*3/MM3 (ref 140–450)
PMV BLD AUTO: 9.9 FL (ref 6–12)
POTASSIUM BLD-SCNC: 3.3 MMOL/L (ref 3.5–5.2)
POTASSIUM BLD-SCNC: 3.3 MMOL/L (ref 3.5–5.2)
PREALB SERPL-MCNC: 18.5 MG/DL (ref 20–40)
PROT SERPL-MCNC: 6 G/DL (ref 6–8.5)
PROTHROMBIN TIME: 30.6 SECONDS (ref 11.9–14.6)
RBC # BLD AUTO: 2.91 10*6/MM3 (ref 4.14–5.8)
SODIUM BLD-SCNC: 142 MMOL/L (ref 136–145)
SODIUM BLD-SCNC: 143 MMOL/L (ref 136–145)
TRIGL SERPL-MCNC: 76 MG/DL (ref 0–150)
WBC NRBC COR # BLD: 8.73 10*3/MM3 (ref 3.4–10.8)

## 2020-01-23 PROCEDURE — 85025 COMPLETE CBC W/AUTO DIFF WBC: CPT | Performed by: INTERNAL MEDICINE

## 2020-01-23 PROCEDURE — 82272 OCCULT BLD FECES 1-3 TESTS: CPT | Performed by: INTERNAL MEDICINE

## 2020-01-23 PROCEDURE — 99233 SBSQ HOSP IP/OBS HIGH 50: CPT | Performed by: INTERNAL MEDICINE

## 2020-01-23 PROCEDURE — 83735 ASSAY OF MAGNESIUM: CPT | Performed by: INTERNAL MEDICINE

## 2020-01-23 PROCEDURE — 25010000002 MORPHINE SULFATE (PF) 2 MG/ML SOLUTION: Performed by: INTERNAL MEDICINE

## 2020-01-23 PROCEDURE — 97110 THERAPEUTIC EXERCISES: CPT

## 2020-01-23 PROCEDURE — 83880 ASSAY OF NATRIURETIC PEPTIDE: CPT | Performed by: INTERNAL MEDICINE

## 2020-01-23 PROCEDURE — 25010000002 ENOXAPARIN PER 10 MG: Performed by: INTERNAL MEDICINE

## 2020-01-23 PROCEDURE — 25010000002 LORAZEPAM PER 2 MG: Performed by: INTERNAL MEDICINE

## 2020-01-23 PROCEDURE — 25010000002 HALOPERIDOL LACTATE PER 5 MG: Performed by: INTERNAL MEDICINE

## 2020-01-23 PROCEDURE — 84100 ASSAY OF PHOSPHORUS: CPT | Performed by: INTERNAL MEDICINE

## 2020-01-23 PROCEDURE — 63710000001 INSULIN LISPRO (HUMAN) PER 5 UNITS: Performed by: INTERNAL MEDICINE

## 2020-01-23 PROCEDURE — 84134 ASSAY OF PREALBUMIN: CPT | Performed by: INTERNAL MEDICINE

## 2020-01-23 PROCEDURE — 84478 ASSAY OF TRIGLYCERIDES: CPT | Performed by: INTERNAL MEDICINE

## 2020-01-23 PROCEDURE — 80053 COMPREHEN METABOLIC PANEL: CPT | Performed by: INTERNAL MEDICINE

## 2020-01-23 PROCEDURE — 82962 GLUCOSE BLOOD TEST: CPT

## 2020-01-23 PROCEDURE — 25010000002 MIDAZOLAM PER 1 MG: Performed by: INTERNAL MEDICINE

## 2020-01-23 PROCEDURE — 63710000001 PREDNISONE PER 1 MG: Performed by: INTERNAL MEDICINE

## 2020-01-23 PROCEDURE — 82465 ASSAY BLD/SERUM CHOLESTEROL: CPT | Performed by: INTERNAL MEDICINE

## 2020-01-23 PROCEDURE — 25010000003 POTASSIUM CHLORIDE PER 2 MEQ: Performed by: INTERNAL MEDICINE

## 2020-01-23 PROCEDURE — 85610 PROTHROMBIN TIME: CPT | Performed by: INTERNAL MEDICINE

## 2020-01-23 RX ORDER — METOPROLOL TARTRATE 5 MG/5ML
2.5 INJECTION INTRAVENOUS EVERY 6 HOURS
Status: DISCONTINUED | OUTPATIENT
Start: 2020-01-23 | End: 2020-02-01

## 2020-01-23 RX ORDER — LEVOTHYROXINE SODIUM ANHYDROUS 100 UG/5ML
50 INJECTION, POWDER, LYOPHILIZED, FOR SOLUTION INTRAVENOUS
Status: DISCONTINUED | OUTPATIENT
Start: 2020-01-24 | End: 2020-02-01

## 2020-01-23 RX ORDER — ENALAPRILAT 2.5 MG/2ML
0.62 INJECTION INTRAVENOUS EVERY 6 HOURS
Status: DISCONTINUED | OUTPATIENT
Start: 2020-01-23 | End: 2020-02-09

## 2020-01-23 RX ORDER — POTASSIUM CHLORIDE 29.8 MG/ML
20 INJECTION INTRAVENOUS EVERY 6 HOURS
Status: DISPENSED | OUTPATIENT
Start: 2020-01-23 | End: 2020-01-23

## 2020-01-24 LAB
ALBUMIN SERPL-MCNC: 3.3 G/DL (ref 3.5–5.2)
ALBUMIN/GLOB SERPL: 1.4 G/DL
ALP SERPL-CCNC: 86 U/L (ref 39–117)
ALT SERPL W P-5'-P-CCNC: 65 U/L (ref 1–41)
ANION GAP SERPL CALCULATED.3IONS-SCNC: 6 MMOL/L (ref 5–15)
AST SERPL-CCNC: 30 U/L (ref 1–40)
BILIRUB SERPL-MCNC: 0.4 MG/DL (ref 0.2–1.2)
BUN BLD-MCNC: 41 MG/DL (ref 8–23)
BUN/CREAT SERPL: 32.5 (ref 7–25)
CALCIUM SPEC-SCNC: 9.1 MG/DL (ref 8.6–10.5)
CHLORIDE SERPL-SCNC: 108 MMOL/L (ref 98–107)
CO2 SERPL-SCNC: 28 MMOL/L (ref 22–29)
CREAT BLD-MCNC: 1.26 MG/DL (ref 0.76–1.27)
GFR SERPL CREATININE-BSD FRML MDRD: 57 ML/MIN/1.73
GLOBULIN UR ELPH-MCNC: 2.4 GM/DL
GLUCOSE BLD-MCNC: 114 MG/DL (ref 65–99)
GLUCOSE BLDC GLUCOMTR-MCNC: 106 MG/DL (ref 70–130)
GLUCOSE BLDC GLUCOMTR-MCNC: 107 MG/DL (ref 70–130)
GLUCOSE BLDC GLUCOMTR-MCNC: 114 MG/DL (ref 70–130)
GLUCOSE BLDC GLUCOMTR-MCNC: 122 MG/DL (ref 70–130)
INR PPP: 1.74 (ref 0.91–1.09)
MAGNESIUM SERPL-MCNC: 2.2 MG/DL (ref 1.6–2.4)
PHOSPHATE SERPL-MCNC: 3.4 MG/DL (ref 2.5–4.5)
POTASSIUM BLD-SCNC: 4.4 MMOL/L (ref 3.5–5.2)
PROT SERPL-MCNC: 5.7 G/DL (ref 6–8.5)
PROTHROMBIN TIME: 21 SECONDS (ref 11.9–14.6)
SODIUM BLD-SCNC: 142 MMOL/L (ref 136–145)

## 2020-01-24 PROCEDURE — 85610 PROTHROMBIN TIME: CPT | Performed by: INTERNAL MEDICINE

## 2020-01-24 PROCEDURE — 92526 ORAL FUNCTION THERAPY: CPT | Performed by: SPEECH-LANGUAGE PATHOLOGIST

## 2020-01-24 PROCEDURE — C1751 CATH, INF, PER/CENT/MIDLINE: HCPCS

## 2020-01-24 PROCEDURE — 25010000002 HALOPERIDOL LACTATE PER 5 MG: Performed by: INTERNAL MEDICINE

## 2020-01-24 PROCEDURE — 97535 SELF CARE MNGMENT TRAINING: CPT

## 2020-01-24 PROCEDURE — 25010000002 LORAZEPAM PER 2 MG: Performed by: INTERNAL MEDICINE

## 2020-01-24 PROCEDURE — 80053 COMPREHEN METABOLIC PANEL: CPT | Performed by: INTERNAL MEDICINE

## 2020-01-24 PROCEDURE — 82962 GLUCOSE BLOOD TEST: CPT

## 2020-01-24 PROCEDURE — 99233 SBSQ HOSP IP/OBS HIGH 50: CPT | Performed by: INTERNAL MEDICINE

## 2020-01-24 PROCEDURE — 25010000002 MORPHINE SULFATE (PF) 2 MG/ML SOLUTION: Performed by: INTERNAL MEDICINE

## 2020-01-24 PROCEDURE — 84100 ASSAY OF PHOSPHORUS: CPT | Performed by: INTERNAL MEDICINE

## 2020-01-24 PROCEDURE — 25010000002 ENOXAPARIN PER 10 MG: Performed by: INTERNAL MEDICINE

## 2020-01-24 PROCEDURE — 83735 ASSAY OF MAGNESIUM: CPT | Performed by: INTERNAL MEDICINE

## 2020-01-24 PROCEDURE — 25010000002 MIDAZOLAM PER 1 MG: Performed by: INTERNAL MEDICINE

## 2020-01-24 RX ORDER — PANTOPRAZOLE SODIUM 40 MG/10ML
80 INJECTION, POWDER, LYOPHILIZED, FOR SOLUTION INTRAVENOUS ONCE
Status: DISCONTINUED | OUTPATIENT
Start: 2020-01-24 | End: 2020-01-25

## 2020-01-24 RX ORDER — DIAPER,BRIEF,INFANT-TODD,DISP
EACH MISCELLANEOUS AS NEEDED
Status: DISCONTINUED | OUTPATIENT
Start: 2020-01-24 | End: 2020-02-21 | Stop reason: HOSPADM

## 2020-01-24 RX ORDER — LIDOCAINE HYDROCHLORIDE 10 MG/ML
1 INJECTION, SOLUTION INFILTRATION; PERINEURAL ONCE
Status: COMPLETED | OUTPATIENT
Start: 2020-01-24 | End: 2020-01-24

## 2020-01-24 RX ADMIN — LIDOCAINE HYDROCHLORIDE 1 ML: 10 INJECTION, SOLUTION INFILTRATION; PERINEURAL at 13:48

## 2020-01-25 LAB
ABO GROUP BLD: NORMAL
ALBUMIN SERPL-MCNC: 2.7 G/DL (ref 3.5–5.2)
ALBUMIN/GLOB SERPL: 1.1 G/DL
ALP SERPL-CCNC: 70 U/L (ref 39–117)
ALT SERPL W P-5'-P-CCNC: 47 U/L (ref 1–41)
ANION GAP SERPL CALCULATED.3IONS-SCNC: 8 MMOL/L (ref 5–15)
AST SERPL-CCNC: 15 U/L (ref 1–40)
BASOPHILS # BLD AUTO: 0.01 10*3/MM3 (ref 0–0.2)
BASOPHILS NFR BLD AUTO: 0.3 % (ref 0–1.5)
BILIRUB SERPL-MCNC: 0.3 MG/DL (ref 0.2–1.2)
BLD GP AB SCN SERPL QL: NEGATIVE
BUN BLD-MCNC: 41 MG/DL (ref 8–23)
BUN/CREAT SERPL: 33.3 (ref 7–25)
CA-I BLD-MCNC: 4.62 MG/DL (ref 4.6–5.4)
CALCIUM SPEC-SCNC: 8.5 MG/DL (ref 8.6–10.5)
CHLORIDE SERPL-SCNC: 107 MMOL/L (ref 98–107)
CO2 SERPL-SCNC: 25 MMOL/L (ref 22–29)
CREAT BLD-MCNC: 1.23 MG/DL (ref 0.76–1.27)
DEPRECATED RDW RBC AUTO: 55.1 FL (ref 37–54)
EOSINOPHIL # BLD AUTO: 0.11 10*3/MM3 (ref 0–0.4)
EOSINOPHIL NFR BLD AUTO: 3.1 % (ref 0.3–6.2)
ERYTHROCYTE [DISTWIDTH] IN BLOOD BY AUTOMATED COUNT: 17 % (ref 12.3–15.4)
GFR SERPL CREATININE-BSD FRML MDRD: 58 ML/MIN/1.73
GLOBULIN UR ELPH-MCNC: 2.5 GM/DL
GLUCOSE BLD-MCNC: 129 MG/DL (ref 65–99)
GLUCOSE BLDC GLUCOMTR-MCNC: 114 MG/DL (ref 70–130)
GLUCOSE BLDC GLUCOMTR-MCNC: 116 MG/DL (ref 70–130)
GLUCOSE BLDC GLUCOMTR-MCNC: 116 MG/DL (ref 70–130)
GLUCOSE BLDC GLUCOMTR-MCNC: 137 MG/DL (ref 70–130)
HCT VFR BLD AUTO: 23.2 % (ref 37.5–51)
HGB BLD-MCNC: 7 G/DL (ref 13–17.7)
IMM GRANULOCYTES # BLD AUTO: 0.01 10*3/MM3 (ref 0–0.05)
IMM GRANULOCYTES NFR BLD AUTO: 0.3 % (ref 0–0.5)
INR PPP: 1.13 (ref 0.91–1.09)
LYMPHOCYTES # BLD AUTO: 0.32 10*3/MM3 (ref 0.7–3.1)
LYMPHOCYTES NFR BLD AUTO: 9 % (ref 19.6–45.3)
Lab: NORMAL
MAGNESIUM SERPL-MCNC: 2.1 MG/DL (ref 1.6–2.4)
MCH RBC QN AUTO: 26.5 PG (ref 26.6–33)
MCHC RBC AUTO-ENTMCNC: 30.2 G/DL (ref 31.5–35.7)
MCV RBC AUTO: 87.9 FL (ref 79–97)
MONOCYTES # BLD AUTO: 0.4 10*3/MM3 (ref 0.1–0.9)
MONOCYTES NFR BLD AUTO: 11.2 % (ref 5–12)
NEUTROPHILS # BLD AUTO: 2.72 10*3/MM3 (ref 1.7–7)
NEUTROPHILS NFR BLD AUTO: 76.1 % (ref 42.7–76)
NRBC BLD AUTO-RTO: 0 /100 WBC (ref 0–0.2)
PHOSPHATE SERPL-MCNC: 3.8 MG/DL (ref 2.5–4.5)
PLATELET # BLD AUTO: 146 10*3/MM3 (ref 140–450)
PMV BLD AUTO: 10.1 FL (ref 6–12)
POTASSIUM BLD-SCNC: 3.9 MMOL/L (ref 3.5–5.2)
PROT SERPL-MCNC: 5.2 G/DL (ref 6–8.5)
PROTHROMBIN TIME: 14.9 SECONDS (ref 11.9–14.6)
RBC # BLD AUTO: 2.64 10*6/MM3 (ref 4.14–5.8)
RH BLD: POSITIVE
SODIUM BLD-SCNC: 140 MMOL/L (ref 136–145)
T&S EXPIRATION DATE: NORMAL
WBC NRBC COR # BLD: 3.57 10*3/MM3 (ref 3.4–10.8)

## 2020-01-25 PROCEDURE — P9016 RBC LEUKOCYTES REDUCED: HCPCS

## 2020-01-25 PROCEDURE — 86900 BLOOD TYPING SEROLOGIC ABO: CPT | Performed by: INTERNAL MEDICINE

## 2020-01-25 PROCEDURE — 25010000002 HALOPERIDOL LACTATE PER 5 MG: Performed by: INTERNAL MEDICINE

## 2020-01-25 PROCEDURE — 86920 COMPATIBILITY TEST SPIN: CPT

## 2020-01-25 PROCEDURE — 84100 ASSAY OF PHOSPHORUS: CPT | Performed by: INTERNAL MEDICINE

## 2020-01-25 PROCEDURE — 25010000002 FUROSEMIDE PER 20 MG: Performed by: NURSE PRACTITIONER

## 2020-01-25 PROCEDURE — 25010000002 HYDRALAZINE PER 20 MG: Performed by: INTERNAL MEDICINE

## 2020-01-25 PROCEDURE — 25010000002 LORAZEPAM PER 2 MG: Performed by: NURSE PRACTITIONER

## 2020-01-25 PROCEDURE — 82330 ASSAY OF CALCIUM: CPT

## 2020-01-25 PROCEDURE — 83735 ASSAY OF MAGNESIUM: CPT | Performed by: INTERNAL MEDICINE

## 2020-01-25 PROCEDURE — 80053 COMPREHEN METABOLIC PANEL: CPT | Performed by: INTERNAL MEDICINE

## 2020-01-25 PROCEDURE — 82962 GLUCOSE BLOOD TEST: CPT

## 2020-01-25 PROCEDURE — 85025 COMPLETE CBC W/AUTO DIFF WBC: CPT | Performed by: INTERNAL MEDICINE

## 2020-01-25 PROCEDURE — 25010000002 HYDROCORTISONE SODIUM SUCCINATE 100 MG RECONSTITUTED SOLUTION: Performed by: NURSE PRACTITIONER

## 2020-01-25 PROCEDURE — 86850 RBC ANTIBODY SCREEN: CPT | Performed by: INTERNAL MEDICINE

## 2020-01-25 PROCEDURE — 25010000002 MIDAZOLAM PER 1 MG: Performed by: INTERNAL MEDICINE

## 2020-01-25 PROCEDURE — 85610 PROTHROMBIN TIME: CPT | Performed by: INTERNAL MEDICINE

## 2020-01-25 PROCEDURE — 25010000002 LORAZEPAM PER 2 MG: Performed by: INTERNAL MEDICINE

## 2020-01-25 PROCEDURE — 86901 BLOOD TYPING SEROLOGIC RH(D): CPT | Performed by: INTERNAL MEDICINE

## 2020-01-25 PROCEDURE — 86900 BLOOD TYPING SEROLOGIC ABO: CPT

## 2020-01-25 RX ORDER — MORPHINE SULFATE 2 MG/ML
2 INJECTION, SOLUTION INTRAMUSCULAR; INTRAVENOUS
Status: DISCONTINUED | OUTPATIENT
Start: 2020-01-25 | End: 2020-02-21 | Stop reason: HOSPADM

## 2020-01-25 RX ORDER — FUROSEMIDE 10 MG/ML
40 INJECTION INTRAMUSCULAR; INTRAVENOUS
Status: DISPENSED | OUTPATIENT
Start: 2020-01-25 | End: 2020-01-26

## 2020-01-25 RX ORDER — DILTIAZEM HYDROCHLORIDE 5 MG/ML
5 INJECTION INTRAVENOUS EVERY 6 HOURS PRN
Status: DISCONTINUED | OUTPATIENT
Start: 2020-01-25 | End: 2020-02-21 | Stop reason: HOSPADM

## 2020-01-25 RX ORDER — LORAZEPAM 2 MG/ML
1 INJECTION INTRAMUSCULAR
Status: DISPENSED | OUTPATIENT
Start: 2020-01-25 | End: 2020-02-04

## 2020-01-25 RX ORDER — ACETAMINOPHEN 650 MG/1
650 SUPPOSITORY RECTAL EVERY 6 HOURS PRN
Status: DISCONTINUED | OUTPATIENT
Start: 2020-01-25 | End: 2020-02-21 | Stop reason: HOSPADM

## 2020-01-25 RX ORDER — MORPHINE SULFATE 2 MG/ML
2 INJECTION, SOLUTION INTRAMUSCULAR; INTRAVENOUS AS NEEDED
Status: DISCONTINUED | OUTPATIENT
Start: 2020-01-25 | End: 2020-01-25

## 2020-01-25 RX ORDER — LORAZEPAM 2 MG/ML
1 INJECTION INTRAMUSCULAR
Status: DISCONTINUED | OUTPATIENT
Start: 2020-01-25 | End: 2020-01-25 | Stop reason: ALTCHOICE

## 2020-01-25 RX ORDER — MIDAZOLAM HYDROCHLORIDE 1 MG/ML
2 INJECTION INTRAMUSCULAR; INTRAVENOUS
Status: DISCONTINUED | OUTPATIENT
Start: 2020-01-25 | End: 2020-02-21 | Stop reason: HOSPADM

## 2020-01-26 LAB
ABO + RH BLD: NORMAL
ABO + RH BLD: NORMAL
BASOPHILS # BLD AUTO: 0.01 10*3/MM3 (ref 0–0.2)
BASOPHILS NFR BLD AUTO: 0.3 % (ref 0–1.5)
BH BB BLOOD EXPIRATION DATE: NORMAL
BH BB BLOOD EXPIRATION DATE: NORMAL
BH BB BLOOD TYPE BARCODE: 5100
BH BB BLOOD TYPE BARCODE: 5100
BH BB DISPENSE STATUS: NORMAL
BH BB DISPENSE STATUS: NORMAL
BH BB PRODUCT CODE: NORMAL
BH BB PRODUCT CODE: NORMAL
BH BB UNIT NUMBER: NORMAL
BH BB UNIT NUMBER: NORMAL
CROSSMATCH INTERPRETATION: NORMAL
CROSSMATCH INTERPRETATION: NORMAL
DEPRECATED RDW RBC AUTO: 49.3 FL (ref 37–54)
EOSINOPHIL # BLD AUTO: 0.1 10*3/MM3 (ref 0–0.4)
EOSINOPHIL NFR BLD AUTO: 3.4 % (ref 0.3–6.2)
ERYTHROCYTE [DISTWIDTH] IN BLOOD BY AUTOMATED COUNT: 16 % (ref 12.3–15.4)
GLUCOSE BLDC GLUCOMTR-MCNC: 118 MG/DL (ref 70–130)
GLUCOSE BLDC GLUCOMTR-MCNC: 125 MG/DL (ref 70–130)
GLUCOSE BLDC GLUCOMTR-MCNC: 137 MG/DL (ref 70–130)
GLUCOSE BLDC GLUCOMTR-MCNC: 201 MG/DL (ref 70–130)
HCT VFR BLD AUTO: 30 % (ref 37.5–51)
HGB BLD-MCNC: 9.7 G/DL (ref 13–17.7)
IMM GRANULOCYTES # BLD AUTO: 0.01 10*3/MM3 (ref 0–0.05)
IMM GRANULOCYTES NFR BLD AUTO: 0.3 % (ref 0–0.5)
INR PPP: 1.05 (ref 0.91–1.09)
LYMPHOCYTES # BLD AUTO: 0.28 10*3/MM3 (ref 0.7–3.1)
LYMPHOCYTES NFR BLD AUTO: 9.7 % (ref 19.6–45.3)
MAGNESIUM SERPL-MCNC: 1.9 MG/DL (ref 1.6–2.4)
MCH RBC QN AUTO: 27.4 PG (ref 26.6–33)
MCHC RBC AUTO-ENTMCNC: 32.3 G/DL (ref 31.5–35.7)
MCV RBC AUTO: 84.7 FL (ref 79–97)
MONOCYTES # BLD AUTO: 0.35 10*3/MM3 (ref 0.1–0.9)
MONOCYTES NFR BLD AUTO: 12.1 % (ref 5–12)
NEUTROPHILS # BLD AUTO: 2.15 10*3/MM3 (ref 1.7–7)
NEUTROPHILS NFR BLD AUTO: 74.2 % (ref 42.7–76)
NRBC BLD AUTO-RTO: 0 /100 WBC (ref 0–0.2)
PHOSPHATE SERPL-MCNC: 2.9 MG/DL (ref 2.5–4.5)
PLATELET # BLD AUTO: 138 10*3/MM3 (ref 140–450)
PMV BLD AUTO: 9.8 FL (ref 6–12)
PROTHROMBIN TIME: 14 SECONDS (ref 11.9–14.6)
RBC # BLD AUTO: 3.54 10*6/MM3 (ref 4.14–5.8)
UNIT  ABO: NORMAL
UNIT  ABO: NORMAL
UNIT  RH: NORMAL
UNIT  RH: NORMAL
WBC NRBC COR # BLD: 2.9 10*3/MM3 (ref 3.4–10.8)

## 2020-01-26 PROCEDURE — 25010000002 HYDROCORTISONE SODIUM SUCCINATE 100 MG RECONSTITUTED SOLUTION: Performed by: NURSE PRACTITIONER

## 2020-01-26 PROCEDURE — 25010000002 LORAZEPAM PER 2 MG: Performed by: NURSE PRACTITIONER

## 2020-01-26 PROCEDURE — 85610 PROTHROMBIN TIME: CPT | Performed by: INTERNAL MEDICINE

## 2020-01-26 PROCEDURE — 63710000001 INSULIN LISPRO (HUMAN) PER 5 UNITS: Performed by: INTERNAL MEDICINE

## 2020-01-26 PROCEDURE — 84100 ASSAY OF PHOSPHORUS: CPT | Performed by: INTERNAL MEDICINE

## 2020-01-26 PROCEDURE — 25010000002 MIDAZOLAM PER 1 MG: Performed by: INTERNAL MEDICINE

## 2020-01-26 PROCEDURE — 25010000002 MORPHINE SULFATE (PF) 2 MG/ML SOLUTION: Performed by: INTERNAL MEDICINE

## 2020-01-26 PROCEDURE — 83735 ASSAY OF MAGNESIUM: CPT | Performed by: INTERNAL MEDICINE

## 2020-01-26 PROCEDURE — 82962 GLUCOSE BLOOD TEST: CPT

## 2020-01-26 PROCEDURE — 85025 COMPLETE CBC W/AUTO DIFF WBC: CPT | Performed by: INTERNAL MEDICINE

## 2020-01-27 ENCOUNTER — APPOINTMENT (OUTPATIENT)
Dept: GENERAL RADIOLOGY | Facility: HOSPITAL | Age: 70
End: 2020-01-27

## 2020-01-27 LAB
ALBUMIN SERPL-MCNC: 3.3 G/DL (ref 3.5–5.2)
ALBUMIN/GLOB SERPL: 1 G/DL
ALP SERPL-CCNC: 91 U/L (ref 39–117)
ALT SERPL W P-5'-P-CCNC: 47 U/L (ref 1–41)
ANION GAP SERPL CALCULATED.3IONS-SCNC: 9 MMOL/L (ref 5–15)
AST SERPL-CCNC: 19 U/L (ref 1–40)
BASOPHILS # BLD AUTO: 0.01 10*3/MM3 (ref 0–0.2)
BASOPHILS NFR BLD AUTO: 0.2 % (ref 0–1.5)
BILIRUB SERPL-MCNC: 0.5 MG/DL (ref 0.2–1.2)
BUN BLD-MCNC: 38 MG/DL (ref 8–23)
BUN/CREAT SERPL: 33.9 (ref 7–25)
CALCIUM SPEC-SCNC: 9.1 MG/DL (ref 8.6–10.5)
CHLORIDE SERPL-SCNC: 105 MMOL/L (ref 98–107)
CO2 SERPL-SCNC: 26 MMOL/L (ref 22–29)
CREAT BLD-MCNC: 1.12 MG/DL (ref 0.76–1.27)
CRP SERPL-MCNC: 9.89 MG/DL (ref 0–0.5)
DEPRECATED RDW RBC AUTO: 51.6 FL (ref 37–54)
EOSINOPHIL # BLD AUTO: 0.22 10*3/MM3 (ref 0–0.4)
EOSINOPHIL NFR BLD AUTO: 4 % (ref 0.3–6.2)
ERYTHROCYTE [DISTWIDTH] IN BLOOD BY AUTOMATED COUNT: 16.2 % (ref 12.3–15.4)
ERYTHROCYTE [SEDIMENTATION RATE] IN BLOOD: 39 MM/HR (ref 0–15)
GFR SERPL CREATININE-BSD FRML MDRD: 65 ML/MIN/1.73
GLOBULIN UR ELPH-MCNC: 3.2 GM/DL
GLUCOSE BLD-MCNC: 101 MG/DL (ref 65–99)
GLUCOSE BLDC GLUCOMTR-MCNC: 108 MG/DL (ref 70–130)
GLUCOSE BLDC GLUCOMTR-MCNC: 110 MG/DL (ref 70–130)
GLUCOSE BLDC GLUCOMTR-MCNC: 169 MG/DL (ref 70–130)
HCT VFR BLD AUTO: 34.8 % (ref 37.5–51)
HGB BLD-MCNC: 10.9 G/DL (ref 13–17.7)
IMM GRANULOCYTES # BLD AUTO: 0.01 10*3/MM3 (ref 0–0.05)
IMM GRANULOCYTES NFR BLD AUTO: 0.2 % (ref 0–0.5)
INR PPP: 0.96 (ref 0.91–1.09)
LYMPHOCYTES # BLD AUTO: 0.54 10*3/MM3 (ref 0.7–3.1)
LYMPHOCYTES NFR BLD AUTO: 9.8 % (ref 19.6–45.3)
MCH RBC QN AUTO: 27 PG (ref 26.6–33)
MCHC RBC AUTO-ENTMCNC: 31.3 G/DL (ref 31.5–35.7)
MCV RBC AUTO: 86.4 FL (ref 79–97)
MONOCYTES # BLD AUTO: 0.85 10*3/MM3 (ref 0.1–0.9)
MONOCYTES NFR BLD AUTO: 15.5 % (ref 5–12)
NEUTROPHILS # BLD AUTO: 3.87 10*3/MM3 (ref 1.7–7)
NEUTROPHILS NFR BLD AUTO: 70.3 % (ref 42.7–76)
NRBC BLD AUTO-RTO: 0 /100 WBC (ref 0–0.2)
PLATELET # BLD AUTO: 163 10*3/MM3 (ref 140–450)
PMV BLD AUTO: 9.9 FL (ref 6–12)
POTASSIUM BLD-SCNC: 4.2 MMOL/L (ref 3.5–5.2)
PREALB SERPL-MCNC: 10.6 MG/DL (ref 20–40)
PROT SERPL-MCNC: 6.5 G/DL (ref 6–8.5)
PROTHROMBIN TIME: 13.1 SECONDS (ref 11.9–14.6)
RBC # BLD AUTO: 4.03 10*6/MM3 (ref 4.14–5.8)
SODIUM BLD-SCNC: 140 MMOL/L (ref 136–145)
TRIGL SERPL-MCNC: 49 MG/DL (ref 0–150)
WBC NRBC COR # BLD: 5.5 10*3/MM3 (ref 3.4–10.8)

## 2020-01-27 PROCEDURE — 80053 COMPREHEN METABOLIC PANEL: CPT | Performed by: INTERNAL MEDICINE

## 2020-01-27 PROCEDURE — 82962 GLUCOSE BLOOD TEST: CPT

## 2020-01-27 PROCEDURE — 74018 RADEX ABDOMEN 1 VIEW: CPT

## 2020-01-27 PROCEDURE — 63710000001 INSULIN LISPRO (HUMAN) PER 5 UNITS: Performed by: INTERNAL MEDICINE

## 2020-01-27 PROCEDURE — 85610 PROTHROMBIN TIME: CPT | Performed by: INTERNAL MEDICINE

## 2020-01-27 PROCEDURE — 84134 ASSAY OF PREALBUMIN: CPT | Performed by: INTERNAL MEDICINE

## 2020-01-27 PROCEDURE — 85651 RBC SED RATE NONAUTOMATED: CPT | Performed by: INTERNAL MEDICINE

## 2020-01-27 PROCEDURE — 25010000002 MIDAZOLAM PER 1 MG: Performed by: INTERNAL MEDICINE

## 2020-01-27 PROCEDURE — 86140 C-REACTIVE PROTEIN: CPT | Performed by: INTERNAL MEDICINE

## 2020-01-27 PROCEDURE — 97110 THERAPEUTIC EXERCISES: CPT | Performed by: OCCUPATIONAL THERAPIST

## 2020-01-27 PROCEDURE — 25010000002 HALOPERIDOL LACTATE PER 5 MG: Performed by: INTERNAL MEDICINE

## 2020-01-27 PROCEDURE — 99232 SBSQ HOSP IP/OBS MODERATE 35: CPT | Performed by: OTOLARYNGOLOGY

## 2020-01-27 PROCEDURE — 25010000002 HYDROCORTISONE SODIUM SUCCINATE 100 MG RECONSTITUTED SOLUTION: Performed by: NURSE PRACTITIONER

## 2020-01-27 PROCEDURE — 25010000002 FUROSEMIDE PER 20 MG: Performed by: INTERNAL MEDICINE

## 2020-01-27 PROCEDURE — 25010000002 MORPHINE SULFATE (PF) 2 MG/ML SOLUTION: Performed by: INTERNAL MEDICINE

## 2020-01-27 PROCEDURE — 85025 COMPLETE CBC W/AUTO DIFF WBC: CPT | Performed by: INTERNAL MEDICINE

## 2020-01-27 PROCEDURE — 92526 ORAL FUNCTION THERAPY: CPT

## 2020-01-27 PROCEDURE — 84478 ASSAY OF TRIGLYCERIDES: CPT | Performed by: INTERNAL MEDICINE

## 2020-01-27 PROCEDURE — 25010000002 LORAZEPAM PER 2 MG: Performed by: NURSE PRACTITIONER

## 2020-01-27 PROCEDURE — 71045 X-RAY EXAM CHEST 1 VIEW: CPT

## 2020-01-27 RX ORDER — FUROSEMIDE 10 MG/ML
40 INJECTION INTRAMUSCULAR; INTRAVENOUS ONCE
Status: DISCONTINUED | OUTPATIENT
Start: 2020-01-27 | End: 2020-02-02

## 2020-01-28 LAB
BASOPHILS # BLD AUTO: 0 10*3/MM3 (ref 0–0.2)
BASOPHILS NFR BLD AUTO: 0 % (ref 0–1.5)
DEPRECATED RDW RBC AUTO: 48.3 FL (ref 37–54)
EOSINOPHIL # BLD AUTO: 0 10*3/MM3 (ref 0–0.4)
EOSINOPHIL NFR BLD AUTO: 0 % (ref 0.3–6.2)
ERYTHROCYTE [DISTWIDTH] IN BLOOD BY AUTOMATED COUNT: 15.9 % (ref 12.3–15.4)
GLUCOSE BLDC GLUCOMTR-MCNC: 108 MG/DL (ref 70–130)
GLUCOSE BLDC GLUCOMTR-MCNC: 113 MG/DL (ref 70–130)
GLUCOSE BLDC GLUCOMTR-MCNC: 149 MG/DL (ref 70–130)
GLUCOSE BLDC GLUCOMTR-MCNC: 92 MG/DL (ref 70–130)
GLUCOSE BLDC GLUCOMTR-MCNC: 94 MG/DL (ref 70–130)
HCT VFR BLD AUTO: 30.8 % (ref 37.5–51)
HGB BLD-MCNC: 9.9 G/DL (ref 13–17.7)
IMM GRANULOCYTES # BLD AUTO: 0.02 10*3/MM3 (ref 0–0.05)
IMM GRANULOCYTES NFR BLD AUTO: 0.5 % (ref 0–0.5)
INR PPP: 0.96 (ref 0.91–1.09)
LYMPHOCYTES # BLD AUTO: 0.2 10*3/MM3 (ref 0.7–3.1)
LYMPHOCYTES NFR BLD AUTO: 4.9 % (ref 19.6–45.3)
MAGNESIUM SERPL-MCNC: 1.8 MG/DL (ref 1.6–2.4)
MCH RBC QN AUTO: 27 PG (ref 26.6–33)
MCHC RBC AUTO-ENTMCNC: 32.1 G/DL (ref 31.5–35.7)
MCV RBC AUTO: 84.2 FL (ref 79–97)
MONOCYTES # BLD AUTO: 0.32 10*3/MM3 (ref 0.1–0.9)
MONOCYTES NFR BLD AUTO: 7.8 % (ref 5–12)
NEUTROPHILS # BLD AUTO: 3.58 10*3/MM3 (ref 1.7–7)
NEUTROPHILS NFR BLD AUTO: 86.8 % (ref 42.7–76)
NRBC BLD AUTO-RTO: 0 /100 WBC (ref 0–0.2)
PHOSPHATE SERPL-MCNC: 4 MG/DL (ref 2.5–4.5)
PLATELET # BLD AUTO: 164 10*3/MM3 (ref 140–450)
PMV BLD AUTO: 9.8 FL (ref 6–12)
PROTHROMBIN TIME: 13.1 SECONDS (ref 11.9–14.6)
RBC # BLD AUTO: 3.66 10*6/MM3 (ref 4.14–5.8)
WBC NRBC COR # BLD: 4.12 10*3/MM3 (ref 3.4–10.8)
WHOLE BLOOD HOLD SPECIMEN: NORMAL

## 2020-01-28 PROCEDURE — 25010000002 MORPHINE SULFATE (PF) 2 MG/ML SOLUTION: Performed by: INTERNAL MEDICINE

## 2020-01-28 PROCEDURE — 83735 ASSAY OF MAGNESIUM: CPT | Performed by: INTERNAL MEDICINE

## 2020-01-28 PROCEDURE — 82962 GLUCOSE BLOOD TEST: CPT

## 2020-01-28 PROCEDURE — 85610 PROTHROMBIN TIME: CPT | Performed by: INTERNAL MEDICINE

## 2020-01-28 PROCEDURE — 99233 SBSQ HOSP IP/OBS HIGH 50: CPT | Performed by: INTERNAL MEDICINE

## 2020-01-28 PROCEDURE — 92526 ORAL FUNCTION THERAPY: CPT

## 2020-01-28 PROCEDURE — 25010000002 HYDRALAZINE PER 20 MG: Performed by: INTERNAL MEDICINE

## 2020-01-28 PROCEDURE — 25010000002 ENOXAPARIN PER 10 MG: Performed by: NURSE PRACTITIONER

## 2020-01-28 PROCEDURE — 84100 ASSAY OF PHOSPHORUS: CPT | Performed by: INTERNAL MEDICINE

## 2020-01-28 PROCEDURE — 25010000002 LORAZEPAM PER 2 MG: Performed by: NURSE PRACTITIONER

## 2020-01-28 PROCEDURE — 97110 THERAPEUTIC EXERCISES: CPT

## 2020-01-28 PROCEDURE — 85025 COMPLETE CBC W/AUTO DIFF WBC: CPT | Performed by: INTERNAL MEDICINE

## 2020-01-28 PROCEDURE — 25010000002 HALOPERIDOL LACTATE PER 5 MG: Performed by: INTERNAL MEDICINE

## 2020-01-28 PROCEDURE — 25010000002 HYDROCORTISONE SODIUM SUCCINATE 100 MG RECONSTITUTED SOLUTION: Performed by: NURSE PRACTITIONER

## 2020-01-28 PROCEDURE — 25010000002 MIDAZOLAM PER 1 MG: Performed by: INTERNAL MEDICINE

## 2020-01-28 RX ORDER — FAMOTIDINE 10 MG/ML
20 INJECTION, SOLUTION INTRAVENOUS EVERY 12 HOURS SCHEDULED
Status: DISCONTINUED | OUTPATIENT
Start: 2020-01-28 | End: 2020-02-14 | Stop reason: SDUPTHER

## 2020-01-29 ENCOUNTER — APPOINTMENT (OUTPATIENT)
Dept: GENERAL RADIOLOGY | Facility: HOSPITAL | Age: 70
End: 2020-01-29

## 2020-01-29 LAB
GLUCOSE BLDC GLUCOMTR-MCNC: 101 MG/DL (ref 70–130)
GLUCOSE BLDC GLUCOMTR-MCNC: 89 MG/DL (ref 70–130)
GLUCOSE BLDC GLUCOMTR-MCNC: 94 MG/DL (ref 70–130)
INR PPP: 0.98 (ref 0.91–1.09)
PROTHROMBIN TIME: 13.3 SECONDS (ref 11.9–14.6)

## 2020-01-29 PROCEDURE — 85610 PROTHROMBIN TIME: CPT | Performed by: INTERNAL MEDICINE

## 2020-01-29 PROCEDURE — 82962 GLUCOSE BLOOD TEST: CPT

## 2020-01-29 PROCEDURE — 99233 SBSQ HOSP IP/OBS HIGH 50: CPT | Performed by: INTERNAL MEDICINE

## 2020-01-29 PROCEDURE — 25010000002 HALOPERIDOL LACTATE PER 5 MG: Performed by: INTERNAL MEDICINE

## 2020-01-29 PROCEDURE — 71045 X-RAY EXAM CHEST 1 VIEW: CPT

## 2020-01-29 PROCEDURE — 25010000002 MORPHINE SULFATE (PF) 2 MG/ML SOLUTION: Performed by: INTERNAL MEDICINE

## 2020-01-29 PROCEDURE — 97530 THERAPEUTIC ACTIVITIES: CPT

## 2020-01-29 PROCEDURE — 25010000002 LORAZEPAM PER 2 MG: Performed by: NURSE PRACTITIONER

## 2020-01-29 PROCEDURE — 99232 SBSQ HOSP IP/OBS MODERATE 35: CPT | Performed by: OTOLARYNGOLOGY

## 2020-01-29 PROCEDURE — 25010000002 ENOXAPARIN PER 10 MG: Performed by: NURSE PRACTITIONER

## 2020-01-29 PROCEDURE — 25010000002 MIDAZOLAM PER 1 MG: Performed by: INTERNAL MEDICINE

## 2020-01-30 LAB
ALBUMIN SERPL-MCNC: 2.4 G/DL (ref 3.5–5.2)
ALBUMIN/GLOB SERPL: 1 G/DL
ALP SERPL-CCNC: 70 U/L (ref 39–117)
ALT SERPL W P-5'-P-CCNC: 33 U/L (ref 1–41)
ANION GAP SERPL CALCULATED.3IONS-SCNC: 8 MMOL/L (ref 5–15)
AST SERPL-CCNC: 19 U/L (ref 1–40)
BASOPHILS # BLD AUTO: 0.01 10*3/MM3 (ref 0–0.2)
BASOPHILS NFR BLD AUTO: 0.3 % (ref 0–1.5)
BILIRUB SERPL-MCNC: 0.5 MG/DL (ref 0.2–1.2)
BUN BLD-MCNC: 33 MG/DL (ref 8–23)
BUN/CREAT SERPL: 34.4 (ref 7–25)
CALCIUM SPEC-SCNC: 8.5 MG/DL (ref 8.6–10.5)
CHLORIDE SERPL-SCNC: 105 MMOL/L (ref 98–107)
CO2 SERPL-SCNC: 26 MMOL/L (ref 22–29)
CREAT BLD-MCNC: 0.96 MG/DL (ref 0.76–1.27)
CRP SERPL-MCNC: 5.65 MG/DL (ref 0–0.5)
DEPRECATED RDW RBC AUTO: 48.9 FL (ref 37–54)
EOSINOPHIL # BLD AUTO: 0.15 10*3/MM3 (ref 0–0.4)
EOSINOPHIL NFR BLD AUTO: 4.2 % (ref 0.3–6.2)
ERYTHROCYTE [DISTWIDTH] IN BLOOD BY AUTOMATED COUNT: 15.6 % (ref 12.3–15.4)
ERYTHROCYTE [SEDIMENTATION RATE] IN BLOOD: 14 MM/HR (ref 0–15)
GFR SERPL CREATININE-BSD FRML MDRD: 78 ML/MIN/1.73
GLOBULIN UR ELPH-MCNC: 2.4 GM/DL
GLUCOSE BLD-MCNC: 119 MG/DL (ref 65–99)
GLUCOSE BLDC GLUCOMTR-MCNC: 109 MG/DL (ref 70–130)
GLUCOSE BLDC GLUCOMTR-MCNC: 113 MG/DL (ref 70–130)
GLUCOSE BLDC GLUCOMTR-MCNC: 120 MG/DL (ref 70–130)
GLUCOSE BLDC GLUCOMTR-MCNC: 122 MG/DL (ref 70–130)
GLUCOSE BLDC GLUCOMTR-MCNC: 97 MG/DL (ref 70–130)
HCT VFR BLD AUTO: 28.2 % (ref 37.5–51)
HGB BLD-MCNC: 9 G/DL (ref 13–17.7)
IMM GRANULOCYTES # BLD AUTO: 0.02 10*3/MM3 (ref 0–0.05)
IMM GRANULOCYTES NFR BLD AUTO: 0.6 % (ref 0–0.5)
INR PPP: 1.05 (ref 0.91–1.09)
LYMPHOCYTES # BLD AUTO: 0.32 10*3/MM3 (ref 0.7–3.1)
LYMPHOCYTES NFR BLD AUTO: 9 % (ref 19.6–45.3)
MAGNESIUM SERPL-MCNC: 1.8 MG/DL (ref 1.6–2.4)
MCH RBC QN AUTO: 27.6 PG (ref 26.6–33)
MCHC RBC AUTO-ENTMCNC: 31.9 G/DL (ref 31.5–35.7)
MCV RBC AUTO: 86.5 FL (ref 79–97)
MONOCYTES # BLD AUTO: 0.56 10*3/MM3 (ref 0.1–0.9)
MONOCYTES NFR BLD AUTO: 15.7 % (ref 5–12)
NEUTROPHILS # BLD AUTO: 2.5 10*3/MM3 (ref 1.7–7)
NEUTROPHILS NFR BLD AUTO: 70.2 % (ref 42.7–76)
NRBC BLD AUTO-RTO: 0 /100 WBC (ref 0–0.2)
PHOSPHATE SERPL-MCNC: 3.8 MG/DL (ref 2.5–4.5)
PLATELET # BLD AUTO: 148 10*3/MM3 (ref 140–450)
PMV BLD AUTO: 10.1 FL (ref 6–12)
POTASSIUM BLD-SCNC: 4 MMOL/L (ref 3.5–5.2)
PROT SERPL-MCNC: 4.8 G/DL (ref 6–8.5)
PROTHROMBIN TIME: 14 SECONDS (ref 11.9–14.6)
RBC # BLD AUTO: 3.26 10*6/MM3 (ref 4.14–5.8)
SODIUM BLD-SCNC: 139 MMOL/L (ref 136–145)
WBC NRBC COR # BLD: 3.56 10*3/MM3 (ref 3.4–10.8)

## 2020-01-30 PROCEDURE — 85025 COMPLETE CBC W/AUTO DIFF WBC: CPT | Performed by: NURSE PRACTITIONER

## 2020-01-30 PROCEDURE — 83735 ASSAY OF MAGNESIUM: CPT | Performed by: INTERNAL MEDICINE

## 2020-01-30 PROCEDURE — 80053 COMPREHEN METABOLIC PANEL: CPT | Performed by: NURSE PRACTITIONER

## 2020-01-30 PROCEDURE — 99233 SBSQ HOSP IP/OBS HIGH 50: CPT | Performed by: INTERNAL MEDICINE

## 2020-01-30 PROCEDURE — 25010000002 MORPHINE SULFATE (PF) 2 MG/ML SOLUTION: Performed by: INTERNAL MEDICINE

## 2020-01-30 PROCEDURE — 25010000002 LORAZEPAM PER 2 MG: Performed by: NURSE PRACTITIONER

## 2020-01-30 PROCEDURE — 25010000002 MIDAZOLAM PER 1 MG: Performed by: INTERNAL MEDICINE

## 2020-01-30 PROCEDURE — 82962 GLUCOSE BLOOD TEST: CPT

## 2020-01-30 PROCEDURE — 84100 ASSAY OF PHOSPHORUS: CPT | Performed by: INTERNAL MEDICINE

## 2020-01-30 PROCEDURE — 92526 ORAL FUNCTION THERAPY: CPT | Performed by: SPEECH-LANGUAGE PATHOLOGIST

## 2020-01-30 PROCEDURE — 85651 RBC SED RATE NONAUTOMATED: CPT | Performed by: NURSE PRACTITIONER

## 2020-01-30 PROCEDURE — 25010000002 ENOXAPARIN PER 10 MG: Performed by: NURSE PRACTITIONER

## 2020-01-30 PROCEDURE — 85610 PROTHROMBIN TIME: CPT | Performed by: INTERNAL MEDICINE

## 2020-01-30 PROCEDURE — 25010000002 HALOPERIDOL LACTATE PER 5 MG: Performed by: INTERNAL MEDICINE

## 2020-01-30 PROCEDURE — 86140 C-REACTIVE PROTEIN: CPT | Performed by: NURSE PRACTITIONER

## 2020-01-30 RX ORDER — BUPIVACAINE HCL/0.9 % NACL/PF 0.1 %
2 PLASTIC BAG, INJECTION (ML) EPIDURAL ONCE
Status: CANCELLED | OUTPATIENT
Start: 2020-01-30 | End: 2020-01-30

## 2020-01-30 RX ORDER — DIAPER,BRIEF,INFANT-TODD,DISP
EACH MISCELLANEOUS EVERY 12 HOURS SCHEDULED
Status: DISCONTINUED | OUTPATIENT
Start: 2020-01-30 | End: 2020-02-21 | Stop reason: HOSPADM

## 2020-01-31 ENCOUNTER — ANESTHESIA EVENT (OUTPATIENT)
Dept: PERIOP | Facility: HOSPITAL | Age: 70
End: 2020-01-31

## 2020-01-31 ENCOUNTER — ANESTHESIA (OUTPATIENT)
Dept: PERIOP | Facility: HOSPITAL | Age: 70
End: 2020-01-31

## 2020-01-31 LAB
GLUCOSE BLDC GLUCOMTR-MCNC: 108 MG/DL (ref 70–130)
GLUCOSE BLDC GLUCOMTR-MCNC: 123 MG/DL (ref 70–130)
GLUCOSE BLDC GLUCOMTR-MCNC: 127 MG/DL (ref 70–130)
HOLD SPECIMEN: NORMAL
INR PPP: 1.08 (ref 0.91–1.09)
PROTHROMBIN TIME: 14.4 SECONDS (ref 11.9–14.6)
WHOLE BLOOD HOLD SPECIMEN: NORMAL

## 2020-01-31 PROCEDURE — 25010000002 MIDAZOLAM PER 1 MG: Performed by: ANESTHESIOLOGY

## 2020-01-31 PROCEDURE — 25010000002 CEFTRIAXONE PER 250 MG: Performed by: NURSE ANESTHETIST, CERTIFIED REGISTERED

## 2020-01-31 PROCEDURE — 99233 SBSQ HOSP IP/OBS HIGH 50: CPT | Performed by: INTERNAL MEDICINE

## 2020-01-31 PROCEDURE — 25010000002 HALOPERIDOL LACTATE PER 5 MG: Performed by: INTERNAL MEDICINE

## 2020-01-31 PROCEDURE — 25010000002 PROPOFOL 10 MG/ML EMULSION: Performed by: NURSE ANESTHETIST, CERTIFIED REGISTERED

## 2020-01-31 PROCEDURE — P9041 ALBUMIN (HUMAN),5%, 50ML: HCPCS | Performed by: NURSE ANESTHETIST, CERTIFIED REGISTERED

## 2020-01-31 PROCEDURE — 25010000002 ENOXAPARIN PER 10 MG: Performed by: NURSE PRACTITIONER

## 2020-01-31 PROCEDURE — 25010000002 ALBUMIN HUMAN 5% PER 50 ML: Performed by: NURSE ANESTHETIST, CERTIFIED REGISTERED

## 2020-01-31 PROCEDURE — 85610 PROTHROMBIN TIME: CPT | Performed by: INTERNAL MEDICINE

## 2020-01-31 PROCEDURE — C1765 ADHESION BARRIER: HCPCS | Performed by: SPECIALIST

## 2020-01-31 PROCEDURE — 82962 GLUCOSE BLOOD TEST: CPT

## 2020-01-31 PROCEDURE — 25010000002 LORAZEPAM PER 2 MG: Performed by: NURSE PRACTITIONER

## 2020-01-31 PROCEDURE — 25010000002 MORPHINE SULFATE (PF) 2 MG/ML SOLUTION: Performed by: INTERNAL MEDICINE

## 2020-01-31 RX ORDER — LIDOCAINE HYDROCHLORIDE 10 MG/ML
0.5 INJECTION, SOLUTION EPIDURAL; INFILTRATION; INTRACAUDAL; PERINEURAL ONCE AS NEEDED
Status: DISCONTINUED | OUTPATIENT
Start: 2020-01-31 | End: 2020-01-31 | Stop reason: HOSPADM

## 2020-01-31 RX ORDER — LIDOCAINE HYDROCHLORIDE 20 MG/ML
INJECTION, SOLUTION INFILTRATION; PERINEURAL AS NEEDED
Status: DISCONTINUED | OUTPATIENT
Start: 2020-01-31 | End: 2020-01-31 | Stop reason: SURG

## 2020-01-31 RX ORDER — ROCURONIUM BROMIDE 10 MG/ML
INJECTION, SOLUTION INTRAVENOUS AS NEEDED
Status: DISCONTINUED | OUTPATIENT
Start: 2020-01-31 | End: 2020-01-31 | Stop reason: SURG

## 2020-01-31 RX ORDER — SODIUM CHLORIDE 0.9 % (FLUSH) 0.9 %
10 SYRINGE (ML) INJECTION AS NEEDED
Status: DISCONTINUED | OUTPATIENT
Start: 2020-01-31 | End: 2020-01-31 | Stop reason: HOSPADM

## 2020-01-31 RX ORDER — CALCIUM CHLORIDE 100 MG/ML
INJECTION INTRAVENOUS; INTRAVENTRICULAR AS NEEDED
Status: DISCONTINUED | OUTPATIENT
Start: 2020-01-31 | End: 2020-01-31 | Stop reason: SURG

## 2020-01-31 RX ORDER — FLUMAZENIL 0.1 MG/ML
0.2 INJECTION INTRAVENOUS AS NEEDED
Status: DISCONTINUED | OUTPATIENT
Start: 2020-01-31 | End: 2020-01-31 | Stop reason: HOSPADM

## 2020-01-31 RX ORDER — SODIUM CHLORIDE 0.9 % (FLUSH) 0.9 %
10 SYRINGE (ML) INJECTION EVERY 12 HOURS SCHEDULED
Status: DISCONTINUED | OUTPATIENT
Start: 2020-01-31 | End: 2020-01-31 | Stop reason: HOSPADM

## 2020-01-31 RX ORDER — ALBUMIN, HUMAN INJ 5% 5 %
SOLUTION INTRAVENOUS CONTINUOUS PRN
Status: DISCONTINUED | OUTPATIENT
Start: 2020-01-31 | End: 2020-01-31 | Stop reason: SURG

## 2020-01-31 RX ORDER — SODIUM CHLORIDE, SODIUM LACTATE, POTASSIUM CHLORIDE, CALCIUM CHLORIDE 600; 310; 30; 20 MG/100ML; MG/100ML; MG/100ML; MG/100ML
9 INJECTION, SOLUTION INTRAVENOUS CONTINUOUS
Status: DISCONTINUED | OUTPATIENT
Start: 2020-01-31 | End: 2020-01-31

## 2020-01-31 RX ORDER — CEFTRIAXONE 1 G/1
INJECTION, POWDER, FOR SOLUTION INTRAMUSCULAR; INTRAVENOUS AS NEEDED
Status: DISCONTINUED | OUTPATIENT
Start: 2020-01-31 | End: 2020-01-31 | Stop reason: SURG

## 2020-01-31 RX ORDER — OXYCODONE AND ACETAMINOPHEN 10; 325 MG/1; MG/1
1 TABLET ORAL ONCE AS NEEDED
Status: DISCONTINUED | OUTPATIENT
Start: 2020-01-31 | End: 2020-01-31 | Stop reason: HOSPADM

## 2020-01-31 RX ORDER — NALOXONE HCL 0.4 MG/ML
0.04 VIAL (ML) INJECTION AS NEEDED
Status: DISCONTINUED | OUTPATIENT
Start: 2020-01-31 | End: 2020-01-31 | Stop reason: HOSPADM

## 2020-01-31 RX ORDER — FENTANYL CITRATE 50 UG/ML
25 INJECTION, SOLUTION INTRAMUSCULAR; INTRAVENOUS
Status: DISCONTINUED | OUTPATIENT
Start: 2020-01-31 | End: 2020-01-31 | Stop reason: HOSPADM

## 2020-01-31 RX ORDER — LABETALOL HYDROCHLORIDE 5 MG/ML
5 INJECTION, SOLUTION INTRAVENOUS
Status: DISCONTINUED | OUTPATIENT
Start: 2020-01-31 | End: 2020-01-31 | Stop reason: HOSPADM

## 2020-01-31 RX ORDER — PROPOFOL 10 MG/ML
VIAL (ML) INTRAVENOUS AS NEEDED
Status: DISCONTINUED | OUTPATIENT
Start: 2020-01-31 | End: 2020-01-31 | Stop reason: SURG

## 2020-01-31 RX ORDER — ONDANSETRON 2 MG/ML
4 INJECTION INTRAMUSCULAR; INTRAVENOUS AS NEEDED
Status: DISCONTINUED | OUTPATIENT
Start: 2020-01-31 | End: 2020-01-31 | Stop reason: HOSPADM

## 2020-01-31 RX ORDER — METOCLOPRAMIDE HYDROCHLORIDE 5 MG/ML
5 INJECTION INTRAMUSCULAR; INTRAVENOUS
Status: DISCONTINUED | OUTPATIENT
Start: 2020-01-31 | End: 2020-01-31 | Stop reason: HOSPADM

## 2020-01-31 RX ORDER — PHENYLEPHRINE HCL IN 0.9% NACL 0.8MG/10ML
SYRINGE (ML) INTRAVENOUS AS NEEDED
Status: DISCONTINUED | OUTPATIENT
Start: 2020-01-31 | End: 2020-01-31 | Stop reason: SURG

## 2020-01-31 RX ORDER — IPRATROPIUM BROMIDE AND ALBUTEROL SULFATE 2.5; .5 MG/3ML; MG/3ML
3 SOLUTION RESPIRATORY (INHALATION) ONCE AS NEEDED
Status: DISCONTINUED | OUTPATIENT
Start: 2020-01-31 | End: 2020-01-31 | Stop reason: HOSPADM

## 2020-01-31 RX ORDER — MIDAZOLAM HYDROCHLORIDE 1 MG/ML
1 INJECTION INTRAMUSCULAR; INTRAVENOUS
Status: DISCONTINUED | OUTPATIENT
Start: 2020-01-31 | End: 2020-01-31 | Stop reason: HOSPADM

## 2020-01-31 RX ORDER — HYDRALAZINE HYDROCHLORIDE 20 MG/ML
5 INJECTION INTRAMUSCULAR; INTRAVENOUS
Status: DISCONTINUED | OUTPATIENT
Start: 2020-01-31 | End: 2020-01-31 | Stop reason: HOSPADM

## 2020-01-31 RX ORDER — FENTANYL CITRATE 50 UG/ML
25 INJECTION, SOLUTION INTRAMUSCULAR; INTRAVENOUS AS NEEDED
Status: DISCONTINUED | OUTPATIENT
Start: 2020-01-31 | End: 2020-01-31 | Stop reason: HOSPADM

## 2020-01-31 RX ORDER — MAGNESIUM HYDROXIDE 1200 MG/15ML
LIQUID ORAL AS NEEDED
Status: DISCONTINUED | OUTPATIENT
Start: 2020-01-31 | End: 2020-01-31 | Stop reason: HOSPADM

## 2020-01-31 RX ORDER — MIDAZOLAM HYDROCHLORIDE 1 MG/ML
2 INJECTION INTRAMUSCULAR; INTRAVENOUS
Status: DISCONTINUED | OUTPATIENT
Start: 2020-01-31 | End: 2020-01-31 | Stop reason: HOSPADM

## 2020-01-31 RX ORDER — MORPHINE SULFATE 2 MG/ML
2 INJECTION, SOLUTION INTRAMUSCULAR; INTRAVENOUS
Status: DISCONTINUED | OUTPATIENT
Start: 2020-01-31 | End: 2020-01-31 | Stop reason: HOSPADM

## 2020-01-31 RX ORDER — DEXTROSE MONOHYDRATE 25 G/50ML
12.5 INJECTION, SOLUTION INTRAVENOUS AS NEEDED
Status: DISCONTINUED | OUTPATIENT
Start: 2020-01-31 | End: 2020-01-31 | Stop reason: HOSPADM

## 2020-01-31 RX ADMIN — LIDOCAINE HYDROCHLORIDE 100 MG: 20 INJECTION, SOLUTION INFILTRATION; PERINEURAL at 16:32

## 2020-01-31 RX ADMIN — Medication 160 MCG: at 16:38

## 2020-01-31 RX ADMIN — PROPOFOL 50 MG: 10 INJECTION, EMULSION INTRAVENOUS at 16:32

## 2020-01-31 RX ADMIN — CALCIUM CHLORIDE 0.5 G: 100 INJECTION, SOLUTION INTRAVENOUS; INTRAVENTRICULAR at 16:37

## 2020-01-31 RX ADMIN — MIDAZOLAM 2 MG: 1 INJECTION INTRAMUSCULAR; INTRAVENOUS at 16:06

## 2020-01-31 RX ADMIN — ALBUMIN HUMAN: 0.05 INJECTION, SOLUTION INTRAVENOUS at 16:47

## 2020-01-31 RX ADMIN — CALCIUM CHLORIDE 0.5 G: 100 INJECTION, SOLUTION INTRAVENOUS; INTRAVENTRICULAR at 16:32

## 2020-01-31 RX ADMIN — CEFTRIAXONE SODIUM 1 G: 1 INJECTION, POWDER, FOR SOLUTION INTRAMUSCULAR; INTRAVENOUS at 16:29

## 2020-01-31 RX ADMIN — ROCURONIUM BROMIDE 50 MG: 10 INJECTION INTRAVENOUS at 16:32

## 2020-01-31 RX ADMIN — Medication 160 MCG: at 16:34

## 2020-01-31 RX ADMIN — SODIUM CHLORIDE, POTASSIUM CHLORIDE, SODIUM LACTATE AND CALCIUM CHLORIDE 9 ML/HR: 600; 310; 30; 20 INJECTION, SOLUTION INTRAVENOUS at 16:19

## 2020-01-31 RX ADMIN — Medication 160 MCG: at 16:36

## 2020-02-01 LAB
GLUCOSE BLDC GLUCOMTR-MCNC: 127 MG/DL (ref 70–130)
GLUCOSE BLDC GLUCOMTR-MCNC: 134 MG/DL (ref 70–130)
GLUCOSE BLDC GLUCOMTR-MCNC: 141 MG/DL (ref 70–130)
GLUCOSE BLDC GLUCOMTR-MCNC: 151 MG/DL (ref 70–130)
GLUCOSE BLDC GLUCOMTR-MCNC: 152 MG/DL (ref 70–130)
INR PPP: 1.08 (ref 0.91–1.09)
MAGNESIUM SERPL-MCNC: 1.8 MG/DL (ref 1.6–2.4)
PHOSPHATE SERPL-MCNC: 4.2 MG/DL (ref 2.5–4.5)
PROTHROMBIN TIME: 14.4 SECONDS (ref 11.9–14.6)

## 2020-02-01 PROCEDURE — 25010000002 ENOXAPARIN PER 10 MG: Performed by: SPECIALIST

## 2020-02-01 PROCEDURE — 85610 PROTHROMBIN TIME: CPT | Performed by: SPECIALIST

## 2020-02-01 PROCEDURE — 99233 SBSQ HOSP IP/OBS HIGH 50: CPT | Performed by: INTERNAL MEDICINE

## 2020-02-01 PROCEDURE — 25010000002 HYDRALAZINE PER 20 MG: Performed by: SPECIALIST

## 2020-02-01 PROCEDURE — 25010000002 MIDAZOLAM PER 1 MG: Performed by: SPECIALIST

## 2020-02-01 PROCEDURE — 84100 ASSAY OF PHOSPHORUS: CPT | Performed by: SPECIALIST

## 2020-02-01 PROCEDURE — 82962 GLUCOSE BLOOD TEST: CPT

## 2020-02-01 PROCEDURE — 25010000002 FUROSEMIDE PER 20 MG: Performed by: INTERNAL MEDICINE

## 2020-02-01 PROCEDURE — 83735 ASSAY OF MAGNESIUM: CPT | Performed by: SPECIALIST

## 2020-02-01 PROCEDURE — 25010000002 LORAZEPAM PER 2 MG: Performed by: SPECIALIST

## 2020-02-01 PROCEDURE — 25010000002 MORPHINE SULFATE (PF) 2 MG/ML SOLUTION: Performed by: SPECIALIST

## 2020-02-01 RX ORDER — METOPROLOL SUCCINATE 25 MG/1
25 TABLET, EXTENDED RELEASE ORAL
Status: DISCONTINUED | OUTPATIENT
Start: 2020-02-02 | End: 2020-02-01

## 2020-02-01 RX ORDER — FUROSEMIDE 10 MG/ML
40 INJECTION INTRAMUSCULAR; INTRAVENOUS ONCE
Status: DISCONTINUED | OUTPATIENT
Start: 2020-02-01 | End: 2020-02-02

## 2020-02-01 RX ORDER — RISPERIDONE 0.5 MG/1
0.75 TABLET ORAL 3 TIMES DAILY
Status: DISCONTINUED | OUTPATIENT
Start: 2020-02-01 | End: 2020-02-21 | Stop reason: HOSPADM

## 2020-02-01 RX ORDER — LANOLIN ALCOHOL/MO/W.PET/CERES
10 CREAM (GRAM) TOPICAL NIGHTLY
Status: DISCONTINUED | OUTPATIENT
Start: 2020-02-01 | End: 2020-02-21 | Stop reason: HOSPADM

## 2020-02-01 RX ORDER — ACETAZOLAMIDE 250 MG/1
250 TABLET ORAL 2 TIMES DAILY
Status: DISCONTINUED | OUTPATIENT
Start: 2020-02-01 | End: 2020-02-15

## 2020-02-01 RX ORDER — ACETAMINOPHEN 160 MG/5ML
650 SOLUTION ORAL EVERY 4 HOURS PRN
Status: DISCONTINUED | OUTPATIENT
Start: 2020-02-01 | End: 2020-02-21 | Stop reason: HOSPADM

## 2020-02-01 RX ORDER — FUROSEMIDE 40 MG/1
40 TABLET ORAL DAILY
Status: DISCONTINUED | OUTPATIENT
Start: 2020-02-02 | End: 2020-02-15

## 2020-02-01 RX ORDER — FERROUS SULFATE 325(65) MG
325 TABLET ORAL
Status: DISCONTINUED | OUTPATIENT
Start: 2020-02-02 | End: 2020-02-21 | Stop reason: HOSPADM

## 2020-02-01 RX ORDER — SACCHAROMYCES BOULARDII 250 MG
250 CAPSULE ORAL DAILY
Status: DISCONTINUED | OUTPATIENT
Start: 2020-02-01 | End: 2020-02-21 | Stop reason: HOSPADM

## 2020-02-01 RX ORDER — TIZANIDINE 4 MG/1
4 TABLET ORAL EVERY 8 HOURS SCHEDULED
Status: DISCONTINUED | OUTPATIENT
Start: 2020-02-01 | End: 2020-02-21 | Stop reason: HOSPADM

## 2020-02-01 RX ORDER — MEMANTINE HYDROCHLORIDE 5 MG/1
10 TABLET ORAL EVERY 12 HOURS SCHEDULED
Status: DISCONTINUED | OUTPATIENT
Start: 2020-02-01 | End: 2020-02-21 | Stop reason: HOSPADM

## 2020-02-01 RX ORDER — DONEPEZIL HYDROCHLORIDE 10 MG/1
10 TABLET, FILM COATED ORAL NIGHTLY
Status: DISCONTINUED | OUTPATIENT
Start: 2020-02-01 | End: 2020-02-21 | Stop reason: HOSPADM

## 2020-02-01 RX ORDER — TRAZODONE HYDROCHLORIDE 50 MG/1
50 TABLET ORAL NIGHTLY
Status: DISCONTINUED | OUTPATIENT
Start: 2020-02-01 | End: 2020-02-21 | Stop reason: HOSPADM

## 2020-02-01 RX ORDER — PANTOPRAZOLE SODIUM 40 MG/1
40 TABLET, DELAYED RELEASE ORAL
Status: DISCONTINUED | OUTPATIENT
Start: 2020-02-02 | End: 2020-02-03 | Stop reason: ALTCHOICE

## 2020-02-01 RX ORDER — MULTIVIT,CALC,MINS/IRON/FOLIC 9MG-400MCG
1 TABLET ORAL DAILY
Status: DISCONTINUED | OUTPATIENT
Start: 2020-02-02 | End: 2020-02-21 | Stop reason: HOSPADM

## 2020-02-01 NOTE — PROGRESS NOTES
PULMONARY AND CRITICAL CARE PROGRESS NOTE - [facility]    Patient: Shawn MANZANO Ordavis    1950   [id number]  01/31/20   7:09 PM  Referring Provider: Bautista Alcantar MD  Chief Complaint: Mechanically ventilated  Interval history: He remains intubated via trach.  Hands are index and he is sedated and is not pulling at anything today.  He cannot provide any history.  He is asleep.  He is tachypneic but is without ventilator dyssynchrony.  Nursing reports no additional problems.  Patient is scheduled for gastric tube today.  Review of Systems:   Cannot obtain due to mechanical ventilation.  The patient notably is critically ill and connected to a ventilator.  As such patient cannot communicate and provide any history whatsoever, including any history of present illness or interval history since arrival or review of systems. The interested reviewer may note this fact, as an attempt has been made at collecting and documenting these portions of the patient history, but this information is unobtainable despite attempted review and therefore cannot be documented at this time.   Ventilator Settings: Mode: Pressure support 20/5, FiO2 0.30    Physical Exam:  Vital signs: Pulse 83 respirations 30 saturation 100 end-tidal CO2 32 blood pressure 175/90 1 minute volume 19 temperature 99.7  Physical Exam   Constitutional: He appears cachectic. He is sleeping. He has a sickly appearance. He appears ill. No distress. He is intubated and restrained.   HENT:   Head: Normocephalic and atraumatic.   Nose: Nose normal.   Feeding tube   Eyes: Pupils are equal, round, and reactive to light. No scleral icterus.   Neck:   Tracheostomy to vent   Cardiovascular: Normal rate and regular rhythm.   No murmur heard.  Pulmonary/Chest: Effort normal. No accessory muscle usage. Tachypnea noted. He is intubated. No respiratory distress. He has wheezes. He has no rhonchi. He has no rales.   No vent dyssynchrony noted    Abdominal: Soft. He exhibits no distension.   Genitourinary:   Genitourinary Comments: Judd catheter   Musculoskeletal: He exhibits no edema.   Neurological:    Intubated via trach, will not follow commands    Skin: Skin is warm and dry. He is not diaphoretic.   Psychiatric: His mood appears not anxious. He is slowed. He is noncommunicative.   Nursing note and vitals reviewed.    Results from last 7 days   Lab Units 01/30/20  0456 01/28/20  1643 01/27/20  0512   WBC 10*3/mm3 3.56 4.12 5.50   HEMOGLOBIN g/dL 9.0* 9.9* 10.9*   PLATELETS 10*3/mm3 148 164 163     Results from last 7 days   Lab Units 01/30/20  0456 01/27/20  0512 01/25/20  0429   SODIUM mmol/L 139 140 140   POTASSIUM mmol/L 4.0 4.2 3.9   BUN mg/dL 33* 38* 41*   CREATININE mg/dL 0.96 1.12 1.23     Recent films:    No radiology results for the last day Films reviewed personally by me.  No new films since January 29.  Clear field that day.  Pulmonary Assessment:  1. Respiratory failure requiring mechanical ventilation persistent and not improved and aggravated by psychiatric problems  2. 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 Pulmonary embolism, right treated and stable  4. COPD chronic and compensated with current medications  5. Dementia unimproved, chronic, unlikely to improve  6. Anemia, stable  Recommend:   · Continue ventilator as is.  He has been moved back to assist-control mode with a rate of 10 tidal volume 450 PEEP of 5.  With this his minute volume is 19 L.  He is unable to liberate from the ventilator with all of this.  · Continue chest x-ray and ABG, PRN.   · Continue nutrition   · Daily therapy with PT, OT and speech although goals are extremely limited  · Out of bed to neuro chair as tolerated  · Continue bronchodilator treatments.  · DVT and stress ulcer prophylaxis.      Electronically signed by Manuel Hensley MD on 1/31/2020 at 7:09 PM

## 2020-02-01 NOTE — OP NOTE
Preoperative diagnosis:  Dysphagia  Postoperative diagnosis:  Same  Procedure:  Open gastrostomy tube placement  Surgeon: Selina Augustine MD  Anesthesia;  Get  Ebl:  Minimal  Ivf:  See anesthesia  Indications: the patient is a 69-year-old male who is status post pecutaneously-placed gastrostomy; however, it was dislodged.  The fistula has been allowed to close.  He now presents for open replacement.  The risks, benefits, complications, and possible alternatives were discussed with the power of  who agreed to proceed.  Description of procedure:  The patient was laid supine. The abdomen was prepped and draped.  The midline supraumbilical incision was created.  The lesser curvature of the stomach was found to be involved in the fistula to the midline abdominal wall.  The anterior body of the stomach was grasped with [place] clamps. Two pursestring sutures were placed with 2-0 silk.  A gastrotomy was created.  A 22F BREA Gastrostomy tube was brought thru the left upper quadrant abdominal wall that was created with bovie.  The tube was placed into the stomach.  Ten cc's of saline was placed into the balloon and the pursestring sutures were cinched down to the tube.  The stomach was then anchored to the deep surface of the abdominal wall with 2-0 silk x 3.  The flange of the G tube was placed flush with the abdominal wall.  The fascia of the abdominal wall was closed with#1 looped PDS.  The skin was closed with 3-0 and 4-0 vicyrl. The flange was anchored to the skin with 2-0 prolene.  The sponge, needle, and instrument counts were correct.  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

## 2020-02-01 NOTE — PROGRESS NOTES
Selina Augustine MD FACS  Progress Note     LOS: 0 days   Patient Care Team:  Tj Wilson MD as PCP - General (Family Medicine)  John Glez MD as Consulting Physician (Otolaryngology)  Hayden Rosario PA as Physician Assistant (Otolaryngology)      Subjective     Interval History:      sedated, on vent.  No events     Objective     Vital Signs  Temp:  [98.1 °F (36.7 °C)-98.6 °F (37 °C)] 98.6 °F (37 °C)  Heart Rate:  [68-80] 71  Resp:  [18-38] 23  BP: (116-152)/(52-60) 124/54    Physical Exam:  General appearance - sedated, on vent  Abdomen - soft, clean, tube intact      Results Review:    Lab Results (last 24 hours)     Procedure Component Value Units Date/Time    POC Glucose Once [[id number]]  (Abnormal) Collected:  02/01/20 1134    Specimen:  Blood Updated:  02/01/20 1202     Glucose 141 mg/dL      Comment: : JACOB Gomes ID: [id number]       POC Glucose Once [[id number]]  (Abnormal) Collected:  02/01/20 0609    Specimen:  Blood Updated:  02/01/20 0622     Glucose 151 mg/dL      Comment: : BESSIE SalmeronMetsulma ID: [id number]       Phosphorus [[id number]]  (Normal) Collected:  02/01/20 0506    Specimen:  Blood Updated:  02/01/20 0545     Phosphorus 4.2 mg/dL     Magnesium [[id number]]  (Normal) Collected:  02/01/20 0506    Specimen:  Blood Updated:  02/01/20 0545     Magnesium 1.8 mg/dL     Protime-INR [[id number]]  (Normal) Collected:  02/01/20 0506    Specimen:  Blood Updated:  02/01/20 0536     Protime 14.4 Seconds      INR 1.08    POC Glucose Once [[id number]]  (Abnormal) Collected:  02/01/20 0033    Specimen:  Blood Updated:  02/01/20 0103     Glucose 152 mg/dL      Comment: : BESSIE SalmeronMeter ID: [id number]       POC Glucose Once [[id number]]  (Normal) Collected:  01/31/20 1843    Specimen:  Blood Updated:  01/31/20 1900     Glucose 108 mg/dL      Comment: : JACOB Gee [facility] ID: [id number]           Imaging  Results (Last 24 Hours)     ** No results found for the last 24 hours. **            Assessment/Plan       Use for meds today.  Ok to start tube feeds on Sunday.      Selina Augustine MD  02/01/20  2:58 PM

## 2020-02-01 NOTE — PROGRESS NOTES
PULMONARY AND CRITICAL CARE PROGRESS NOTE - [facility]    [facility]: Shawn Turner    1950   [id number]  02/01/20   12:42 PM  Referring Provider: Bautista Alcantar MD  Chief Complaint: Mechanically ventilated  Interval history: He remains mechanically ventilated, status post replacement of G-tube yesterday.  He is sedated and cannot provide any history.  Nursing reports no additional problems.  Review of Systems:   Cannot obtain due to mechanical ventilation.  The patient notably is critically ill and connected to a ventilator.  As such patient cannot communicate and provide any history whatsoever, including any history of present illness or interval history since arrival or review of systems. The interested reviewer may note this fact, as an attempt has been made at collecting and documenting these portions of the patient history, but this information is unobtainable despite attempted review and therefore cannot be documented at this time.   Ventilator Settings: Mode: Assist control volume control on arrival.  Physical Exam:  Vital signs: Temperature 99.8 pulse 84 respirations 28 blood pressure 108/58 although this is labile saturation 91  Physical Exam   Constitutional: He appears well-nourished. He appears cachectic. He is sleeping. He has a sickly appearance. He appears ill. No distress. He is intubated and restrained.   HENT:   Head: Normocephalic and atraumatic.   Nose: Nose normal.   Feeding tube   Eyes: Pupils are equal, round, and reactive to light. No scleral icterus.   Neck:   Tracheostomy to vent   Cardiovascular: Normal rate and regular rhythm.   No murmur heard.  Pulmonary/Chest: Effort normal. No accessory muscle usage. Tachypnea noted. He is intubated. No respiratory distress. He has wheezes. He has no rhonchi. He has no rales.   No vent dyssynchrony noted   Abdominal: Soft. He exhibits no distension.   Binder in place.  New G-tube in place   Genitourinary:    Genitourinary Comments: Judd catheter   Musculoskeletal: He exhibits no edema.   Neurological:    Intubated via trach, sleeping   Skin: Skin is warm and dry. No rash noted. He is not diaphoretic. No erythema.   Psychiatric: His mood appears not anxious. He is slowed. He is noncommunicative.   Nursing note and vitals reviewed.    Results from last 7 days   Lab Units 01/30/20  0456 01/28/20  1643 01/27/20  0512   WBC 10*3/mm3 3.56 4.12 5.50   HEMOGLOBIN g/dL 9.0* 9.9* 10.9*   PLATELETS 10*3/mm3 148 164 163     Results from last 7 days   Lab Units 01/30/20  0456 01/27/20  0512   SODIUM mmol/L 139 140   POTASSIUM mmol/L 4.0 4.2   BUN mg/dL 33* 38*   CREATININE mg/dL 0.96 1.12     Recent films:    No radiology results for the last day Films reviewed personally by me.  No new films since January 29.  Clear field that day.  Pulmonary Assessment:  1. Respiratory failure requiring mechanical ventilation persistent and not improved and aggravated by psychiatric problems    2. 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 Pulmonary embolism, right treated and stable  4. COPD chronic and compensated with current medications  5. Dementia unimproved, chronic, unlikely to improve  6. Anemia, stable  Recommend:   · We have changed the ventilator back over to pressure support ventilation mode.  Recommend limiting sedation continue monitoring for any activities of self-harm that he may take on  · Continue chest x-ray and ABG, PRN.   · Continue nutrition   · Daily therapy with PT, OT and speech although goals are extremely limited  · Out of bed to neuro chair as tolerated; this has been somewhat successful since he has been here  · Continue bronchodilator treatments.  · DVT and stress ulcer prophylaxis.      Electronically signed by Manuel Hensley MD on 2/1/2020 at 12:42 PM

## 2020-02-01 NOTE — PROGRESS NOTES
[facility]  MELANI Alcantar M.D.  MATT Herrera        Internal Medicine Progress Note    2/1/2020   8:28 AM    Name:  Shawn Turner  MRN:    [id number]     Acct:     [id number]   Room:  [address] [place] [address] Day: 0     Admit Date: 1/15/2020 10:17 AM  PCP: Tj Wilson MD    Subjective:     C/C: need for continued vent weaning    Interval History: Status:  stayed the same. Resting in bed. No family at bedside.  Continues with low grade temp.  Resting quietly. Denies pain. Remains in bilateral wrist restraints.  I&O 897 we will give one-time dose of Lasix.  No new concerns identified.  G-tube placed on 1/30/2020, will continue TPN today and reevaluate tomorrow.    sReview of Systems   Unable to perform ROS: patient nonverbal       Medications:     Allergies:   Allergies   Allergen Reactions   • Aspirin        Current Meds:   Current Facility-Administered Medications:   •  acetaminophen (TYLENOL) 160 MG/5ML solution 650 mg, 650 mg, Per G Tube, Q4H PRN, Bautista Alcantar MD  •  acetaminophen (TYLENOL) suppository 650 mg, 650 mg, Rectal, Q6H PRN, Selina Augustine MD  •  Adult Standard Central TPN, , Intravenous, Q24H (TPN) **AND** Fat Emulsion Plant Based (INTRALIPID,LIPOSYN) 20 % infusion 50 g, 250 mL, Intravenous, Q24H (TPN), Selina Augustine MD  •  albuterol (PROVENTIL) nebulizer solution 0.083% 2.5 mg/3mL, 2.5 mg, Nebulization, Q2H PRN, Selina Augustine MD  •  bacitracin ointment, , Topical, Q12H, Selina Augustine MD  •  bacitracin ointment, , Topical, PRN, Selina Augustine MD  •  bacitracin ointment, , Topical, PRN, Selina Augustine MD  •  bacitracin ointment, , Topical, Q12H, Selina Augustine MD  •  dextrose (D50W) 25 g/ 50mL Intravenous Solution 25 g, 25 g, Intravenous, Q15 Min PRN, Selina Augustine MD  •  dextrose (GLUTOSE) oral gel 15 g, 15 g, Oral, Q15 Min PRN, Selina Augustine MD  •  dilTIAZem (CARDIZEM) injection 5 mg, 5 mg, Intravenous, Q6H PRN, Selina Augustine MD  •   enalaprilat (VASOTEC) injection 0.625 mg, 0.625 mg, Intravenous, Q6H, Selina Augustine MD  •  enoxaparin (LOVENOX) syringe 60 mg, 1 mg/kg, Subcutaneous, Q12H, Selina Augustine MD  •  famotidine (PEPCID) injection 20 mg, 20 mg, Intravenous, Q12H, Selina Augustine MD  •  furosemide (LASIX) injection 40 mg, 40 mg, Intravenous, Once, Selina Augustine MD  •  glucagon (human recombinant) (GLUCAGEN DIAGNOSTIC) injection 1 mg, 1 mg, Subcutaneous, Q15 Min PRN, Selina Augustine MD  •  haloperidol lactate (HALDOL) injection 1 mg, 1 mg, Intramuscular, Q4H PRN, Selina Augustine MD  •  hydrALAZINE (APRESOLINE) injection 5 mg, 5 mg, Intravenous, Q6H PRN, Selina Augustine MD  •  hypromellose (ISOPTO TEARS) 0.5 % ophthalmic solution 2 drop, 2 drop, Both Eyes, Q2H PRN, Selina Augustine MD  •  insulin lispro (humaLOG) injection 2-7 Units, 2-7 Units, Subcutaneous, Q6H, Selina Augustine MD  •  ipratropium (ATROVENT) nebulizer solution 0.5 mg, 0.5 mg, Nebulization, Q6H PRN, Selina Augustine MD  •  ipratropium-albuterol (DUO-NEB) nebulizer solution 3 mL, 3 mL, Nebulization, Q6H - RT, Selina Augustine MD  •  levothyroxine sodium injection 50 mcg, 50 mcg, Intravenous, Q AM, Selina Augustine MD  •  LORazepam (ATIVAN) injection 1 mg, 1 mg, Intravenous, Q1H PRN, Selina Augustine MD  •  metoprolol tartrate (LOPRESSOR) injection 2.5 mg, 2.5 mg, Intravenous, Q6H, Selina Augustine MD  •  midazolam (VERSED) injection 2 mg, 2 mg, Intravenous, Q1H PRN, Selina Augustine MD  •  Morphine sulfate (PF) injection 2 mg, 2 mg, Intravenous, Q1H PRN, Selina Augustine MD  •  norepinephrine (LEVOPHED) 16,000 mcg in sodium chloride 0.9 % 250 mL (64 mcg/mL) infusion, 0.02-0.3 mcg/kg/min, Intravenous, Titrated, Selina Augustine MD  •  potassium chloride 20 mEq in 100 mL IVPB, 20 mEq, Intravenous, Once, Selina Augustine MD  •  sodium chloride 0.9 % flush 10 mL, 10 mL, Intravenous, Q12H, Selina Augustine MD  •  valproate (DEPACON) 125 mg in sodium  "chloride 0.9 % 50 mL IVPB, 125 mg, Intravenous, Q12H, Selina Augustine MD    Data:     Code Status:    There are no questions and answers to display.       No family history on file.    Social History     Socioeconomic History   • Marital status:      Spouse name: Not on file   • Number of children: Not on file   • Years of education: Not on file   • Highest education level: Not on file   Tobacco Use   • Smoking status: Current Every Day Smoker   Substance and Sexual Activity   • Alcohol use: No   • Drug use: Defer       Vitals:  /54   Pulse 71   Temp 98.6 °F (37 °C)   Resp 23   Ht 167.6 cm (66\")   Wt 56.7 kg (125 lb)   SpO2 98%   BMI 20.18 kg/m²   T 99.8 P 84 R 28 /58 Sp02 91% (vent - A/C 30% FiO2)      I/O (24Hr):    Intake/Output Summary (Last 24 hours) at 2/1/2020 0828  Last data filed at 1/31/2020 1732  Gross per 24 hour   Intake 520 ml   Output 200 ml   Net 320 ml       Labs and imaging:      Recent Results (from the past 12 hour(s))   POC Glucose Once    Collection Time: 02/01/20 12:33 AM   Result Value Ref Range    Glucose 152 (H) 70 - 130 mg/dL   Protime-INR    Collection Time: 02/01/20  5:06 AM   Result Value Ref Range    Protime 14.4 11.9 - 14.6 Seconds    INR 1.08 0.91 - 1.09   Magnesium    Collection Time: 02/01/20  5:06 AM   Result Value Ref Range    Magnesium 1.8 1.6 - 2.4 mg/dL   Phosphorus    Collection Time: 02/01/20  5:06 AM   Result Value Ref Range    Phosphorus 4.2 2.5 - 4.5 mg/dL   POC Glucose Once    Collection Time: 02/01/20  6:09 AM   Result Value Ref Range    Glucose 151 (H) 70 - 130 mg/dL       Physical Examination:        Physical Exam   Constitutional: He appears well-nourished. He appears cachectic. He is restrained.   HENT:   Head: Normocephalic and atraumatic.   Nose: Nose normal.   Mouth/Throat: Oropharynx is clear and moist.   Eyes: Pupils are equal, round, and reactive to light. EOM are normal.   Neck: Normal range of motion. Neck supple.   Trach to vent "   Cardiovascular: Normal rate, regular rhythm, normal heart sounds and intact distal pulses.   Pulmonary/Chest: Effort normal and breath sounds normal.   Abdominal: Soft. Bowel sounds are normal.   G-tube site within normal limits   Musculoskeletal: Normal range of motion.   Generalized weakness   Neurological: He is alert. He has normal reflexes.   Nonverbal  Confused   Skin: Skin is warm and dry.   Psychiatric: He has a normal mood and affect. His behavior is normal.   Anxious at times   Nursing note and vitals reviewed.        Assessment:             Acute tracheostomy management (CMS/[facility])    Acute respiratory failure with hypoxia and hypercapnia (CMS/[facility])    Ventilator dependence (CMS/[facility])    Past Medical History:   Diagnosis Date   • Anxiety disorder    • Cerumen impaction    • COPD (chronic obstructive pulmonary disease) (CMS/[facility])    • Depression    • Hypertension    • Hypothyroidism    • Kidney disease    • Osteoarthritis    • Sinusitis, chronic         Plan:        1. Acute on chronic hypoxic/hypercapneic respiratory failure  2. RUL pulmonary embolus  3. PAF  4. Hypothyroidism  5. COPD  6. Anxiety disorder  7. CKD  8. Multifactorial anemia  9. GERD  10. Dementia  11. Metabolic encephalopathy  12. Iron deficiency  13. Hypokalemia - improved  14. Moderate protein calorie malnutrition  15. Occult stool positive    Continue current treatment. Monitor counts. Increase activity. Labs Monday. Continue vent weaning under direction of pulmonology. Aggressive therapies. Maintain patient safety.Continue nutrition support per AMONs.  Monitor renal function. Monitor I&O's closely. Watch for s/s bleeding. Continue lovenox.  G-tube in place and anticipate resuming psych medications.   Lasix 40 mg IV x1.      Electronically signed by MATT Alexander on 2/1/2020 at 8:28 AM

## 2020-02-02 LAB
ANION GAP SERPL CALCULATED.3IONS-SCNC: 11 MMOL/L (ref 5–15)
ARTERIAL PATENCY WRIST A: NORMAL
ATMOSPHERIC PRESS: 748 MMHG
BASE EXCESS BLDA CALC-SCNC: 1 MMOL/L (ref 0–2)
BDY SITE: NORMAL
BODY TEMPERATURE: 37 C
BUN BLD-MCNC: 46 MG/DL (ref 8–23)
BUN/CREAT SERPL: 31.3 (ref 7–25)
CALCIUM SPEC-SCNC: 8.8 MG/DL (ref 8.6–10.5)
CHLORIDE SERPL-SCNC: 101 MMOL/L (ref 98–107)
CO2 SERPL-SCNC: 27 MMOL/L (ref 22–29)
CPAP: 5 CMH2O
CREAT BLD-MCNC: 1.47 MG/DL (ref 0.76–1.27)
GFR SERPL CREATININE-BSD FRML MDRD: 47 ML/MIN/1.73
GLUCOSE BLD-MCNC: 132 MG/DL (ref 65–99)
GLUCOSE BLDC GLUCOMTR-MCNC: 127 MG/DL (ref 70–130)
HCO3 BLDA-SCNC: 25.8 MMOL/L (ref 20–26)
HOROWITZ INDEX BLD+IHG-RTO: 30 %
INR PPP: 1.19 (ref 0.91–1.09)
Lab: NORMAL
MODALITY: NORMAL
NT-PROBNP SERPL-MCNC: 130.7 PG/ML (ref 5–900)
PCO2 BLDA: 40.7 MM HG (ref 35–45)
PH BLDA: 7.41 PH UNITS (ref 7.35–7.45)
PO2 BLDA: 84.8 MM HG (ref 83–108)
POTASSIUM BLD-SCNC: 4.1 MMOL/L (ref 3.5–5.2)
PROTHROMBIN TIME: 15.5 SECONDS (ref 11.9–14.6)
PSV: 20 CMH2O
SAO2 % BLDCOA: 97.6 % (ref 94–99)
SODIUM BLD-SCNC: 139 MMOL/L (ref 136–145)
VENTILATOR MODE: NORMAL

## 2020-02-02 PROCEDURE — 25010000002 LORAZEPAM PER 2 MG: Performed by: SPECIALIST

## 2020-02-02 PROCEDURE — 25010000002 MORPHINE SULFATE (PF) 2 MG/ML SOLUTION: Performed by: SPECIALIST

## 2020-02-02 PROCEDURE — 80048 BASIC METABOLIC PNL TOTAL CA: CPT | Performed by: INTERNAL MEDICINE

## 2020-02-02 PROCEDURE — 82962 GLUCOSE BLOOD TEST: CPT

## 2020-02-02 PROCEDURE — 85610 PROTHROMBIN TIME: CPT | Performed by: SPECIALIST

## 2020-02-02 PROCEDURE — 83880 ASSAY OF NATRIURETIC PEPTIDE: CPT | Performed by: INTERNAL MEDICINE

## 2020-02-02 PROCEDURE — 25010000002 ENOXAPARIN PER 10 MG: Performed by: SPECIALIST

## 2020-02-02 PROCEDURE — 82803 BLOOD GASES ANY COMBINATION: CPT

## 2020-02-02 PROCEDURE — 25010000002 MIDAZOLAM PER 1 MG: Performed by: SPECIALIST

## 2020-02-02 PROCEDURE — 99233 SBSQ HOSP IP/OBS HIGH 50: CPT | Performed by: INTERNAL MEDICINE

## 2020-02-02 PROCEDURE — 25010000002 HALOPERIDOL LACTATE PER 5 MG: Performed by: SPECIALIST

## 2020-02-02 RX ORDER — FUROSEMIDE 10 MG/ML
20 INJECTION INTRAMUSCULAR; INTRAVENOUS ONCE
Status: DISCONTINUED | OUTPATIENT
Start: 2020-02-02 | End: 2020-02-02

## 2020-02-02 NOTE — PROGRESS NOTES
PULMONARY AND CRITICAL CARE PROGRESS NOTE - [facility]    Patient: Shawn Turner    1950   [id number]  02/02/20   11:25 AM  Referring Provider: Bautista Alcantar MD  Chief Complaint: Mechanically ventilated  Interval history: He remains mechanically ventilated, status post replacement of G-tube.  He is sedated and has required ongoing need for fairly extensive restraints including hand mitts to prevent self-harm.  He has had his PEG tube placed in abdomen and binder following that.  This morning he is listening to ballet music and raising his legs rhythmically with the music.    Review of Systems:   Cannot obtain due to mechanical ventilation.  The patient notably is critically ill and connected to a ventilator.  As such patient cannot communicate and provide any history whatsoever, including any history of present illness or interval history since arrival or review of systems. The interested reviewer may note this fact, as an attempt has been made at collecting and documenting these portions of the patient history, but this information is unobtainable despite attempted review and therefore cannot be documented at this time.   Ventilator Settings: PSV  Physical Exam:  Vital signs: Temperature 97.9 pulse 70 respirations 24 blood pressure 149/83 saturation 98  Physical Exam   Constitutional: He appears well-nourished. He appears cachectic. He is sleeping. He has a sickly appearance. He appears ill. No distress. He is intubated and restrained.   HENT:   Head: Normocephalic.   Nose: Nose normal.   Feeding tube   Eyes: Pupils are equal, round, and reactive to light. No scleral icterus.   Neck:   Tracheostomy to vent   Cardiovascular: Normal rate and regular rhythm.   No murmur heard.  Pulmonary/Chest: Effort normal. No accessory muscle usage. Tachypnea noted. He is intubated. No respiratory distress. He has wheezes. He has no rhonchi. He has no rales.   Abdominal: Soft. He exhibits no  distension.   Binder in place.  New G-tube in place   Genitourinary:   Genitourinary Comments: Judd catheter   Musculoskeletal: He exhibits no edema.   He is raising his legs in tune with the music   Neurological:   Sedated   Skin: Skin is warm and dry. No rash noted. He is not diaphoretic. No erythema.   Psychiatric: His mood appears not anxious. He is slowed. He is noncommunicative.   Nursing note and vitals reviewed.    Results from last 7 days   Lab Units 01/30/20  0456 01/28/20  1643 01/27/20  0512   WBC 10*3/mm3 3.56 4.12 5.50   HEMOGLOBIN g/dL 9.0* 9.9* 10.9*   PLATELETS 10*3/mm3 148 164 163     Results from last 7 days   Lab Units 02/02/20  0445 01/30/20  0456 01/27/20  0512   SODIUM mmol/L 139 139 140   POTASSIUM mmol/L 4.1 4.0 4.2   BUN mg/dL 46* 33* 38*   CREATININE mg/dL 1.47* 0.96 1.12     Recent films:    No radiology results for the last day     Pulmonary Assessment:  1. Respiratory failure requiring mechanical ventilation which appears to be chronic at this point  2. 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 Pulmonary embolism, right treated and stable  4. COPD chronic and compensated with current medications  5. Dementia unimproved, chronic, unlikely to improve  6. Anemia, stable  Recommend:   · Continue PSV mode and restraints as needed  · Follow-up chest x-ray next week  · Continue nutrition   · Daily therapy with PT, OT and speech although goals are extremely limited  · Mobilize as tolerated  · Continue bronchodilator treatments.  · DVT and stress ulcer prophylaxis.      Electronically signed by Manuel Hensley MD on 2/2/2020 at 11:25 AM

## 2020-02-02 NOTE — ANESTHESIA POSTPROCEDURE EVALUATION
Patient: Shawn MANZANO Ort    Procedure Summary     Date:  01/31/20 Room / Location:   [facility] OR  /  [facility] OR    Anesthesia Start:  1629 Anesthesia Stop:  1722    Procedure:  GASTROSTOMY FEEDING TUBE INSERTION (N/A Abdomen) Diagnosis:      Surgeon:  Selina Augustine MD Provider:  GERRY Londono CRNA    Anesthesia Type:  general ASA Status:  4          Anesthesia Type: general    Vitals  Vitals Value Taken Time   /58 1/31/2020  5:39 PM   Temp 98.6 °F (37 °C) 1/31/2020  5:32 PM   Pulse 77 1/31/2020  5:43 PM   Resp 23 1/31/2020  5:32 PM   SpO2 96 % 1/31/2020  5:43 PM   Vitals shown include unvalidated device data.        Post Anesthesia Care and Evaluation    Patient location during evaluation: PACU  Patient participation: complete - patient participated  Level of consciousness: awake and alert  Pain score: 0  Pain management: adequate  Airway patency: patent  Anesthetic complications: No anesthetic complications    Cardiovascular status: acceptable and stable  Respiratory status: acceptable and unassisted  Hydration status: acceptable

## 2020-02-02 NOTE — PROGRESS NOTES
[facility]  MELANI Alcantar M.D.  MATT Herrera        Internal Medicine Progress Note    2/2/2020   10:30 AM    Name:  Shawn Turner  MRN:    [id number]     Acct:     [id number]   Room:  [address] [place] [address] Day: 0     Admit Date: 1/15/2020 10:17 AM  PCP: Tj Wilson MD    Subjective:     C/C: need for continued vent weaning    Interval History: Status:  stayed the same. Resting in bed. No family at bedside.  Remains in restraints and soft mitts.  Tolerating tube feedings without difficulty, will discontinue TPN once current bag is completed.  Afebrile.  I&O positive again today for 675, will give Lasix 20 mg IV x1. G-tube site within normal limits.      sReview of Systems   Unable to perform ROS: patient nonverbal       Medications:     Allergies:   Allergies   Allergen Reactions   • Aspirin        Current Meds:   Current Facility-Administered Medications:   •  acetaminophen (TYLENOL) 160 MG/5ML solution 650 mg, 650 mg, Per G Tube, Q4H PRN, Bautista Alcantar MD  •  acetaminophen (TYLENOL) suppository 650 mg, 650 mg, Rectal, Q6H PRN, Selina Augustine MD  •  acetaZOLAMIDE (DIAMOX) tablet 250 mg, 250 mg, Per G Tube, BID, Bautista Alcantar MD  •  Adult Standard Central TPN, , Intravenous, Q24H (TPN) **AND** Fat Emulsion Plant Based (INTRALIPID,LIPOSYN) 20 % infusion 50 g, 250 mL, Intravenous, Q24H (TPN), Selina Augustine MD  •  albuterol (PROVENTIL) nebulizer solution 0.083% 2.5 mg/3mL, 2.5 mg, Nebulization, Q2H PRN, Selina Augustine MD  •  bacitracin ointment, , Topical, Q12H, Selina Augustine MD  •  bacitracin ointment, , Topical, PRN, Selina Augustine MD  •  bacitracin ointment, , Topical, PRN, Selina Augustine MD  •  bacitracin ointment, , Topical, Q12H, Selina Augustine MD  •  dextrose (D50W) 25 g/ 50mL Intravenous Solution 25 g, 25 g, Intravenous, Q15 Min PRN, Selina Augustine MD  •  dextrose (GLUTOSE) oral gel 15 g, 15 g, Oral, Q15 Min PRN, Selina Augustine MD  •  dilTIAZem  (CARDIZEM) injection 5 mg, 5 mg, Intravenous, Q6H PRN, Selina Augustine MD  •  dilTIAZem (CARDIZEM) tablet 30 mg, 30 mg, Per G Tube, Q6H, Bautista Alcantar MD  •  donepezil (ARICEPT) tablet 10 mg, 10 mg, Per G Tube, Nightly, Bautista Alcantar MD  •  enalaprilat (VASOTEC) injection 0.625 mg, 0.625 mg, Intravenous, Q6H, Selina Augustine MD  •  enoxaparin (LOVENOX) syringe 60 mg, 1 mg/kg, Subcutaneous, Q12H, Selina Augustine MD  •  famotidine (PEPCID) injection 20 mg, 20 mg, Intravenous, Q12H, Selina Augustine MD  •  ferrous sulfate tablet 325 mg, 325 mg, Oral, Daily With Breakfast, Bautista Alcantar MD  •  furosemide (LASIX) injection 40 mg, 40 mg, Intravenous, Once, Selina Augustine MD  •  furosemide (LASIX) injection 40 mg, 40 mg, Intravenous, Once, Bautista Alcantar MD  •  furosemide (LASIX) tablet 40 mg, 40 mg, Per G Tube, Daily, Bautista Alcantar MD  •  glucagon (human recombinant) (GLUCAGEN DIAGNOSTIC) injection 1 mg, 1 mg, Subcutaneous, Q15 Min PRN, Selina Augustine MD  •  haloperidol lactate (HALDOL) injection 1 mg, 1 mg, Intramuscular, Q4H PRN, Selina Augustine MD  •  hydrALAZINE (APRESOLINE) injection 5 mg, 5 mg, Intravenous, Q6H PRN, Selina Augustine MD  •  hypromellose (ISOPTO TEARS) 0.5 % ophthalmic solution 2 drop, 2 drop, Both Eyes, Q2H PRN, Selina Augustine MD  •  insulin lispro (humaLOG) injection 2-7 Units, 2-7 Units, Subcutaneous, Q6H, Selina Augustine MD  •  ipratropium (ATROVENT) nebulizer solution 0.5 mg, 0.5 mg, Nebulization, Q6H PRN, Selina Augustine MD  •  ipratropium-albuterol (DUO-NEB) nebulizer solution 3 mL, 3 mL, Nebulization, Q6H - RT, Selina Augustine MD  •  levothyroxine (SYNTHROID, LEVOTHROID) tablet 75 mcg, 75 mcg, Per G Tube, Q AM, Bautista Alcantar MD  •  LORazepam (ATIVAN) injection 1 mg, 1 mg, Intravenous, Q1H PRN, Selina Augustine MD  •  melatonin tablet 9.75 mg, 9.75 mg, Per G Tube, Nightly, Bautista Alcantar MD  •  memantine  (NAMENDA) tablet 10 mg, 10 mg, Per G Tube, Q12H, Bautista Alcantar MD  •  metoprolol tartrate (LOPRESSOR) tablet 12.5 mg, 12.5 mg, Per G Tube, Q12H, Bautista Alcantar MD  •  midazolam (VERSED) injection 2 mg, 2 mg, Intravenous, Q1H PRN, Selina Augustine MD  •  Morphine sulfate (PF) injection 2 mg, 2 mg, Intravenous, Q1H PRN, Selina Augustine MD  •  multivitamin w/minerals tablet 1 tablet, 1 tablet, Oral, Daily, Bautista Alcantar MD  •  norepinephrine (LEVOPHED) 16,000 mcg in sodium chloride 0.9 % 250 mL (64 mcg/mL) infusion, 0.02-0.3 mcg/kg/min, Intravenous, Titrated, Selina Augustine MD  •  pantoprazole (PROTONIX) EC tablet 40 mg, 40 mg, Oral, Q AM, Bautista Alcantar MD  •  polyethylene glycol 3350 powder (packet), 17 g, Per G Tube, Q12H PRN, Bautista Alcantar MD  •  potassium chloride 20 mEq in 100 mL IVPB, 20 mEq, Intravenous, Once, Selina Augustine MD  •  risperiDONE (risperDAL) tablet 0.75 mg, 0.75 mg, Per G Tube, TID, Bautista Alcantar MD  •  saccharomyces boulardii (FLORASTOR) capsule 250 mg, 250 mg, Per G Tube, Daily, Bautista Alcantar MD  •  sodium chloride 0.9 % flush 10 mL, 10 mL, Intravenous, Q12H, Selina Augustine MD  •  tiZANidine (ZANAFLEX) tablet 4 mg, 4 mg, Per G Tube, Q8H, Bautista Alcantar MD  •  traZODone (DESYREL) tablet 50 mg, 50 mg, Per G Tube, Nightly, Bautista Alcantar MD  •  valproate (DEPACON) 125 mg in sodium chloride 0.9 % 50 mL IVPB, 125 mg, Intravenous, Q12H, Selina Augustine MD  •  Vortioxetine HBr tablet 20 mg, 20 mg, Per G Tube, Daily, Bautista Alcantar MD    Data:     Code Status:    There are no questions and answers to display.       No family history on file.    Social History     Socioeconomic History   • Marital status:      Spouse name: Not on file   • Number of children: Not on file   • Years of education: Not on file   • Highest education level: Not on file   Tobacco Use   • Smoking status: Current Every Day  "Smoker   Substance and Sexual Activity   • Alcohol use: No   • Drug use: Defer       Vitals:  /54   Pulse 71   Temp 98.6 °F (37 °C)   Resp 23   Ht 167.6 cm (66\")   Wt 56.7 kg (125 lb)   SpO2 98%   BMI 20.18 kg/m²   T 97.9 P 70 R 24 /83 Sp02 98% (vent - A/C 30% FiO2)      I/O (24Hr):  No intake or output data in the 24 hours ending 02/02/20 1030    Labs and imaging:      Recent Results (from the past 12 hour(s))   Blood Gas, Arterial    Collection Time: 02/02/20  4:09 AM   Result Value Ref Range    Site Right Brachial     Miller's Test N/A     pH, Arterial 7.410 7.350 - 7.450 pH units    pCO2, Arterial 40.7 35.0 - 45.0 mm Hg    pO2, Arterial 84.8 83.0 - 108.0 mm Hg    HCO3, Arterial 25.8 20.0 - 26.0 mmol/L    Base Excess, Arterial 1.0 0.0 - 2.0 mmol/L    O2 Saturation, Arterial 97.6 94.0 - 99.0 %    Temperature 37.0 C    Barometric Pressure for Blood Gas 748 mmHg    Modality Ventilator     FIO2 30 %    Ventilator Mode PSV     PSV 20.0 cmH2O    CPAP 5.0 cmH2O    Collected by [id number]    Protime-INR    Collection Time: 02/02/20  4:45 AM   Result Value Ref Range    Protime 15.5 (H) 11.9 - 14.6 Seconds    INR 1.19 (H) 0.91 - 1.09   BNP    Collection Time: 02/02/20  4:45 AM   Result Value Ref Range    proBNP 130.7 5.0 - 900.0 pg/mL   Basic Metabolic Panel    Collection Time: 02/02/20  4:45 AM   Result Value Ref Range    Glucose 132 (H) 65 - 99 mg/dL    BUN 46 (H) 8 - 23 mg/dL    Creatinine 1.47 (H) 0.76 - 1.27 mg/dL    Sodium 139 136 - 145 mmol/L    Potassium 4.1 3.5 - 5.2 mmol/L    Chloride 101 98 - 107 mmol/L    CO2 27.0 22.0 - 29.0 mmol/L    Calcium 8.8 8.6 - 10.5 mg/dL    eGFR Non African Amer 47 (L) >60 mL/min/1.73    BUN/Creatinine Ratio 31.3 (H) 7.0 - 25.0    Anion Gap 11.0 5.0 - 15.0 mmol/L       Physical Examination:        Physical Exam   Constitutional: He appears well-nourished. He appears cachectic. He is restrained.   HENT:   Head: Normocephalic and atraumatic.   Nose: Nose normal. "   Mouth/Throat: Oropharynx is clear and moist.   Eyes: Pupils are equal, round, and reactive to light. EOM are normal.   Neck: Normal range of motion. Neck supple.   Trach to vent   Cardiovascular: Normal rate, regular rhythm, normal heart sounds and intact distal pulses.   Pulmonary/Chest: Effort normal and breath sounds normal.   Abdominal: Soft. Bowel sounds are normal.   G-tube site within normal limits   Musculoskeletal: Normal range of motion.   Generalized weakness   Neurological: He is alert. He has normal reflexes.   Nonverbal  Confused   Skin: Skin is warm and dry.   Psychiatric: He has a normal mood and affect. His behavior is normal.   Anxious at times   Nursing note and vitals reviewed.        Assessment:             Acute tracheostomy management (CMS/[facility])    Acute respiratory failure with hypoxia and hypercapnia (CMS/[facility])    Ventilator dependence (CMS/[facility])    Past Medical History:   Diagnosis Date   • Anxiety disorder    • Cerumen impaction    • COPD (chronic obstructive pulmonary disease) (CMS/[facility])    • Depression    • Hypertension    • Hypothyroidism    • Kidney disease    • Osteoarthritis    • Sinusitis, chronic         Plan:        1. Acute on chronic hypoxic/hypercapneic respiratory failure  2. RUL pulmonary embolus  3. PAF  4. Hypothyroidism  5. COPD  6. Anxiety disorder  7. CKD  8. Multifactorial anemia  9. GERD  10. Dementia  11. Metabolic encephalopathy  12. Iron deficiency  13. Hypokalemia - improved  14. Moderate protein calorie malnutrition  15. Occult stool positive    Continue current treatment. Monitor counts. Increase activity. Labs Monday. Continue vent weaning under direction of pulmonology. Aggressive therapies. Maintain patient safety.Continue nutrition support per AMONs.  Monitor renal function. Monitor I&O's closely.  Lasix 20 mg IV x1 today.  Watch for s/s bleeding. Continue lovenox.  Discontinue TPN after current bag has completed infusing.    Electronically signed by Blanca POLO  MATT Islas on 2/2/2020 at 10:30 AM

## 2020-02-03 ENCOUNTER — APPOINTMENT (OUTPATIENT)
Dept: GENERAL RADIOLOGY | Facility: HOSPITAL | Age: 70
End: 2020-02-03

## 2020-02-03 LAB
ABO GROUP BLD: NORMAL
ALBUMIN SERPL-MCNC: 2.3 G/DL (ref 3.5–5.2)
ALBUMIN/GLOB SERPL: 0.8 G/DL
ALP SERPL-CCNC: 90 U/L (ref 39–117)
ALT SERPL W P-5'-P-CCNC: 24 U/L (ref 1–41)
ANION GAP SERPL CALCULATED.3IONS-SCNC: 9 MMOL/L (ref 5–15)
AST SERPL-CCNC: 16 U/L (ref 1–40)
BASOPHILS # BLD AUTO: 0.01 10*3/MM3 (ref 0–0.2)
BASOPHILS NFR BLD AUTO: 0.2 % (ref 0–1.5)
BILIRUB SERPL-MCNC: 0.3 MG/DL (ref 0.2–1.2)
BLD GP AB SCN SERPL QL: NEGATIVE
BUN BLD-MCNC: 52 MG/DL (ref 8–23)
BUN/CREAT SERPL: 38.5 (ref 7–25)
CALCIUM SPEC-SCNC: 8.2 MG/DL (ref 8.6–10.5)
CHLORIDE SERPL-SCNC: 100 MMOL/L (ref 98–107)
CO2 SERPL-SCNC: 25 MMOL/L (ref 22–29)
CREAT BLD-MCNC: 1.35 MG/DL (ref 0.76–1.27)
DEPRECATED RDW RBC AUTO: 49.1 FL (ref 37–54)
EOSINOPHIL # BLD AUTO: 0.06 10*3/MM3 (ref 0–0.4)
EOSINOPHIL NFR BLD AUTO: 1 % (ref 0.3–6.2)
ERYTHROCYTE [DISTWIDTH] IN BLOOD BY AUTOMATED COUNT: 15.5 % (ref 12.3–15.4)
GFR SERPL CREATININE-BSD FRML MDRD: 52 ML/MIN/1.73
GLOBULIN UR ELPH-MCNC: 2.8 GM/DL
GLUCOSE BLD-MCNC: 124 MG/DL (ref 65–99)
GLUCOSE BLDC GLUCOMTR-MCNC: 130 MG/DL (ref 70–130)
GLUCOSE BLDC GLUCOMTR-MCNC: 142 MG/DL (ref 70–130)
HCT VFR BLD AUTO: 19.1 % (ref 37.5–51)
HCT VFR BLD AUTO: 29.1 % (ref 37.5–51)
HGB BLD-MCNC: 6 G/DL (ref 13–17.7)
HGB BLD-MCNC: 9.5 G/DL (ref 13–17.7)
IMM GRANULOCYTES # BLD AUTO: 0.07 10*3/MM3 (ref 0–0.05)
IMM GRANULOCYTES NFR BLD AUTO: 1.1 % (ref 0–0.5)
INR PPP: 1.1 (ref 0.91–1.09)
LYMPHOCYTES # BLD AUTO: 0.4 10*3/MM3 (ref 0.7–3.1)
LYMPHOCYTES NFR BLD AUTO: 6.4 % (ref 19.6–45.3)
MAGNESIUM SERPL-MCNC: 2.1 MG/DL (ref 1.6–2.4)
MCH RBC QN AUTO: 27.1 PG (ref 26.6–33)
MCHC RBC AUTO-ENTMCNC: 31.4 G/DL (ref 31.5–35.7)
MCV RBC AUTO: 86.4 FL (ref 79–97)
MONOCYTES # BLD AUTO: 0.69 10*3/MM3 (ref 0.1–0.9)
MONOCYTES NFR BLD AUTO: 11 % (ref 5–12)
NEUTROPHILS # BLD AUTO: 5.04 10*3/MM3 (ref 1.7–7)
NEUTROPHILS NFR BLD AUTO: 80.3 % (ref 42.7–76)
NRBC BLD AUTO-RTO: 0 /100 WBC (ref 0–0.2)
NT-PROBNP SERPL-MCNC: 126.1 PG/ML (ref 5–900)
PHOSPHATE SERPL-MCNC: 4.8 MG/DL (ref 2.5–4.5)
PLATELET # BLD AUTO: 195 10*3/MM3 (ref 140–450)
PMV BLD AUTO: 10.8 FL (ref 6–12)
POTASSIUM BLD-SCNC: 3.9 MMOL/L (ref 3.5–5.2)
PREALB SERPL-MCNC: 10.3 MG/DL (ref 20–40)
PROT SERPL-MCNC: 5.1 G/DL (ref 6–8.5)
PROTHROMBIN TIME: 14.6 SECONDS (ref 11.9–14.6)
RBC # BLD AUTO: 2.21 10*6/MM3 (ref 4.14–5.8)
RH BLD: POSITIVE
SODIUM BLD-SCNC: 134 MMOL/L (ref 136–145)
T&S EXPIRATION DATE: NORMAL
TRIGL SERPL-MCNC: 47 MG/DL (ref 0–150)
WBC NRBC COR # BLD: 6.27 10*3/MM3 (ref 3.4–10.8)

## 2020-02-03 PROCEDURE — 99233 SBSQ HOSP IP/OBS HIGH 50: CPT | Performed by: INTERNAL MEDICINE

## 2020-02-03 PROCEDURE — 85018 HEMOGLOBIN: CPT | Performed by: INTERNAL MEDICINE

## 2020-02-03 PROCEDURE — 85014 HEMATOCRIT: CPT | Performed by: INTERNAL MEDICINE

## 2020-02-03 PROCEDURE — 85610 PROTHROMBIN TIME: CPT | Performed by: SPECIALIST

## 2020-02-03 PROCEDURE — 25010000002 ENOXAPARIN PER 10 MG: Performed by: SPECIALIST

## 2020-02-03 PROCEDURE — 86923 COMPATIBILITY TEST ELECTRIC: CPT

## 2020-02-03 PROCEDURE — 86900 BLOOD TYPING SEROLOGIC ABO: CPT | Performed by: INTERNAL MEDICINE

## 2020-02-03 PROCEDURE — 82962 GLUCOSE BLOOD TEST: CPT

## 2020-02-03 PROCEDURE — 84478 ASSAY OF TRIGLYCERIDES: CPT | Performed by: SPECIALIST

## 2020-02-03 PROCEDURE — P9016 RBC LEUKOCYTES REDUCED: HCPCS

## 2020-02-03 PROCEDURE — 25010000002 MORPHINE SULFATE (PF) 2 MG/ML SOLUTION: Performed by: SPECIALIST

## 2020-02-03 PROCEDURE — 80053 COMPREHEN METABOLIC PANEL: CPT | Performed by: SPECIALIST

## 2020-02-03 PROCEDURE — 86850 RBC ANTIBODY SCREEN: CPT | Performed by: INTERNAL MEDICINE

## 2020-02-03 PROCEDURE — 83735 ASSAY OF MAGNESIUM: CPT | Performed by: SPECIALIST

## 2020-02-03 PROCEDURE — 86900 BLOOD TYPING SEROLOGIC ABO: CPT

## 2020-02-03 PROCEDURE — 86901 BLOOD TYPING SEROLOGIC RH(D): CPT | Performed by: INTERNAL MEDICINE

## 2020-02-03 PROCEDURE — 83880 ASSAY OF NATRIURETIC PEPTIDE: CPT | Performed by: SPECIALIST

## 2020-02-03 PROCEDURE — 74018 RADEX ABDOMEN 1 VIEW: CPT

## 2020-02-03 PROCEDURE — 25010000002 MIDAZOLAM PER 1 MG: Performed by: SPECIALIST

## 2020-02-03 PROCEDURE — 71045 X-RAY EXAM CHEST 1 VIEW: CPT

## 2020-02-03 PROCEDURE — 25010000002 LORAZEPAM PER 2 MG: Performed by: SPECIALIST

## 2020-02-03 PROCEDURE — 85025 COMPLETE CBC W/AUTO DIFF WBC: CPT | Performed by: SPECIALIST

## 2020-02-03 PROCEDURE — 84134 ASSAY OF PREALBUMIN: CPT | Performed by: SPECIALIST

## 2020-02-03 PROCEDURE — 84100 ASSAY OF PHOSPHORUS: CPT | Performed by: SPECIALIST

## 2020-02-03 NOTE — PROGRESS NOTES
Rashid Bowen Jr, MD     OTOLARYNGOLOGY, HEAD AND NECK SURGERY PROGRESS NOTE   Referring Provider: Bautista Alcantar MD  Reason for Consultation: Trach management  Location: 586/1  Accompanied by: No one  Chief complaint: No chief complaint on file.    Subjective    Interval History:   Since last examined, Shawn Turner has remained on vent with trach.  Respiratory reports patient is back on Psych medications and is less agitated in baed, on vent.  Patient seen. Chart reviewed.  Review of Systems:   ROS master CMN: No change from prior inquiry      Objective    Vital Signs     EXAMINATION:   CONSTITUTIONAL:    well nourished, well-developed, alert, oriented, in no acute distress   well developed  well nourished  Trach present, on vent     BODY HABITUS:    Normal body habitus     COMMUNICATION:    non-verbal     HEAD:     Normocephalic, without obvious abnormality, atraumatic     FACE:    structure normal, no tenderness present, no lesions/masses, no evidence of trauma     SALIVARY GLANDS:    parotid glands with no tenderness, no swelling, no masses, submandibular glands with normal size, nontender      EYE:    eyelids normal, orbits normal, no proptosis, conjunctiva clear, sclera non-icteric, pupils equal, round, reactive to light and accomodation  Color:   brown     HEARING:    not evaluated     NOSE EXTERNAL:    APPEARANCE: normal, straight, with good projection, no tenderness, no lesions, no tenderness, good nasal support, patent nares     NOSE INTERNAL:    Anterior rhinoscopy  intact mucosa, normal inferior turbinates, septum midline without perforation, secretions clear and normal amount, nares patent, normal nasal airway without obstruction, no lesions     ORAL CAVITY:      LIPS:      structure normal, no tenderness on palpation, no lesions, no evidence of trauma, normal mobility and oral competence    TEETH:       edentulous:  upper and lower    GUMS:      gingivae healthy, no lesions    ORAL MUCOSA:        oral mucosa normal, no mucosal lesions    FLOOR OF MOUTH:       Warthin's duct patent, mucosa normal  TONGUE:       intact mucosa, mobility not tested    PALATE:       hard palate with normal mucosa and structure      soft palate- low thick     OROPHARYNX:    Direct examination  oropharyngeal mucosa normal, tonsil(s) with normal appearance       NECK:     LARYNGEAL POSITION: normal  trach present     LYMPH NODES :    no adenopathy     THYROID:    no overt thyromegaly, no tenderness, nodules or mass present on palpation, position midline     CHEST/RESPIRATORY:    Mechanical ventilation     CARDIOVASCULAR:    regular rate and rhythm, no murmurs, gallups, no peripheral edema     NEURO/PSYCHIATRIC :    Unresponsive to me, CN not tested     TRACHEOSTOMY SITE:    Tracheostomy tube type: Shiley #8 cuffed DIC     Stoma: mildly granulated, more mature now    Secretions: mildly brown, minimal amount   Date placed: 12/13/2020  Date last changed: never    Results Review:    Lab Results (last 24 hours)     Procedure Component Value Units Date/Time    Comprehensive Metabolic Panel [[id number]]  (Abnormal) Collected:  02/03/20 0726    Specimen:  Blood Updated:  02/03/20 0755     Glucose 124 mg/dL      BUN 52 mg/dL      Creatinine 1.35 mg/dL      Sodium 134 mmol/L      Potassium 3.9 mmol/L      Chloride 100 mmol/L      CO2 25.0 mmol/L      Calcium 8.2 mg/dL      Total Protein 5.1 g/dL      Albumin 2.30 g/dL      ALT (SGPT) 24 U/L      AST (SGOT) 16 U/L      Alkaline Phosphatase 90 U/L      Total Bilirubin 0.3 mg/dL      eGFR Non African Amer 52 mL/min/1.73      Globulin 2.8 gm/dL      A/G Ratio 0.8 g/dL      BUN/Creatinine Ratio 38.5     Anion Gap 9.0 mmol/L     Narrative:       GFR Normal >60  Chronic Kidney Disease <60  Kidney Failure <15      Triglycerides [[id number]]  (Normal) Collected:  02/03/20 0726    Specimen:  Blood Updated:  02/03/20 0755     Triglycerides 47 mg/dL     Magnesium [[id number]]  (Normal) Collected:   02/03/20 0726    Specimen:  Blood Updated:  02/03/20 0755     Magnesium 2.1 mg/dL     Phosphorus [[id number]]  (Abnormal) Collected:  02/03/20 0726    Specimen:  Blood Updated:  02/03/20 0755     Phosphorus 4.8 mg/dL     BNP [[id number]]  (Normal) Collected:  02/03/20 0726    Specimen:  Blood Updated:  02/03/20 0755     proBNP 126.1 pg/mL     Narrative:       Among patients with dyspnea, NT-proBNP is highly sensitive for the detection of acute congestive heart failure. In addition NT-proBNP of <300 pg/ml effectively rules out acute congestive heart failure with 99% negative predictive value.    Results may be falsely decreased if patient taking Biotin.      CBC & Differential [[id number]] Collected:  02/03/20 0726    Specimen:  Blood Updated:  02/03/20 0743    Narrative:       The following orders were created for panel order CBC & Differential.  Procedure                               Abnormality         Status                     ---------                               -----------         ------                     CBC Auto Differential[[id number]]        Abnormal            Final result                 Please view results for these tests on the individual orders.    CBC Auto Differential [[id number]]  (Abnormal) Collected:  02/03/20 0726    Specimen:  Blood Updated:  02/03/20 0743     WBC 6.27 10*3/mm3      RBC 2.21 10*6/mm3      Hemoglobin 6.0 g/dL      Hematocrit 19.1 %      MCV 86.4 fL      MCH 27.1 pg      MCHC 31.4 g/dL      RDW 15.5 %      RDW-SD 49.1 fl      MPV 10.8 fL      Platelets 195 10*3/mm3      Neutrophil % 80.3 %      Lymphocyte % 6.4 %      Monocyte % 11.0 %      Eosinophil % 1.0 %      Basophil % 0.2 %      Immature Grans % 1.1 %      Neutrophils, Absolute 5.04 10*3/mm3      Lymphocytes, Absolute 0.40 10*3/mm3      Monocytes, Absolute 0.69 10*3/mm3      Eosinophils, Absolute 0.06 10*3/mm3      Basophils, Absolute 0.01 10*3/mm3      Immature Grans, Absolute 0.07 10*3/mm3      nRBC 0.0 /100 WBC      Protime-INR [[id number]]  (Abnormal) Collected:  02/03/20 0726    Specimen:  Blood Updated:  02/03/20 0741     Protime 14.6 Seconds      INR 1.10    Prealbumin [[id number]] Collected:  02/03/20 0726    Specimen:  Blood Updated:  02/03/20 0730    POC Glucose Once [[id number]]  (Normal) Collected:  02/02/20 2131    Specimen:  Blood Updated:  02/02/20 2153     Glucose 127 mg/dL      Comment: : [email or web address] Baltazar NickMeter ID: [id number]           Imaging Results (Last 24 Hours)     ** No results found for the last 24 hours. **          Medications, Allergies and Past History Reviewed      Assessment    ENT ASSESSMENT:     Acute tracheostomy management (CMS/[facility])    Acute respiratory failure with hypoxia and hypercapnia (CMS/[facility])    Ventilator dependence (CMS/[facility])         Plan    PLAN:   Patient has not had recent trach change. Now that he is calmer on vent, I will plan to change trach and evaluate airway.    Following patient as in-patient. Further recommendations will be made based on serial examinations.    Medications/Orders for this encounter: No new medications ordered.  No New orders written.        Rashid Bowen Jr, MD  02/03/20  9:44 AM

## 2020-02-03 NOTE — PROGRESS NOTES
Ortiz Alcantar M.D.  MATT Herrera        Internal Medicine Progress Note    2/3/2020   2:17 PM    Name:  Shawn Turner  MRN:    [id number]     Acct:     [id number]   Room:  [address] [place] [address] Day: 0     Admit Date: 1/15/2020 10:17 AM  PCP: Tj Wilson MD    Subjective:     C/C: need for continued vent weaning    Interval History: Status:  stayed the same. Resting in bed. No family at bedside.  Remains in bilateral wrist restraints and mittens to limit access to lines.  Tolerating tube feedings without difficulty. Afebrile. H/h 6.0/19.1. Platelets 195. Renal function stable/slightly improved. Tolerating current vent settings.   sReview of Systems   Unable to perform ROS: patient nonverbal       Medications:     Allergies:   Allergies   Allergen Reactions   • Aspirin        Current Meds:   Current Facility-Administered Medications:   •  acetaminophen (TYLENOL) 160 MG/5ML solution 650 mg, 650 mg, Per G Tube, Q4H PRN, Bautista Alcantar MD  •  acetaminophen (TYLENOL) suppository 650 mg, 650 mg, Rectal, Q6H PRN, Selina Augustine MD  •  acetaZOLAMIDE (DIAMOX) tablet 250 mg, 250 mg, Per G Tube, BID, Bautista Alcantar MD  •  albuterol (PROVENTIL) nebulizer solution 0.083% 2.5 mg/3mL, 2.5 mg, Nebulization, Q2H PRN, Selina Augustine MD  •  bacitracin ointment, , Topical, Q12H, Selina Augustine MD  •  bacitracin ointment, , Topical, PRN, Selina Augustine MD  •  bacitracin ointment, , Topical, PRN, Selina Augustine MD  •  bacitracin ointment, , Topical, Q12H, Selina Augustine MD  •  dextrose (D50W) 25 g/ 50mL Intravenous Solution 25 g, 25 g, Intravenous, Q15 Min PRN, Selina Augustine MD  •  dextrose (GLUTOSE) oral gel 15 g, 15 g, Oral, Q15 Min PRN, Selina Augustine MD  •  dilTIAZem (CARDIZEM) injection 5 mg, 5 mg, Intravenous, Q6H PRN, Selina Augustine MD  •  dilTIAZem (CARDIZEM) tablet 30 mg, 30 mg, Per G Tube, Q6H, Bautista Alcantar MD  •  donepezil (ARICEPT) tablet 10  mg, 10 mg, Per G Tube, Nightly, Bautista Alcantar MD  •  enalaprilat (VASOTEC) injection 0.625 mg, 0.625 mg, Intravenous, Q6H, Selina Augustine MD  •  enoxaparin (LOVENOX) syringe 60 mg, 1 mg/kg, Subcutaneous, Q12H, Selina Augustine MD  •  famotidine (PEPCID) injection 20 mg, 20 mg, Intravenous, Q12H, Selina Augustine MD  •  ferrous sulfate tablet 325 mg, 325 mg, Oral, Daily With Breakfast, Bautista Alcantar MD  •  furosemide (LASIX) tablet 40 mg, 40 mg, Per G Tube, Daily, Bautista Alcantar MD  •  glucagon (human recombinant) (GLUCAGEN DIAGNOSTIC) injection 1 mg, 1 mg, Subcutaneous, Q15 Min PRN, Selina Augustine MD  •  haloperidol lactate (HALDOL) injection 1 mg, 1 mg, Intramuscular, Q4H PRN, Selina Augustine MD  •  hydrALAZINE (APRESOLINE) injection 5 mg, 5 mg, Intravenous, Q6H PRN, Selina Augustine MD  •  hypromellose (ISOPTO TEARS) 0.5 % ophthalmic solution 2 drop, 2 drop, Both Eyes, Q2H PRN, Selina Augustine MD  •  insulin lispro (humaLOG) injection 2-7 Units, 2-7 Units, Subcutaneous, Q6H, Selina Augustine MD  •  ipratropium (ATROVENT) nebulizer solution 0.5 mg, 0.5 mg, Nebulization, Q6H PRN, Selina Augustine MD  •  ipratropium-albuterol (DUO-NEB) nebulizer solution 3 mL, 3 mL, Nebulization, Q6H - RT, Selina Augustine MD  •  levothyroxine (SYNTHROID, LEVOTHROID) tablet 75 mcg, 75 mcg, Per G Tube, Q AM, Bautista Alcantar MD  •  LORazepam (ATIVAN) injection 1 mg, 1 mg, Intravenous, Q1H PRN, Selina Augustine MD  •  melatonin tablet 9.75 mg, 9.75 mg, Per G Tube, Nightly, Bautista Alcantar MD  •  memantine (NAMENDA) tablet 10 mg, 10 mg, Per G Tube, Q12H, Bautista Alcantar MD  •  metoprolol tartrate (LOPRESSOR) tablet 12.5 mg, 12.5 mg, Per G Tube, Q12H, Bautista Alcantar MD  •  midazolam (VERSED) injection 2 mg, 2 mg, Intravenous, Q1H PRN, Selina Augustine MD  •  Morphine sulfate (PF) injection 2 mg, 2 mg, Intravenous, Q1H PRN, Selina Augustine MD  •  multivitamin  "w/minerals tablet 1 tablet, 1 tablet, Oral, Daily, Bautista Alcantar MD  •  norepinephrine (LEVOPHED) 16,000 mcg in sodium chloride 0.9 % 250 mL (64 mcg/mL) infusion, 0.02-0.3 mcg/kg/min, Intravenous, Titrated, Selina Augustine MD  •  polyethylene glycol 3350 powder (packet), 17 g, Per G Tube, Q12H PRN, Bautista Alcantar MD  •  potassium chloride 20 mEq in 100 mL IVPB, 20 mEq, Intravenous, Once, Selina Augustine MD  •  risperiDONE (risperDAL) tablet 0.75 mg, 0.75 mg, Per G Tube, TID, Bautista Alcantar MD  •  saccharomyces boulardii (FLORASTOR) capsule 250 mg, 250 mg, Per G Tube, Daily, Bautista Alcantar MD  •  sodium chloride 0.9 % flush 10 mL, 10 mL, Intravenous, Q12H, Selina Augustine MD  •  tiZANidine (ZANAFLEX) tablet 4 mg, 4 mg, Per G Tube, Q8H, Bautista Alcantar MD  •  traZODone (DESYREL) tablet 50 mg, 50 mg, Per G Tube, Nightly, Bautista Alcantar MD  •  valproate (DEPACON) 125 mg in sodium chloride 0.9 % 50 mL IVPB, 125 mg, Intravenous, Q12H, Selina Augustine MD  •  Vortioxetine HBr tablet 20 mg, 20 mg, Per G Tube, Daily, Bautista Alcantar MD    Data:     Code Status:    There are no questions and answers to display.       No family history on file.    Social History     Socioeconomic History   • Marital status:      Spouse name: Not on file   • Number of children: Not on file   • Years of education: Not on file   • Highest education level: Not on file   Tobacco Use   • Smoking status: Current Every Day Smoker   Substance and Sexual Activity   • Alcohol use: No   • Drug use: Defer       Vitals:  /54   Pulse 71   Temp 98.6 °F (37 °C)   Resp 23   Ht 167.6 cm (66\")   Wt 56.7 kg (125 lb)   SpO2 98%   BMI 20.18 kg/m²   T 97.7 P 60 R 24 BP 79/38 Sp02 100% (vent - A/C 30% FiO2)      I/O (24Hr):  No intake or output data in the 24 hours ending 02/03/20 1417    Labs and imaging:      Recent Results (from the past 12 hour(s))   Protime-INR    Collection Time: " 02/03/20  7:26 AM   Result Value Ref Range    Protime 14.6 11.9 - 14.6 Seconds    INR 1.10 (H) 0.91 - 1.09   Magnesium    Collection Time: 02/03/20  7:26 AM   Result Value Ref Range    Magnesium 2.1 1.6 - 2.4 mg/dL   Phosphorus    Collection Time: 02/03/20  7:26 AM   Result Value Ref Range    Phosphorus 4.8 (H) 2.5 - 4.5 mg/dL   Comprehensive Metabolic Panel    Collection Time: 02/03/20  7:26 AM   Result Value Ref Range    Glucose 124 (H) 65 - 99 mg/dL    BUN 52 (H) 8 - 23 mg/dL    Creatinine 1.35 (H) 0.76 - 1.27 mg/dL    Sodium 134 (L) 136 - 145 mmol/L    Potassium 3.9 3.5 - 5.2 mmol/L    Chloride 100 98 - 107 mmol/L    CO2 25.0 22.0 - 29.0 mmol/L    Calcium 8.2 (L) 8.6 - 10.5 mg/dL    Total Protein 5.1 (L) 6.0 - 8.5 g/dL    Albumin 2.30 (L) 3.50 - 5.20 g/dL    ALT (SGPT) 24 1 - 41 U/L    AST (SGOT) 16 1 - 40 U/L    Alkaline Phosphatase 90 39 - 117 U/L    Total Bilirubin 0.3 0.2 - 1.2 mg/dL    eGFR Non African Amer 52 (L) >60 mL/min/1.73    Globulin 2.8 gm/dL    A/G Ratio 0.8 g/dL    BUN/Creatinine Ratio 38.5 (H) 7.0 - 25.0    Anion Gap 9.0 5.0 - 15.0 mmol/L   BNP    Collection Time: 02/03/20  7:26 AM   Result Value Ref Range    proBNP 126.1 5.0 - 900.0 pg/mL   CBC Auto Differential    Collection Time: 02/03/20  7:26 AM   Result Value Ref Range    WBC 6.27 3.40 - 10.80 10*3/mm3    RBC 2.21 (L) 4.14 - 5.80 10*6/mm3    Hemoglobin 6.0 (C) 13.0 - 17.7 g/dL    Hematocrit 19.1 (C) 37.5 - 51.0 %    MCV 86.4 79.0 - 97.0 fL    MCH 27.1 26.6 - 33.0 pg    MCHC 31.4 (L) 31.5 - 35.7 g/dL    RDW 15.5 (H) 12.3 - 15.4 %    RDW-SD 49.1 37.0 - 54.0 fl    MPV 10.8 6.0 - 12.0 fL    Platelets 195 140 - 450 10*3/mm3    Neutrophil % 80.3 (H) 42.7 - 76.0 %    Lymphocyte % 6.4 (L) 19.6 - 45.3 %    Monocyte % 11.0 5.0 - 12.0 %    Eosinophil % 1.0 0.3 - 6.2 %    Basophil % 0.2 0.0 - 1.5 %    Immature Grans % 1.1 (H) 0.0 - 0.5 %    Neutrophils, Absolute 5.04 1.70 - 7.00 10*3/mm3    Lymphocytes, Absolute 0.40 (L) 0.70 - 3.10 10*3/mm3     Monocytes, Absolute 0.69 0.10 - 0.90 10*3/mm3    Eosinophils, Absolute 0.06 0.00 - 0.40 10*3/mm3    Basophils, Absolute 0.01 0.00 - 0.20 10*3/mm3    Immature Grans, Absolute 0.07 (H) 0.00 - 0.05 10*3/mm3    nRBC 0.0 0.0 - 0.2 /100 WBC   Triglycerides    Collection Time: 02/03/20  7:26 AM   Result Value Ref Range    Triglycerides 47 0 - 150 mg/dL   Type & Screen    Collection Time: 02/03/20  8:42 AM   Result Value Ref Range    ABO Type O     RH type Positive     Antibody Screen Negative     T&S Expiration Date 2/6/2020 11:59:59 PM    POC Glucose Once    Collection Time: 02/03/20 12:00 PM   Result Value Ref Range    Glucose 142 (H) 70 - 130 mg/dL   Prepare RBC, 2 Units    Collection Time: 02/03/20  1:11 PM   Result Value Ref Range    Product Code V3673L21     Unit Number [id number]     UNIT  ABO O     UNIT  RH POS     Dispense Status IS     Blood Type OPOS     Blood Expiration Date 202002262359     Blood Type Barcode 5100     Product Code B4115U53     Unit Number [id number]     UNIT  ABO O     UNIT  RH POS     Dispense Status IS     Blood Type OPOS     Blood Expiration Date 202002262359     Blood Type Barcode 5100        Physical Examination:        Physical Exam   Constitutional: He appears well-nourished. He appears cachectic. He is restrained.   HENT:   Head: Normocephalic and atraumatic.   Nose: Nose normal.   Mouth/Throat: Oropharynx is clear and moist.   Eyes: Pupils are equal, round, and reactive to light. EOM are normal.   Neck: Normal range of motion. Neck supple.   Trach to vent   Cardiovascular: Normal rate, regular rhythm, normal heart sounds and intact distal pulses.   Pulmonary/Chest: Effort normal and breath sounds normal.   Abdominal: Soft. Bowel sounds are normal.   g tube  Dressing c.d.i   Musculoskeletal: Normal range of motion.   Generalized weakness   Neurological: He is alert. He has normal reflexes.   Nonverbal  Confused   Skin: Skin is warm and dry.   Psychiatric: He has a normal mood  and affect. His behavior is normal.   Anxious at times   Nursing note and vitals reviewed.        Assessment:             Acute tracheostomy management (CMS/[facility])    Acute respiratory failure with hypoxia and hypercapnia (CMS/[facility])    Ventilator dependence (CMS/[facility])    Past Medical History:   Diagnosis Date   • Anxiety disorder    • Cerumen impaction    • COPD (chronic obstructive pulmonary disease) (CMS/[facility])    • Depression    • Hypertension    • Hypothyroidism    • Kidney disease    • Osteoarthritis    • Sinusitis, chronic         Plan:        1. Acute on chronic hypoxic/hypercapneic respiratory failure  2. RUL pulmonary embolus  3. PAF  4. Hypothyroidism  5. COPD  6. Anxiety disorder  7. CKD  8. Multifactorial anemia  9. GERD  10. Dementia  11. Metabolic encephalopathy  12. Iron deficiency  13. Hypokalemia - improved  14. Moderate protein calorie malnutrition  15. Occult stool positive    Continue current treatment. Monitor counts. Increase activity. Labs Thursday. Continue vent weaning under direction of pulmonology. Aggressive therapies. Maintain patient safety.Continue nutrition support per AMONs.  Monitor renal function. Monitor I&O's closely.   Watch for s/s bleeding. Hold lovenox today. Transfuse 2 units PRBC.     Electronically signed by MATT Tolentino on 2/3/2020 at 2:17 PM

## 2020-02-03 NOTE — PROGRESS NOTES
PULMONARY AND CRITICAL CARE PROGRESS NOTE - [facility]    Patient: Shawn MANZANO [place]davis    1950   [id number]  02/03/20   9:43 AM  Referring Provider: Bautista Alcantar MD  Chief Complaint: Mechanically ventilated  Interval history: The patient is resting in bed with tracheostomy to mechanical ventilator.  Currently in PSVT 20/5, 30% FiO2.  Nursing and RT states that the patient has been doing better since his psychiatric medications have been restarted.  Recommend that he get up in the chair today.  Nursing reports that he will be receiving blood products today.  Hgb noted to be 6.  No other aggravating or alleviating factors.   Review of Systems:   Cannot obtain due to mechanical ventilation.  The patient notably is critically ill and connected to a ventilator.  As such patient cannot communicate and provide any history whatsoever, including any history of present illness or interval history since arrival or review of systems. The interested reviewer may note this fact, as an attempt has been made at collecting and documenting these portions of the patient history, but this information is unobtainable despite attempted review and therefore cannot be documented at this time.   Ventilator Settings: PSV 20/5, 30%  Physical Exam:  Vital signs: Temperature 97.7 pulse 61 respirations 13 blood pressure 96/44 saturation 97  Physical Exam   Constitutional: He appears well-nourished. He appears cachectic. He has a sickly appearance. He appears ill. No distress. He is intubated and restrained.   HENT:   Head: Normocephalic.   Nose: Nose normal.   Feeding tube   Eyes: Pupils are equal, round, and reactive to light. No scleral icterus.   Neck:   Tracheostomy to vent   Cardiovascular: Normal rate and regular rhythm.   No murmur heard.  Pulmonary/Chest: Effort normal. No accessory muscle usage. Tachypnea noted. He is intubated. No respiratory distress. He has no rhonchi. He has no rales.   Abdominal:  Soft. He exhibits no distension.   Binder in place.  New G-tube in place   Genitourinary:   Genitourinary Comments: Judd catheter   Musculoskeletal: He exhibits no edema.   Skin: Skin is warm and dry. No rash noted. He is not diaphoretic. No erythema.   Psychiatric: His mood appears not anxious. He is slowed. He is noncommunicative.   Nursing note and vitals reviewed.    Results from last 7 days   Lab Units 02/03/20  0726 01/30/20  0456 01/28/20  1643   WBC 10*3/mm3 6.27 3.56 4.12   HEMOGLOBIN g/dL 6.0* 9.0* 9.9*   PLATELETS 10*3/mm3 195 148 164     Results from last 7 days   Lab Units 02/03/20  0726 02/02/20  0445 01/30/20  0456   SODIUM mmol/L 134* 139 139   POTASSIUM mmol/L 3.9 4.1 4.0   BUN mg/dL 52* 46* 33*   CREATININE mg/dL 1.35* 1.47* 0.96     Recent films:    No radiology results for the last day     Pulmonary Assessment:  1. Respiratory failure requiring mechanical ventilation which appears to be chronic at this point  2. 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 Pulmonary embolism, right treated and stable  4. COPD chronic and compensated with current medications  5. Dementia unimproved, chronic, unlikely to improve  6. Anemia  Recommend:   · Continue PSV mode and restraints as needed  · CXR in AM   · Anemia per attending, PRBC today   · Continue nutrition   · Daily therapy with PT, OT and SLP  · Mobilize as tolerated  · Continue bronchodilator treatments.  · DVT and stress ulcer prophylaxis.      Electronically signed by MATT Crawford on 2/3/2020 at 9:43 AM      Physician substantive portion:  Working on anemia today.  Exam shows sleeping, no distress.  diminished breath sounds. On vent.  Recommend mobilize to chair.  Continue psyche meds, try to eliminate other sedatives as tolerated.  Can gry trache collar once more awake if cooperative.    I have seen and examined patient personally, performing a face-to-face diagnostic evaluation with plan of care reviewed and developed with MATT  and nursing staff. I have addended and/or modified the above history of present illness, physical examination, and assessment and plan to reflect my findings and impressions. Essential elements of the care plan were discussed with APRN above.  Agree with findings and assessment/plan as documented above.    Electronically signed by Manuel Hensley MD, on 2/3/2020, 8:47 PM

## 2020-02-04 ENCOUNTER — APPOINTMENT (OUTPATIENT)
Dept: GENERAL RADIOLOGY | Facility: HOSPITAL | Age: 70
End: 2020-02-04

## 2020-02-04 LAB
ABO + RH BLD: NORMAL
ABO + RH BLD: NORMAL
BASOPHILS # BLD AUTO: 0.02 10*3/MM3 (ref 0–0.2)
BASOPHILS NFR BLD AUTO: 0.3 % (ref 0–1.5)
BH BB BLOOD EXPIRATION DATE: NORMAL
BH BB BLOOD EXPIRATION DATE: NORMAL
BH BB BLOOD TYPE BARCODE: 5100
BH BB BLOOD TYPE BARCODE: 5100
BH BB DISPENSE STATUS: NORMAL
BH BB DISPENSE STATUS: NORMAL
BH BB PRODUCT CODE: NORMAL
BH BB PRODUCT CODE: NORMAL
BH BB UNIT NUMBER: NORMAL
BH BB UNIT NUMBER: NORMAL
DEPRECATED RDW RBC AUTO: 48.6 FL (ref 37–54)
EOSINOPHIL # BLD AUTO: 0.04 10*3/MM3 (ref 0–0.4)
EOSINOPHIL NFR BLD AUTO: 0.5 % (ref 0.3–6.2)
ERYTHROCYTE [DISTWIDTH] IN BLOOD BY AUTOMATED COUNT: 15.1 % (ref 12.3–15.4)
GLUCOSE BLDC GLUCOMTR-MCNC: 73 MG/DL (ref 70–130)
GLUCOSE BLDC GLUCOMTR-MCNC: 76 MG/DL (ref 70–130)
GLUCOSE BLDC GLUCOMTR-MCNC: 80 MG/DL (ref 70–130)
GLUCOSE BLDC GLUCOMTR-MCNC: 94 MG/DL (ref 70–130)
HCT VFR BLD AUTO: 25.4 % (ref 37.5–51)
HGB BLD-MCNC: 8.1 G/DL (ref 13–17.7)
IMM GRANULOCYTES # BLD AUTO: 0.07 10*3/MM3 (ref 0–0.05)
IMM GRANULOCYTES NFR BLD AUTO: 0.9 % (ref 0–0.5)
INR PPP: 1.03 (ref 0.91–1.09)
LYMPHOCYTES # BLD AUTO: 0.6 10*3/MM3 (ref 0.7–3.1)
LYMPHOCYTES NFR BLD AUTO: 8 % (ref 19.6–45.3)
MCH RBC QN AUTO: 27.7 PG (ref 26.6–33)
MCHC RBC AUTO-ENTMCNC: 31.9 G/DL (ref 31.5–35.7)
MCV RBC AUTO: 87 FL (ref 79–97)
MONOCYTES # BLD AUTO: 0.77 10*3/MM3 (ref 0.1–0.9)
MONOCYTES NFR BLD AUTO: 10.3 % (ref 5–12)
NEUTROPHILS # BLD AUTO: 5.98 10*3/MM3 (ref 1.7–7)
NEUTROPHILS NFR BLD AUTO: 80 % (ref 42.7–76)
NRBC BLD AUTO-RTO: 0 /100 WBC (ref 0–0.2)
PLATELET # BLD AUTO: 199 10*3/MM3 (ref 140–450)
PMV BLD AUTO: 10.9 FL (ref 6–12)
PROTHROMBIN TIME: 13.8 SECONDS (ref 11.9–14.6)
RBC # BLD AUTO: 2.92 10*6/MM3 (ref 4.14–5.8)
UNIT  ABO: NORMAL
UNIT  ABO: NORMAL
UNIT  RH: NORMAL
UNIT  RH: NORMAL
WBC NRBC COR # BLD: 7.48 10*3/MM3 (ref 3.4–10.8)

## 2020-02-04 PROCEDURE — 85025 COMPLETE CBC W/AUTO DIFF WBC: CPT | Performed by: INTERNAL MEDICINE

## 2020-02-04 PROCEDURE — 36410 VNPNXR 3YR/> PHY/QHP DX/THER: CPT

## 2020-02-04 PROCEDURE — 25010000002 MORPHINE SULFATE (PF) 2 MG/ML SOLUTION: Performed by: SPECIALIST

## 2020-02-04 PROCEDURE — 99233 SBSQ HOSP IP/OBS HIGH 50: CPT | Performed by: INTERNAL MEDICINE

## 2020-02-04 PROCEDURE — 82962 GLUCOSE BLOOD TEST: CPT

## 2020-02-04 PROCEDURE — C1751 CATH, INF, PER/CENT/MIDLINE: HCPCS

## 2020-02-04 PROCEDURE — 92526 ORAL FUNCTION THERAPY: CPT

## 2020-02-04 PROCEDURE — 71045 X-RAY EXAM CHEST 1 VIEW: CPT

## 2020-02-04 PROCEDURE — 25010000002 ENOXAPARIN PER 10 MG: Performed by: SPECIALIST

## 2020-02-04 PROCEDURE — 25010000002 MIDAZOLAM PER 1 MG: Performed by: SPECIALIST

## 2020-02-04 PROCEDURE — 85610 PROTHROMBIN TIME: CPT | Performed by: SPECIALIST

## 2020-02-04 RX ORDER — LIDOCAINE HYDROCHLORIDE 10 MG/ML
1 INJECTION, SOLUTION EPIDURAL; INFILTRATION; INTRACAUDAL; PERINEURAL ONCE
Status: COMPLETED | OUTPATIENT
Start: 2020-02-04 | End: 2020-02-04

## 2020-02-04 RX ADMIN — LIDOCAINE HYDROCHLORIDE 1 ML: 10 INJECTION, SOLUTION EPIDURAL; INFILTRATION; INTRACAUDAL; PERINEURAL at 13:51

## 2020-02-04 NOTE — PROGRESS NOTES
Adult Nutrition  Assessment/PES    Patient Name:  Shawn Turner  YOB: 1950  MRN: [id number]  Admit Date:  1/15/2020    Assessment Date:  2/4/2020    Comments:  Pt is no longer on TPN/Lipids. PEG placed 1/31/20 and Nutren 2.0 was resumed over the weekend. At time of RD visit, pt did not have TF hung. RN reports pt has mild ileus so TF is being held at this time. Pt did not have an active TF order, resumed TF order with new goal rate of 40ml/hr with 45ml/hr water flush. TF TO NOT BE RESUMED UNTIL MD PERMITS AND ILEUS HAS RESOLVED. Noted 19# wt loss compared to admission wt, question accuracy however would assume pt has lost significant wt r/t lack of adequate nutrition. Will continue to follow.     Reason for Assessment     Row Name 02/04/20 1459          Reason for Assessment    Reason For Assessment  follow-up protocol         Nutrition/Diet History     Row Name 02/04/20 1459          Nutrition/Diet History    Typical Food/Fluid Intake  Pt is no longer on TPN/Lipids. PEG placed on 1/31/20 and TF was resumed over the weekend. At time of RD visit, pt did not have TF hanging, spoke with RN, she states pts TF is being held at this time secondary to mild ileus. Awaiting MD to resume TF when appropriate.          Anthropometrics     Row Name 02/04/20 1501          Anthropometrics    Weight  48.3 kg (106 lb 6.4 oz)        Usual Body Weight (UBW)    Weight Loss  unintentional     Weight Loss Time Frame  18.6# loss compared to admission wt        Body Mass Index (BMI)    BMI Assessment  BMI 17-18.4: protein-energy malnutrition grade I         Labs/Tests/Procedures/Meds     Row Name 02/04/20 1502          Labs/Procedures/Meds    Lab Results Reviewed  reviewed, pertinent     Lab Results Comments  BUN; Cr; albumin        Diagnostic Tests/Procedures    Diagnostic Test/Procedure Reviewed  reviewed, pertinent     Diagnostic Test/Procedures Comments  PEG placed 1/31/20        Medications    Pertinent Medications  Reviewed  reviewed, pertinent     Pertinent Medications Comments  cardizem; lovenox; pepcid; lasix; insulin; namenda; MVI; florastor         Physical Findings     Row Name 02/04/20 1508          Physical Findings    Overall Physical Appearance  on ventilator support trach     Gastrointestinal  constipation BM 1/25     Skin  other (see comments) marilou 14         Estimated/Assessed Needs     Row Name 02/04/20 1509          Calculation Measurements    Weight Used For Calculations  48.3 kg (106 lb 6.4 oz)        Estimated/Assessed Needs    Additional Documentation  Calorie Requirements (Group);Fluid Requirements (Group);[place]. Jeor Equation (Group);[place] Equation (Group);Protein Requirements (Group)        Calorie Requirements    Estimated Calorie Requirement (kcal/day)  1658     Estimated Calorie Need Method  [place]-St Jeor;[place] (modified)        [place]-St. Jeor Equation    RMR ([place]-St. Jeor Equation)  1190.38        [place] Equation    Ve (L/Min) for [place] Equation Calculation  19.3        Protein Requirements    Weight Used For Protein Calculations  48.3 kg (106 lb 6.4 oz)     Est Protein Requirement Amount (gms/kg)  1.4 gm protein     Estimated Protein Requirements (gms/day)  67.57        Fluid Requirements    Estimated Fluid Requirement Method  RDA Method     [place] Method (over 20 kg)  3785.26         Nutrition Prescription Ordered     Row Name 02/04/20 1512          Nutrition Prescription PO    Current PO Diet  NPO        Nutrition Prescription EN    Enteral Route  PEG         Evaluation of Received Nutrient/Fluid Intake     Row Name 02/04/20 1509          Nutrient/Fluid Evaluation    Number of Days Evaluated  2 days     Additional Documentation  Fluid Intake Evaluation (Group);Calories Evaluation (Group);Intake Assessment (Group);Protein Evaluation (Group)        Calories Evaluation    Enteral Calories (kcal)  1416     Total Calories (kcal/kg)  29.3        Protein  Evaluation    Enteral Protein (gm)  59     Total Protein (gm/kg)  1.2        Intake Assessment    Energy/Calorie Requirement Assessment  meeting needs     Protein Requirement Assessment  meeting needs     Fluid Requirement Assessment  meeting needs     Average Calorie Intake (days)  2     Average Protein Intake (days)  2     Tolerance  tolerating        Fluid Intake Evaluation    Free Water Flush Fluid (mL)  976     IV Fluid (mL)  1559        EN Evaluation    Number of Days EN Intake Evaluated  2 days     EN Average Volume Delivered (mL/day)  708 mL/day     % Goal Volume   92 %     TF Changes  Held     HOB  Greater than or equal to 30 degress               Problem/Interventions:  Problem 1     Row Name 02/04/20 1515          Nutrition Diagnoses Problem 1    Problem 1  Needs Alternate Route     Etiology (related to)  Medical Diagnosis     Pulmonary/Critical Care  A/C respiratory failure;Hypercapnea;COPD;Pulmonary emboli;Ventilator     Signs/Symptoms (evidenced by)  NPO;Report/Observation     Other Comment  recent PEG placed on 1/31/20 however, pt has mild ileus now, awaiting MD to resume TF.                Intervention Goal     Row Name 02/04/20 1516          Intervention Goal    General  Reduce/improve symptoms;Meet nutritional needs for age/condition;Disease management/therapy     TF/PN  Inititiate TF/PN;Establish TF tolerance;Tolerate TF at goal     Deliver % of Goal  100 %     Deliver % of Estimated Need  100 %     Transition  TF to PO     Weight  Maintain weight         Nutrition Intervention     Row Name 02/04/20 1516          Nutrition Intervention    RD/Tech Action  Follow Tx progress;Care plan reviewd;Recommend/ordered     Recommended/Ordered  EN         Nutrition Prescription     Row Name 02/04/20 1517          Nutrition Prescription EN    Enteral Prescription  Enteral begin/change;Enteral to supply     Enteral Route  PEG     Product  Nutren 2 alicia     TF Delivery Method  Continuous     Continuous TF Goal  Rate (mL/hr)  40 mL/hr     Continuous TF Starting Rate (mL/hr)  20 mL/hr     Continuous TF Goal Volume (mL)  880 mL     Water flush (mL)   45 mL     Water Flush Frequency  Per hour     New EN Prescription Ordered?  Yes        EN to Supply    Kcal/Day  1760 Kcal/Day     Kcal/Kg  36 Kcal/Kg     Kcal/Kg Weight Method  Actual weight     Protein (gm/day)  74 gm/day     Meet Estimated Kcal Need (%)  100 %     Meet Estimated Protein Need (%)  112 %     TF Free H2O (mL)  607 mL     Total Free H2O (mL/day)  1597 mL/day        Nutrition Prescription PN    Parenteral Prescription  -- discontinued on 2/2/20         Education/Evaluation     Row Name 02/04/20 1519          Education    Education  No discharge needs identified at this time        Monitor/Evaluation    Monitor  Per protocol           Electronically signed by:  Vinita Sultana  02/04/20 3:26 PM

## 2020-02-04 NOTE — CONSULTS
12 cm dual lumen Midline catheter placed in pt right basilic vein. Pt tolerated procedure well. Line flushes and draws well. Sterile dressing applied.

## 2020-02-04 NOTE — PROGRESS NOTES
PULMONARY AND CRITICAL CARE PROGRESS NOTE - [facility]    Patient: Shawn Turner    1950   [id number]  02/04/20   9:01 AM  Referring Provider: Bautista Alcantar MD  Chief Complaint: Mechanically ventilated  Interval history: The patient is resting in bed with tracheostomy to mechanical ventilator. He is sleeping.  Currently back in AC mode.  Patient with mild hypotension and lethargic. CXR not obtained this AM.  No other aggravating or alleviating factors.   Review of Systems:   Cannot obtain due to mechanical ventilation.  The patient notably is critically ill and connected to a ventilator.  As such patient cannot communicate and provide any history whatsoever, including any history of present illness or interval history since arrival or review of systems. The interested reviewer may note this fact, as an attempt has been made at collecting and documenting these portions of the patient history, but this information is unobtainable despite attempted review and therefore cannot be documented at this time.   Ventilator Settings:  AC,10,450,5,30%  Physical Exam:  Vital signs: Temperature N/A, pulse 53, respirations 18, blood pressure 86/53, saturation 97%  Physical Exam   Constitutional: He appears well-nourished. He appears cachectic. He has a sickly appearance. He appears ill. No distress. He is intubated and restrained.   HENT:   Head: Normocephalic.   Nose: Nose normal.   Feeding tube   Eyes: Pupils are equal, round, and reactive to light. No scleral icterus.   Neck:   Tracheostomy to vent   Cardiovascular: Normal rate and regular rhythm.   No murmur heard.  Pulmonary/Chest: Effort normal. No accessory muscle usage. Tachypnea noted. He is intubated. No respiratory distress. He has no rhonchi. He has no rales.   Abdominal: Soft. He exhibits no distension.   Binder in place.  New G-tube in place   Genitourinary:   Genitourinary Comments: Judd catheter   Musculoskeletal: He  exhibits no edema.   Skin: Skin is warm and dry. No rash noted. He is not diaphoretic. No erythema.   Psychiatric: His mood appears not anxious. He is slowed. He is noncommunicative.   Nursing note and vitals reviewed.    Results from last 7 days   Lab Units 02/04/20  0433 02/03/20  1800 02/03/20  0726 01/30/20  0456   WBC 10*3/mm3 7.48  --  6.27 3.56   HEMOGLOBIN g/dL 8.1* 9.5* 6.0* 9.0*   PLATELETS 10*3/mm3 199  --  195 148     Results from last 7 days   Lab Units 02/03/20  0726 02/02/20  0445 01/30/20  0456   SODIUM mmol/L 134* 139 139   POTASSIUM mmol/L 3.9 4.1 4.0   BUN mg/dL 52* 46* 33*   CREATININE mg/dL 1.35* 1.47* 0.96     Recent films:    Xr Chest 1 View    Result Date: 2/3/2020  1. Stable radiographic appearance the chest. Mild left basilar atelectasis.  This report was finalized on 02/03/2020 15:41 by Dr. Praneeth Caban MD.    Xr Abdomen Kub    Result Date: 2/3/2020  Impression:  Moderate gaseous bowel distention involving the small bowel and colon. Imaging appearance is most suggestive of a mild ileus. This report was finalized on 02/03/2020 17:02 by Dr. Surendra Guardado MD.       Pulmonary Assessment:  1. Respiratory failure requiring mechanical ventilation which appears to be chronic at this point  2. 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 Pulmonary embolism, right treated and stable  4. COPD chronic and compensated with current medications  5. Dementia unimproved, chronic, unlikely to improve  6. Anemia  Recommend:   · Continue PSV mode and restraints as needed  · CXR now   · Dr. Shellie torres cardizem, daliresp, lasix  · IVF 500ml bolus today  · Continue nutrition   · Daily therapy with PT, OT and SLP  · Mobilize as tolerated  · Continue bronchodilator treatments.  · DVT and stress ulcer prophylaxis.      Electronically signed by MATT Crawford on 2/4/2020 at 9:01 AM       Physician substantive portion:  Sleepy and somewhat hypotensive today.  Still on vent in ac mode.  I changed to  psv 10/5 which he appeared to tolerate well,  Will bolus with ivf given hx diuresis and a lot of time with npo status and hypotension currently  Hold daliresp given gi issues.   Hold hypertensive meds with low bp.    I have seen and examined patient personally, performing a face-to-face diagnostic evaluation with plan of care reviewed and developed with APRN and nursing staff. I have addended and/or modified the above history of present illness, physical examination, and assessment and plan to reflect my findings and impressions. Essential elements of the care plan were discussed with APRN above.  Agree with findings and assessment/plan as documented above.    Electronically signed by Manuel Hensley MD, on 2/4/2020, 9:23 PM

## 2020-02-05 LAB
BASOPHILS # BLD AUTO: 0.02 10*3/MM3 (ref 0–0.2)
BASOPHILS NFR BLD AUTO: 0.2 % (ref 0–1.5)
DEPRECATED RDW RBC AUTO: 47.8 FL (ref 37–54)
EOSINOPHIL # BLD AUTO: 0.02 10*3/MM3 (ref 0–0.4)
EOSINOPHIL NFR BLD AUTO: 0.2 % (ref 0.3–6.2)
ERYTHROCYTE [DISTWIDTH] IN BLOOD BY AUTOMATED COUNT: 15 % (ref 12.3–15.4)
GLUCOSE BLDC GLUCOMTR-MCNC: 122 MG/DL (ref 70–130)
GLUCOSE BLDC GLUCOMTR-MCNC: 139 MG/DL (ref 70–130)
GLUCOSE BLDC GLUCOMTR-MCNC: 170 MG/DL (ref 70–130)
HCT VFR BLD AUTO: 30.7 % (ref 37.5–51)
HGB BLD-MCNC: 9.6 G/DL (ref 13–17.7)
IMM GRANULOCYTES # BLD AUTO: 0.05 10*3/MM3 (ref 0–0.05)
IMM GRANULOCYTES NFR BLD AUTO: 0.6 % (ref 0–0.5)
INR PPP: 1.03 (ref 0.91–1.09)
LYMPHOCYTES # BLD AUTO: 0.29 10*3/MM3 (ref 0.7–3.1)
LYMPHOCYTES NFR BLD AUTO: 3.2 % (ref 19.6–45.3)
MAGNESIUM SERPL-MCNC: 2 MG/DL (ref 1.6–2.4)
MCH RBC QN AUTO: 27.7 PG (ref 26.6–33)
MCHC RBC AUTO-ENTMCNC: 31.3 G/DL (ref 31.5–35.7)
MCV RBC AUTO: 88.5 FL (ref 79–97)
MONOCYTES # BLD AUTO: 0.62 10*3/MM3 (ref 0.1–0.9)
MONOCYTES NFR BLD AUTO: 6.9 % (ref 5–12)
NEUTROPHILS # BLD AUTO: 7.94 10*3/MM3 (ref 1.7–7)
NEUTROPHILS NFR BLD AUTO: 88.9 % (ref 42.7–76)
NRBC BLD AUTO-RTO: 0 /100 WBC (ref 0–0.2)
PHOSPHATE SERPL-MCNC: 3.2 MG/DL (ref 2.5–4.5)
PLATELET # BLD AUTO: 261 10*3/MM3 (ref 140–450)
PMV BLD AUTO: 11.5 FL (ref 6–12)
PROTHROMBIN TIME: 13.8 SECONDS (ref 11.9–14.6)
RBC # BLD AUTO: 3.47 10*6/MM3 (ref 4.14–5.8)
WBC NRBC COR # BLD: 8.94 10*3/MM3 (ref 3.4–10.8)
WHOLE BLOOD HOLD SPECIMEN: NORMAL

## 2020-02-05 PROCEDURE — 99231 SBSQ HOSP IP/OBS SF/LOW 25: CPT | Performed by: OTOLARYNGOLOGY

## 2020-02-05 PROCEDURE — 99233 SBSQ HOSP IP/OBS HIGH 50: CPT | Performed by: INTERNAL MEDICINE

## 2020-02-05 PROCEDURE — 25010000002 ENOXAPARIN PER 10 MG: Performed by: SPECIALIST

## 2020-02-05 PROCEDURE — 85610 PROTHROMBIN TIME: CPT | Performed by: SPECIALIST

## 2020-02-05 PROCEDURE — 85025 COMPLETE CBC W/AUTO DIFF WBC: CPT | Performed by: INTERNAL MEDICINE

## 2020-02-05 PROCEDURE — 82962 GLUCOSE BLOOD TEST: CPT

## 2020-02-05 PROCEDURE — 92526 ORAL FUNCTION THERAPY: CPT

## 2020-02-05 PROCEDURE — 84100 ASSAY OF PHOSPHORUS: CPT | Performed by: SPECIALIST

## 2020-02-05 PROCEDURE — 83735 ASSAY OF MAGNESIUM: CPT | Performed by: SPECIALIST

## 2020-02-05 NOTE — PROGRESS NOTES
PULMONARY AND CRITICAL CARE PROGRESS NOTE - [facility]    Patient: Shawn MANZANO Ordavis    1950   [id number]  02/05/20   8:28 AM  Referring Provider: Bautista Alcantar MD  Chief Complaint: Mechanically ventilated  Interval history: The patient is resting in bed with tracheostomy to mechanical ventilator. He is sleeping but aroused easily.  Currently back in AC mode. Per RT, tolerated PSV most of the day. Night RT noted RSBI to be 114-140 and patient was placed back in AC mode. He is hypertensive this am.  No other aggravating or alleviating factors.   Review of Systems:   Cannot obtain due to mechanical ventilation.  The patient notably is critically ill and connected to a ventilator.  As such patient cannot communicate and provide any history whatsoever, including any history of present illness or interval history since arrival or review of systems. The interested reviewer may note this fact, as an attempt has been made at collecting and documenting these portions of the patient history, but this information is unobtainable despite attempted review and therefore cannot be documented at this time.   Ventilator Settings:  AC,10,450,5,30%  Physical Exam:  Vital signs: Temperature 100.0, pulse 72, respirations 31, blood pressure 162/80, saturation 99% ET 30  Physical Exam   Constitutional: He appears well-nourished. He appears cachectic. He has a sickly appearance. He appears ill. No distress. He is intubated and restrained.   HENT:   Head: Normocephalic.   Nose: Nose normal.   Feeding tube   Eyes: Pupils are equal, round, and reactive to light. No scleral icterus.   Neck:   Tracheostomy to vent   Cardiovascular: Normal rate and regular rhythm.   No murmur heard.  Pulmonary/Chest: Effort normal. No accessory muscle usage. Tachypnea noted. He is intubated. No respiratory distress. He has no rhonchi. He has no rales.   Abdominal: Soft. He exhibits no distension.   Binder in place.   G-tube in  place   Genitourinary:   Genitourinary Comments: Judd catheter   Musculoskeletal: He exhibits no edema.   Hand mits noted    Skin: Skin is warm and dry. No rash noted. He is not diaphoretic. No erythema.   Psychiatric: His mood appears not anxious. He is slowed. He is noncommunicative.   Nursing note and vitals reviewed.    Results from last 7 days   Lab Units 02/04/20  0433 02/03/20  1800 02/03/20  0726 01/30/20  0456   WBC 10*3/mm3 7.48  --  6.27 3.56   HEMOGLOBIN g/dL 8.1* 9.5* 6.0* 9.0*   PLATELETS 10*3/mm3 199  --  195 148     Results from last 7 days   Lab Units 02/03/20  0726 02/02/20  0445 01/30/20  0456   SODIUM mmol/L 134* 139 139   POTASSIUM mmol/L 3.9 4.1 4.0   BUN mg/dL 52* 46* 33*   CREATININE mg/dL 1.35* 1.47* 0.96     Recent films:    Xr Chest 1 View    Result Date: 2/4/2020  Subtle airspace opacities in the lung bases are concerning for an inflammatory process. Attention on follow-up recommended. This report was finalized on 02/04/2020 09:48 by Dr. Daniel Mendoza MD.    Xr Chest 1 View    Result Date: 2/3/2020  1. Stable radiographic appearance the chest. Mild left basilar atelectasis.  This report was finalized on 02/03/2020 15:41 by Dr. Praneeth Caban MD.    Xr Abdomen Kub    Result Date: 2/3/2020  Impression:  Moderate gaseous bowel distention involving the small bowel and colon. Imaging appearance is most suggestive of a mild ileus. This report was finalized on 02/03/2020 17:02 by Dr. Surendra Guardado MD.       Pulmonary Assessment:  1. Respiratory failure requiring mechanical ventilation which appears to be chronic at this point  2. 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 Pulmonary embolism, right treated and stable  4. COPD chronic and compensated with current medications  5. Dementia unimproved, chronic, unlikely to improve  6. Anemia  Recommend:   · Changed to SIMV, Vt increased to 550, PS 18, peep increased to 7.5 related to autopeep, Fio2 remains at 30%   · CXR twice weekly will  order for Friday   · marcel Fabian, lasix  · IVF 500ml bolus today  · Continue nutrition   · Daily therapy with PT, OT and SLP  · Mobilize as tolerated  · Continue bronchodilator treatments.  · DVT and stress ulcer prophylaxis.      Electronically signed by MATT Fields on 2/5/2020 at 8:28 AM      Physician substantive portion:  Patient has had problems trying to stay on pressure support.  Had to change his ventilator as above.  Breath sounds are coarse.  He does have auto PEEP so we are raising the PEEP as well to counterbalance that.  Hopefully the combination of adding some SIMV breaths and the increased PEEP will help him maintain around the clock.  I am concerned that he is going to need chronic ventilatory support and may ultimately need to go to a facility where he can receive that on a permanent basis    I have seen and examined patient personally, performing a face-to-face diagnostic evaluation with plan of care reviewed and developed with APRN and nursing staff. I have addended and/or modified the above history of present illness, physical examination, and assessment and plan to reflect my findings and impressions. Essential elements of the care plan were discussed with APRN above.  Agree with findings and assessment/plan as documented above.    Electronically signed by Manuel Hensley MD, on 2/5/2020, 8:55 AM

## 2020-02-05 NOTE — PROGRESS NOTES
Rashid Bowen Jr, MD     OTOLARYNGOLOGY, HEAD AND NECK SURGERY PROGRESS NOTE   Referring Provider: Bautista Alcantar MD  Reason for Consultation: Trach management  Location: 586/1  Accompanied by: No one  Chief complaint: No chief complaint on file.    Subjective    Interval History:   Since last examined, Shawn Turner Has remained with trach. Respiratory states he does not tolerate vent wean well. He is not participating in the rehab.  Nursing staff indicate he is calmer on current medication regimen.  He continues with mechanical ventilation.  Patient seen. Chart reviewed.  Review of Systems:   ROS master CMN: No change from prior inquiry      Objective    Vital Signs     EXAMINATION:   CONSTITUTIONAL:    well nourished, well-developed, alert, oriented, in no acute distress   well developed  Trach present, on vent     BODY HABITUS:    Normal body habitus     COMMUNICATION:    non-verbal     HEAD:     Normocephalic, without obvious abnormality, atraumatic     FACE:    structure normal, no tenderness present, no lesions/masses, no evidence of trauma     SALIVARY GLANDS:    parotid glands with no tenderness, no swelling, no masses, submandibular glands with normal size, nontender      EYE:    eyelids normal, orbits normal, no proptosis, conjunctiva clear, sclera non-icteric, pupils equal, round, reactive to light and accomodation  Color:   brown     HEARING:    not evaluated     NOSE EXTERNAL:    APPEARANCE: normal, straight, with good projection, no tenderness, no lesions, no tenderness, good nasal support, patent nares     ORAL CAVITY:      LIPS:      structure normal, no tenderness on palpation, no lesions, no evidence of trauma, normal mobility and oral competence    TEETH:       edentulous:  upper and lower    GUMS:      gingivae healthy, no lesions    ORAL MUCOSA:       oral mucosa normal, no mucosal lesions    FLOOR OF MOUTH:       Warthin's duct patent, mucosa normal  TONGUE:       intact mucosa,  mobility not tested    PALATE:       hard palate with normal mucosa and structure      soft palate- low thick     OROPHARYNX:    Direct examination  oropharyngeal mucosa normal, tonsil(s) with normal appearance       NECK:     LARYNGEAL POSITION: normal  trach present     CHEST/RESPIRATORY:    Mechanical ventilation     CARDIOVASCULAR:    regular rate and rhythm, no murmurs, gallups, no peripheral edema     NEURO/PSYCHIATRIC :    Unresponsive to me, CN not tested     TRACHEOSTOMY SITE:    Tracheostomy tube type: Shiley #8 cuffed DIC     Stoma: mildly granulated, more mature now    Secretions:   Date placed: 12/13/2020  Date last changed: never    Results Review:    Lab Results (last 24 hours)     Procedure Component Value Units Date/Time    Extra Tubes [[id number]] Collected:  02/05/20 0446    Specimen:  Blood, Venous Line Updated:  02/05/20 0716    Narrative:       The following orders were created for panel order Extra Tubes.  Procedure                               Abnormality         Status                     ---------                               -----------         ------                     Lavender Top[[id number]]                                     Final result                 Please view results for these tests on the individual orders.    Lavender Top [[id number]] Collected:  02/05/20 0446    Specimen:  Blood Updated:  02/05/20 0716     Extra Tube hold for add-on     Comment: Auto resulted       Phosphorus [[id number]]  (Normal) Collected:  02/05/20 0445    Specimen:  Blood Updated:  02/05/20 0534     Phosphorus 3.2 mg/dL     Protime-INR [[id number]]  (Normal) Collected:  02/05/20 0445    Specimen:  Blood Updated:  02/05/20 0533     Protime 13.8 Seconds      INR 1.03    Magnesium [[id number]]  (Normal) Collected:  02/05/20 0445    Specimen:  Blood Updated:  02/05/20 0532     Magnesium 2.0 mg/dL     POC Glucose Once [[id number]]  (Normal) Collected:  02/04/20 1622    Specimen:  Blood Updated:  02/04/20 1637      Glucose 80 mg/dL      Comment: : SUKUMAR Bhatt RobinMeter ID: [id number]       POC Glucose Once [[id number]]  (Normal) Collected:  02/04/20 1058    Specimen:  Blood Updated:  02/04/20 1137     Glucose 73 mg/dL      Comment: : SUKUMAR Bhatt RobinMeter ID: [id number]           Imaging Results (Last 24 Hours)     Procedure Component Value Units Date/Time    XR Chest 1 View [[id number]] Collected:  02/04/20 0945     Updated:  02/04/20 0952    Narrative:       EXAMINATION: XR CHEST 1 VW- 2/4/2020 9:45 AM CST     HISTORY: Respiratory failure     COMPARISON: 02/03/2020     FINDINGS:   Tracheostomy tube remains in place with tip above the lázaro. The heart  and mediastinal contours are stable. Atherosclerotic calcifications are  seen in the aorta. There is diffuse coarsening of the interstitial  markings. Developing airspace opacities are noted in the left lung base  with obscuration of the hemidiaphragm. Subtle airspace opacities are  also developing in the right lung base with bronchial cuffing. There is  no appreciable pneumothorax or definite pleural effusion. A left  approach PICC line is in place with tip projecting over the cavoatrial  junction.       Impression:       Subtle airspace opacities in the lung bases are concerning  for an inflammatory process. Attention on follow-up recommended.  This report was finalized on 02/04/2020 09:48 by Dr. Daniel Mendoza MD.          Medications, Allergies and Past History Reviewed       Assessment    ENT ASSESSMENT:     Acute tracheostomy management (CMS/HCC)    Acute respiratory failure with hypoxia and hypercapnia (CMS/HCC)    Ventilator dependence (CMS/HCC)         Plan    PLAN:   Patient remains weak. He is not tolerating trach wean at this time.  He is stable on trach.  I will plan trach change soon for hygiene. Since he will remain on vent, I will continue #8 size for suction and ease of ventilation.  Reviewed Pulm note. Pt appears to require chronic  mechanical ventilation.  I will continue trach right now and try to slowly wean trach.    Following patient as in-patient. Further recommendations will be made based on serial examinations.    Medications/Orders for this encounter: No new medications ordered.  No New orders written.        Rashid Bowen Jr, MD  02/05/20  8:03 AM

## 2020-02-05 NOTE — PROGRESS NOTES
Ortiz Alcantar M.D.  MATT Herrera        Internal Medicine Progress Note    2/5/2020   10:11 AM    Name:  Shanw Turner  MRN:    [id number]     Acct:     [id number]   Room:  [address] [place] [address] Day: 0     Admit Date: 1/15/2020 10:17 AM  PCP: Tj Wilson MD    Subjective:     C/C: need for continued vent weaning    Interval History: Status: same. Resting in bed. No family at bedside. Tolerating current vent settings. Little progress with therapies. Appears in no acute distress. Blood sugars stable. Hgb down to 8.1 yesterday. No s/s bleeding reported per nursing.     sReview of Systems   Unable to perform ROS: patient nonverbal       Medications:     Allergies:   Allergies   Allergen Reactions   • Aspirin        Current Meds:   Current Facility-Administered Medications:   •  acetaminophen (TYLENOL) 160 MG/5ML solution 650 mg, 650 mg, Per G Tube, Q4H PRN, Bautista Alcantar MD  •  acetaminophen (TYLENOL) suppository 650 mg, 650 mg, Rectal, Q6H PRN, Selina Augustine MD  •  acetaZOLAMIDE (DIAMOX) tablet 250 mg, 250 mg, Per G Tube, BID, Bautista Alcantar MD  •  albuterol (PROVENTIL) nebulizer solution 0.083% 2.5 mg/3mL, 2.5 mg, Nebulization, Q2H PRN, Selina Augustine MD  •  bacitracin ointment, , Topical, Q12H, Selina Augustine MD  •  bacitracin ointment, , Topical, PRN, Selina Augustine MD  •  bacitracin ointment, , Topical, PRN, Selina Augustine MD  •  bacitracin ointment, , Topical, Q12H, Selina Augustine MD  •  dextrose (D50W) 25 g/ 50mL Intravenous Solution 25 g, 25 g, Intravenous, Q15 Min PRN, Selina Augustine MD  •  dextrose (GLUTOSE) oral gel 15 g, 15 g, Oral, Q15 Min PRN, Selina Augustine MD  •  dilTIAZem (CARDIZEM) injection 5 mg, 5 mg, Intravenous, Q6H PRN, Selina Augustine MD  •  dilTIAZem (CARDIZEM) tablet 30 mg, 30 mg, Per G Tube, Q6H, Bautista Alcantar MD  •  donepezil (ARICEPT) tablet 10 mg, 10 mg, Per G Tube, Nightly, Bautista Alcantar MD  •   enalaprilat (VASOTEC) injection 0.625 mg, 0.625 mg, Intravenous, Q6H, Selina Augustine MD  •  enoxaparin (LOVENOX) syringe 60 mg, 1 mg/kg, Subcutaneous, Q12H, Selina Augustine MD  •  famotidine (PEPCID) injection 20 mg, 20 mg, Intravenous, Q12H, Selina Augustine MD  •  ferrous sulfate tablet 325 mg, 325 mg, Oral, Daily With Breakfast, Bautista Alcantar MD  •  furosemide (LASIX) tablet 40 mg, 40 mg, Per G Tube, Daily, Bautista Alcantar MD  •  glucagon (human recombinant) (GLUCAGEN DIAGNOSTIC) injection 1 mg, 1 mg, Subcutaneous, Q15 Min PRN, Selina Augustine MD  •  haloperidol lactate (HALDOL) injection 1 mg, 1 mg, Intramuscular, Q4H PRN, Selina Augustine MD  •  hydrALAZINE (APRESOLINE) injection 5 mg, 5 mg, Intravenous, Q6H PRN, Selina Augustine MD  •  hypromellose (ISOPTO TEARS) 0.5 % ophthalmic solution 2 drop, 2 drop, Both Eyes, Q2H PRN, Selina Augustine MD  •  insulin lispro (humaLOG) injection 2-7 Units, 2-7 Units, Subcutaneous, Q6H, Selina Augustine MD  •  ipratropium (ATROVENT) nebulizer solution 0.5 mg, 0.5 mg, Nebulization, Q6H PRN, Selina Augustine MD  •  ipratropium-albuterol (DUO-NEB) nebulizer solution 3 mL, 3 mL, Nebulization, Q6H - RT, Selina Augustine MD  •  levothyroxine (SYNTHROID, LEVOTHROID) tablet 75 mcg, 75 mcg, Per G Tube, Q AM, Bautista Alcantar MD  •  melatonin tablet 9.75 mg, 9.75 mg, Per G Tube, Nightly, Bautista Alcantar MD  •  memantine (NAMENDA) tablet 10 mg, 10 mg, Per G Tube, Q12H, Bautista Alcantar MD  •  metoprolol tartrate (LOPRESSOR) tablet 12.5 mg, 12.5 mg, Per G Tube, Q12H, Bautista Alcantar MD  •  midazolam (VERSED) injection 2 mg, 2 mg, Intravenous, Q1H PRN, Selina Augustine MD  •  Morphine sulfate (PF) injection 2 mg, 2 mg, Intravenous, Q1H PRN, Selina Augustine MD  •  multivitamin w/minerals tablet 1 tablet, 1 tablet, Oral, Daily, Bautista Alcantar MD  •  norepinephrine (LEVOPHED) 16,000 mcg in sodium chloride 0.9 % 250 mL (64  mcg/mL) infusion, 0.02-0.3 mcg/kg/min, Intravenous, Titrated, Selina Augustine MD  •  polyethylene glycol 3350 powder (packet), 17 g, Per G Tube, Q12H PRN, Bautista Alcantar MD  •  risperiDONE (risperDAL) tablet 0.75 mg, 0.75 mg, Per G Tube, TID, Bautista Alcantar MD  •  saccharomyces boulardii (FLORASTOR) capsule 250 mg, 250 mg, Per G Tube, Daily, Bautista Alcantar MD  •  sodium chloride 0.9 % flush 10 mL, 10 mL, Intravenous, Q12H, Selina Augustine MD  •  tiZANidine (ZANAFLEX) tablet 4 mg, 4 mg, Per G Tube, Q8H, Bautista Alcantar MD  •  traZODone (DESYREL) tablet 50 mg, 50 mg, Per G Tube, Nightly, Bautista Alcantar MD  •  valproate (DEPACON) 125 mg in sodium chloride 0.9 % 50 mL IVPB, 125 mg, Intravenous, Q12H, Selina Augustine MD  •  Vortioxetine HBr tablet 20 mg, 20 mg, Per G Tube, Daily, Bautista Alcantar MD    Data:     Code Status:    There are no questions and answers to display.       No family history on file.    Social History     Socioeconomic History   • Marital status:      Spouse name: Not on file   • Number of children: Not on file   • Years of education: Not on file   • Highest education level: Not on file   Tobacco Use   • Smoking status: Current Every Day Smoker   Substance and Sexual Activity   • Alcohol use: No   • Drug use: Defer       Vitals:  T 97.8 P 74 R 27 /70 Sp02 99% (vent)      I/O (24Hr):  No intake or output data in the 24 hours ending 02/05/20 1011    Labs and imaging:      Recent Results (from the past 12 hour(s))   Protime-INR    Collection Time: 02/05/20  4:45 AM   Result Value Ref Range    Protime 13.8 11.9 - 14.6 Seconds    INR 1.03 0.91 - 1.09   Magnesium    Collection Time: 02/05/20  4:45 AM   Result Value Ref Range    Magnesium 2.0 1.6 - 2.4 mg/dL   Phosphorus    Collection Time: 02/05/20  4:45 AM   Result Value Ref Range    Phosphorus 3.2 2.5 - 4.5 mg/dL   Lavender Top    Collection Time: 02/05/20  4:46 AM   Result Value Ref  Range    Extra Tube hold for add-on        Physical Examination:        Physical Exam   Constitutional: He appears well-nourished. He appears cachectic. He is restrained.   HENT:   Head: Normocephalic and atraumatic.   Nose: Nose normal.   Mouth/Throat: Oropharynx is clear and moist.   Eyes: Pupils are equal, round, and reactive to light. EOM are normal.   Neck: Normal range of motion. Neck supple.   Trach to vent   Cardiovascular: Normal rate, regular rhythm, normal heart sounds and intact distal pulses.   Pulmonary/Chest: Effort normal and breath sounds normal.   Abdominal: Soft. Bowel sounds are normal.   g tube  Dressing c.d.i   Musculoskeletal: Normal range of motion.   Generalized weakness   Neurological: He is alert. He has normal reflexes.   Nonverbal  Confused   Skin: Skin is warm and dry.   Psychiatric: He has a normal mood and affect. His behavior is normal.   Anxious at times   Nursing note and vitals reviewed.        Assessment:             Acute tracheostomy management (CMS/[facility])    Acute respiratory failure with hypoxia and hypercapnia (CMS/[facility])    Ventilator dependence (CMS/[facility])    Past Medical History:   Diagnosis Date   • Anxiety disorder    • Cerumen impaction    • COPD (chronic obstructive pulmonary disease) (CMS/[facility])    • Depression    • Hypertension    • Hypothyroidism    • Kidney disease    • Osteoarthritis    • Sinusitis, chronic         Plan:        1. Acute on chronic hypoxic/hypercapneic respiratory failure  2. RUL pulmonary embolus  3. PAF  4. Hypothyroidism  5. COPD  6. Anxiety disorder  7. CKD  8. Multifactorial anemia  9. GERD  10. Dementia  11. Metabolic encephalopathy  12. Iron deficiency  13. Hypokalemia - improved  14. Moderate protein calorie malnutrition  15. Occult stool positive    Continue current treatment. Monitor counts. Increase activity. Labs in am. Continue vent weaning under direction of pulmonology. Aggressive therapies. Maintain patient safety. Continue nutrition  support per AMONs.  Monitor renal function. Monitor I&O's closely.  Watch for s/s bleeding. Continue to hold Lovenox.  Will transfuse as needed judiciously. CBC today.     Electronically signed by MATT Tolentino on 2/5/2020 at 10:11 AM     I have discussed the care of Shawn R Ort, including pertinent history and exam findings, with the nurse practitioner.    I have seen and examined the patient and the key elements of all parts of the encounter have been performed by me.  I agree with the assessment, plan and orders as documented by MATT Herrera, after I modified the exam findings and the plan of treatments and the final version is my approved version of the assessment.        Electronically signed by Bautista Alcantar MD on 2/5/2020 at 11:23 PM

## 2020-02-05 NOTE — PROGRESS NOTES
"[facility] Fall Assessment Note    Shawn Turner is a 69 y.o.male  [Ht: 167.6 cm (66\"); Wt: 48.3 kg (106 lb 6.4 oz)] admitted 1/15/2020 10:17 AM.    Current medications associated with an increased risk for fall include:  Acetazolamide  Diltiazem  Enalaprilat  Furosemide  Humalog  Melatonin  Memantine  Metoprolol tartrate  Risperidone  Tizanidine  Trazodone  Valproate  Vortioxetine  Diltiazem  Haloperiodol  Hydralazine  Midazolam  Morphine    L Zander [place], [facility]  02/05/203:53 PM         "

## 2020-02-05 NOTE — PROGRESS NOTES
MICHELLE Chavez APRN        Internal Medicine Progress Note    2/4/2020   6:26 PM    Name:  Shawn Turner  MRN:    [id number]     Acct:     [id number]   Room:  [address] [place] [address] Day: 0     Admit Date: 1/15/2020 10:17 AM  PCP: Tj Wilson MD    Subjective:     C/C: need for continued vent weaning    Interval History: Status: Improved with rest less restlessness.  Tolerating current vent settings.  Remains with mitts in place.    sReview of Systems   Unable to perform ROS: patient nonverbal       Medications:     Allergies:   Allergies   Allergen Reactions   • Aspirin        Current Meds:   Current Facility-Administered Medications:   •  acetaminophen (TYLENOL) 160 MG/5ML solution 650 mg, 650 mg, Per G Tube, Q4H PRN, Bautista Alcantar MD  •  acetaminophen (TYLENOL) suppository 650 mg, 650 mg, Rectal, Q6H PRN, Selina Augustine MD  •  acetaZOLAMIDE (DIAMOX) tablet 250 mg, 250 mg, Per G Tube, BID, Bautista Alcantar MD  •  albuterol (PROVENTIL) nebulizer solution 0.083% 2.5 mg/3mL, 2.5 mg, Nebulization, Q2H PRN, Selina Augustine MD  •  bacitracin ointment, , Topical, Q12H, Selina Augustine MD  •  bacitracin ointment, , Topical, PRN, Selina Augustine MD  •  bacitracin ointment, , Topical, PRN, Selina Augustine MD  •  bacitracin ointment, , Topical, Q12H, Selina Augustine MD  •  dextrose (D50W) 25 g/ 50mL Intravenous Solution 25 g, 25 g, Intravenous, Q15 Min PRN, Selina Augustien MD  •  dextrose (GLUTOSE) oral gel 15 g, 15 g, Oral, Q15 Min PRN, Selina Augustine MD  •  dilTIAZem (CARDIZEM) injection 5 mg, 5 mg, Intravenous, Q6H PRN, Selina Augustine MD  •  dilTIAZem (CARDIZEM) tablet 30 mg, 30 mg, Per G Tube, Q6H, Bautista Alcantar MD  •  donepezil (ARICEPT) tablet 10 mg, 10 mg, Per G Tube, Nightly, Bautista Alcantar MD  •  enalaprilat (VASOTEC) injection 0.625 mg, 0.625 mg, Intravenous, Q6H, Selina Augustine MD  •  enoxaparin (LOVENOX) syringe 60  mg, 1 mg/kg, Subcutaneous, Q12H, Selina Augustine MD  •  famotidine (PEPCID) injection 20 mg, 20 mg, Intravenous, Q12H, Selina Augustine MD  •  ferrous sulfate tablet 325 mg, 325 mg, Oral, Daily With Breakfast, Bautista Alcantar MD  •  furosemide (LASIX) tablet 40 mg, 40 mg, Per G Tube, Daily, Bautista Alcantar MD  •  glucagon (human recombinant) (GLUCAGEN DIAGNOSTIC) injection 1 mg, 1 mg, Subcutaneous, Q15 Min PRN, Selina Augustine MD  •  haloperidol lactate (HALDOL) injection 1 mg, 1 mg, Intramuscular, Q4H PRN, Selina Augustine MD  •  hydrALAZINE (APRESOLINE) injection 5 mg, 5 mg, Intravenous, Q6H PRN, Selina Augustine MD  •  hypromellose (ISOPTO TEARS) 0.5 % ophthalmic solution 2 drop, 2 drop, Both Eyes, Q2H PRN, Selina Augustine MD  •  insulin lispro (humaLOG) injection 2-7 Units, 2-7 Units, Subcutaneous, Q6H, Selina Augustine MD  •  ipratropium (ATROVENT) nebulizer solution 0.5 mg, 0.5 mg, Nebulization, Q6H PRN, Selina Augustine MD  •  ipratropium-albuterol (DUO-NEB) nebulizer solution 3 mL, 3 mL, Nebulization, Q6H - RT, Selina Augustine MD  •  levothyroxine (SYNTHROID, LEVOTHROID) tablet 75 mcg, 75 mcg, Per G Tube, Q AM, Bautista Alcantar MD  •  melatonin tablet 9.75 mg, 9.75 mg, Per G Tube, Nightly, Bautista Alcantar MD  •  memantine (NAMENDA) tablet 10 mg, 10 mg, Per G Tube, Q12H, Bautista Alcantar MD  •  metoprolol tartrate (LOPRESSOR) tablet 12.5 mg, 12.5 mg, Per G Tube, Q12H, Bautista Alcantar MD  •  midazolam (VERSED) injection 2 mg, 2 mg, Intravenous, Q1H PRN, Selina Augustine MD  •  Morphine sulfate (PF) injection 2 mg, 2 mg, Intravenous, Q1H PRN, Selina Augustine MD  •  multivitamin w/minerals tablet 1 tablet, 1 tablet, Oral, Daily, Bautista Alcantar MD  •  norepinephrine (LEVOPHED) 16,000 mcg in sodium chloride 0.9 % 250 mL (64 mcg/mL) infusion, 0.02-0.3 mcg/kg/min, Intravenous, Titrated, Selina Augustine MD  •  polyethylene glycol 3350 powder (packet),  17 g, Per G Tube, Q12H PRN, Bautista Alcantar MD  •  risperiDONE (risperDAL) tablet 0.75 mg, 0.75 mg, Per G Tube, TID, Bautista Alcantar MD  •  saccharomyces boulardii (FLORASTOR) capsule 250 mg, 250 mg, Per G Tube, Daily, Bautista Alcantar MD  •  sodium chloride 0.9 % flush 10 mL, 10 mL, Intravenous, Q12H, Selina Augustine MD  •  tiZANidine (ZANAFLEX) tablet 4 mg, 4 mg, Per G Tube, Q8H, Bautista Alcantar MD  •  traZODone (DESYREL) tablet 50 mg, 50 mg, Per G Tube, Nightly, Bautista Alcantar MD  •  valproate (DEPACON) 125 mg in sodium chloride 0.9 % 50 mL IVPB, 125 mg, Intravenous, Q12H, Selina Augustine MD  •  Vortioxetine HBr tablet 20 mg, 20 mg, Per G Tube, Daily, Bautista Alcantar MD    Data:     Code Status:    There are no questions and answers to display.       No family history on file.    Social History     Socioeconomic History   • Marital status:      Spouse name: Not on file   • Number of children: Not on file   • Years of education: Not on file   • Highest education level: Not on file   Tobacco Use   • Smoking status: Current Every Day Smoker   Substance and Sexual Activity   • Alcohol use: No   • Drug use: Defer       Vitals:  Temperature 97.7 pulse 51 respiratory rate 19 blood pressure 111/60 sat 99% on the ventilator      I/O (24Hr):  No intake or output data in the 24 hours ending 02/04/20 3026    Labs and imaging:      Recent Results (from the past 12 hour(s))   POC Glucose Once    Collection Time: 02/04/20 10:58 AM   Result Value Ref Range    Glucose 73 70 - 130 mg/dL   POC Glucose Once    Collection Time: 02/04/20  4:22 PM   Result Value Ref Range    Glucose 80 70 - 130 mg/dL       Physical Examination:        Physical Exam   Constitutional: He appears well-nourished. He appears cachectic. He is restrained.   HENT:   Head: Normocephalic and atraumatic.   Nose: Nose normal.   Mouth/Throat: Oropharynx is clear and moist.   Eyes: Pupils are equal, round, and  reactive to light. EOM are normal.   Neck: Normal range of motion. Neck supple.   Trach to vent   Cardiovascular: Normal rate, regular rhythm, normal heart sounds and intact distal pulses.   Pulmonary/Chest: Effort normal and breath sounds normal.   Abdominal: Soft. Bowel sounds are normal.   g tube  Dressing c.d.i   Musculoskeletal: Normal range of motion.   Generalized weakness   Neurological: He is alert. He has normal reflexes.   Nonverbal  Confused   Skin: Skin is warm and dry.   Psychiatric: He has a normal mood and affect. His behavior is normal.   Anxious at times   Nursing note and vitals reviewed.        Assessment:             Acute tracheostomy management (CMS/[facility])    Acute respiratory failure with hypoxia and hypercapnia (CMS/[facility])    Ventilator dependence (CMS/[facility])    Past Medical History:   Diagnosis Date   • Anxiety disorder    • Cerumen impaction    • COPD (chronic obstructive pulmonary disease) (CMS/[facility])    • Depression    • Hypertension    • Hypothyroidism    • Kidney disease    • Osteoarthritis    • Sinusitis, chronic         Plan:        1. Acute on chronic hypoxic/hypercapneic respiratory failure  2. RUL pulmonary embolus  3. PAF  4. Hypothyroidism  5. COPD  6. Anxiety disorder  7. CKD  8. Multifactorial anemia  9. GERD  10. Dementia  11. Metabolic encephalopathy  12. Iron deficiency  13. Hypokalemia - improved  14. Moderate protein calorie malnutrition  15. Occult stool positive    Continue current treatment. Monitor counts. Increase activity. Labs Thursday. Continue vent weaning under direction of pulmonology. Aggressive therapies. Maintain patient safety.Continue nutrition support per AMONs.  Monitor renal function. Monitor I&O's closely.   Watch for s/s bleeding.  Continue to hold Lovenox.  Will transfuse as needed judiciously.    Electronically signed by Bautista Alcantar MD on 2/4/2020 at 6:26 PM

## 2020-02-06 LAB
ANION GAP SERPL CALCULATED.3IONS-SCNC: 8 MMOL/L (ref 5–15)
BASOPHILS # BLD AUTO: 0.01 10*3/MM3 (ref 0–0.2)
BASOPHILS NFR BLD AUTO: 0.1 % (ref 0–1.5)
BUN BLD-MCNC: 31 MG/DL (ref 8–23)
BUN/CREAT SERPL: 28.4 (ref 7–25)
CALCIUM SPEC-SCNC: 8.3 MG/DL (ref 8.6–10.5)
CHLORIDE SERPL-SCNC: 104 MMOL/L (ref 98–107)
CO2 SERPL-SCNC: 23 MMOL/L (ref 22–29)
CREAT BLD-MCNC: 1.09 MG/DL (ref 0.76–1.27)
CRP SERPL-MCNC: 10.33 MG/DL (ref 0–0.5)
DEPRECATED RDW RBC AUTO: 47.6 FL (ref 37–54)
EOSINOPHIL # BLD AUTO: 0.01 10*3/MM3 (ref 0–0.4)
EOSINOPHIL NFR BLD AUTO: 0.1 % (ref 0.3–6.2)
ERYTHROCYTE [DISTWIDTH] IN BLOOD BY AUTOMATED COUNT: 14.9 % (ref 12.3–15.4)
ERYTHROCYTE [SEDIMENTATION RATE] IN BLOOD: 45 MM/HR (ref 0–15)
GFR SERPL CREATININE-BSD FRML MDRD: 67 ML/MIN/1.73
GLUCOSE BLD-MCNC: 114 MG/DL (ref 65–99)
GLUCOSE BLDC GLUCOMTR-MCNC: 133 MG/DL (ref 70–130)
GLUCOSE BLDC GLUCOMTR-MCNC: 139 MG/DL (ref 70–130)
HCT VFR BLD AUTO: 24.3 % (ref 37.5–51)
HGB BLD-MCNC: 7.8 G/DL (ref 13–17.7)
IMM GRANULOCYTES # BLD AUTO: 0.05 10*3/MM3 (ref 0–0.05)
IMM GRANULOCYTES NFR BLD AUTO: 0.6 % (ref 0–0.5)
INR PPP: 1.04 (ref 0.91–1.09)
LYMPHOCYTES # BLD AUTO: 0.48 10*3/MM3 (ref 0.7–3.1)
LYMPHOCYTES NFR BLD AUTO: 5.7 % (ref 19.6–45.3)
MCH RBC QN AUTO: 27.7 PG (ref 26.6–33)
MCHC RBC AUTO-ENTMCNC: 32.1 G/DL (ref 31.5–35.7)
MCV RBC AUTO: 86.2 FL (ref 79–97)
MONOCYTES # BLD AUTO: 0.71 10*3/MM3 (ref 0.1–0.9)
MONOCYTES NFR BLD AUTO: 8.5 % (ref 5–12)
NEUTROPHILS # BLD AUTO: 7.12 10*3/MM3 (ref 1.7–7)
NEUTROPHILS NFR BLD AUTO: 85 % (ref 42.7–76)
NRBC BLD AUTO-RTO: 0 /100 WBC (ref 0–0.2)
NT-PROBNP SERPL-MCNC: 245.3 PG/ML (ref 5–900)
PLATELET # BLD AUTO: 232 10*3/MM3 (ref 140–450)
PMV BLD AUTO: 11.2 FL (ref 6–12)
POTASSIUM BLD-SCNC: 3.6 MMOL/L (ref 3.5–5.2)
PROTHROMBIN TIME: 13.9 SECONDS (ref 11.9–14.6)
RBC # BLD AUTO: 2.82 10*6/MM3 (ref 4.14–5.8)
SODIUM BLD-SCNC: 135 MMOL/L (ref 136–145)
WBC NRBC COR # BLD: 8.38 10*3/MM3 (ref 3.4–10.8)

## 2020-02-06 PROCEDURE — 80048 BASIC METABOLIC PNL TOTAL CA: CPT | Performed by: INTERNAL MEDICINE

## 2020-02-06 PROCEDURE — 82962 GLUCOSE BLOOD TEST: CPT

## 2020-02-06 PROCEDURE — 25010000002 ENOXAPARIN PER 10 MG: Performed by: SPECIALIST

## 2020-02-06 PROCEDURE — 85651 RBC SED RATE NONAUTOMATED: CPT | Performed by: INTERNAL MEDICINE

## 2020-02-06 PROCEDURE — 25010000002 ENOXAPARIN PER 10 MG: Performed by: INTERNAL MEDICINE

## 2020-02-06 PROCEDURE — 85610 PROTHROMBIN TIME: CPT | Performed by: SPECIALIST

## 2020-02-06 PROCEDURE — 83880 ASSAY OF NATRIURETIC PEPTIDE: CPT | Performed by: INTERNAL MEDICINE

## 2020-02-06 PROCEDURE — 99233 SBSQ HOSP IP/OBS HIGH 50: CPT | Performed by: INTERNAL MEDICINE

## 2020-02-06 PROCEDURE — 92526 ORAL FUNCTION THERAPY: CPT

## 2020-02-06 PROCEDURE — 85025 COMPLETE CBC W/AUTO DIFF WBC: CPT | Performed by: INTERNAL MEDICINE

## 2020-02-06 PROCEDURE — 86140 C-REACTIVE PROTEIN: CPT | Performed by: INTERNAL MEDICINE

## 2020-02-06 NOTE — PROGRESS NOTES
PULMONARY AND CRITICAL CARE PROGRESS NOTE - [facility]    [facility]: Shawn Turner    1950   [id number]  02/06/20   8:33 AM  Referring Provider: Bautista Alcantar MD  Chief Complaint: Mechanically ventilated  Interval history: The patient is resting in bed with tracheostomy to mechanical ventilator. He is sleeping.  He remains in SIMV mode.  No other aggravating or alleviating factors.   Review of Systems:   Cannot obtain due to mechanical ventilation.  The patient notably is critically ill and connected to a ventilator.  As such patient cannot communicate and provide any history whatsoever, including any history of present illness or interval history since arrival or review of systems. The interested reviewer may note this fact, as an attempt has been made at collecting and documenting these portions of the patient history, but this information is unobtainable despite attempted review and therefore cannot be documented at this time.   Ventilator Settings:  SIMV,8,550,18,7.5,30%  Physical Exam:  Vital signs: Temperature 98.1, pulse 67, respirations 20, blood pressure 136/79, saturation 97% ET 27%  Physical Exam   Constitutional: He appears well-nourished. He appears cachectic. He has a sickly appearance. He appears ill. No distress. He is intubated and restrained.   HENT:   Head: Normocephalic.   Nose: Nose normal.   Feeding tube   Eyes: Pupils are equal, round, and reactive to light. No scleral icterus.   Neck:   Tracheostomy to vent   Cardiovascular: Normal rate and regular rhythm.   No murmur heard.  Pulmonary/Chest: Effort normal. No accessory muscle usage. He is intubated. No respiratory distress. He has no rhonchi. He has no rales.   Abdominal: Soft. He exhibits no distension.   Binder in place.   G-tube in place   Genitourinary:   Genitourinary Comments: Judd catheter   Musculoskeletal: He exhibits no edema.   Hand mits noted    Skin: Skin is warm and dry. No rash noted. He  is not diaphoretic. No erythema.   Psychiatric: His mood appears not anxious. He is slowed. He is noncommunicative.   Nursing note and vitals reviewed.    Results from last 7 days   Lab Units 02/06/20  0541 02/05/20  0444 02/04/20  0433   WBC 10*3/mm3 8.38 8.94 7.48   HEMOGLOBIN g/dL 7.8* 9.6* 8.1*   PLATELETS 10*3/mm3 232 261 199     Results from last 7 days   Lab Units 02/06/20  0541 02/03/20  0726 02/02/20  0445   SODIUM mmol/L 135* 134* 139   POTASSIUM mmol/L 3.6 3.9 4.1   BUN mg/dL 31* 52* 46*   CREATININE mg/dL 1.09 1.35* 1.47*     Recent films:    Xr Chest 1 View    Result Date: 2/4/2020  Subtle airspace opacities in the lung bases are concerning for an inflammatory process. Attention on follow-up recommended. This report was finalized on 02/04/2020 09:48 by Dr. Daniel Mendoza MD.     Pulmonary Assessment:  1. Respiratory failure requiring mechanical ventilation which appears to be chronic at this point  2. 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 Pulmonary embolism, right treated and stable  4. COPD chronic and compensated with current medications  5. Dementia unimproved, chronic, unlikely to improve  6. Anemia  Recommend:   · Continue SIMV, decrease RR to 8  · CXR twice weekly will order for Friday   · Continue nutrition   · Daily therapy with PT, OT and SLP  · Mobilize as tolerated  · Continue bronchodilator treatments.  · DVT and stress ulcer prophylaxis.      Electronically signed by MATT Crawford on 2/6/2020 at 8:33 AM      Physician substantive portion:  He appears to be about the same.  He is in chronic respiratory failure.  RT indicates consideration for transfer to [place].  He has really made very little to no progress with improvement.  He remains arousable.  Chest has diminished breath sounds and he still little tachypneic.  We will try to reduce his backup SIMV rate a little bit more and try to get him back over to spontaneous breathing mode and ultimately trach collar  although I do not see that achievable in the near future.    I have seen and examined patient personally, performing a face-to-face diagnostic evaluation with plan of care reviewed and developed with APRN and nursing staff. I have addended and/or modified the above history of present illness, physical examination, and assessment and plan to reflect my findings and impressions. Essential elements of the care plan were discussed with APRN above.  Agree with findings and assessment/plan as documented above.    Electronically signed by Manuel Hensley MD, on 2/6/2020, 9:26 AM

## 2020-02-06 NOTE — PROGRESS NOTES
Ortiz Alcantar M.D.  MATT Herrera        Internal Medicine Progress Note    2/6/2020   12:23 PM    Name:  Shawn Turner  MRN:    [id number]     Acct:     [id number]   Room:  [address] [place] [address] Day: 0     Admit Date: 1/15/2020 10:17 AM  PCP: Tj Wilson MD    Subjective:     C/C: need for continued vent weaning    Interval History: Status: same. Resting in bed. No family at bedside. Tolerating current vent settings. Little progress with therapies. Appears in no acute distress. Blood sugars stable. Hgb down to 7.8. Renal function slowly improving. No s/s bleeding reported per nursing.     sReview of Systems   Unable to perform ROS: patient nonverbal       Medications:     Allergies:   Allergies   Allergen Reactions   • Aspirin        Current Meds:   Current Facility-Administered Medications:   •  acetaminophen (TYLENOL) 160 MG/5ML solution 650 mg, 650 mg, Per G Tube, Q4H PRN, Bautista Alcantar MD  •  acetaminophen (TYLENOL) suppository 650 mg, 650 mg, Rectal, Q6H PRN, Selina Augustine MD  •  acetaZOLAMIDE (DIAMOX) tablet 250 mg, 250 mg, Per G Tube, BID, Bautista Alcantar MD  •  albuterol (PROVENTIL) nebulizer solution 0.083% 2.5 mg/3mL, 2.5 mg, Nebulization, Q2H PRN, Selina Augustine MD  •  bacitracin ointment, , Topical, Q12H, Selina Augustine MD  •  bacitracin ointment, , Topical, PRN, Selina Augustine MD  •  bacitracin ointment, , Topical, PRN, Selina Augustine MD  •  bacitracin ointment, , Topical, Q12H, Selina Augustine MD  •  dextrose (D50W) 25 g/ 50mL Intravenous Solution 25 g, 25 g, Intravenous, Q15 Min PRN, Selina Augustine MD  •  dextrose (GLUTOSE) oral gel 15 g, 15 g, Oral, Q15 Min PRN, Selina Augustine MD  •  dilTIAZem (CARDIZEM) injection 5 mg, 5 mg, Intravenous, Q6H PRN, Selina Augustine MD  •  dilTIAZem (CARDIZEM) tablet 30 mg, 30 mg, Per G Tube, Q6H, Bautista Alcantar MD  •  donepezil (ARICEPT) tablet 10 mg, 10 mg, Per G Tube, Nightly, Ortiz  Bautista Agosto MD  •  enalaprilat (VASOTEC) injection 0.625 mg, 0.625 mg, Intravenous, Q6H, Selina Augustine MD  •  enoxaparin (LOVENOX) syringe 60 mg, 1 mg/kg, Subcutaneous, Q12H, Selina Augustine MD  •  famotidine (PEPCID) injection 20 mg, 20 mg, Intravenous, Q12H, Selina Augustine MD  •  ferrous sulfate tablet 325 mg, 325 mg, Oral, Daily With Breakfast, Bautista Alcantar MD  •  furosemide (LASIX) tablet 40 mg, 40 mg, Per G Tube, Daily, Bautista Alcantar MD  •  glucagon (human recombinant) (GLUCAGEN DIAGNOSTIC) injection 1 mg, 1 mg, Subcutaneous, Q15 Min PRN, Selina Augustine MD  •  haloperidol lactate (HALDOL) injection 1 mg, 1 mg, Intramuscular, Q4H PRN, Selina Augustine MD  •  hydrALAZINE (APRESOLINE) injection 5 mg, 5 mg, Intravenous, Q6H PRN, Selina Augustine MD  •  hypromellose (ISOPTO TEARS) 0.5 % ophthalmic solution 2 drop, 2 drop, Both Eyes, Q2H PRN, Selina Augustine MD  •  insulin lispro (humaLOG) injection 2-7 Units, 2-7 Units, Subcutaneous, Q6H, Selina Augustine MD  •  ipratropium (ATROVENT) nebulizer solution 0.5 mg, 0.5 mg, Nebulization, Q6H PRN, Selina Augustine MD  •  ipratropium-albuterol (DUO-NEB) nebulizer solution 3 mL, 3 mL, Nebulization, Q6H - RT, Selina Augustine MD  •  levothyroxine (SYNTHROID, LEVOTHROID) tablet 75 mcg, 75 mcg, Per G Tube, Q AM, Bautista Alcantar MD  •  melatonin tablet 9.75 mg, 9.75 mg, Per G Tube, Nightly, Bautista Alcantar MD  •  memantine (NAMENDA) tablet 10 mg, 10 mg, Per G Tube, Q12H, Bautista Alcantar MD  •  metoprolol tartrate (LOPRESSOR) tablet 12.5 mg, 12.5 mg, Per G Tube, Q12H, Bautista Alcantar MD  •  midazolam (VERSED) injection 2 mg, 2 mg, Intravenous, Q1H PRN, Selina Augustine MD  •  Morphine sulfate (PF) injection 2 mg, 2 mg, Intravenous, Q1H PRN, Selina Augustine MD  •  multivitamin w/minerals tablet 1 tablet, 1 tablet, Oral, Daily, Bautista Alcantar MD  •  norepinephrine (LEVOPHED) 16,000 mcg in sodium  chloride 0.9 % 250 mL (64 mcg/mL) infusion, 0.02-0.3 mcg/kg/min, Intravenous, Titrated, Selina Augustine MD  •  polyethylene glycol 3350 powder (packet), 17 g, Per G Tube, Q12H PRN, Bautista Alcantar MD  •  risperiDONE (risperDAL) tablet 0.75 mg, 0.75 mg, Per G Tube, TID, Bautista Alcantar MD  •  saccharomyces boulardii (FLORASTOR) capsule 250 mg, 250 mg, Per G Tube, Daily, Bautista Alcantar MD  •  sodium chloride 0.9 % flush 10 mL, 10 mL, Intravenous, Q12H, Selina Augustine MD  •  tiZANidine (ZANAFLEX) tablet 4 mg, 4 mg, Per G Tube, Q8H, Bautista Alcantar MD  •  traZODone (DESYREL) tablet 50 mg, 50 mg, Per G Tube, Nightly, Bautista Alcantar MD  •  valproate (DEPACON) 125 mg in sodium chloride 0.9 % 50 mL IVPB, 125 mg, Intravenous, Q12H, Selina Augustine MD  •  Vortioxetine HBr tablet 20 mg, 20 mg, Per G Tube, Daily, Bautista Alcantar MD    Data:     Code Status:    There are no questions and answers to display.       No family history on file.    Social History     Socioeconomic History   • Marital status:      Spouse name: Not on file   • Number of children: Not on file   • Years of education: Not on file   • Highest education level: Not on file   Tobacco Use   • Smoking status: Current Every Day Smoker   Substance and Sexual Activity   • Alcohol use: No   • Drug use: Defer       Vitals:  T 98.1 P 70 R 25 /67 Sp02 97% (vent)      I/O (24Hr):  No intake or output data in the 24 hours ending 02/06/20 1223    Labs and imaging:      Recent Results (from the past 12 hour(s))   Protime-INR    Collection Time: 02/06/20  5:41 AM   Result Value Ref Range    Protime 13.9 11.9 - 14.6 Seconds    INR 1.04 0.91 - 1.09   Basic Metabolic Panel    Collection Time: 02/06/20  5:41 AM   Result Value Ref Range    Glucose 114 (H) 65 - 99 mg/dL    BUN 31 (H) 8 - 23 mg/dL    Creatinine 1.09 0.76 - 1.27 mg/dL    Sodium 135 (L) 136 - 145 mmol/L    Potassium 3.6 3.5 - 5.2 mmol/L    Chloride 104  98 - 107 mmol/L    CO2 23.0 22.0 - 29.0 mmol/L    Calcium 8.3 (L) 8.6 - 10.5 mg/dL    eGFR Non African Amer 67 >60 mL/min/1.73    BUN/Creatinine Ratio 28.4 (H) 7.0 - 25.0    Anion Gap 8.0 5.0 - 15.0 mmol/L   BNP    Collection Time: 02/06/20  5:41 AM   Result Value Ref Range    proBNP 245.3 5.0 - 900.0 pg/mL   Sedimentation Rate    Collection Time: 02/06/20  5:41 AM   Result Value Ref Range    Sed Rate 45 (H) 0 - 15 mm/hr   C-reactive Protein    Collection Time: 02/06/20  5:41 AM   Result Value Ref Range    C-Reactive Protein 10.33 (H) 0.00 - 0.50 mg/dL   CBC Auto Differential    Collection Time: 02/06/20  5:41 AM   Result Value Ref Range    WBC 8.38 3.40 - 10.80 10*3/mm3    RBC 2.82 (L) 4.14 - 5.80 10*6/mm3    Hemoglobin 7.8 (L) 13.0 - 17.7 g/dL    Hematocrit 24.3 (L) 37.5 - 51.0 %    MCV 86.2 79.0 - 97.0 fL    MCH 27.7 26.6 - 33.0 pg    MCHC 32.1 31.5 - 35.7 g/dL    RDW 14.9 12.3 - 15.4 %    RDW-SD 47.6 37.0 - 54.0 fl    MPV 11.2 6.0 - 12.0 fL    Platelets 232 140 - 450 10*3/mm3    Neutrophil % 85.0 (H) 42.7 - 76.0 %    Lymphocyte % 5.7 (L) 19.6 - 45.3 %    Monocyte % 8.5 5.0 - 12.0 %    Eosinophil % 0.1 (L) 0.3 - 6.2 %    Basophil % 0.1 0.0 - 1.5 %    Immature Grans % 0.6 (H) 0.0 - 0.5 %    Neutrophils, Absolute 7.12 (H) 1.70 - 7.00 10*3/mm3    Lymphocytes, Absolute 0.48 (L) 0.70 - 3.10 10*3/mm3    Monocytes, Absolute 0.71 0.10 - 0.90 10*3/mm3    Eosinophils, Absolute 0.01 0.00 - 0.40 10*3/mm3    Basophils, Absolute 0.01 0.00 - 0.20 10*3/mm3    Immature Grans, Absolute 0.05 0.00 - 0.05 10*3/mm3    nRBC 0.0 0.0 - 0.2 /100 WBC       Physical Examination:        Physical Exam   Constitutional: He appears well-nourished. He appears cachectic. He is restrained.   HENT:   Head: Normocephalic and atraumatic.   Nose: Nose normal.   Mouth/Throat: Oropharynx is clear and moist.   Eyes: Pupils are equal, round, and reactive to light. EOM are normal.   Neck: Normal range of motion. Neck supple.   Trach to vent    Cardiovascular: Normal rate, regular rhythm, normal heart sounds and intact distal pulses.   Pulmonary/Chest: Effort normal and breath sounds normal.   Abdominal: Soft. Bowel sounds are normal.   g tube  Dressing c.d.i   Musculoskeletal: Normal range of motion.   Generalized weakness   Neurological: He is alert. He has normal reflexes.   Nonverbal  Confused   Skin: Skin is warm and dry.   Psychiatric: He has a normal mood and affect. His behavior is normal.   Anxious at times   Nursing note and vitals reviewed.        Assessment:             Acute tracheostomy management (CMS/[facility])    Acute respiratory failure with hypoxia and hypercapnia (CMS/[facility])    Ventilator dependence (CMS/[facility])    Past Medical History:   Diagnosis Date   • Anxiety disorder    • Cerumen impaction    • COPD (chronic obstructive pulmonary disease) (CMS/[facility])    • Depression    • Hypertension    • Hypothyroidism    • Kidney disease    • Osteoarthritis    • Sinusitis, chronic         Plan:        1. Acute on chronic hypoxic/hypercapneic respiratory failure  2. RUL pulmonary embolus  3. PAF  4. Hypothyroidism  5. COPD  6. Anxiety disorder  7. CKD  8. Multifactorial anemia  9. GERD  10. Dementia  11. Metabolic encephalopathy  12. Iron deficiency  13. Hypokalemia - improved  14. Moderate protein calorie malnutrition  15. Occult stool positive    Continue current treatment. Monitor counts. Increase activity. Labs Monday. Continue vent weaning under direction of pulmonology. Aggressive therapies. Maintain patient safety. Continue nutrition support per AMONs.  Monitor renal function. Monitor I&O's closely.  Watch for s/s bleeding. Continue Lovenox - transition to xarelto/coumadin if h/h stabilizes.  Will transfuse as needed judiciously.     Electronically signed by MATT Tolentino on 2/6/2020 at 12:23 PM     I have discussed the care of Shawn Mannt, including pertinent history and exam findings, with the nurse practitioner.    I have seen and  examined the patient and the key elements of all parts of the encounter have been performed by me.  I agree with the assessment, plan and orders as documented by MATT Herrera, after I modified the exam findings and the plan of treatments and the final version is my approved version of the assessment.        Electronically signed by Bautista Alcantar MD on 2/6/2020 at 6:19 PM

## 2020-02-07 LAB
BASOPHILS # BLD AUTO: 0.02 10*3/MM3 (ref 0–0.2)
BASOPHILS NFR BLD AUTO: 0.2 % (ref 0–1.5)
DEPRECATED RDW RBC AUTO: 48.7 FL (ref 37–54)
EOSINOPHIL # BLD AUTO: 0.01 10*3/MM3 (ref 0–0.4)
EOSINOPHIL NFR BLD AUTO: 0.1 % (ref 0.3–6.2)
ERYTHROCYTE [DISTWIDTH] IN BLOOD BY AUTOMATED COUNT: 15.2 % (ref 12.3–15.4)
GLUCOSE BLDC GLUCOMTR-MCNC: 124 MG/DL (ref 70–130)
GLUCOSE BLDC GLUCOMTR-MCNC: 127 MG/DL (ref 70–130)
GLUCOSE BLDC GLUCOMTR-MCNC: 132 MG/DL (ref 70–130)
GLUCOSE BLDC GLUCOMTR-MCNC: 140 MG/DL (ref 70–130)
HCT VFR BLD AUTO: 24.5 % (ref 37.5–51)
HGB BLD-MCNC: 7.9 G/DL (ref 13–17.7)
IMM GRANULOCYTES # BLD AUTO: 0.1 10*3/MM3 (ref 0–0.05)
IMM GRANULOCYTES NFR BLD AUTO: 1.1 % (ref 0–0.5)
INR PPP: 1.08 (ref 0.91–1.09)
LYMPHOCYTES # BLD AUTO: 0.6 10*3/MM3 (ref 0.7–3.1)
LYMPHOCYTES NFR BLD AUTO: 6.4 % (ref 19.6–45.3)
MAGNESIUM SERPL-MCNC: 2.2 MG/DL (ref 1.6–2.4)
MCH RBC QN AUTO: 28.5 PG (ref 26.6–33)
MCHC RBC AUTO-ENTMCNC: 32.2 G/DL (ref 31.5–35.7)
MCV RBC AUTO: 88.4 FL (ref 79–97)
MONOCYTES # BLD AUTO: 0.8 10*3/MM3 (ref 0.1–0.9)
MONOCYTES NFR BLD AUTO: 8.5 % (ref 5–12)
NEUTROPHILS # BLD AUTO: 7.84 10*3/MM3 (ref 1.7–7)
NEUTROPHILS NFR BLD AUTO: 83.7 % (ref 42.7–76)
NRBC BLD AUTO-RTO: 0 /100 WBC (ref 0–0.2)
PHOSPHATE SERPL-MCNC: 2.9 MG/DL (ref 2.5–4.5)
PLATELET # BLD AUTO: 241 10*3/MM3 (ref 140–450)
PMV BLD AUTO: 10.8 FL (ref 6–12)
PROTHROMBIN TIME: 14.3 SECONDS (ref 11.9–14.6)
RBC # BLD AUTO: 2.77 10*6/MM3 (ref 4.14–5.8)
WBC NRBC COR # BLD: 9.37 10*3/MM3 (ref 3.4–10.8)

## 2020-02-07 PROCEDURE — 84100 ASSAY OF PHOSPHORUS: CPT | Performed by: SPECIALIST

## 2020-02-07 PROCEDURE — 85025 COMPLETE CBC W/AUTO DIFF WBC: CPT | Performed by: INTERNAL MEDICINE

## 2020-02-07 PROCEDURE — 82962 GLUCOSE BLOOD TEST: CPT

## 2020-02-07 PROCEDURE — 83735 ASSAY OF MAGNESIUM: CPT | Performed by: SPECIALIST

## 2020-02-07 PROCEDURE — 99232 SBSQ HOSP IP/OBS MODERATE 35: CPT | Performed by: INTERNAL MEDICINE

## 2020-02-07 PROCEDURE — 85610 PROTHROMBIN TIME: CPT | Performed by: SPECIALIST

## 2020-02-07 NOTE — PROGRESS NOTES
PULMONARY AND CRITICAL CARE PROGRESS NOTE - [facility]    [facility]: Shawn Turner    1950   [id number]  02/07/20   8:45 AM  Referring Provider: Bautista Alcantar MD  Chief Complaint: Mechanically ventilated  Interval history: The patient is resting in bed with tracheostomy to mechanical ventilator. He is awake this AM.  He remains in SIMV mode and appears to be in no distress.  No other aggravating or alleviating factors.   Review of Systems:   Cannot obtain due to mechanical ventilation.  The patient notably is critically ill and connected to a ventilator.  As such patient cannot communicate and provide any history whatsoever, including any history of present illness or interval history since arrival or review of systems. The interested reviewer may note this fact, as an attempt has been made at collecting and documenting these portions of the patient history, but this information is unobtainable despite attempted review and therefore cannot be documented at this time.   Ventilator Settings:  SIMV,8,550,18,7.5,30%  Physical Exam:  Vital signs: Temperature N/A, pulse 65, respirations 28, blood pressure 133/80, saturation 100%, ET 25%  Physical Exam   Constitutional: He appears well-nourished. He appears cachectic. He has a sickly appearance. He appears ill. No distress. He is intubated and restrained.   HENT:   Head: Normocephalic.   Nose: Nose normal.   Feeding tube   Eyes: Pupils are equal, round, and reactive to light. No scleral icterus.   Neck:   Tracheostomy to vent   Cardiovascular: Normal rate and regular rhythm.   No murmur heard.  Pulmonary/Chest: Effort normal. No accessory muscle usage. He is intubated. No respiratory distress. He has no rhonchi. He has no rales.   Abdominal: Soft. He exhibits no distension.   Binder in place.   G-tube in place   Genitourinary:   Genitourinary Comments: Judd catheter   Musculoskeletal: He exhibits no edema.   Hand mits noted    Skin:  Skin is warm and dry. No rash noted. He is not diaphoretic. No erythema.   Psychiatric: His mood appears not anxious. He is slowed. He is noncommunicative.   Nursing note and vitals reviewed.    Results from last 7 days   Lab Units 02/07/20  0501 02/06/20  0541 02/05/20  0444   WBC 10*3/mm3 9.37 8.38 8.94   HEMOGLOBIN g/dL 7.9* 7.8* 9.6*   PLATELETS 10*3/mm3 241 232 261     Results from last 7 days   Lab Units 02/06/20  0541 02/03/20  0726 02/02/20  0445   SODIUM mmol/L 135* 134* 139   POTASSIUM mmol/L 3.6 3.9 4.1   BUN mg/dL 31* 52* 46*   CREATININE mg/dL 1.09 1.35* 1.47*     Recent films:    No radiology results for the last day   Pulmonary Assessment:  1. Respiratory failure requiring mechanical ventilation which appears to be chronic at this point  2. 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 Pulmonary embolism, right treated and stable  4. COPD chronic and compensated with current medications  5. Dementia unimproved, chronic, unlikely to improve  6. Anemia  Recommend:   · Continue SIMV  · Continue CXR twice weekly will order for Friday   · Continue nutrition   · Daily therapy with PT, OT and SLP  · Mobilize as tolerated  · Continue bronchodilator treatments.  · DVT and stress ulcer prophylaxis.  · See on request over the weekend       Electronically signed by MATT Crawford on 2/7/2020 at 8:45 AM       Physician substantive portion:  He remains on the ventilator.  He is awake.  He is in no distress.  His SIMV breaths and a spontaneous breaths have the same morphology on the ventilator graphics.  He does not have any dyssynchrony.  Lungs have diminished breath sounds.  He is not ready to discontinue ventilation.  Continue as is.  I will recheck Monday.    I have seen and examined patient personally, performing a face-to-face diagnostic evaluation with plan of care reviewed and developed with MATT and nursing staff. I have addended and/or modified the above history of present illness, physical  examination, and assessment and plan to reflect my findings and impressions. Essential elements of the care plan were discussed with APRN above.  Agree with findings and assessment/plan as documented above.    Electronically signed by Manuel Hensley MD, on 2/7/2020, 9:23 AM

## 2020-02-07 NOTE — PROGRESS NOTES
MICHELLE Chavez APRN        Internal Medicine Progress Note    2/7/2020   11:32 AM    Name:  Shawn Turner  MRN:    [id number]     Acct:     [id number]   Room:  [address] [place] [address] Day: 0     Admit Date: 1/15/2020 10:17 AM  PCP: Tj Wilson MD    Subjective:     C/C: need for continued vent weaning    Interval History: Status: same. Resting in bed. No family at bedside. Tolerating current vent settings. Little progress with therapies. Appears in no acute distress. Blood sugars stable. Hgb stable. Renal function slowly improving. No s/s bleeding reported per nursing.     sReview of Systems   Unable to perform ROS: patient nonverbal       Medications:     Allergies:   Allergies   Allergen Reactions   • Aspirin        Current Meds:   Current Facility-Administered Medications:   •  acetaminophen (TYLENOL) 160 MG/5ML solution 650 mg, 650 mg, Per G Tube, Q4H PRN, Bautista Alcantar MD  •  acetaminophen (TYLENOL) suppository 650 mg, 650 mg, Rectal, Q6H PRN, Selina Augustine MD  •  acetaZOLAMIDE (DIAMOX) tablet 250 mg, 250 mg, Per G Tube, BID, Bautitsa Alcantar MD  •  albuterol (PROVENTIL) nebulizer solution 0.083% 2.5 mg/3mL, 2.5 mg, Nebulization, Q2H PRN, Selina Augustine MD  •  bacitracin ointment, , Topical, Q12H, Selina Augustine MD  •  bacitracin ointment, , Topical, PRN, Selina Augustine MD  •  bacitracin ointment, , Topical, PRN, Selina Augustine MD  •  bacitracin ointment, , Topical, Q12H, Selina Augustine MD  •  dextrose (D50W) 25 g/ 50mL Intravenous Solution 25 g, 25 g, Intravenous, Q15 Min PRN, Selina Augustine MD  •  dextrose (GLUTOSE) oral gel 15 g, 15 g, Oral, Q15 Min PRN, Selina Augustine MD  •  dilTIAZem (CARDIZEM) injection 5 mg, 5 mg, Intravenous, Q6H PRN, Selina Augustine MD  •  dilTIAZem (CARDIZEM) tablet 30 mg, 30 mg, Per G Tube, Q6H, Bautista Alcantar MD  •  donepezil (ARICEPT) tablet 10 mg, 10 mg, Per G Tube, Nightly, Bautista Alcantar  MD Surendra  •  enalaprilat (VASOTEC) injection 0.625 mg, 0.625 mg, Intravenous, Q6H, Selina Augustine MD  •  enoxaparin (LOVENOX) syringe 50 mg, 1 mg/kg, Subcutaneous, Q12H, Bautista Alcantar MD  •  famotidine (PEPCID) injection 20 mg, 20 mg, Intravenous, Q12H, Selina Augustine MD  •  ferrous sulfate tablet 325 mg, 325 mg, Oral, Daily With Breakfast, Bautista Alcantar MD  •  furosemide (LASIX) tablet 40 mg, 40 mg, Per G Tube, Daily, Bautista Alcantar MD  •  glucagon (human recombinant) (GLUCAGEN DIAGNOSTIC) injection 1 mg, 1 mg, Subcutaneous, Q15 Min PRN, Selina Augustine MD  •  haloperidol lactate (HALDOL) injection 1 mg, 1 mg, Intramuscular, Q4H PRN, Selina Augustine MD  •  hydrALAZINE (APRESOLINE) injection 5 mg, 5 mg, Intravenous, Q6H PRN, Selina Augustine MD  •  hypromellose (ISOPTO TEARS) 0.5 % ophthalmic solution 2 drop, 2 drop, Both Eyes, Q2H PRN, Selina Augustine MD  •  insulin lispro (humaLOG) injection 2-7 Units, 2-7 Units, Subcutaneous, Q6H, Selina Augustine MD  •  ipratropium (ATROVENT) nebulizer solution 0.5 mg, 0.5 mg, Nebulization, Q6H PRN, Selina Augustine MD  •  ipratropium-albuterol (DUO-NEB) nebulizer solution 3 mL, 3 mL, Nebulization, Q6H - RT, Selina Augustine MD  •  levothyroxine (SYNTHROID, LEVOTHROID) tablet 75 mcg, 75 mcg, Per G Tube, Q AM, Bautista Alcantar MD  •  melatonin tablet 9.75 mg, 9.75 mg, Per G Tube, Nightly, Bautista Alcantar MD  •  memantine (NAMENDA) tablet 10 mg, 10 mg, Per G Tube, Q12H, Bautista Alcantar MD  •  metoprolol tartrate (LOPRESSOR) tablet 12.5 mg, 12.5 mg, Per G Tube, Q12H, Bautista Alcantar MD  •  midazolam (VERSED) injection 2 mg, 2 mg, Intravenous, Q1H PRN, Selina Augustine MD  •  Morphine sulfate (PF) injection 2 mg, 2 mg, Intravenous, Q1H PRN, Selina Augustine MD  •  multivitamin w/minerals tablet 1 tablet, 1 tablet, Oral, Daily, Bautista Alcantar MD  •  norepinephrine (LEVOPHED) 16,000 mcg in sodium chloride  0.9 % 250 mL (64 mcg/mL) infusion, 0.02-0.3 mcg/kg/min, Intravenous, Titrated, Selina Augustine MD  •  polyethylene glycol 3350 powder (packet), 17 g, Per G Tube, Q12H PRN, Bautista Alcantar MD  •  risperiDONE (risperDAL) tablet 0.75 mg, 0.75 mg, Per G Tube, TID, Bautista Alcantar MD  •  saccharomyces boulardii (FLORASTOR) capsule 250 mg, 250 mg, Per G Tube, Daily, Bautista Alcantar MD  •  sodium chloride 0.9 % flush 10 mL, 10 mL, Intravenous, Q12H, Selina Augustine MD  •  tiZANidine (ZANAFLEX) tablet 4 mg, 4 mg, Per G Tube, Q8H, Bautista Alcantar MD  •  traZODone (DESYREL) tablet 50 mg, 50 mg, Per G Tube, Nightly, Bautista Alcantar MD  •  valproate (DEPACON) 125 mg in sodium chloride 0.9 % 50 mL IVPB, 125 mg, Intravenous, Q12H, Selina Augustine MD  •  Vortioxetine HBr tablet 20 mg, 20 mg, Per G Tube, Daily, Bautista Alcantar MD    Data:     Code Status:    There are no questions and answers to display.       No family history on file.    Social History     Socioeconomic History   • Marital status:      Spouse name: Not on file   • Number of children: Not on file   • Years of education: Not on file   • Highest education level: Not on file   Tobacco Use   • Smoking status: Current Every Day Smoker   Substance and Sexual Activity   • Alcohol use: No   • Drug use: Defer       Vitals:  T 98.0 P 62 R 24 /80 Sp02 100% (vent)      I/O (24Hr):  No intake or output data in the 24 hours ending 02/07/20 1132    Labs and imaging:      Recent Results (from the past 12 hour(s))   POC Glucose Once    Collection Time: 02/07/20  1:14 AM   Result Value Ref Range    Glucose 124 70 - 130 mg/dL   Protime-INR    Collection Time: 02/07/20  5:01 AM   Result Value Ref Range    Protime 14.3 11.9 - 14.6 Seconds    INR 1.08 0.91 - 1.09   Magnesium    Collection Time: 02/07/20  5:01 AM   Result Value Ref Range    Magnesium 2.2 1.6 - 2.4 mg/dL   Phosphorus    Collection Time: 02/07/20  5:01 AM    Result Value Ref Range    Phosphorus 2.9 2.5 - 4.5 mg/dL   CBC Auto Differential    Collection Time: 02/07/20  5:01 AM   Result Value Ref Range    WBC 9.37 3.40 - 10.80 10*3/mm3    RBC 2.77 (L) 4.14 - 5.80 10*6/mm3    Hemoglobin 7.9 (L) 13.0 - 17.7 g/dL    Hematocrit 24.5 (L) 37.5 - 51.0 %    MCV 88.4 79.0 - 97.0 fL    MCH 28.5 26.6 - 33.0 pg    MCHC 32.2 31.5 - 35.7 g/dL    RDW 15.2 12.3 - 15.4 %    RDW-SD 48.7 37.0 - 54.0 fl    MPV 10.8 6.0 - 12.0 fL    Platelets 241 140 - 450 10*3/mm3    Neutrophil % 83.7 (H) 42.7 - 76.0 %    Lymphocyte % 6.4 (L) 19.6 - 45.3 %    Monocyte % 8.5 5.0 - 12.0 %    Eosinophil % 0.1 (L) 0.3 - 6.2 %    Basophil % 0.2 0.0 - 1.5 %    Immature Grans % 1.1 (H) 0.0 - 0.5 %    Neutrophils, Absolute 7.84 (H) 1.70 - 7.00 10*3/mm3    Lymphocytes, Absolute 0.60 (L) 0.70 - 3.10 10*3/mm3    Monocytes, Absolute 0.80 0.10 - 0.90 10*3/mm3    Eosinophils, Absolute 0.01 0.00 - 0.40 10*3/mm3    Basophils, Absolute 0.02 0.00 - 0.20 10*3/mm3    Immature Grans, Absolute 0.10 (H) 0.00 - 0.05 10*3/mm3    nRBC 0.0 0.0 - 0.2 /100 WBC   POC Glucose Once    Collection Time: 02/07/20  5:53 AM   Result Value Ref Range    Glucose 127 70 - 130 mg/dL       Physical Examination:        Physical Exam   Constitutional: He appears well-nourished. He appears cachectic. He is restrained.   HENT:   Head: Normocephalic and atraumatic.   Nose: Nose normal.   Mouth/Throat: Oropharynx is clear and moist.   Eyes: Pupils are equal, round, and reactive to light. EOM are normal.   Neck: Normal range of motion. Neck supple.   Trach to vent   Cardiovascular: Normal rate, regular rhythm, normal heart sounds and intact distal pulses.   Pulmonary/Chest: Effort normal and breath sounds normal.   Abdominal: Soft. Bowel sounds are normal.   g tube  Dressing c.d.i   Musculoskeletal: Normal range of motion.   Generalized weakness   Neurological: He is alert. He has normal reflexes.   Nonverbal  Confused   Skin: Skin is warm and dry.    Psychiatric: He has a normal mood and affect. His behavior is normal.   Anxious at times   Nursing note and vitals reviewed.        Assessment:             Acute tracheostomy management (CMS/[facility])    Acute respiratory failure with hypoxia and hypercapnia (CMS/[facility])    Ventilator dependence (CMS/[facility])    Past Medical History:   Diagnosis Date   • Anxiety disorder    • Cerumen impaction    • COPD (chronic obstructive pulmonary disease) (CMS/[facility])    • Depression    • Hypertension    • Hypothyroidism    • Kidney disease    • Osteoarthritis    • Sinusitis, chronic         Plan:        1. Acute on chronic hypoxic/hypercapneic respiratory failure  2. RUL pulmonary embolus  3. PAF  4. Hypothyroidism  5. COPD  6. Anxiety disorder  7. CKD  8. Multifactorial anemia  9. GERD  10. Dementia  11. Metabolic encephalopathy  12. Iron deficiency  13. Hypokalemia - improved  14. Moderate protein calorie malnutrition  15. Occult stool positive    Continue current treatment. Monitor counts. Increase activity. Labs Monday. Continue vent weaning under direction of pulmonology. Aggressive therapies. Maintain patient safety. Continue nutrition support per AMONs.  Monitor renal function. Monitor I&O's closely.  Watch for s/s bleeding. Continue Lovenox - transition to xarelto/coumadin if h/h stabilizes.  Will transfuse as needed judiciously.     Electronically signed by MATT Tolentino on 2/7/2020 at 11:32 AM   I have discussed the care of Shawn Mannt, including pertinent history and exam findings, with the nurse practitioner.    I have seen and examined the patient and the key elements of all parts of the encounter have been performed by me.  I agree with the assessment, plan and orders as documented by MATT Herrera, after I modified the exam findings and the plan of treatments and the final version is my approved version of the assessment.        Electronically signed by Bautista Alcantar MD on 2/7/2020 at 7:45 PM

## 2020-02-08 LAB
GLUCOSE BLDC GLUCOMTR-MCNC: 120 MG/DL (ref 70–130)
GLUCOSE BLDC GLUCOMTR-MCNC: 121 MG/DL (ref 70–130)
GLUCOSE BLDC GLUCOMTR-MCNC: 126 MG/DL (ref 70–130)
GLUCOSE BLDC GLUCOMTR-MCNC: 128 MG/DL (ref 70–130)
GLUCOSE BLDC GLUCOMTR-MCNC: 132 MG/DL (ref 70–130)
INR PPP: 1.02 (ref 0.91–1.09)
PROTHROMBIN TIME: 13.7 SECONDS (ref 11.9–14.6)

## 2020-02-08 PROCEDURE — 85610 PROTHROMBIN TIME: CPT | Performed by: SPECIALIST

## 2020-02-08 PROCEDURE — 25010000002 ENOXAPARIN PER 10 MG: Performed by: INTERNAL MEDICINE

## 2020-02-08 PROCEDURE — 82962 GLUCOSE BLOOD TEST: CPT

## 2020-02-08 NOTE — PROGRESS NOTES
MICHELLE Chavez APRN        Internal Medicine Progress Note    2/8/2020   10:24 AM    Name:  Shawn Turner  MRN:    [id number]     Acct:     [id number]   Room:  [address] [place] [address] Day: 0     Admit Date: 1/15/2020 10:17 AM  PCP: Tj Wilson MD    Subjective:     C/C: need for continued vent weaning    Interval History: Status: same. Resting in bed. No family at bedside. Tolerating current vent settings. Little progress with therapies. Appears in no acute distress. Blood sugars stable.  No s/s bleeding reported per nursing.     sReview of Systems   Unable to perform ROS: patient nonverbal       Medications:     Allergies:   Allergies   Allergen Reactions   • Aspirin        Current Meds:   Current Facility-Administered Medications:   •  acetaminophen (TYLENOL) 160 MG/5ML solution 650 mg, 650 mg, Per G Tube, Q4H PRN, Bautista Alcantar MD  •  acetaminophen (TYLENOL) suppository 650 mg, 650 mg, Rectal, Q6H PRN, Selina Augustine MD  •  acetaZOLAMIDE (DIAMOX) tablet 250 mg, 250 mg, Per G Tube, BID, Bautista Alcantar MD  •  albuterol (PROVENTIL) nebulizer solution 0.083% 2.5 mg/3mL, 2.5 mg, Nebulization, Q2H PRN, Selina Augustine MD  •  bacitracin ointment, , Topical, Q12H, Selina Augustine MD  •  bacitracin ointment, , Topical, PRN, Selina Augustine MD  •  bacitracin ointment, , Topical, PRN, Selina Augustine MD  •  bacitracin ointment, , Topical, Q12H, Selina Augustine MD  •  dextrose (D50W) 25 g/ 50mL Intravenous Solution 25 g, 25 g, Intravenous, Q15 Min PRN, Selina Augustine MD  •  dextrose (GLUTOSE) oral gel 15 g, 15 g, Oral, Q15 Min PRN, Selina Augustine MD  •  dilTIAZem (CARDIZEM) injection 5 mg, 5 mg, Intravenous, Q6H PRN, Selian Augustine MD  •  dilTIAZem (CARDIZEM) tablet 30 mg, 30 mg, Per G Tube, Q6H, Bautista Alcantar MD  •  donepezil (ARICEPT) tablet 10 mg, 10 mg, Per G Tube, Nightly, Bautista Alcantar MD  •  enalaprilat (VASOTEC)  injection 0.625 mg, 0.625 mg, Intravenous, Q6H, Selina Augustine MD  •  enoxaparin (LOVENOX) syringe 50 mg, 1 mg/kg, Subcutaneous, Q12H, Bautista Alcantar MD  •  famotidine (PEPCID) injection 20 mg, 20 mg, Intravenous, Q12H, Selina Augustine MD  •  ferrous sulfate tablet 325 mg, 325 mg, Oral, Daily With Breakfast, Bautista Alcantar MD  •  furosemide (LASIX) tablet 40 mg, 40 mg, Per G Tube, Daily, Bautista Alcantar MD  •  glucagon (human recombinant) (GLUCAGEN DIAGNOSTIC) injection 1 mg, 1 mg, Subcutaneous, Q15 Min PRN, Selina Augustine MD  •  haloperidol lactate (HALDOL) injection 1 mg, 1 mg, Intramuscular, Q4H PRN, Selina Augustine MD  •  hydrALAZINE (APRESOLINE) injection 5 mg, 5 mg, Intravenous, Q6H PRN, Selina Augustine MD  •  hypromellose (ISOPTO TEARS) 0.5 % ophthalmic solution 2 drop, 2 drop, Both Eyes, Q2H PRN, Selina Augustine MD  •  insulin lispro (humaLOG) injection 2-7 Units, 2-7 Units, Subcutaneous, Q6H, Selina Augustine MD  •  ipratropium (ATROVENT) nebulizer solution 0.5 mg, 0.5 mg, Nebulization, Q6H PRN, Selina Augustine MD  •  ipratropium-albuterol (DUO-NEB) nebulizer solution 3 mL, 3 mL, Nebulization, Q6H - RT, Selina Augustine MD  •  levothyroxine (SYNTHROID, LEVOTHROID) tablet 75 mcg, 75 mcg, Per G Tube, Q AM, Bautista Alcantar MD  •  melatonin tablet 9.75 mg, 9.75 mg, Per G Tube, Nightly, Bautista Alcantar MD  •  memantine (NAMENDA) tablet 10 mg, 10 mg, Per G Tube, Q12H, Bautista Alcantar MD  •  metoprolol tartrate (LOPRESSOR) tablet 12.5 mg, 12.5 mg, Per G Tube, Q12H, Bautista Alcantar MD  •  midazolam (VERSED) injection 2 mg, 2 mg, Intravenous, Q1H PRN, Selina Augustine MD  •  Morphine sulfate (PF) injection 2 mg, 2 mg, Intravenous, Q1H PRN, Selina Augustine MD  •  multivitamin w/minerals tablet 1 tablet, 1 tablet, Oral, Daily, Bautista Alcantar MD  •  norepinephrine (LEVOPHED) 16,000 mcg in sodium chloride 0.9 % 250 mL (64 mcg/mL) infusion,  0.02-0.3 mcg/kg/min, Intravenous, Titrated, Selina Augustine MD  •  polyethylene glycol 3350 powder (packet), 17 g, Per G Tube, Q12H PRN, Bautista Alcantar MD  •  risperiDONE (risperDAL) tablet 0.75 mg, 0.75 mg, Per G Tube, TID, Bautista Alcantar MD  •  saccharomyces boulardii (FLORASTOR) capsule 250 mg, 250 mg, Per G Tube, Daily, Bautista Alcantar MD  •  sodium chloride 0.9 % flush 10 mL, 10 mL, Intravenous, Q12H, Selina Augustine MD  •  tiZANidine (ZANAFLEX) tablet 4 mg, 4 mg, Per G Tube, Q8H, Bautista Alcantar MD  •  traZODone (DESYREL) tablet 50 mg, 50 mg, Per G Tube, Nightly, Bautista Alcantar MD  •  valproate (DEPACON) 125 mg in sodium chloride 0.9 % 50 mL IVPB, 125 mg, Intravenous, Q12H, Selina Augustine MD  •  Vortioxetine HBr tablet 20 mg, 20 mg, Per G Tube, Daily, Bautista Alcantar MD    Data:     Code Status:    There are no questions and answers to display.       No family history on file.    Social History     Socioeconomic History   • Marital status:      Spouse name: Not on file   • Number of children: Not on file   • Years of education: Not on file   • Highest education level: Not on file   Tobacco Use   • Smoking status: Current Every Day Smoker   Substance and Sexual Activity   • Alcohol use: No   • Drug use: Defer       Vitals:  T 98.3 P 52 R 15 /59 Sp02 100% (vent)      I/O (24Hr):  No intake or output data in the 24 hours ending 02/08/20 1024    Labs and imaging:      Recent Results (from the past 12 hour(s))   POC Glucose Once    Collection Time: 02/07/20 11:56 PM   Result Value Ref Range    Glucose 132 (H) 70 - 130 mg/dL   Protime-INR    Collection Time: 02/08/20  5:02 AM   Result Value Ref Range    Protime 13.7 11.9 - 14.6 Seconds    INR 1.02 0.91 - 1.09   POC Glucose Once    Collection Time: 02/08/20  6:05 AM   Result Value Ref Range    Glucose 128 70 - 130 mg/dL       Physical Examination:        Physical Exam   Constitutional: He appears  well-nourished. He appears cachectic. He is restrained.   HENT:   Head: Normocephalic and atraumatic.   Nose: Nose normal.   Mouth/Throat: Oropharynx is clear and moist.   Eyes: Pupils are equal, round, and reactive to light. EOM are normal.   Neck: Normal range of motion. Neck supple.   Trach to vent   Cardiovascular: Normal rate, regular rhythm, normal heart sounds and intact distal pulses.   Pulmonary/Chest: Effort normal and breath sounds normal.   Abdominal: Soft. Bowel sounds are normal.   g tube  Dressing c.d.i   Musculoskeletal: Normal range of motion.   Generalized weakness   Neurological: He is alert. He has normal reflexes.   Nonverbal  Confused   Skin: Skin is warm and dry.   Psychiatric: He has a normal mood and affect. His behavior is normal.   Anxious at times   Nursing note and vitals reviewed.        Assessment:             Acute tracheostomy management (CMS/[facility])    Acute respiratory failure with hypoxia and hypercapnia (CMS/[facility])    Ventilator dependence (CMS/[facility])    Past Medical History:   Diagnosis Date   • Anxiety disorder    • Cerumen impaction    • COPD (chronic obstructive pulmonary disease) (CMS/[facility])    • Depression    • Hypertension    • Hypothyroidism    • Kidney disease    • Osteoarthritis    • Sinusitis, chronic         Plan:        1. Acute on chronic hypoxic/hypercapneic respiratory failure  2. RUL pulmonary embolus  3. PAF  4. Hypothyroidism  5. COPD  6. Anxiety disorder  7. CKD  8. Multifactorial anemia  9. GERD  10. Dementia  11. Metabolic encephalopathy  12. Iron deficiency  13. Hypokalemia - improved  14. Moderate protein calorie malnutrition  15. Occult stool positive    Continue current treatment. Monitor counts. Increase activity. Labs Monday. Continue vent weaning under direction of pulmonology. Aggressive therapies. Maintain patient safety. Continue nutrition support per AMONs.  Monitor renal function. Monitor I&O's closely.  Watch for s/s bleeding. Continue Lovenox -  transition to xarelto/coumadin if h/h stabilizes.  Will transfuse as needed judiciously.     Electronically signed by MATT Moreland on 2/8/2020 at 10:24 AM   I have discussed the care of Shawn Turner, including pertinent history and exam findings, with the nurse practitioner.    I have seen and examined the patient and the key elements of all parts of the encounter have been performed by me.  I agree with the assessment, plan and orders as documented by MATT Kruse, after I modified the exam findings and the plan of treatments and the final version is my approved version of the assessment.        Electronically signed by Bautista Alcantar MD on 2/8/2020 at 7:58 PM

## 2020-02-09 LAB
GLUCOSE BLDC GLUCOMTR-MCNC: 101 MG/DL (ref 70–130)
GLUCOSE BLDC GLUCOMTR-MCNC: 110 MG/DL (ref 70–130)
GLUCOSE BLDC GLUCOMTR-MCNC: 116 MG/DL (ref 70–130)
GLUCOSE BLDC GLUCOMTR-MCNC: 124 MG/DL (ref 70–130)
INR PPP: 0.94 (ref 0.91–1.09)
MAGNESIUM SERPL-MCNC: 1.9 MG/DL (ref 1.6–2.4)
PHOSPHATE SERPL-MCNC: 2.4 MG/DL (ref 2.5–4.5)
PROTHROMBIN TIME: 12.8 SECONDS (ref 11.9–14.6)

## 2020-02-09 PROCEDURE — 25010000002 MIDAZOLAM PER 1 MG: Performed by: SPECIALIST

## 2020-02-09 PROCEDURE — 25010000002 ENOXAPARIN PER 10 MG: Performed by: INTERNAL MEDICINE

## 2020-02-09 PROCEDURE — 85610 PROTHROMBIN TIME: CPT | Performed by: SPECIALIST

## 2020-02-09 PROCEDURE — 83735 ASSAY OF MAGNESIUM: CPT | Performed by: SPECIALIST

## 2020-02-09 PROCEDURE — 84100 ASSAY OF PHOSPHORUS: CPT | Performed by: SPECIALIST

## 2020-02-09 PROCEDURE — 82962 GLUCOSE BLOOD TEST: CPT

## 2020-02-09 RX ORDER — LISINOPRIL 20 MG/1
20 TABLET ORAL NIGHTLY
Status: DISCONTINUED | OUTPATIENT
Start: 2020-02-09 | End: 2020-02-21 | Stop reason: HOSPADM

## 2020-02-09 NOTE — PROGRESS NOTES
[facility]  MELANI Alcantar M.D.  MATT Herrera        Internal Medicine Progress Note    2/9/2020   1:20 PM    Name:  Shawn Turner  MRN:    [id number]     Acct:     [id number]   Room:  [address] [place] [address] Day: 0     Admit Date: 1/15/2020 10:17 AM  PCP: Tj Wilson MD    Subjective:     C/C: need for continued vent weaning    Interval History: Status: same. Resting in bed. No family at bedside. Tolerating current vent settings. Little progress with therapies. Appears in no acute distress.  Glucose 110-124.  No s/s bleeding reported per nursing.  Hypophosphatemia with a phosphorus of 2.4.    sReview of Systems   Unable to perform ROS: patient nonverbal       Medications:     Allergies:   Allergies   Allergen Reactions   • Aspirin        Current Meds:   Current Facility-Administered Medications:   •  acetaminophen (TYLENOL) 160 MG/5ML solution 650 mg, 650 mg, Per G Tube, Q4H PRN, Bautista Alcantar MD  •  acetaminophen (TYLENOL) suppository 650 mg, 650 mg, Rectal, Q6H PRN, Selina Augustine MD  •  acetaZOLAMIDE (DIAMOX) tablet 250 mg, 250 mg, Per G Tube, BID, Bautista Alcantar MD  •  albuterol (PROVENTIL) nebulizer solution 0.083% 2.5 mg/3mL, 2.5 mg, Nebulization, Q2H PRN, Selina Augustine MD  •  bacitracin ointment, , Topical, Q12H, Selina Augustine MD  •  bacitracin ointment, , Topical, PRN, Selina Augustine MD  •  bacitracin ointment, , Topical, PRN, Selina Augustine MD  •  bacitracin ointment, , Topical, Q12H, Selina Augustine MD  •  dextrose (D50W) 25 g/ 50mL Intravenous Solution 25 g, 25 g, Intravenous, Q15 Min PRN, Selina Augustine MD  •  dextrose (GLUTOSE) oral gel 15 g, 15 g, Oral, Q15 Min PRN, Selina Augustine MD  •  dilTIAZem (CARDIZEM) injection 5 mg, 5 mg, Intravenous, Q6H PRN, Selina Augustine MD  •  dilTIAZem (CARDIZEM) tablet 30 mg, 30 mg, Per G Tube, Q6H, Bautista Alcantar MD  •  donepezil (ARICEPT) tablet 10 mg, 10 mg, Per G Tube, Nightly, Bautista Alcantar  MD Surendra  •  enalaprilat (VASOTEC) injection 0.625 mg, 0.625 mg, Intravenous, Q6H, Selina Augustine MD  •  enoxaparin (LOVENOX) syringe 50 mg, 1 mg/kg, Subcutaneous, Q12H, Bautista Alcantar MD  •  famotidine (PEPCID) injection 20 mg, 20 mg, Intravenous, Q12H, Selina Augustine MD  •  ferrous sulfate tablet 325 mg, 325 mg, Oral, Daily With Breakfast, Bautista Alcantar MD  •  furosemide (LASIX) tablet 40 mg, 40 mg, Per G Tube, Daily, Bautista Alcantar MD  •  glucagon (human recombinant) (GLUCAGEN DIAGNOSTIC) injection 1 mg, 1 mg, Subcutaneous, Q15 Min PRN, Selina Augustine MD  •  haloperidol lactate (HALDOL) injection 1 mg, 1 mg, Intramuscular, Q4H PRN, Selina Augustine MD  •  hydrALAZINE (APRESOLINE) injection 5 mg, 5 mg, Intravenous, Q6H PRN, Selina Augustine MD  •  hypromellose (ISOPTO TEARS) 0.5 % ophthalmic solution 2 drop, 2 drop, Both Eyes, Q2H PRN, Selina Augustine MD  •  insulin lispro (humaLOG) injection 2-7 Units, 2-7 Units, Subcutaneous, Q6H, Selina Augustine MD  •  ipratropium (ATROVENT) nebulizer solution 0.5 mg, 0.5 mg, Nebulization, Q6H PRN, Selina Augustine MD  •  ipratropium-albuterol (DUO-NEB) nebulizer solution 3 mL, 3 mL, Nebulization, Q6H - RT, Selina Augustine MD  •  levothyroxine (SYNTHROID, LEVOTHROID) tablet 75 mcg, 75 mcg, Per G Tube, Q AM, Bautista Alcantar MD  •  melatonin tablet 9.75 mg, 9.75 mg, Per G Tube, Nightly, Bautisat Alcantar MD  •  memantine (NAMENDA) tablet 10 mg, 10 mg, Per G Tube, Q12H, Bautista Alcantar MD  •  metoprolol tartrate (LOPRESSOR) tablet 12.5 mg, 12.5 mg, Per G Tube, Q12H, Bautista Alcantar MD  •  midazolam (VERSED) injection 2 mg, 2 mg, Intravenous, Q1H PRN, Selina Augustine MD  •  Morphine sulfate (PF) injection 2 mg, 2 mg, Intravenous, Q1H PRN, Selina Augustine MD  •  multivitamin w/minerals tablet 1 tablet, 1 tablet, Oral, Daily, Bautista Alcantar MD  •  norepinephrine (LEVOPHED) 16,000 mcg in sodium chloride  0.9 % 250 mL (64 mcg/mL) infusion, 0.02-0.3 mcg/kg/min, Intravenous, Titrated, Selina Augustine MD  •  polyethylene glycol 3350 powder (packet), 17 g, Per G Tube, Q12H PRN, Bautista Alcantar MD  •  risperiDONE (risperDAL) tablet 0.75 mg, 0.75 mg, Per G Tube, TID, Bautista Alcantar MD  •  saccharomyces boulardii (FLORASTOR) capsule 250 mg, 250 mg, Per G Tube, Daily, Bautista Alcantar MD  •  sodium chloride 0.9 % flush 10 mL, 10 mL, Intravenous, Q12H, Selina Augustine MD  •  tiZANidine (ZANAFLEX) tablet 4 mg, 4 mg, Per G Tube, Q8H, Bautista Alcantar MD  •  traZODone (DESYREL) tablet 50 mg, 50 mg, Per G Tube, Nightly, Bautista Alcantar MD  •  valproate (DEPACON) 125 mg in sodium chloride 0.9 % 50 mL IVPB, 125 mg, Intravenous, Q12H, Selina Augustine MD  •  Vortioxetine HBr tablet 20 mg, 20 mg, Per G Tube, Daily, Bautista Alcantar MD    Data:     Code Status:    There are no questions and answers to display.       No family history on file.    Social History     Socioeconomic History   • Marital status:      Spouse name: Not on file   • Number of children: Not on file   • Years of education: Not on file   • Highest education level: Not on file   Tobacco Use   • Smoking status: Current Every Day Smoker   Substance and Sexual Activity   • Alcohol use: No   • Drug use: Defer       Vitals:  T 98.6 P 57 R 16 /56 Sp02 100% (vent)      I/O (24Hr):  No intake or output data in the 24 hours ending 02/09/20 1320    Labs and imaging:      Recent Results (from the past 12 hour(s))   Protime-INR    Collection Time: 02/09/20  5:38 AM   Result Value Ref Range    Protime 12.8 11.9 - 14.6 Seconds    INR 0.94 0.91 - 1.09   Magnesium    Collection Time: 02/09/20  5:38 AM   Result Value Ref Range    Magnesium 1.9 1.6 - 2.4 mg/dL   Phosphorus    Collection Time: 02/09/20  5:38 AM   Result Value Ref Range    Phosphorus 2.4 (L) 2.5 - 4.5 mg/dL   POC Glucose Once    Collection Time: 02/09/20  6:18  AM   Result Value Ref Range    Glucose 124 70 - 130 mg/dL   POC Glucose Once    Collection Time: 02/09/20 11:23 AM   Result Value Ref Range    Glucose 110 70 - 130 mg/dL       Physical Examination:        Physical Exam   Constitutional: He appears well-nourished. He appears cachectic. He is restrained.   HENT:   Head: Normocephalic and atraumatic.   Nose: Nose normal.   Mouth/Throat: Oropharynx is clear and moist.   Eyes: Pupils are equal, round, and reactive to light. EOM are normal.   Neck: Normal range of motion. Neck supple.   Trach to vent   Cardiovascular: Normal rate, regular rhythm, normal heart sounds and intact distal pulses.   Pulmonary/Chest: Effort normal and breath sounds normal.   Abdominal: Soft. Bowel sounds are normal.   g tube  Dressing c.d.i   Musculoskeletal: Normal range of motion.   Generalized weakness   Neurological: He is alert. He has normal reflexes.   Nonverbal  Confused   Skin: Skin is warm and dry.   Psychiatric: He has a normal mood and affect. His behavior is normal.   Anxious at times   Nursing note and vitals reviewed.        Assessment:             Acute tracheostomy management (CMS/[facility])    Acute respiratory failure with hypoxia and hypercapnia (CMS/[facility])    Ventilator dependence (CMS/[facility])    Past Medical History:   Diagnosis Date   • Anxiety disorder    • Cerumen impaction    • COPD (chronic obstructive pulmonary disease) (CMS/[facility])    • Depression    • Hypertension    • Hypothyroidism    • Kidney disease    • Osteoarthritis    • Sinusitis, chronic         Plan:        1. Acute on chronic hypoxic/hypercapneic respiratory failure  2. RUL pulmonary embolus  3. PAF  4. Hypothyroidism  5. COPD  6. Anxiety disorder  7. CKD  8. Multifactorial anemia  9. GERD  10. Dementia  11. Metabolic encephalopathy  12. Iron deficiency  13. Hypokalemia - improved  14. Moderate protein calorie malnutrition  15. Occult stool positive    Continue current treatment. Monitor counts. Increase activity. Labs  Monday. Continue vent weaning under direction of pulmonology. Aggressive therapies. Maintain patient safety. Continue nutrition support per AMONs.  Monitor renal function. Monitor I&O's closely.  Watch for s/s bleeding. Continue Lovenox - transition to xarelto/coumadin if h/h stabilizes.  Will transfuse as needed judiciously.  Replace phosphorus, repeat [place] level in a.m.  Discontinue Vasotec and resume lisinopril 20 mg per tube. daily    Electronically signed by MATT Alexander on 2/9/2020 at 1:20 PM   I have discussed the care of Shawn Turner, including pertinent history and exam findings, with the nurse practitioner.    I have seen and examined the patient and the key elements of all parts of the encounter have been performed by me.  I agree with the assessment, plan and orders as documented by MATT Prince, after I modified the exam findings and the plan of treatments and the final version is my approved version of the assessment.        Electronically signed by Bautista Alcantar MD on 2/9/2020 at 10:00 PM

## 2020-02-10 LAB
AMMONIA BLD-SCNC: 20 UMOL/L (ref 16–60)
ANION GAP SERPL CALCULATED.3IONS-SCNC: 11 MMOL/L (ref 5–15)
BASOPHILS # BLD AUTO: 0.03 10*3/MM3 (ref 0–0.2)
BASOPHILS NFR BLD AUTO: 0.3 % (ref 0–1.5)
BUN BLD-MCNC: 30 MG/DL (ref 8–23)
BUN/CREAT SERPL: 32.3 (ref 7–25)
CALCIUM SPEC-SCNC: 8.5 MG/DL (ref 8.6–10.5)
CHLORIDE SERPL-SCNC: 102 MMOL/L (ref 98–107)
CO2 SERPL-SCNC: 22 MMOL/L (ref 22–29)
CREAT BLD-MCNC: 0.93 MG/DL (ref 0.76–1.27)
CRP SERPL-MCNC: 3.39 MG/DL (ref 0–0.5)
DEPRECATED RDW RBC AUTO: 49 FL (ref 37–54)
EOSINOPHIL # BLD AUTO: 0.02 10*3/MM3 (ref 0–0.4)
EOSINOPHIL NFR BLD AUTO: 0.2 % (ref 0.3–6.2)
ERYTHROCYTE [DISTWIDTH] IN BLOOD BY AUTOMATED COUNT: 15.4 % (ref 12.3–15.4)
ERYTHROCYTE [SEDIMENTATION RATE] IN BLOOD: 40 MM/HR (ref 0–15)
GFR SERPL CREATININE-BSD FRML MDRD: 81 ML/MIN/1.73
GLUCOSE BLD-MCNC: 110 MG/DL (ref 65–99)
GLUCOSE BLDC GLUCOMTR-MCNC: 115 MG/DL (ref 70–130)
GLUCOSE BLDC GLUCOMTR-MCNC: 116 MG/DL (ref 70–130)
GLUCOSE BLDC GLUCOMTR-MCNC: 116 MG/DL (ref 70–130)
GLUCOSE BLDC GLUCOMTR-MCNC: 118 MG/DL (ref 70–130)
GLUCOSE BLDC GLUCOMTR-MCNC: 120 MG/DL (ref 70–130)
HCT VFR BLD AUTO: 28.8 % (ref 37.5–51)
HGB BLD-MCNC: 9 G/DL (ref 13–17.7)
HOLD SPECIMEN: NORMAL
IMM GRANULOCYTES # BLD AUTO: 0.19 10*3/MM3 (ref 0–0.05)
IMM GRANULOCYTES NFR BLD AUTO: 2.2 % (ref 0–0.5)
INR PPP: 0.99 (ref 0.91–1.09)
LYMPHOCYTES # BLD AUTO: 0.84 10*3/MM3 (ref 0.7–3.1)
LYMPHOCYTES NFR BLD AUTO: 9.6 % (ref 19.6–45.3)
MCH RBC QN AUTO: 27.4 PG (ref 26.6–33)
MCHC RBC AUTO-ENTMCNC: 31.3 G/DL (ref 31.5–35.7)
MCV RBC AUTO: 87.8 FL (ref 79–97)
MONOCYTES # BLD AUTO: 0.89 10*3/MM3 (ref 0.1–0.9)
MONOCYTES NFR BLD AUTO: 10.2 % (ref 5–12)
NEUTROPHILS # BLD AUTO: 6.77 10*3/MM3 (ref 1.7–7)
NEUTROPHILS NFR BLD AUTO: 77.5 % (ref 42.7–76)
NRBC BLD AUTO-RTO: 0 /100 WBC (ref 0–0.2)
NT-PROBNP SERPL-MCNC: 130.3 PG/ML (ref 5–900)
PHOSPHATE SERPL-MCNC: 3.2 MG/DL (ref 2.5–4.5)
PLATELET # BLD AUTO: 347 10*3/MM3 (ref 140–450)
PMV BLD AUTO: 9.9 FL (ref 6–12)
POTASSIUM BLD-SCNC: 3.8 MMOL/L (ref 3.5–5.2)
PROTHROMBIN TIME: 13.4 SECONDS (ref 11.9–14.6)
RBC # BLD AUTO: 3.28 10*6/MM3 (ref 4.14–5.8)
SODIUM BLD-SCNC: 135 MMOL/L (ref 136–145)
TSH SERPL DL<=0.05 MIU/L-ACNC: 6.47 UIU/ML (ref 0.27–4.2)
WBC NRBC COR # BLD: 8.74 10*3/MM3 (ref 3.4–10.8)

## 2020-02-10 PROCEDURE — 80048 BASIC METABOLIC PNL TOTAL CA: CPT | Performed by: INTERNAL MEDICINE

## 2020-02-10 PROCEDURE — 99232 SBSQ HOSP IP/OBS MODERATE 35: CPT | Performed by: INTERNAL MEDICINE

## 2020-02-10 PROCEDURE — 82962 GLUCOSE BLOOD TEST: CPT

## 2020-02-10 PROCEDURE — 84443 ASSAY THYROID STIM HORMONE: CPT | Performed by: INTERNAL MEDICINE

## 2020-02-10 PROCEDURE — 82140 ASSAY OF AMMONIA: CPT | Performed by: INTERNAL MEDICINE

## 2020-02-10 PROCEDURE — 85610 PROTHROMBIN TIME: CPT | Performed by: INTERNAL MEDICINE

## 2020-02-10 PROCEDURE — 85651 RBC SED RATE NONAUTOMATED: CPT | Performed by: INTERNAL MEDICINE

## 2020-02-10 PROCEDURE — 83880 ASSAY OF NATRIURETIC PEPTIDE: CPT | Performed by: INTERNAL MEDICINE

## 2020-02-10 PROCEDURE — 86140 C-REACTIVE PROTEIN: CPT | Performed by: INTERNAL MEDICINE

## 2020-02-10 PROCEDURE — 25010000002 ENOXAPARIN PER 10 MG: Performed by: INTERNAL MEDICINE

## 2020-02-10 PROCEDURE — 84100 ASSAY OF PHOSPHORUS: CPT | Performed by: INTERNAL MEDICINE

## 2020-02-10 PROCEDURE — 85025 COMPLETE CBC W/AUTO DIFF WBC: CPT | Performed by: INTERNAL MEDICINE

## 2020-02-10 NOTE — PROGRESS NOTES
[facility]  MELANI Alcantar M.D.  MATT Herrera        Internal Medicine Progress Note    2/10/2020   2:14 PM    Name:  Shawn Turner  MRN:    [id number]     Acct:     [id number]   Room:  [address] [place] [address] Day: 0     Admit Date: 1/15/2020 10:17 AM  PCP: Tj Wilson MD    Subjective:     C/C: need for continued vent weaning    Interval History: Status: same. Resting in bed. No family at bedside. Tolerating current vent settings. Little progress with therapies. Appears in no acute distress.  Glucose 101-120.  No s/s bleeding reported per nursing.  Hypophosphatemia reported yesterday resolved today with  a phosphorus of 3.2.    sReview of Systems   Unable to perform ROS: patient nonverbal       Medications:     Allergies:   Allergies   Allergen Reactions   • Aspirin        Current Meds:   Current Facility-Administered Medications:   •  acetaminophen (TYLENOL) 160 MG/5ML solution 650 mg, 650 mg, Per G Tube, Q4H PRN, Bautista Alcantar MD  •  acetaminophen (TYLENOL) suppository 650 mg, 650 mg, Rectal, Q6H PRN, Selina Augustine MD  •  acetaZOLAMIDE (DIAMOX) tablet 250 mg, 250 mg, Per G Tube, BID, Bautista Alcantar MD  •  albuterol (PROVENTIL) nebulizer solution 0.083% 2.5 mg/3mL, 2.5 mg, Nebulization, Q2H PRN, Selina Augustine MD  •  bacitracin ointment, , Topical, Q12H, Selina Augustine MD  •  bacitracin ointment, , Topical, PRN, Selina Augustine MD  •  bacitracin ointment, , Topical, PRN, Selina Augustine MD  •  bacitracin ointment, , Topical, Q12H, Selina Augustine MD  •  dextrose (D50W) 25 g/ 50mL Intravenous Solution 25 g, 25 g, Intravenous, Q15 Min PRN, Selina Augustine MD  •  dextrose (GLUTOSE) oral gel 15 g, 15 g, Oral, Q15 Min PRN, Selina Augustine MD  •  dilTIAZem (CARDIZEM) injection 5 mg, 5 mg, Intravenous, Q6H PRN, Selina Augustine MD  •  dilTIAZem (CARDIZEM) tablet 30 mg, 30 mg, Per G Tube, Q6H, Bautista Alcantar MD  •  donepezil (ARICEPT) tablet 10 mg, 10 mg, Per G  Tube, Nightly, Bautista Alcantar MD  •  enoxaparin (LOVENOX) syringe 50 mg, 1 mg/kg, Subcutaneous, Q12H, Bautista Alcantar MD  •  famotidine (PEPCID) injection 20 mg, 20 mg, Intravenous, Q12H, Selina Augustine MD  •  ferrous sulfate tablet 325 mg, 325 mg, Oral, Daily With Breakfast, Bautista Alcantar MD  •  furosemide (LASIX) tablet 40 mg, 40 mg, Per G Tube, Daily, Bautista Alcantar MD  •  glucagon (human recombinant) (GLUCAGEN DIAGNOSTIC) injection 1 mg, 1 mg, Subcutaneous, Q15 Min PRN, Selina Augustine MD  •  haloperidol lactate (HALDOL) injection 1 mg, 1 mg, Intramuscular, Q4H PRN, Selina Augustine MD  •  hydrALAZINE (APRESOLINE) injection 5 mg, 5 mg, Intravenous, Q6H PRN, Selina Augustine MD  •  hypromellose (ISOPTO TEARS) 0.5 % ophthalmic solution 2 drop, 2 drop, Both Eyes, Q2H PRN, Selina Augustine MD  •  insulin lispro (humaLOG) injection 2-7 Units, 2-7 Units, Subcutaneous, Q6H, Selina Augustine MD  •  ipratropium (ATROVENT) nebulizer solution 0.5 mg, 0.5 mg, Nebulization, Q6H PRN, Selina Augustine MD  •  ipratropium-albuterol (DUO-NEB) nebulizer solution 3 mL, 3 mL, Nebulization, Q6H - RT, Selina Augustine MD  •  levothyroxine (SYNTHROID, LEVOTHROID) tablet 75 mcg, 75 mcg, Per G Tube, Q AM, Bautista Alcantar MD  •  lisinopril (PRINIVIL,ZESTRIL) tablet 20 mg, 20 mg, Per G Tube, Nightly, Blanca Islas APRN  •  melatonin tablet 9.75 mg, 9.75 mg, Per G Tube, Nightly, Bautista Alcantar MD  •  memantine (NAMENDA) tablet 10 mg, 10 mg, Per G Tube, Q12H, Bautista Alcantar MD  •  metoprolol tartrate (LOPRESSOR) tablet 12.5 mg, 12.5 mg, Per G Tube, Q12H, Bautista Alcantar MD  •  midazolam (VERSED) injection 2 mg, 2 mg, Intravenous, Q1H PRN, Selina Augustine MD  •  Morphine sulfate (PF) injection 2 mg, 2 mg, Intravenous, Q1H PRN, Selina Augustine MD  •  multivitamin w/minerals tablet 1 tablet, 1 tablet, Oral, Daily, Bautista Alcantar MD  •  norepinephrine (LEVOPHED)  16,000 mcg in sodium chloride 0.9 % 250 mL (64 mcg/mL) infusion, 0.02-0.3 mcg/kg/min, Intravenous, Titrated, Selina Augustine MD  •  polyethylene glycol 3350 powder (packet), 17 g, Per G Tube, Q12H PRN, Bautista Alcantar MD  •  Potassium Phosphates 9 mmol in sodium chloride 0.9 % 100 mL infusion, 9 mmol, Intravenous, Once, Blnaca Islas APRN  •  risperiDONE (risperDAL) tablet 0.75 mg, 0.75 mg, Per G Tube, TID, Bautista Alcantar MD  •  saccharomyces boulardii (FLORASTOR) capsule 250 mg, 250 mg, Per G Tube, Daily, Bautista Alcantar MD  •  sodium chloride 0.9 % flush 10 mL, 10 mL, Intravenous, Q12H, Selina Augustine MD  •  tiZANidine (ZANAFLEX) tablet 4 mg, 4 mg, Per G Tube, Q8H, Bautista Alcantar MD  •  traZODone (DESYREL) tablet 50 mg, 50 mg, Per G Tube, Nightly, Bautista Alcantar MD  •  valproate (DEPACON) 125 mg in sodium chloride 0.9 % 50 mL IVPB, 125 mg, Intravenous, Q12H, Selina Augustine MD  •  Vortioxetine HBr tablet 20 mg, 20 mg, Per G Tube, Daily, Bautista Alcantar MD    Data:     Code Status:    There are no questions and answers to display.       No family history on file.    Social History     Socioeconomic History   • Marital status:      Spouse name: Not on file   • Number of children: Not on file   • Years of education: Not on file   • Highest education level: Not on file   Tobacco Use   • Smoking status: Current Every Day Smoker   Substance and Sexual Activity   • Alcohol use: No   • Drug use: Defer       Vitals:  T 97.4 P 59 R 18 /79 Sp02 100% (vent)      I/O (24Hr):  No intake or output data in the 24 hours ending 02/10/20 1414    Labs and imaging:      Recent Results (from the past 12 hour(s))   POC Glucose Once    Collection Time: 02/10/20  6:20 AM   Result Value Ref Range    Glucose 116 70 - 130 mg/dL   Protime-INR    Collection Time: 02/10/20  6:22 AM   Result Value Ref Range    Protime 13.4 11.9 - 14.6 Seconds    INR 0.99 0.91 - 1.09   Basic  Metabolic Panel    Collection Time: 02/10/20  6:22 AM   Result Value Ref Range    Glucose 110 (H) 65 - 99 mg/dL    BUN 30 (H) 8 - 23 mg/dL    Creatinine 0.93 0.76 - 1.27 mg/dL    Sodium 135 (L) 136 - 145 mmol/L    Potassium 3.8 3.5 - 5.2 mmol/L    Chloride 102 98 - 107 mmol/L    CO2 22.0 22.0 - 29.0 mmol/L    Calcium 8.5 (L) 8.6 - 10.5 mg/dL    eGFR Non African Amer 81 >60 mL/min/1.73    BUN/Creatinine Ratio 32.3 (H) 7.0 - 25.0    Anion Gap 11.0 5.0 - 15.0 mmol/L   Ammonia    Collection Time: 02/10/20  6:22 AM   Result Value Ref Range    Ammonia 20 16 - 60 umol/L   TSH    Collection Time: 02/10/20  6:22 AM   Result Value Ref Range    TSH 6.470 (H) 0.270 - 4.200 uIU/mL   BNP    Collection Time: 02/10/20  6:22 AM   Result Value Ref Range    proBNP 130.3 5.0 - 900.0 pg/mL   Sedimentation Rate    Collection Time: 02/10/20  6:22 AM   Result Value Ref Range    Sed Rate 40 (H) 0 - 15 mm/hr   C-reactive Protein    Collection Time: 02/10/20  6:22 AM   Result Value Ref Range    C-Reactive Protein 3.39 (H) 0.00 - 0.50 mg/dL   Phosphorus    Collection Time: 02/10/20  6:22 AM   Result Value Ref Range    Phosphorus 3.2 2.5 - 4.5 mg/dL   CBC Auto Differential    Collection Time: 02/10/20  6:22 AM   Result Value Ref Range    WBC 8.74 3.40 - 10.80 10*3/mm3    RBC 3.28 (L) 4.14 - 5.80 10*6/mm3    Hemoglobin 9.0 (L) 13.0 - 17.7 g/dL    Hematocrit 28.8 (L) 37.5 - 51.0 %    MCV 87.8 79.0 - 97.0 fL    MCH 27.4 26.6 - 33.0 pg    MCHC 31.3 (L) 31.5 - 35.7 g/dL    RDW 15.4 12.3 - 15.4 %    RDW-SD 49.0 37.0 - 54.0 fl    MPV 9.9 6.0 - 12.0 fL    Platelets 347 140 - 450 10*3/mm3    Neutrophil % 77.5 (H) 42.7 - 76.0 %    Lymphocyte % 9.6 (L) 19.6 - 45.3 %    Monocyte % 10.2 5.0 - 12.0 %    Eosinophil % 0.2 (L) 0.3 - 6.2 %    Basophil % 0.3 0.0 - 1.5 %    Immature Grans % 2.2 (H) 0.0 - 0.5 %    Neutrophils, Absolute 6.77 1.70 - 7.00 10*3/mm3    Lymphocytes, Absolute 0.84 0.70 - 3.10 10*3/mm3    Monocytes, Absolute 0.89 0.10 - 0.90 10*3/mm3     Eosinophils, Absolute 0.02 0.00 - 0.40 10*3/mm3    Basophils, Absolute 0.03 0.00 - 0.20 10*3/mm3    Immature Grans, Absolute 0.19 (H) 0.00 - 0.05 10*3/mm3    nRBC 0.0 0.0 - 0.2 /100 WBC   Gold Top - SST    Collection Time: 02/10/20  6:23 AM   Result Value Ref Range    Extra Tube Hold for add-ons.    POC Glucose Once    Collection Time: 02/10/20 12:21 PM   Result Value Ref Range    Glucose 115 70 - 130 mg/dL       Physical Examination:        Physical Exam   Constitutional: He appears well-nourished. He appears cachectic. He is restrained.   HENT:   Head: Normocephalic and atraumatic.   Nose: Nose normal.   Mouth/Throat: Oropharynx is clear and moist.   Eyes: Pupils are equal, round, and reactive to light. EOM are normal.   Neck: Normal range of motion. Neck supple.   Trach to vent   Cardiovascular: Normal rate, regular rhythm, normal heart sounds and intact distal pulses.   Pulmonary/Chest: Effort normal and breath sounds normal.   Abdominal: Soft. Bowel sounds are normal.   g tube  Dressing c.d.i   Musculoskeletal: Normal range of motion.   Generalized weakness   Neurological: He is alert. He has normal reflexes.   Nonverbal  Confused   Skin: Skin is warm and dry.   Psychiatric: He has a normal mood and affect. His behavior is normal.   Anxious at times   Nursing note and vitals reviewed.        Assessment:             Acute tracheostomy management (CMS/[facility])    Acute respiratory failure with hypoxia and hypercapnia (CMS/[facility])    Ventilator dependence (CMS/[facility])    Past Medical History:   Diagnosis Date   • Anxiety disorder    • Cerumen impaction    • COPD (chronic obstructive pulmonary disease) (CMS/[facility])    • Depression    • Hypertension    • Hypothyroidism    • Kidney disease    • Osteoarthritis    • Sinusitis, chronic         Plan:        1. Acute on chronic hypoxic/hypercapneic respiratory failure  2. RUL pulmonary embolus  3. PAF  4. Hypothyroidism  5. COPD  6. Anxiety disorder  7. CKD  8. Multifactorial  anemia  9. GERD  10. Dementia  11. Metabolic encephalopathy  12. Iron deficiency  13. Hypokalemia - improved  14. Moderate protein calorie malnutrition  15. Occult stool positive    Continue current treatment. Monitor counts. Increase activity. Labs Thursday. Continue vent weaning under direction of pulmonology. Aggressive therapies. Maintain patient safety. Continue nutrition support per AMONs.  Monitor renal function. Monitor I&O's closely.  Watch for s/s bleeding. Continue Lovenox - transition to xarelto/coumadin if h/h stabilizes.  Will transfuse as needed judiciously.    Electronically signed by MATT Moreland on 2/10/2020 at 2:14 PM   I have discussed the care of Shawn Turner, including pertinent history and exam findings, with the nurse practitioner.    I have seen and examined the patient and the key elements of all parts of the encounter have been performed by me.  I agree with the assessment, plan and orders as documented by MATT Kruse, after I modified the exam findings and the plan of treatments and the final version is my approved version of the assessment.        Electronically signed by Bautista Alcantar MD on 2/10/2020 at 8:04 PM

## 2020-02-10 NOTE — PROGRESS NOTES
PULMONARY AND CRITICAL CARE PROGRESS NOTE - [facility]    Patient: Shawn MANZANO Ordavis    1950   [id number]  02/10/20   9:12 AM  Referring Provider: Bautista Alcantar MD  Chief Complaint: Mechanically ventilated  Interval history: The patient is resting in bed with tracheostomy to mechanical ventilator. He remains in SIMV mode.   No other aggravating or alleviating factors.   Review of Systems:   Cannot obtain due to mechanical ventilation.  The patient notably is critically ill and connected to a ventilator.  As such patient cannot communicate and provide any history whatsoever, including any history of present illness or interval history since arrival or review of systems. The interested reviewer may note this fact, as an attempt has been made at collecting and documenting these portions of the patient history, but this information is unobtainable despite attempted review and therefore cannot be documented at this time.   Ventilator Settings:  SIMV,8,550,18,7.5,30%  Physical Exam:  Vital signs: Temperature 97.4, pulse 59, respirations 18, blood pressure 153/79, saturation 100%,   Physical Exam   Constitutional: He appears well-nourished. He appears cachectic. He has a sickly appearance. No distress. He is intubated and restrained.   HENT:   Head: Normocephalic.   Nose: Nose normal.   Feeding tube-nutrition infusing    Eyes: Pupils are equal, round, and reactive to light. No scleral icterus.   Neck:   Tracheostomy to vent   Cardiovascular: Normal rate and regular rhythm.   No murmur heard.  Pulmonary/Chest: Effort normal. No accessory muscle usage. He is intubated. No respiratory distress. He has no rhonchi. He has no rales.   Abdominal: Soft. He exhibits no distension.   Binder in place.   G-tube in place   Genitourinary:   Genitourinary Comments: Judd catheter   Musculoskeletal: He exhibits no edema.   Skin: Skin is warm and dry. No rash noted. He is not diaphoretic. No erythema.    Psychiatric: His mood appears not anxious. He is slowed. He is noncommunicative.   Nursing note and vitals reviewed.    Results from last 7 days   Lab Units 02/10/20  0622 02/07/20  0501 02/06/20  0541   WBC 10*3/mm3 8.74 9.37 8.38   HEMOGLOBIN g/dL 9.0* 7.9* 7.8*   PLATELETS 10*3/mm3 347 241 232     Results from last 7 days   Lab Units 02/10/20  0622 02/06/20  0541   SODIUM mmol/L 135* 135*   POTASSIUM mmol/L 3.8 3.6   BUN mg/dL 30* 31*   CREATININE mg/dL 0.93 1.09     Recent films:    No radiology results for the last day   Pulmonary Assessment:  1. Respiratory failure requiring mechanical ventilation which appears to be chronic at this point  2. 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 Pulmonary embolism, right treated and stable  4. COPD chronic and compensated with current medications  5. Dementia unimproved, chronic, unlikely to improve  6. Anemia  Recommend:   · Continue SIMV-decreased Peep from 7.5 to 5  · Continue CXR twice weekly -written for one today 2/10/20  · ABGs as needed   · Continue nutrition   · Daily therapy with PT, OT and SLP  · Mobilize as tolerated  · Continue bronchodilator treatments.  · DVT and stress ulcer prophylaxis.      Electronically signed by MATT Fields on 2/10/2020 at 9:12 AM     Physician substantive portion:  He is noncommunicative. He is not distressed.  Will reduce peep to 5, monitor saturation.  Lungs have coarse breath sounds.  Getting enteral nutrition.    I have seen and examined patient personally, performing a face-to-face diagnostic evaluation with plan of care reviewed and developed with APRN and nursing staff. I have addended and/or modified the above history of present illness, physical examination, and assessment and plan to reflect my findings and impressions. Essential elements of the care plan were discussed with APRN above.  Agree with findings and assessment/plan as documented above.    Electronically signed by Manuel Hensley MD,  on 2/10/2020, 8:55 PM

## 2020-02-11 ENCOUNTER — APPOINTMENT (OUTPATIENT)
Dept: GENERAL RADIOLOGY | Facility: HOSPITAL | Age: 70
End: 2020-02-11

## 2020-02-11 LAB
GLUCOSE BLDC GLUCOMTR-MCNC: 121 MG/DL (ref 70–130)
GLUCOSE BLDC GLUCOMTR-MCNC: 129 MG/DL (ref 70–130)
GLUCOSE BLDC GLUCOMTR-MCNC: 135 MG/DL (ref 70–130)
HOLD SPECIMEN: NORMAL
INR PPP: 1.06 (ref 0.91–1.09)
PROTHROMBIN TIME: 14.1 SECONDS (ref 11.9–14.6)
WHOLE BLOOD HOLD SPECIMEN: NORMAL

## 2020-02-11 PROCEDURE — 25010000002 ENOXAPARIN PER 10 MG: Performed by: INTERNAL MEDICINE

## 2020-02-11 PROCEDURE — 71045 X-RAY EXAM CHEST 1 VIEW: CPT

## 2020-02-11 PROCEDURE — 99233 SBSQ HOSP IP/OBS HIGH 50: CPT | Performed by: INTERNAL MEDICINE

## 2020-02-11 PROCEDURE — 85610 PROTHROMBIN TIME: CPT | Performed by: INTERNAL MEDICINE

## 2020-02-11 PROCEDURE — 82962 GLUCOSE BLOOD TEST: CPT

## 2020-02-11 NOTE — PROGRESS NOTES
PULMONARY AND CRITICAL CARE PROGRESS NOTE - [facility]    Patient: Shawn Turner    1950   [id number]  02/11/20   9:01 AM  Referring Provider: Bautista Alcantar MD  Chief Complaint: Mechanically ventilated  Interval history: The patient is resting in bed with tracheostomy to mechanical ventilator. He remains in SIMV mode.  He is awake this morning.  He has mittens on his hands.  No other aggravating or alleviating factors.   Review of Systems:   Cannot obtain due to mechanical ventilation.  The patient notably is critically ill and connected to a ventilator.  As such patient cannot communicate and provide any history whatsoever, including any history of present illness or interval history since arrival or review of systems. The interested reviewer may note this fact, as an attempt has been made at collecting and documenting these portions of the patient history, but this information is unobtainable despite attempted review and therefore cannot be documented at this time.   Ventilator Settings:  SIMV,8,550,18,7.5,30%  Physical Exam:  Vital signs: Temperature N/A, pulse 75, respirations 41, blood pressure 81/39, saturation 100%, ETCO2 35%  Physical Exam   Constitutional: He appears well-nourished. He appears cachectic. He has a sickly appearance. No distress. He is intubated and restrained.   HENT:   Head: Normocephalic.   Nose: Nose normal.   Feeding tube-nutrition infusing    Eyes: Pupils are equal, round, and reactive to light. No scleral icterus.   Neck:   Tracheostomy to vent   Cardiovascular: Normal rate and regular rhythm.   No murmur heard.  Pulmonary/Chest: Effort normal. No accessory muscle usage. He is intubated. No respiratory distress. He has no rhonchi. He has no rales.   Abdominal: Soft. He exhibits no distension.   Binder in place.   G-tube in place   Genitourinary:   Genitourinary Comments: Judd catheter   Musculoskeletal: He exhibits no edema.   Skin: Skin is warm  and dry. No rash noted. He is not diaphoretic. No erythema.   Psychiatric: His mood appears not anxious. He is slowed. He is noncommunicative.   Nursing note and vitals reviewed.    Results from last 7 days   Lab Units 02/10/20  0622 02/07/20  0501 02/06/20  0541   WBC 10*3/mm3 8.74 9.37 8.38   HEMOGLOBIN g/dL 9.0* 7.9* 7.8*   PLATELETS 10*3/mm3 347 241 232     Results from last 7 days   Lab Units 02/10/20  0622 02/06/20  0541   SODIUM mmol/L 135* 135*   POTASSIUM mmol/L 3.8 3.6   BUN mg/dL 30* 31*   CREATININE mg/dL 0.93 1.09     Recent films:    Xr Chest 1 View    Result Date: 2/11/2020  1. Better inspiration. Near-complete clearing of infiltrate on the left. Minimal residual infiltrate at the right base likely due to atelectasis. 2. A new 1 cm nodule in the left side is probably a nipple shadow artifact. This cannot be seen on recent images.   This report was finalized on 02/11/2020 07:25 by Dr. Morris Weems MD.     My interpretation: Tracheostomy intact.  Near complete clearing of left-sided infiltrate.  Pulmonary Assessment:  1. Respiratory failure requiring mechanical ventilation which appears to be chronic at this point  2. 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 Pulmonary embolism, right treated and stable  4. COPD chronic and compensated with current medications  5. Dementia unimproved, chronic, unlikely to improve  6. Anemia  Recommend:   · Continue SIMV as is  · Continue CXR twice weekly  · ABGs as needed   · Continue nutrition   · Daily therapy with PT, OT and SLP  · Mobilize as tolerated  · Continue bronchodilator treatments.  · DVT and stress ulcer prophylaxis.      Electronically signed by MATT Crawford on 2/11/2020 at 9:01 AM     Physician substantive portion:  Patient remains on the ventilator.  He remains chronically tachypneic in SIMV mode.  Nursing reports that when he is sleeping he becomes a little hypotensive and some bradycardia.  We will continue him on SIMV to ensure  a minimal amount of minute volume to prevent any nocturnal respiratory events triggering these cardiovascular episodes.  Does not appear ready to get off the ventilator anytime soon.  Continue periodic chest x-ray.  Last chest x-ray was improved.    I have seen and examined patient personally, performing a face-to-face diagnostic evaluation with plan of care reviewed and developed with APRN and nursing staff. I have addended and/or modified the above history of present illness, physical examination, and assessment and plan to reflect my findings and impressions. Essential elements of the care plan were discussed with APRN above.  Agree with findings and assessment/plan as documented above.    Electronically signed by Manuel Hensley MD, on 2/11/2020, 9:22 AM

## 2020-02-11 NOTE — PROGRESS NOTES
Adult Nutrition  Assessment/PES    Patient Name:  Shawn Turner  YOB: 1950  MRN: [id number]  Admit Date:  1/15/2020    Assessment Date:  2/11/2020    Comments:  Pt remains on mech vent, EN via PEG. Nutren 2.0 infusing at goal rate of 40ml/hr at time of RD visit. Re-assessed nutritional needs based on current vent settings. Current TF regimen remains adequate for nutritional needs. Will continue to monitor TF tolerance and adjust TF as needed.     Reason for Assessment     Row Name 02/11/20 1418          Reason for Assessment    Reason For Assessment  follow-up protocol         Nutrition/Diet History     Row Name 02/11/20 1418          Nutrition/Diet History    Typical Food/Fluid Intake  TF running at goal rate at time of RD visit. No residuals noted, has been tolerating well.          Anthropometrics     Row Name 02/11/20 1418          Usual Body Weight (UBW)    Weight Loss Time Frame  11.6# loss compared to admission wt        Body Mass Index (BMI)    BMI Assessment  BMI 17-18.4: protein-energy malnutrition grade I         Labs/Tests/Procedures/Meds     Row Name 02/11/20 1418          Labs/Procedures/Meds    Lab Results Reviewed  reviewed, pertinent     Lab Results Comments  glucose; BUN; crp; Na        Diagnostic Tests/Procedures    Diagnostic Test/Procedure Reviewed  reviewed, pertinent        Medications    Pertinent Medications Reviewed  reviewed, pertinent     Pertinent Medications Comments  lovenox; pepcid; lasix; insulin; namenda; MVI; florastor         Physical Findings     Row Name 02/11/20 1419          Physical Findings    Overall Physical Appearance  on ventilator support trach     Skin  other (see comments) marilou 13         Estimated/Assessed Needs     Row Name 02/11/20 1420          Calculation Measurements    Weight Used For Calculations  51.4 kg (113 lb 6.4 oz)        Estimated/Assessed Needs    Additional Documentation  Calorie Requirements (Group);Fluid Requirements  (Group);[place]-St. Jeor Equation (Group);[place] Equation (Group);Protein Requirements (Group)        Calorie Requirements    Estimated Calorie Requirement (kcal/day)  1583     Estimated Calorie Need Method  [place]-St Jeor;[place] State     Estimated Calorie Requirement Comment  6183-3312        [place]-St. Jeor Equation    RMR ([place]-St. Jeor Equation)  1222.13        [place] Equation    Ve (L/Min) for [place] Equation Calculation  15.9        Protein Requirements    Weight Used For Protein Calculations  51.3 kg (113 lb)     Est Protein Requirement Amount (gms/kg)  1.4 gm protein     Estimated Protein Requirements (gms/day)  71.76        Fluid Requirements    Estimated Fluid Requirement Method  RDA Method     [place] Method (over 20 kg)  2528.76         Nutrition Prescription Ordered     Row Name 02/11/20 1423          Nutrition Prescription PO    Current PO Diet  NPO        Nutrition Prescription EN    Enteral Route  PEG     Product  Nutren 2.0 (TwoCal)     TF Delivery Method  Continuous     Continuous TF Goal Rate (mL/hr)  40 mL/hr     Continuous TF Current Rate (mL/hr)  40 mL/hr     Continuous TF Goal Volume (mL)  880 mL     Water flush (mL)   45 mL     Water Flush Frequency  Per hour         Evaluation of Received Nutrient/Fluid Intake     Row Name 02/11/20 1420          Nutrient/Fluid Evaluation    Number of Days Evaluated  2 days     Additional Documentation  Fluid Intake Evaluation (Group);Calories Evaluation (Group);Intake Assessment (Group);Protein Evaluation (Group)        Calories Evaluation    Enteral Calories (kcal)  2326     Total Calories (kcal/kg)  48.1        Protein Evaluation    Enteral Protein (gm)  98     Total Protein (gm/kg)  2.02        Intake Assessment    Energy/Calorie Requirement Assessment  exceeds needs     Protein Requirement Assessment  exceeds needs     Fluid Requirement Assessment  meeting needs     Average Calorie Intake (days)  2     Average Protein Intake (days)   2     Tolerance  tolerating        Fluid Intake Evaluation    Free Water Flush Fluid (mL)  820     IV Fluid (mL)  43        EN Evaluation    Number of Days EN Intake Evaluated  2 days     EN Average Volume Delivered (mL/day)  1163 mL/day question accuracy of intake     % Goal Volume   132 %     HOB  Greater than or equal to 30 degress               Problem/Interventions:  Problem 1     Row Name 02/11/20 1430          Nutrition Diagnoses Problem 1    Problem 1  Needs Alternate Route     Etiology (related to)  Medical Diagnosis     Pulmonary/Critical Care  A/C respiratory failure;Hypercapnea;COPD;Pulmonary emboli;Ventilator     Signs/Symptoms (evidenced by)  NPO;EN Intake Delivery     Percent (%) of  PN goal  132 %               Intervention Goal     Row Name 02/11/20 1430          Intervention Goal    General  Nutrition support treatment;Reduce/improve symptoms;Meet nutritional needs for age/condition;Disease management/therapy     TF/PN  Maintain TF/PN;Deliver (%) goal;Deliver estimated need (%)     Deliver % of Goal  100 %     Deliver % of Estimated Need  100 %     Weight  Maintain weight         Nutrition Intervention     Row Name 02/11/20 1431          Nutrition Intervention    RD/Tech Action  Follow Tx progress;Care plan reviewd           Education/Evaluation     Row Name 02/11/20 1431          Education    Education  No discharge needs identified at this time        Monitor/Evaluation    Monitor  Per protocol           Electronically signed by:  Vinita Sultana  02/11/20 2:31 PM

## 2020-02-12 LAB
GLUCOSE BLDC GLUCOMTR-MCNC: 120 MG/DL (ref 70–130)
GLUCOSE BLDC GLUCOMTR-MCNC: 139 MG/DL (ref 70–130)
GLUCOSE BLDC GLUCOMTR-MCNC: 143 MG/DL (ref 70–130)
GLUCOSE BLDC GLUCOMTR-MCNC: 155 MG/DL (ref 70–130)
INR PPP: 1.04 (ref 0.91–1.09)
PROTHROMBIN TIME: 13.9 SECONDS (ref 11.9–14.6)

## 2020-02-12 PROCEDURE — 82962 GLUCOSE BLOOD TEST: CPT

## 2020-02-12 PROCEDURE — 99232 SBSQ HOSP IP/OBS MODERATE 35: CPT | Performed by: INTERNAL MEDICINE

## 2020-02-12 PROCEDURE — 25010000002 ENOXAPARIN PER 10 MG: Performed by: INTERNAL MEDICINE

## 2020-02-12 PROCEDURE — 85610 PROTHROMBIN TIME: CPT | Performed by: INTERNAL MEDICINE

## 2020-02-12 PROCEDURE — 99231 SBSQ HOSP IP/OBS SF/LOW 25: CPT | Performed by: OTOLARYNGOLOGY

## 2020-02-12 NOTE — PROGRESS NOTES
PULMONARY AND CRITICAL CARE PROGRESS NOTE - [facility]    Patient: Shawn Turner    1950   [id number]  02/12/20   8:37 AM  Referring Provider: Bautista Alcantar MD  Chief Complaint: Mechanically ventilated  Interval history: The patient is resting in bed with tracheostomy to mechanical ventilator. He remains in SIMV mode.  He is awake this morning.  He has mittens on his hands.  No other aggravating or alleviating factors.   Review of Systems:   Cannot obtain due to mechanical ventilation.  The patient notably is critically ill and connected to a ventilator.  As such patient cannot communicate and provide any history whatsoever, including any history of present illness or interval history since arrival or review of systems. The interested reviewer may note this fact, as an attempt has been made at collecting and documenting these portions of the patient history, but this information is unobtainable despite attempted review and therefore cannot be documented at this time.   Ventilator Settings:  SIMV,8,550,18,7.5,30%  Physical Exam:  Vital signs: Temperature N/A, pulse75, respirations 32, blood pressure 135/57, saturation 100%, ETCO2 30%  Physical Exam   Constitutional: He appears well-nourished. He appears cachectic. He has a sickly appearance. No distress. He is intubated and restrained.   HENT:   Head: Normocephalic.   Nose: Nose normal.   Feeding tube-nutrition infusing    Eyes: Pupils are equal, round, and reactive to light. No scleral icterus.   Neck:   Tracheostomy to vent   Cardiovascular: Normal rate and regular rhythm.   No murmur heard.  Pulmonary/Chest: Effort normal. No accessory muscle usage. He is intubated. No respiratory distress. He has no rhonchi. He has no rales.   Abdominal: Soft. He exhibits no distension.   Binder in place.   G-tube in place   Genitourinary:   Genitourinary Comments: Judd catheter   Musculoskeletal: He exhibits no edema.   Skin: Skin is warm  and dry. No rash noted. He is not diaphoretic. No erythema.   Psychiatric: His mood appears not anxious. He is slowed. He is noncommunicative.   Nursing note and vitals reviewed.    Results from last 7 days   Lab Units 02/10/20  0622 02/07/20  0501 02/06/20  0541   WBC 10*3/mm3 8.74 9.37 8.38   HEMOGLOBIN g/dL 9.0* 7.9* 7.8*   PLATELETS 10*3/mm3 347 241 232     Results from last 7 days   Lab Units 02/10/20  0622 02/06/20  0541   SODIUM mmol/L 135* 135*   POTASSIUM mmol/L 3.8 3.6   BUN mg/dL 30* 31*   CREATININE mg/dL 0.93 1.09     Recent films:    Xr Chest 1 View    Result Date: 2/11/2020  1. Better inspiration. Near-complete clearing of infiltrate on the left. Minimal residual infiltrate at the right base likely due to atelectasis. 2. A new 1 cm nodule in the left side is probably a nipple shadow artifact. This cannot be seen on recent images.   This report was finalized on 02/11/2020 07:25 by Dr. Morris Weems MD.     My interpretation: no new imaging   Pulmonary Assessment:  1. Respiratory failure requiring mechanical ventilation which appears to be chronic at this point  2. 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 Pulmonary embolism, right treated and stable  4. COPD chronic and compensated with current medications  5. Dementia unimproved, chronic, unlikely to improve  6. Anemia  Recommend:   · Continue SIMV as is  · Continue CXR twice weekly  · ABGs as needed   · Continue nutrition   · Daily therapy with PT, OT and SLP  · Mobilize as tolerated  · Continue bronchodilator treatments.  · DVT and stress ulcer prophylaxis.      Electronically signed by MATT Fields on 2/12/2020 at 8:37 AM     Physician substantive portion:  He is overall unchanged.  He remains on the ventilator he remains on SIMV remains tachypneic.  He has not had bradypnea or other events reported per nursing.  Lungs have diminished but otherwise clear breath sounds.  He is not very awake.  Plan continue as is.  This appears  to be his baseline at this point.  Recommend long-term facility which can handle mechanical ventilation    I have seen and examined patient personally, performing a face-to-face diagnostic evaluation with plan of care reviewed and developed with APRN and nursing staff. I have addended and/or modified the above history of present illness, physical examination, and assessment and plan to reflect my findings and impressions. Essential elements of the care plan were discussed with APRN above.  Agree with findings and assessment/plan as documented above.    Electronically signed by Manuel Hensley MD, on 2/12/2020, 8:46 AM

## 2020-02-12 NOTE — PROGRESS NOTES
MICHELLE Chavez APRN        Internal Medicine Progress Note    2/12/2020   12:07 PM    Name:  Shawn Turner  MRN:    [id number]     Acct:     [id number]   Room:  [address] [place] [address] Day: 0     Admit Date: 1/15/2020 10:17 AM  PCP: Tj Wilson MD    Subjective:     C/C: need for continued vent weaning    Interval History: Status: Remains the same. Resting in bed Spiked fever overnight.    sReview of Systems   Unable to perform ROS: patient nonverbal       Medications:     Allergies:   Allergies   Allergen Reactions   • Aspirin        Current Meds:   Current Facility-Administered Medications:   •  acetaminophen (TYLENOL) 160 MG/5ML solution 650 mg, 650 mg, Per G Tube, Q4H PRN, Bautista Alcantar MD  •  acetaminophen (TYLENOL) suppository 650 mg, 650 mg, Rectal, Q6H PRN, Selina Augustine MD  •  acetaZOLAMIDE (DIAMOX) tablet 250 mg, 250 mg, Per G Tube, BID, Bautista Alcantar MD  •  albuterol (PROVENTIL) nebulizer solution 0.083% 2.5 mg/3mL, 2.5 mg, Nebulization, Q2H PRN, Selina Augustine MD  •  bacitracin ointment, , Topical, Q12H, Selina Augustine MD  •  bacitracin ointment, , Topical, PRN, Selina Augustine MD  •  bacitracin ointment, , Topical, PRN, Selina Augustine MD  •  bacitracin ointment, , Topical, Q12H, Selina Augustine MD  •  dextrose (D50W) 25 g/ 50mL Intravenous Solution 25 g, 25 g, Intravenous, Q15 Min PRN, Selina Augustine MD  •  dextrose (GLUTOSE) oral gel 15 g, 15 g, Oral, Q15 Min PRN, Selina Augustine MD  •  dilTIAZem (CARDIZEM) injection 5 mg, 5 mg, Intravenous, Q6H PRN, Selina Augustine MD  •  dilTIAZem (CARDIZEM) tablet 30 mg, 30 mg, Per G Tube, Q6H, Bautista Alcantar MD  •  donepezil (ARICEPT) tablet 10 mg, 10 mg, Per G Tube, Nightly, Bautista Alcantar MD  •  enoxaparin (LOVENOX) syringe 50 mg, 1 mg/kg, Subcutaneous, Q12H, Bautista Alcantar MD  •  famotidine (PEPCID) injection 20 mg, 20 mg, Intravenous, Q12H, Selina Augustine  MD SUNG  •  ferrous sulfate tablet 325 mg, 325 mg, Oral, Daily With Breakfast, Bautista Alcantar MD  •  furosemide (LASIX) tablet 40 mg, 40 mg, Per G Tube, Daily, Bautista Alcantar MD  •  glucagon (human recombinant) (GLUCAGEN DIAGNOSTIC) injection 1 mg, 1 mg, Subcutaneous, Q15 Min PRN, Selina Augustine MD  •  haloperidol lactate (HALDOL) injection 1 mg, 1 mg, Intramuscular, Q4H PRN, Selina Augustine MD  •  hydrALAZINE (APRESOLINE) injection 5 mg, 5 mg, Intravenous, Q6H PRN, Selina Augustine MD  •  hypromellose (ISOPTO TEARS) 0.5 % ophthalmic solution 2 drop, 2 drop, Both Eyes, Q2H PRN, Selina Augustine MD  •  ipratropium (ATROVENT) nebulizer solution 0.5 mg, 0.5 mg, Nebulization, Q6H PRN, Selina Augustine MD  •  ipratropium-albuterol (DUO-NEB) nebulizer solution 3 mL, 3 mL, Nebulization, Q6H - RT, Selina Augustine MD  •  levothyroxine (SYNTHROID, LEVOTHROID) tablet 75 mcg, 75 mcg, Per G Tube, Q AM, Bautista Alcantar MD  •  lisinopril (PRINIVIL,ZESTRIL) tablet 20 mg, 20 mg, Per G Tube, Nightly, Blanca Islas APRN  •  melatonin tablet 9.75 mg, 9.75 mg, Per G Tube, Nightly, Bautista Alcantar MD  •  memantine (NAMENDA) tablet 10 mg, 10 mg, Per G Tube, Q12H, Bautista Alcantar MD  •  metoprolol tartrate (LOPRESSOR) tablet 12.5 mg, 12.5 mg, Per G Tube, Q12H, Bautista Alcantar MD  •  midazolam (VERSED) injection 2 mg, 2 mg, Intravenous, Q1H PRN, Selina Augustine MD  •  Morphine sulfate (PF) injection 2 mg, 2 mg, Intravenous, Q1H PRN, Selina Augustine MD  •  multivitamin w/minerals tablet 1 tablet, 1 tablet, Oral, Daily, Bautista Alcantar MD  •  norepinephrine (LEVOPHED) 16,000 mcg in sodium chloride 0.9 % 250 mL (64 mcg/mL) infusion, 0.02-0.3 mcg/kg/min, Intravenous, Titrated, Selina Augustine MD  •  polyethylene glycol 3350 powder (packet), 17 g, Per G Tube, Q12H PRN, Bautista Alcantar MD  •  Potassium Phosphates 9 mmol in sodium chloride 0.9 % 100 mL infusion, 9 mmol,  Intravenous, Once, Blanca Islas APRN  •  risperiDONE (risperDAL) tablet 0.75 mg, 0.75 mg, Per G Tube, TID, Bautista Alcantar MD  •  saccharomyces boulardii (FLORASTOR) capsule 250 mg, 250 mg, Per G Tube, Daily, Bautista Alcantar MD  •  sodium chloride 0.9 % flush 10 mL, 10 mL, Intravenous, Q12H, Selina Augustine MD  •  tiZANidine (ZANAFLEX) tablet 4 mg, 4 mg, Per G Tube, Q8H, Bautista Alcantar MD  •  traZODone (DESYREL) tablet 50 mg, 50 mg, Per G Tube, Nightly, Bautista Alcantra MD  •  valproate (DEPACON) 125 mg in sodium chloride 0.9 % 50 mL IVPB, 125 mg, Intravenous, Q12H, Selina Augustine MD  •  Vortioxetine HBr tablet 20 mg, 20 mg, Per G Tube, Daily, Bautista Alcantar MD    Data:     Code Status:    There are no questions and answers to display.       No family history on file.    Social History     Socioeconomic History   • Marital status:      Spouse name: Not on file   • Number of children: Not on file   • Years of education: Not on file   • Highest education level: Not on file   Tobacco Use   • Smoking status: Current Every Day Smoker   Substance and Sexual Activity   • Alcohol use: No   • Drug use: Defer       Vitals:  Temp 102.1 with a pulse of 70 respiratory rate of 30 blood pressure 139/71 with a sat of 100% (vent)      I/O (24Hr):  No intake or output data in the 24 hours ending 02/12/20 1207    Labs and imaging:      Recent Results (from the past 12 hour(s))   Protime-INR    Collection Time: 02/12/20  4:47 AM   Result Value Ref Range    Protime 13.9 11.9 - 14.6 Seconds    INR 1.04 0.91 - 1.09   POC Glucose Once    Collection Time: 02/12/20  5:25 AM   Result Value Ref Range    Glucose 143 (H) 70 - 130 mg/dL       Physical Examination:        Physical Exam   Constitutional: He appears well-nourished. He appears cachectic. He is restrained.   HENT:   Head: Normocephalic and atraumatic.   Nose: Nose normal.   Mouth/Throat: Oropharynx is clear and moist.   Eyes: Pupils  are equal, round, and reactive to light. EOM are normal.   Neck: Normal range of motion. Neck supple.   Trach to vent   Cardiovascular: Normal rate, regular rhythm, normal heart sounds and intact distal pulses.   Pulmonary/Chest: Effort normal and breath sounds normal.   Abdominal: Soft. Bowel sounds are normal.   g tube  Dressing c.d.i   Musculoskeletal: Normal range of motion.   Generalized weakness   Neurological: He is alert. He has normal reflexes.   Nonverbal  Confused   Skin: Skin is warm and dry.   Psychiatric: He has a normal mood and affect. His behavior is normal.   Anxious at times   Nursing note and vitals reviewed.        Assessment:             Acute tracheostomy management (CMS/[facility])    Acute respiratory failure with hypoxia and hypercapnia (CMS/[facility])    Ventilator dependence (CMS/[facility])    Past Medical History:   Diagnosis Date   • Anxiety disorder    • Cerumen impaction    • COPD (chronic obstructive pulmonary disease) (CMS/[facility])    • Depression    • Hypertension    • Hypothyroidism    • Kidney disease    • Osteoarthritis    • Sinusitis, chronic         Plan:        1. Acute on chronic hypoxic/hypercapneic respiratory failure  2. RUL pulmonary embolus  3. PAF  4. Hypothyroidism  5. COPD  6. Anxiety disorder  7. CKD  8. Multifactorial anemia  9. GERD  10. Dementia  11. Metabolic encephalopathy  12. Iron deficiency  13. Hypokalemia - improved  14. Moderate protein calorie malnutrition  15. Occult stool positive    Continue current treatment. Monitor counts. Increase activity. Labs Thursday. Continue vent weaning under direction of pulmonology. Aggressive therapies. Maintain patient safety. Continue nutrition support per AMONs.  Monitor renal function. Monitor I&O's closely. Watch for s/s bleeding. Continue lovenox for now and plan to transition to xarelto/coumadin in am if hgb stable and no further bleeding noted.     Electronically signed by Bautista Alcantar MD on 2/12/2020 at 12:07 PM

## 2020-02-12 NOTE — PROGRESS NOTES
MICHELLE Chavez APRN        Internal Medicine Progress Note    2/11/2020   9:49 PM    Name:  Shawn Turner  MRN:    [id number]     Acct:     [id number]   Room:  [address] [place] [address] Day: 0     Admit Date: 1/15/2020 10:17 AM  PCP: Tj Wilson MD    Subjective:     C/C: need for continued vent weaning    Interval History: Status: same. Resting in bed. No family at bedside. Tolerating current vent settings. Little participation with therapies. Minimal response to stimulus.Appears in no acute distress. Blood sugars stable. Low grade temp today.     sReview of Systems   Unable to perform ROS: patient nonverbal       Medications:     Allergies:   Allergies   Allergen Reactions   • Aspirin        Current Meds:   Current Facility-Administered Medications:   •  acetaminophen (TYLENOL) 160 MG/5ML solution 650 mg, 650 mg, Per G Tube, Q4H PRN, Bautista Alcantar MD  •  acetaminophen (TYLENOL) suppository 650 mg, 650 mg, Rectal, Q6H PRN, Selina Augustine MD  •  acetaZOLAMIDE (DIAMOX) tablet 250 mg, 250 mg, Per G Tube, BID, Bautista Alcantar MD  •  albuterol (PROVENTIL) nebulizer solution 0.083% 2.5 mg/3mL, 2.5 mg, Nebulization, Q2H PRN, Selina Augustine MD  •  bacitracin ointment, , Topical, Q12H, Selina Augustine MD  •  bacitracin ointment, , Topical, PRN, Selina Augustine MD  •  bacitracin ointment, , Topical, PRN, Selina Augustine MD  •  bacitracin ointment, , Topical, Q12H, Selina Augustine MD  •  dextrose (D50W) 25 g/ 50mL Intravenous Solution 25 g, 25 g, Intravenous, Q15 Min PRN, Selina Augustine MD  •  dextrose (GLUTOSE) oral gel 15 g, 15 g, Oral, Q15 Min PRN, Selina Augustine MD  •  dilTIAZem (CARDIZEM) injection 5 mg, 5 mg, Intravenous, Q6H PRN, Selina Augustine MD  •  dilTIAZem (CARDIZEM) tablet 30 mg, 30 mg, Per G Tube, Q6H, Bautista Alcantar MD  •  donepezil (ARICEPT) tablet 10 mg, 10 mg, Per G Tube, Nightly, Bautista Alcantar MD  •  enoxaparin  (LOVENOX) syringe 50 mg, 1 mg/kg, Subcutaneous, Q12H, Bautista Alcantar MD  •  famotidine (PEPCID) injection 20 mg, 20 mg, Intravenous, Q12H, Selina Augustine MD  •  ferrous sulfate tablet 325 mg, 325 mg, Oral, Daily With Breakfast, Bautista Alcantar MD  •  furosemide (LASIX) tablet 40 mg, 40 mg, Per G Tube, Daily, Bautista Alcantar MD  •  glucagon (human recombinant) (GLUCAGEN DIAGNOSTIC) injection 1 mg, 1 mg, Subcutaneous, Q15 Min PRN, Selina Augustine MD  •  haloperidol lactate (HALDOL) injection 1 mg, 1 mg, Intramuscular, Q4H PRN, Selina Augustine MD  •  hydrALAZINE (APRESOLINE) injection 5 mg, 5 mg, Intravenous, Q6H PRN, Selina Augustine MD  •  hypromellose (ISOPTO TEARS) 0.5 % ophthalmic solution 2 drop, 2 drop, Both Eyes, Q2H PRN, Selina Augustine MD  •  insulin lispro (humaLOG) injection 2-7 Units, 2-7 Units, Subcutaneous, Q6H, Selina Augustine MD  •  ipratropium (ATROVENT) nebulizer solution 0.5 mg, 0.5 mg, Nebulization, Q6H PRN, Selina Augustine MD  •  ipratropium-albuterol (DUO-NEB) nebulizer solution 3 mL, 3 mL, Nebulization, Q6H - RT, Selina Augustine MD  •  levothyroxine (SYNTHROID, LEVOTHROID) tablet 75 mcg, 75 mcg, Per G Tube, Q AM, Bautista Alcantar MD  •  lisinopril (PRINIVIL,ZESTRIL) tablet 20 mg, 20 mg, Per G Tube, Nightly, Blanca Islas APRN  •  melatonin tablet 9.75 mg, 9.75 mg, Per G Tube, Nightly, Bautista Alcantar MD  •  memantine (NAMENDA) tablet 10 mg, 10 mg, Per G Tube, Q12H, Bautista Alcantar MD  •  metoprolol tartrate (LOPRESSOR) tablet 12.5 mg, 12.5 mg, Per G Tube, Q12H, Bautista Alcantar MD  •  midazolam (VERSED) injection 2 mg, 2 mg, Intravenous, Q1H PRN, Selina Augustine MD  •  Morphine sulfate (PF) injection 2 mg, 2 mg, Intravenous, Q1H PRN, Selina Augustine MD  •  multivitamin w/minerals tablet 1 tablet, 1 tablet, Oral, Daily, Bautista Alcantar MD  •  norepinephrine (LEVOPHED) 16,000 mcg in sodium chloride 0.9 % 250 mL (64 mcg/mL)  infusion, 0.02-0.3 mcg/kg/min, Intravenous, Titrated, Selina Augustine MD  •  polyethylene glycol 3350 powder (packet), 17 g, Per G Tube, Q12H PRN, Bautista Alcantar MD  •  Potassium Phosphates 9 mmol in sodium chloride 0.9 % 100 mL infusion, 9 mmol, Intravenous, Once, Blanca Islas APRN  •  risperiDONE (risperDAL) tablet 0.75 mg, 0.75 mg, Per G Tube, TID, Bautista Alcantar MD  •  saccharomyces boulardii (FLORASTOR) capsule 250 mg, 250 mg, Per G Tube, Daily, Bautista Alcantar MD  •  sodium chloride 0.9 % flush 10 mL, 10 mL, Intravenous, Q12H, Selina Augustine MD  •  tiZANidine (ZANAFLEX) tablet 4 mg, 4 mg, Per G Tube, Q8H, Bautista Alcantar MD  •  traZODone (DESYREL) tablet 50 mg, 50 mg, Per G Tube, Nightly, Bautista Alcantar MD  •  valproate (DEPACON) 125 mg in sodium chloride 0.9 % 50 mL IVPB, 125 mg, Intravenous, Q12H, Selian Augustine MD  •  Vortioxetine HBr tablet 20 mg, 20 mg, Per G Tube, Daily, Bautista Alcantar MD    Data:     Code Status:    There are no questions and answers to display.       No family history on file.    Social History     Socioeconomic History   • Marital status:      Spouse name: Not on file   • Number of children: Not on file   • Years of education: Not on file   • Highest education level: Not on file   Tobacco Use   • Smoking status: Current Every Day Smoker   Substance and Sexual Activity   • Alcohol use: No   • Drug use: Defer       Vitals:  T 99.8 P 74 R 34 /66 Sp02 98% (vent)      I/O (24Hr):  No intake or output data in the 24 hours ending 02/11/20 6080    Labs and imaging:      Recent Results (from the past 12 hour(s))   POC Glucose Once    Collection Time: 02/11/20 11:34 AM   Result Value Ref Range    Glucose 121 70 - 130 mg/dL   POC Glucose Once    Collection Time: 02/11/20  5:34 PM   Result Value Ref Range    Glucose 129 70 - 130 mg/dL       Physical Examination:        Physical Exam   Constitutional: He appears well-nourished.  He appears cachectic. He is restrained.   HENT:   Head: Normocephalic and atraumatic.   Nose: Nose normal.   Mouth/Throat: Oropharynx is clear and moist.   Eyes: Pupils are equal, round, and reactive to light. EOM are normal.   Neck: Normal range of motion. Neck supple.   Trach to vent   Cardiovascular: Normal rate, regular rhythm, normal heart sounds and intact distal pulses.   Pulmonary/Chest: Effort normal and breath sounds normal.   Abdominal: Soft. Bowel sounds are normal.   g tube  Dressing c.d.i   Musculoskeletal: Normal range of motion.   Generalized weakness   Neurological: He is alert. He has normal reflexes.   Nonverbal  Confused   Skin: Skin is warm and dry.   Psychiatric: He has a normal mood and affect. His behavior is normal.   Anxious at times   Nursing note and vitals reviewed.        Assessment:             Acute tracheostomy management (CMS/[facility])    Acute respiratory failure with hypoxia and hypercapnia (CMS/[facility])    Ventilator dependence (CMS/[facility])    Past Medical History:   Diagnosis Date   • Anxiety disorder    • Cerumen impaction    • COPD (chronic obstructive pulmonary disease) (CMS/[facility])    • Depression    • Hypertension    • Hypothyroidism    • Kidney disease    • Osteoarthritis    • Sinusitis, chronic         Plan:        1. Acute on chronic hypoxic/hypercapneic respiratory failure  2. RUL pulmonary embolus  3. PAF  4. Hypothyroidism  5. COPD  6. Anxiety disorder  7. CKD  8. Multifactorial anemia  9. GERD  10. Dementia  11. Metabolic encephalopathy  12. Iron deficiency  13. Hypokalemia - improved  14. Moderate protein calorie malnutrition  15. Occult stool positive    Continue current treatment. Monitor counts. Increase activity. Labs Thursday. Continue vent weaning under direction of pulmonology. Aggressive therapies. Maintain patient safety. Continue nutrition support per AMONs.  Monitor renal function. Monitor I&O's closely. Watch for s/s bleeding. Continue lovenox for now and plan to  transition to xarelto/coumadin Thursday if hgb stable and no further bleeding noted.     Electronically signed by MATT Tolentino on 2/11/2020 at 9:49 PM

## 2020-02-12 NOTE — PROGRESS NOTES
Rashid Bowen Jr, MD     OTOLARYNGOLOGY, HEAD AND NECK SURGERY PROGRESS NOTE   Referring Provider: Bautista Alcantar MD  Reason for Consultation: Trach management  Location: 586/1  Accompanied by: no one  Chief complaint: No chief complaint on file.    Subjective    Interval History:   Since last examined, Shawn Turner has remained on vent with trach.  Respiratoy reports no real progress towards vent weaning.  Pulmonary notes reviewed.  Apparent plan is for patient to go to long term NH for prolongenged ventilator requirements.  Patient seen. Chart reviewed.  Review of Systems:   ROS master CMN: No change from prior inquiry      Objective    Vital Signs     EXAMINATION:   CONSTITUTIONAL:    well nourished, well-developed, alert, oriented, in no acute distress   well developed  Trach present, on vent  Deflects my headlight with hands     BODY HABITUS:    Normal body habitus     COMMUNICATION:    non-verbal     HEAD:     Normocephalic, without obvious abnormality, atraumatic     FACE:    structure normal, no tenderness present, no lesions/masses, no evidence of trauma     SALIVARY GLANDS:    parotid glands with no tenderness, no swelling, no masses, submandibular glands with normal size, nontender      EYE:    eyelids normal, orbits normal, no proptosis, conjunctiva clear, sclera non-icteric, pupils equal, round, reactive to light and accomodation  Color:   brown     HEARING:    not evaluated     NOSE EXTERNAL:    APPEARANCE: normal, straight, with good projection, no tenderness, no lesions, no tenderness, good nasal support, patent nares     ORAL CAVITY:      LIPS:      structure normal, no tenderness on palpation, no lesions, no evidence of trauma, normal mobility and oral competence    TEETH:       edentulous:  upper and lower    GUMS:      gingivae healthy, no lesions    ORAL MUCOSA:       oral mucosa normal, no mucosal lesions    FLOOR OF MOUTH:       Warthin's duct patent, mucosa  normal  TONGUE:       intact mucosa, mobility not tested    PALATE:       hard palate with normal mucosa and structure      soft palate- low thick     OROPHARYNX:    Direct examination  oropharyngeal mucosa normal, tonsil(s) with normal appearance       NECK:     LARYNGEAL POSITION: normal  trach present     CHEST/RESPIRATORY:    Mechanical ventilation     CARDIOVASCULAR:    regular rate and rhythm, no murmurs, gallups, no peripheral edema     NEURO/PSYCHIATRIC :    Not follow commands, deflects exam, CN not well tested, grossly intact     TRACHEOSTOMY SITE:    Tracheostomy tube type: Shiley #8 cuffed DIC     Stoma: mildly granulated, more mature now    Secretions:   Date placed: 12/13/2020  Date last changed: 2/5/2020    Results Review:    Lab Results (last 24 hours)     Procedure Component Value Units Date/Time    POC Glucose Once [[id number]]  (Abnormal) Collected:  02/12/20 0525    Specimen:  Blood Updated:  02/12/20 0550     Glucose 143 mg/dL      Comment: : GAVIN HastingshyMeter ID: [id number]       Protime-INR [[id number]]  (Normal) Collected:  02/12/20 0447    Specimen:  Blood Updated:  02/12/20 0518     Protime 13.9 Seconds      INR 1.04    POC Glucose Once [[id number]]  (Normal) Collected:  02/12/20 0005    Specimen:  Blood Updated:  02/12/20 0023     Glucose 120 mg/dL      Comment: : GAVIN Olivas KathyMeter ID: [id number]       POC Glucose Once [[id number]]  (Normal) Collected:  02/11/20 1734    Specimen:  Blood Updated:  02/11/20 1753     Glucose 129 mg/dL      Comment: : [facility] CindyMeter ID: [id number]       POC Glucose Once [[id number]]  (Normal) Collected:  02/11/20 1134    Specimen:  Blood Updated:  02/11/20 1155     Glucose 121 mg/dL      Comment: : LAVON Reilly CarolynMeter ID: [id number]           Imaging Results (Last 24 Hours)     ** No results found for the last 24 hours. **          Medications, Allergies and Past History Reviewed       Assessment    ENT ASSESSMENT:     Acute tracheostomy management (CMS/[facility])    Acute respiratory failure with hypoxia and hypercapnia (CMS/[facility])    Ventilator dependence (CMS/[facility])         Plan    PLAN:   Patient is not weaning from vent.  Apparent plan is long term vent with trach.  I will follow for trach hygiene. If plan is long term vent, I will maintain #8 trach for ventilation and suction. There is no need to downsize since he will need suction. I feel larger caliber trach will help pulmonary toilet. I will place new trach prior to transfer for hygiene. Discussed with Respiratory.    Following patient as in-patient. Further recommendations will be made based on serial examinations.    Medications/Orders for this encounter: No new medications ordered.  No New orders written.        Rashid Bowen Jr, MD  02/12/20  8:47 AM

## 2020-02-13 LAB
ANION GAP SERPL CALCULATED.3IONS-SCNC: 10 MMOL/L (ref 5–15)
BASOPHILS # BLD AUTO: 0.03 10*3/MM3 (ref 0–0.2)
BASOPHILS NFR BLD AUTO: 0.2 % (ref 0–1.5)
BUN BLD-MCNC: 38 MG/DL (ref 8–23)
BUN/CREAT SERPL: 36.2 (ref 7–25)
CALCIUM SPEC-SCNC: 8.4 MG/DL (ref 8.6–10.5)
CHLORIDE SERPL-SCNC: 104 MMOL/L (ref 98–107)
CO2 SERPL-SCNC: 21 MMOL/L (ref 22–29)
CREAT BLD-MCNC: 1.05 MG/DL (ref 0.76–1.27)
CRP SERPL-MCNC: 1.45 MG/DL (ref 0–0.5)
DEPRECATED RDW RBC AUTO: 48.1 FL (ref 37–54)
EOSINOPHIL # BLD AUTO: 0.04 10*3/MM3 (ref 0–0.4)
EOSINOPHIL NFR BLD AUTO: 0.3 % (ref 0.3–6.2)
ERYTHROCYTE [DISTWIDTH] IN BLOOD BY AUTOMATED COUNT: 15.3 % (ref 12.3–15.4)
ERYTHROCYTE [SEDIMENTATION RATE] IN BLOOD: 16 MM/HR (ref 0–15)
GFR SERPL CREATININE-BSD FRML MDRD: 70 ML/MIN/1.73
GLUCOSE BLD-MCNC: 121 MG/DL (ref 65–99)
GLUCOSE BLDC GLUCOMTR-MCNC: 148 MG/DL (ref 70–130)
HCT VFR BLD AUTO: 23.4 % (ref 37.5–51)
HGB BLD-MCNC: 7.5 G/DL (ref 13–17.7)
IMM GRANULOCYTES # BLD AUTO: 0.2 10*3/MM3 (ref 0–0.05)
IMM GRANULOCYTES NFR BLD AUTO: 1.7 % (ref 0–0.5)
INR PPP: 1.02 (ref 0.91–1.09)
LYMPHOCYTES # BLD AUTO: 0.85 10*3/MM3 (ref 0.7–3.1)
LYMPHOCYTES NFR BLD AUTO: 7.1 % (ref 19.6–45.3)
MCH RBC QN AUTO: 27.8 PG (ref 26.6–33)
MCHC RBC AUTO-ENTMCNC: 32.1 G/DL (ref 31.5–35.7)
MCV RBC AUTO: 86.7 FL (ref 79–97)
MONOCYTES # BLD AUTO: 1 10*3/MM3 (ref 0.1–0.9)
MONOCYTES NFR BLD AUTO: 8.3 % (ref 5–12)
NEUTROPHILS # BLD AUTO: 9.93 10*3/MM3 (ref 1.7–7)
NEUTROPHILS NFR BLD AUTO: 82.4 % (ref 42.7–76)
NRBC BLD AUTO-RTO: 0 /100 WBC (ref 0–0.2)
NT-PROBNP SERPL-MCNC: 204.7 PG/ML (ref 5–900)
PLATELET # BLD AUTO: 324 10*3/MM3 (ref 140–450)
PMV BLD AUTO: 10.3 FL (ref 6–12)
POTASSIUM BLD-SCNC: 3.6 MMOL/L (ref 3.5–5.2)
PROTHROMBIN TIME: 13.7 SECONDS (ref 11.9–14.6)
RBC # BLD AUTO: 2.7 10*6/MM3 (ref 4.14–5.8)
SODIUM BLD-SCNC: 135 MMOL/L (ref 136–145)
WBC NRBC COR # BLD: 12.05 10*3/MM3 (ref 3.4–10.8)

## 2020-02-13 PROCEDURE — 99232 SBSQ HOSP IP/OBS MODERATE 35: CPT | Performed by: INTERNAL MEDICINE

## 2020-02-13 PROCEDURE — 85610 PROTHROMBIN TIME: CPT | Performed by: INTERNAL MEDICINE

## 2020-02-13 PROCEDURE — 25010000002 ENOXAPARIN PER 10 MG: Performed by: INTERNAL MEDICINE

## 2020-02-13 PROCEDURE — 82962 GLUCOSE BLOOD TEST: CPT

## 2020-02-13 PROCEDURE — 85651 RBC SED RATE NONAUTOMATED: CPT | Performed by: INTERNAL MEDICINE

## 2020-02-13 PROCEDURE — 83880 ASSAY OF NATRIURETIC PEPTIDE: CPT | Performed by: INTERNAL MEDICINE

## 2020-02-13 PROCEDURE — 86140 C-REACTIVE PROTEIN: CPT | Performed by: INTERNAL MEDICINE

## 2020-02-13 PROCEDURE — 85025 COMPLETE CBC W/AUTO DIFF WBC: CPT | Performed by: INTERNAL MEDICINE

## 2020-02-13 PROCEDURE — 80048 BASIC METABOLIC PNL TOTAL CA: CPT | Performed by: INTERNAL MEDICINE

## 2020-02-13 PROCEDURE — 25010000002 MORPHINE SULFATE (PF) 2 MG/ML SOLUTION: Performed by: SPECIALIST

## 2020-02-13 NOTE — PROGRESS NOTES
[facility]  MELANI Alcantar M.D.  MATT Herrera        Internal Medicine Progress Note    2/13/2020   11:58 AM    Name:  Shawn Turner  MRN:    [id number]     Acct:     [id number]   Room:  [address] [place] [id number] Day: 0     Admit Date: 1/15/2020 10:17 AM  PCP: Tj Wilson MD    Subjective:     C/C: need for continued vent weaning    Interval History: Status: Remains the same. Resting in bed. No family at bedside. Continued with trach to vent at [facility]. Tachypneic and blood pressure elevated at present time. Patient opens left eye to command. Otherwise, minimal response to treatment. Elevated temp yesterday. Tolerating current vent settings. Hgb down to 7.5.     sReview of Systems   Unable to perform ROS: patient nonverbal       Medications:     Allergies:   Allergies   Allergen Reactions   • Aspirin        Current Meds:   Current Facility-Administered Medications:   •  acetaminophen (TYLENOL) 160 MG/5ML solution 650 mg, 650 mg, Per G Tube, Q4H PRN, Bautista Alcantar MD  •  acetaminophen (TYLENOL) suppository 650 mg, 650 mg, Rectal, Q6H PRN, Selina Augustine MD  •  acetaZOLAMIDE (DIAMOX) tablet 250 mg, 250 mg, Per G Tube, BID, Bautista Alcantar MD  •  albuterol (PROVENTIL) nebulizer solution 0.083% 2.5 mg/3mL, 2.5 mg, Nebulization, Q2H PRN, Selina Augustine MD  •  bacitracin ointment, , Topical, Q12H, Selina Augustine MD  •  bacitracin ointment, , Topical, PRN, Selina Augustine MD  •  bacitracin ointment, , Topical, PRN, Selina Augustine MD  •  bacitracin ointment, , Topical, Q12H, Selina Augustine MD  •  dextrose (D50W) 25 g/ 50mL Intravenous Solution 25 g, 25 g, Intravenous, Q15 Min PRN, Selina Augustine MD  •  dextrose (GLUTOSE) oral gel 15 g, 15 g, Oral, Q15 Min PRN, Selina Augustine MD  •  dilTIAZem (CARDIZEM) injection 5 mg, 5 mg, Intravenous, Q6H PRN, Selina Augustine MD  •  dilTIAZem (CARDIZEM) tablet 30 mg, 30 mg, Per G Tube, Q6H, Bautista Alcantar MD  •  donepezil  (ARICEPT) tablet 10 mg, 10 mg, Per G Tube, Nightly, Bautista Alcantar MD  •  enoxaparin (LOVENOX) syringe 50 mg, 1 mg/kg, Subcutaneous, Q12H, Bautista Alcantar MD  •  famotidine (PEPCID) injection 20 mg, 20 mg, Intravenous, Q12H, Selina Augustine MD  •  ferrous sulfate tablet 325 mg, 325 mg, Oral, Daily With Breakfast, Bautista Alcantar MD  •  furosemide (LASIX) tablet 40 mg, 40 mg, Per G Tube, Daily, Bautista Alcantar MD  •  glucagon (human recombinant) (GLUCAGEN DIAGNOSTIC) injection 1 mg, 1 mg, Subcutaneous, Q15 Min PRN, Selina Augustine MD  •  haloperidol lactate (HALDOL) injection 1 mg, 1 mg, Intramuscular, Q4H PRN, Selina Augustine MD  •  hydrALAZINE (APRESOLINE) injection 5 mg, 5 mg, Intravenous, Q6H PRN, Selina Augustine MD  •  hypromellose (ISOPTO TEARS) 0.5 % ophthalmic solution 2 drop, 2 drop, Both Eyes, Q2H PRN, Selina Augustine MD  •  ipratropium (ATROVENT) nebulizer solution 0.5 mg, 0.5 mg, Nebulization, Q6H PRN, Selina Augustine MD  •  ipratropium-albuterol (DUO-NEB) nebulizer solution 3 mL, 3 mL, Nebulization, Q6H - RT, Selina Augustine MD  •  levothyroxine (SYNTHROID, LEVOTHROID) tablet 75 mcg, 75 mcg, Per G Tube, Q AM, Bautista Alcantar MD  •  lisinopril (PRINIVIL,ZESTRIL) tablet 20 mg, 20 mg, Per G Tube, Nightly, Blanca Islas APRN  •  melatonin tablet 9.75 mg, 9.75 mg, Per G Tube, Nightly, Bautista Alcantar MD  •  memantine (NAMENDA) tablet 10 mg, 10 mg, Per G Tube, Q12H, Bautista Alcantar MD  •  metoprolol tartrate (LOPRESSOR) tablet 12.5 mg, 12.5 mg, Per G Tube, Q12H, Bautista Alcantar MD  •  midazolam (VERSED) injection 2 mg, 2 mg, Intravenous, Q1H PRN, Selina Augustine MD  •  Morphine sulfate (PF) injection 2 mg, 2 mg, Intravenous, Q1H PRN, Selina Augustine MD  •  multivitamin w/minerals tablet 1 tablet, 1 tablet, Oral, Daily, Bautista Alcantar MD  •  norepinephrine (LEVOPHED) 16,000 mcg in sodium chloride 0.9 % 250 mL (64 mcg/mL)  infusion, 0.02-0.3 mcg/kg/min, Intravenous, Titrated, Selina Augustine MD  •  polyethylene glycol 3350 powder (packet), 17 g, Per G Tube, Q12H PRN, Bautista Alcantar MD  •  Potassium Phosphates 9 mmol in sodium chloride 0.9 % 100 mL infusion, 9 mmol, Intravenous, Once, Blanca Isals APRN  •  risperiDONE (risperDAL) tablet 0.75 mg, 0.75 mg, Per G Tube, TID, Bautista Alcantar MD  •  saccharomyces boulardii (FLORASTOR) capsule 250 mg, 250 mg, Per G Tube, Daily, Bautista Alcantar MD  •  sodium chloride 0.9 % flush 10 mL, 10 mL, Intravenous, Q12H, Selina Augustine MD  •  tiZANidine (ZANAFLEX) tablet 4 mg, 4 mg, Per G Tube, Q8H, Bautista Alcantar MD  •  traZODone (DESYREL) tablet 50 mg, 50 mg, Per G Tube, Nightly, Bautista Alcanatr MD  •  valproate (DEPACON) 125 mg in sodium chloride 0.9 % 50 mL IVPB, 125 mg, Intravenous, Q12H, Selina Augustine MD  •  Vortioxetine HBr tablet 20 mg, 20 mg, Per G Tube, Daily, Bautista Alcantar MD    Data:     Code Status:    There are no questions and answers to display.       No family history on file.    Social History     Socioeconomic History   • Marital status:      Spouse name: Not on file   • Number of children: Not on file   • Years of education: Not on file   • Highest education level: Not on file   Tobacco Use   • Smoking status: Current Every Day Smoker   Substance and Sexual Activity   • Alcohol use: No   • Drug use: Defer       Vitals:  T 97.4 P 80 R 40 /66 Sp02 97% (vent)      I/O (24Hr):  No intake or output data in the 24 hours ending 02/13/20 1158    Labs and imaging:      Recent Results (from the past 12 hour(s))   Protime-INR    Collection Time: 02/13/20  4:54 AM   Result Value Ref Range    Protime 13.7 11.9 - 14.6 Seconds    INR 1.02 0.91 - 1.09   Basic Metabolic Panel    Collection Time: 02/13/20  4:54 AM   Result Value Ref Range    Glucose 121 (H) 65 - 99 mg/dL    BUN 38 (H) 8 - 23 mg/dL    Creatinine 1.05 0.76 - 1.27  mg/dL    Sodium 135 (L) 136 - 145 mmol/L    Potassium 3.6 3.5 - 5.2 mmol/L    Chloride 104 98 - 107 mmol/L    CO2 21.0 (L) 22.0 - 29.0 mmol/L    Calcium 8.4 (L) 8.6 - 10.5 mg/dL    eGFR Non African Amer 70 >60 mL/min/1.73    BUN/Creatinine Ratio 36.2 (H) 7.0 - 25.0    Anion Gap 10.0 5.0 - 15.0 mmol/L   BNP    Collection Time: 02/13/20  4:54 AM   Result Value Ref Range    proBNP 204.7 5.0 - 900.0 pg/mL   Sedimentation Rate    Collection Time: 02/13/20  4:54 AM   Result Value Ref Range    Sed Rate 16 (H) 0 - 15 mm/hr   C-reactive Protein    Collection Time: 02/13/20  4:54 AM   Result Value Ref Range    C-Reactive Protein 1.45 (H) 0.00 - 0.50 mg/dL   CBC Auto Differential    Collection Time: 02/13/20  4:54 AM   Result Value Ref Range    WBC 12.05 (H) 3.40 - 10.80 10*3/mm3    RBC 2.70 (L) 4.14 - 5.80 10*6/mm3    Hemoglobin 7.5 (L) 13.0 - 17.7 g/dL    Hematocrit 23.4 (L) 37.5 - 51.0 %    MCV 86.7 79.0 - 97.0 fL    MCH 27.8 26.6 - 33.0 pg    MCHC 32.1 31.5 - 35.7 g/dL    RDW 15.3 12.3 - 15.4 %    RDW-SD 48.1 37.0 - 54.0 fl    MPV 10.3 6.0 - 12.0 fL    Platelets 324 140 - 450 10*3/mm3    Neutrophil % 82.4 (H) 42.7 - 76.0 %    Lymphocyte % 7.1 (L) 19.6 - 45.3 %    Monocyte % 8.3 5.0 - 12.0 %    Eosinophil % 0.3 0.3 - 6.2 %    Basophil % 0.2 0.0 - 1.5 %    Immature Grans % 1.7 (H) 0.0 - 0.5 %    Neutrophils, Absolute 9.93 (H) 1.70 - 7.00 10*3/mm3    Lymphocytes, Absolute 0.85 0.70 - 3.10 10*3/mm3    Monocytes, Absolute 1.00 (H) 0.10 - 0.90 10*3/mm3    Eosinophils, Absolute 0.04 0.00 - 0.40 10*3/mm3    Basophils, Absolute 0.03 0.00 - 0.20 10*3/mm3    Immature Grans, Absolute 0.20 (H) 0.00 - 0.05 10*3/mm3    nRBC 0.0 0.0 - 0.2 /100 WBC       Physical Examination:        Physical Exam   Constitutional: He appears well-nourished. He appears cachectic. He is restrained.   HENT:   Head: Normocephalic and atraumatic.   Nose: Nose normal.   Mouth/Throat: Oropharynx is clear and moist.   Eyes: Pupils are equal, round, and reactive to  light. EOM are normal.   Neck: Normal range of motion. Neck supple.   Trach to vent   Cardiovascular: Normal rate, regular rhythm, normal heart sounds and intact distal pulses.   Pulmonary/Chest: Effort normal and breath sounds normal.   Abdominal: Soft. Bowel sounds are normal.   g tube  Dressing c.d.i   Musculoskeletal: Normal range of motion.   Generalized weakness   Neurological: He is alert. He has normal reflexes.   Nonverbal  Confused   Skin: Skin is warm and dry.   Psychiatric: He has a normal mood and affect. His behavior is normal.   Anxious at times   Nursing note and vitals reviewed.        Assessment:             Acute tracheostomy management (CMS/[facility])    Acute respiratory failure with hypoxia and hypercapnia (CMS/[facility])    Ventilator dependence (CMS/[facility])    Past Medical History:   Diagnosis Date   • Anxiety disorder    • Cerumen impaction    • COPD (chronic obstructive pulmonary disease) (CMS/[facility])    • Depression    • Hypertension    • Hypothyroidism    • Kidney disease    • Osteoarthritis    • Sinusitis, chronic         Plan:        1. Acute on chronic hypoxic/hypercapneic respiratory failure  2. RUL pulmonary embolus  3. PAF  4. Hypothyroidism  5. COPD  6. Anxiety disorder  7. CKD  8. Multifactorial anemia  9. GERD  10. Dementia  11. Metabolic encephalopathy  12. Iron deficiency  13. Hypokalemia - improved  14. Moderate protein calorie malnutrition  15. Occult stool positive    Continue current treatment. Monitor counts. Increase activity. Labs Monday. Continue vent weaning under direction of pulmonology. Aggressive therapies. Maintain patient safety. Continue nutrition support per AMONs.  Monitor renal function. Monitor I&O's closely. Watch for s/s bleeding. Continue lovenox for now and plan to transition to xarelto/coumadin in am if hgb stable and no further bleeding noted.     Electronically signed by MATT Tolentino on 2/13/2020 at 11:58 AM

## 2020-02-13 NOTE — PROGRESS NOTES
PULMONARY AND CRITICAL CARE PROGRESS NOTE - [facility]    Patient: Shawn Turner    1950   [id number]  02/13/20   8:51 AM  Referring Provider: Bautista Alcantar MD  Chief Complaint: Mechanically ventilated  Interval history: The patient is resting in bed with tracheostomy to mechanical ventilator. He remains in SIMV mode.  He is asleep this morning.  He has mittens on his hands.  No other aggravating or alleviating factors.   Review of Systems:   Cannot obtain due to mechanical ventilation.  The patient notably is critically ill and connected to a ventilator.  As such patient cannot communicate and provide any history whatsoever, including any history of present illness or interval history since arrival or review of systems. The interested reviewer may note this fact, as an attempt has been made at collecting and documenting these portions of the patient history, but this information is unobtainable despite attempted review and therefore cannot be documented at this time.   Ventilator Settings:  SIMV,8,550,18,7.5,30%  Physical Exam:  Vital signs: Temperature N/A, pulse61, respirations 15, blood pressure 86/43, saturation 100%, ETCO2 33%  Physical Exam   Constitutional: He appears well-nourished. He appears cachectic. He has a sickly appearance. No distress. He is intubated and restrained.   HENT:   Head: Normocephalic.   Nose: Nose normal.   Feeding tube-nutrition infusing    Eyes: Pupils are equal, round, and reactive to light. No scleral icterus.   Neck:   Tracheostomy to vent   Cardiovascular: Normal rate and regular rhythm.   No murmur heard.  Pulmonary/Chest: Effort normal. No accessory muscle usage. He is intubated. No respiratory distress. He has no rhonchi. He has no rales.   Abdominal: Soft. He exhibits no distension.   Binder in place.   G-tube in place   Genitourinary:   Genitourinary Comments: Judd catheter   Musculoskeletal: He exhibits no edema.   Skin: Skin is warm  and dry. No rash noted. He is not diaphoretic. No erythema.   Psychiatric: His mood appears not anxious. He is slowed. He is noncommunicative.   Nursing note and vitals reviewed.    Results from last 7 days   Lab Units 02/13/20  0454 02/10/20  0622 02/07/20  0501   WBC 10*3/mm3 12.05* 8.74 9.37   HEMOGLOBIN g/dL 7.5* 9.0* 7.9*   PLATELETS 10*3/mm3 324 347 241     Results from last 7 days   Lab Units 02/13/20  0454 02/10/20  0622   SODIUM mmol/L 135* 135*   POTASSIUM mmol/L 3.6 3.8   BUN mg/dL 38* 30*   CREATININE mg/dL 1.05 0.93     Recent films:    No radiology results for the last day   My interpretation: no new imaging   Pulmonary Assessment:  1. Respiratory failure requiring mechanical ventilation which appears to be chronic at this point  2. 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 Pulmonary embolism, right treated and stable  4. COPD chronic and compensated with current medications  5. Dementia unimproved, chronic, unlikely to improve  6. Anemia  Recommend:   · Continue SIMV as is  · Continue CXR twice weekly  · ABGs as needed   · Continue nutrition   · Daily therapy with PT, OT and SLP  · Mobilize as tolerated  · Continue bronchodilator treatments.  · DVT and stress ulcer prophylaxis.      Electronically signed by MATT Fields on 2/13/2020 at 8:51 AM     Physician substantive portion:  He remains in no distress, intubated mechanically ventilated.  This is unchanged.  He is essentially ventilator dependent.  Breath sounds are diminished.  Anemia noted.  Deferred to primary service regarding additional transfusion or management of that problem.  He appears ventilator dependent at this point no changes and will probably need to stay on the current settings for an extended time.    I have seen and examined patient personally, performing a face-to-face diagnostic evaluation with plan of care reviewed and developed with APRN and nursing staff. I have addended and/or modified the above history  of present illness, physical examination, and assessment and plan to reflect my findings and impressions. Essential elements of the care plan were discussed with APRN above.  Agree with findings and assessment/plan as documented above.    Electronically signed by Manuel Hensley MD, on 2/13/2020, 1:11 PM

## 2020-02-14 LAB
GLUCOSE BLDC GLUCOMTR-MCNC: 105 MG/DL (ref 70–130)
GLUCOSE BLDC GLUCOMTR-MCNC: 134 MG/DL (ref 70–130)
HCT VFR BLD AUTO: 28.8 % (ref 37.5–51)
HGB BLD-MCNC: 9.1 G/DL (ref 13–17.7)
HOLD SPECIMEN: NORMAL
INR PPP: 0.99 (ref 0.91–1.09)
PROTHROMBIN TIME: 13.4 SECONDS (ref 11.9–14.6)

## 2020-02-14 PROCEDURE — 82962 GLUCOSE BLOOD TEST: CPT

## 2020-02-14 PROCEDURE — 85014 HEMATOCRIT: CPT | Performed by: INTERNAL MEDICINE

## 2020-02-14 PROCEDURE — 85610 PROTHROMBIN TIME: CPT | Performed by: INTERNAL MEDICINE

## 2020-02-14 PROCEDURE — 99232 SBSQ HOSP IP/OBS MODERATE 35: CPT | Performed by: INTERNAL MEDICINE

## 2020-02-14 PROCEDURE — 25010000002 ENOXAPARIN PER 10 MG: Performed by: INTERNAL MEDICINE

## 2020-02-14 PROCEDURE — 85018 HEMOGLOBIN: CPT | Performed by: INTERNAL MEDICINE

## 2020-02-14 RX ORDER — DIVALPROEX SODIUM 125 MG/1
125 CAPSULE, COATED PELLETS ORAL EVERY 12 HOURS SCHEDULED
Status: DISCONTINUED | OUTPATIENT
Start: 2020-02-14 | End: 2020-02-21 | Stop reason: HOSPADM

## 2020-02-14 RX ORDER — FAMOTIDINE 40 MG/5ML
20 POWDER, FOR SUSPENSION ORAL 2 TIMES DAILY
Status: DISCONTINUED | OUTPATIENT
Start: 2020-02-14 | End: 2020-02-14 | Stop reason: ALTCHOICE

## 2020-02-14 RX ORDER — DIAPER,BRIEF,INFANT-TODD,DISP
EACH MISCELLANEOUS
Status: DISCONTINUED | OUTPATIENT
Start: 2020-02-14 | End: 2020-02-21 | Stop reason: HOSPADM

## 2020-02-14 RX ORDER — FAMOTIDINE 20 MG/1
20 TABLET, FILM COATED ORAL EVERY 12 HOURS SCHEDULED
Status: DISCONTINUED | OUTPATIENT
Start: 2020-02-14 | End: 2020-02-21 | Stop reason: HOSPADM

## 2020-02-14 NOTE — PROGRESS NOTES
PULMONARY/ CRITICAL CARE CONSULT NOTE:    Assessment/Plan    Chronic ventilator dependent respiratory failure  -weaning slowly; will need long-term placement  -PS reduced to 15, for final settings of: SIMV/8/550/15/5/30%; Patient demonstrated good initial tolerance with no increase in RR, and with TVs maintained >450    COPD    Dementia    Pulmonary embolism            Subjective: No events overnight. Continues on SIMV, with spontaneous -700.      Objective :    Vitals:  Vital Signs (last 72 hrs)     None           Physical examination:  Physical Exam  Gen: NAD  Neck: trach CDI, #8 Shiley, cuff up  CV: RR, nl S1&S2, no MGR  Resp: CTA, no WCR  Neuro: Alert, tracking  Psych: Calm, pleasant      Labs and imaging studies: Reviewed  Lab Results (last 48 hours)     Procedure Component Value Units Date/Time    Extra Tubes [[id number]] Collected:  02/14/20 0500    Specimen:  Blood, Venous Line Updated:  02/14/20 0815    Narrative:       The following orders were created for panel order Extra Tubes.  Procedure                               Abnormality         Status                     ---------                               -----------         ------                     Green Top (Gel)[[id number]]                                  Final result                 Please view results for these tests on the individual orders.    Green Top (Gel) [[id number]] Collected:  02/14/20 0500    Specimen:  Blood Updated:  02/14/20 0815     Extra Tube Hold for add-ons.     Comment: Auto resulted.       Protime-INR [[id number]]  (Normal) Collected:  02/14/20 0500    Specimen:  Blood Updated:  02/14/20 0541     Protime 13.4 Seconds      INR 0.99    Hemoglobin & Hematocrit, Blood [[id number]]  (Abnormal) Collected:  02/14/20 0500    Specimen:  Blood Updated:  02/14/20 0534     Hemoglobin 9.1 g/dL      Hematocrit 28.8 %     POC Glucose Once [[id number]]  (Abnormal) Collected:  02/13/20 1200    Specimen:  Blood Updated:  02/13/20 1221     Glucose  148 mg/dL      Comment: : RSGRANT JenningsMeter ID: [id number]       Basic Metabolic Panel [[id number]]  (Abnormal) Collected:  02/13/20 0454    Specimen:  Blood Updated:  02/13/20 0530     Glucose 121 mg/dL      BUN 38 mg/dL      Creatinine 1.05 mg/dL      Sodium 135 mmol/L      Potassium 3.6 mmol/L      Chloride 104 mmol/L      CO2 21.0 mmol/L      Calcium 8.4 mg/dL      eGFR Non African Amer 70 mL/min/1.73      BUN/Creatinine Ratio 36.2     Anion Gap 10.0 mmol/L     Narrative:       GFR Normal >60  Chronic Kidney Disease <60  Kidney Failure <15      BNP [[id number]]  (Normal) Collected:  02/13/20 0454    Specimen:  Blood Updated:  02/13/20 0530     proBNP 204.7 pg/mL     Narrative:       Among patients with dyspnea, NT-proBNP is highly sensitive for the detection of acute congestive heart failure. In addition NT-proBNP of <300 pg/ml effectively rules out acute congestive heart failure with 99% negative predictive value.    Results may be falsely decreased if patient taking Biotin.      C-reactive Protein [[id number]]  (Abnormal) Collected:  02/13/20 0454    Specimen:  Blood Updated:  02/13/20 0530     C-Reactive Protein 1.45 mg/dL     Sedimentation Rate [[id number]]  (Abnormal) Collected:  02/13/20 0454    Specimen:  Blood Updated:  02/13/20 0529     Sed Rate 16 mm/hr     Protime-INR [[id number]]  (Normal) Collected:  02/13/20 0454    Specimen:  Blood Updated:  02/13/20 0518     Protime 13.7 Seconds      INR 1.02    CBC & Differential [[id number]] Collected:  02/13/20 0454    Specimen:  Blood Updated:  02/13/20 0507    Narrative:       The following orders were created for panel order CBC & Differential.  Procedure                               Abnormality         Status                     ---------                               -----------         ------                     CBC Auto Differential[[id number]]        Abnormal            Final result                 Please view results for these tests on  the individual orders.    CBC Auto Differential [[id number]]  (Abnormal) Collected:  02/13/20 0454    Specimen:  Blood Updated:  02/13/20 0507     WBC 12.05 10*3/mm3      RBC 2.70 10*6/mm3      Hemoglobin 7.5 g/dL      Hematocrit 23.4 %      MCV 86.7 fL      MCH 27.8 pg      MCHC 32.1 g/dL      RDW 15.3 %      RDW-SD 48.1 fl      MPV 10.3 fL      Platelets 324 10*3/mm3      Neutrophil % 82.4 %      Lymphocyte % 7.1 %      Monocyte % 8.3 %      Eosinophil % 0.3 %      Basophil % 0.2 %      Immature Grans % 1.7 %      Neutrophils, Absolute 9.93 10*3/mm3      Lymphocytes, Absolute 0.85 10*3/mm3      Monocytes, Absolute 1.00 10*3/mm3      Eosinophils, Absolute 0.04 10*3/mm3      Basophils, Absolute 0.03 10*3/mm3      Immature Grans, Absolute 0.20 10*3/mm3      nRBC 0.0 /100 WBC     POC Glucose Once [[id number]]  (Abnormal) Collected:  02/12/20 2306    Specimen:  Blood Updated:  02/12/20 2324     Glucose 139 mg/dL      Comment: : TEO Barrientos ID: [id number]       POC Glucose Once [[id number]]  (Abnormal) Collected:  02/12/20 1151    Specimen:  Blood Updated:  02/12/20 1220     Glucose 155 mg/dL      Comment: : LALO Frederick ID: [id number]              2/14/2020  11:44 AM

## 2020-02-14 NOTE — PROGRESS NOTES
Ortiz Alcantar M.D.  MATT Herrera        Internal Medicine Progress Note    2/14/2020   11:17 AM    Name:  Shawn Turner  MRN:    [id number]     Acct:     [id number]   Room:  [address] [place] [address] Day: 0     Admit Date: 1/15/2020 10:17 AM  PCP: Tj Wilson MD    Subjective:     C/C: need for continued ventilator weaning    Interval History: Status: Remains the same. Resting in bed. No family at bedside. Continued with trach to vent at SIMV. Tachypneic and blood pressure elevated at present time. Patient opens left eye to command. Otherwise, minimal response to treatment. Elevated temp yesterday. Tolerating current vent settings. Hgb stable. Some green drainage around g tube - addressed by wound care.    sReview of Systems   Unable to perform ROS: patient nonverbal       Medications:     Allergies:   Allergies   Allergen Reactions   • Aspirin        Current Meds:   Current Facility-Administered Medications:   •  acetaminophen (TYLENOL) 160 MG/5ML solution 650 mg, 650 mg, Per G Tube, Q4H PRN, Bautista Alcantar MD  •  acetaminophen (TYLENOL) suppository 650 mg, 650 mg, Rectal, Q6H PRN, Selina Augustine MD  •  acetaZOLAMIDE (DIAMOX) tablet 250 mg, 250 mg, Per G Tube, BID, Bautista Alcantar MD  •  albuterol (PROVENTIL) nebulizer solution 0.083% 2.5 mg/3mL, 2.5 mg, Nebulization, Q2H PRN, Selina Augustine MD  •  bacitracin ointment, , Topical, Q12H, Selina Augustine MD  •  bacitracin ointment, , Topical, PRN, Selina Augustine MD  •  bacitracin ointment, , Topical, PRN, Selina Augustine MD  •  bacitracin ointment, , Topical, Q12H, Selina Augustine MD  •  bacitracin ointment, , Topical, Q24H, Sherry Jennings APRN  •  dextrose (D50W) 25 g/ 50mL Intravenous Solution 25 g, 25 g, Intravenous, Q15 Min PRN, Selina Augustine MD  •  dextrose (GLUTOSE) oral gel 15 g, 15 g, Oral, Q15 Min PRN, Selina Augustine MD  •  dilTIAZem (CARDIZEM) injection 5 mg, 5 mg, Intravenous, Q6H PRN,  Selina Augustine MD  •  dilTIAZem (CARDIZEM) tablet 30 mg, 30 mg, Per G Tube, Q6H, Bautista Alcantar MD  •  donepezil (ARICEPT) tablet 10 mg, 10 mg, Per G Tube, Nightly, Bautista Alcantar MD  •  enoxaparin (LOVENOX) syringe 50 mg, 1 mg/kg, Subcutaneous, Q12H, Bautista Alcantar MD  •  famotidine (PEPCID) injection 20 mg, 20 mg, Intravenous, Q12H, Selina Augustine MD  •  ferrous sulfate tablet 325 mg, 325 mg, Oral, Daily With Breakfast, Bautista Alcantar MD  •  furosemide (LASIX) tablet 40 mg, 40 mg, Per G Tube, Daily, Bautista Alcantar MD  •  glucagon (human recombinant) (GLUCAGEN DIAGNOSTIC) injection 1 mg, 1 mg, Subcutaneous, Q15 Min PRN, Selina Augustine MD  •  haloperidol lactate (HALDOL) injection 1 mg, 1 mg, Intramuscular, Q4H PRN, Selina Augustine MD  •  hydrALAZINE (APRESOLINE) injection 5 mg, 5 mg, Intravenous, Q6H PRN, Selina Augustine MD  •  hypromellose (ISOPTO TEARS) 0.5 % ophthalmic solution 2 drop, 2 drop, Both Eyes, Q2H PRN, Selina Augustine MD  •  ipratropium (ATROVENT) nebulizer solution 0.5 mg, 0.5 mg, Nebulization, Q6H PRN, Selina Augustine MD  •  ipratropium-albuterol (DUO-NEB) nebulizer solution 3 mL, 3 mL, Nebulization, Q6H - RT, Selina Augustine MD  •  levothyroxine (SYNTHROID, LEVOTHROID) tablet 75 mcg, 75 mcg, Per G Tube, Q AM, Bautista Alcantar MD  •  lisinopril (PRINIVIL,ZESTRIL) tablet 20 mg, 20 mg, Per G Tube, Nightly, Blanca Islas APRN  •  melatonin tablet 9.75 mg, 9.75 mg, Per G Tube, Nightly, Bautista Alcantar MD  •  memantine (NAMENDA) tablet 10 mg, 10 mg, Per G Tube, Q12H, Bautista Alcantar MD  •  metoprolol tartrate (LOPRESSOR) tablet 12.5 mg, 12.5 mg, Per G Tube, Q12H, Bautista Alcantar MD  •  midazolam (VERSED) injection 2 mg, 2 mg, Intravenous, Q1H PRN, Selina Augustine MD  •  Morphine sulfate (PF) injection 2 mg, 2 mg, Intravenous, Q1H PRN, Selina Augustine MD  •  multivitamin w/minerals tablet 1 tablet, 1 tablet, Oral,  Daily, Bautista Alcantar MD  •  norepinephrine (LEVOPHED) 16,000 mcg in sodium chloride 0.9 % 250 mL (64 mcg/mL) infusion, 0.02-0.3 mcg/kg/min, Intravenous, Titrated, Selina uAgustine MD  •  polyethylene glycol 3350 powder (packet), 17 g, Per G Tube, Q12H PRN, Bautista Alcantar MD  •  Potassium Phosphates 9 mmol in sodium chloride 0.9 % 100 mL infusion, 9 mmol, Intravenous, Once, Blanca Islas APRN  •  risperiDONE (risperDAL) tablet 0.75 mg, 0.75 mg, Per G Tube, TID, Bautista Alcantar MD  •  saccharomyces boulardii (FLORASTOR) capsule 250 mg, 250 mg, Per G Tube, Daily, Bautista Alcantar MD  •  sodium chloride 0.9 % flush 10 mL, 10 mL, Intravenous, Q12H, Seilna Augustine MD  •  tiZANidine (ZANAFLEX) tablet 4 mg, 4 mg, Per G Tube, Q8H, Bautista Alcantar MD  •  traZODone (DESYREL) tablet 50 mg, 50 mg, Per G Tube, Nightly, Bautista Alcantar MD  •  valproate (DEPACON) 125 mg in sodium chloride 0.9 % 50 mL IVPB, 125 mg, Intravenous, Q12H, Selina Augustine MD  •  Vortioxetine HBr tablet 20 mg, 20 mg, Per G Tube, Daily, Bautista Alcantar MD    Data:     Code Status:    There are no questions and answers to display.       No family history on file.    Social History     Socioeconomic History   • Marital status:      Spouse name: Not on file   • Number of children: Not on file   • Years of education: Not on file   • Highest education level: Not on file   Tobacco Use   • Smoking status: Current Every Day Smoker   Substance and Sexual Activity   • Alcohol use: No   • Drug use: Defer       Vitals:  T 98.1 P 65 R 24 /62 Sp02 99% (vent)      I/O (24Hr):  No intake or output data in the 24 hours ending 02/14/20 1117    Labs and imaging:      Recent Results (from the past 12 hour(s))   Protime-INR    Collection Time: 02/14/20  5:00 AM   Result Value Ref Range    Protime 13.4 11.9 - 14.6 Seconds    INR 0.99 0.91 - 1.09   Hemoglobin & Hematocrit, Blood    Collection Time: 02/14/20   5:00 AM   Result Value Ref Range    Hemoglobin 9.1 (L) 13.0 - 17.7 g/dL    Hematocrit 28.8 (L) 37.5 - 51.0 %   Green Top (Gel)    Collection Time: 02/14/20  5:00 AM   Result Value Ref Range    Extra Tube Hold for add-ons.        Physical Examination:        Physical Exam   Constitutional: He appears well-nourished. He appears cachectic. He is restrained.   HENT:   Head: Normocephalic and atraumatic.   Nose: Nose normal.   Mouth/Throat: Oropharynx is clear and moist.   Eyes: Pupils are equal, round, and reactive to light. EOM are normal.   Neck: Normal range of motion. Neck supple.   Trach to vent   Cardiovascular: Regular rhythm, normal heart sounds and intact distal pulses. Bradycardia present.   Pulmonary/Chest: Effort normal and breath sounds normal.   Abdominal: Soft. Bowel sounds are normal.   g tube  Dressing c.d.i - scant green drainage around tube   Musculoskeletal: Normal range of motion.   Generalized weakness   Neurological: He is alert. He has normal reflexes.   Nonverbal  Confused   Skin: Skin is warm and dry.   Psychiatric: He has a normal mood and affect. His behavior is normal.   Anxious at times   Nursing note and vitals reviewed.        Assessment:             Acute tracheostomy management (CMS/[facility])    Acute respiratory failure with hypoxia and hypercapnia (CMS/[facility])    Ventilator dependence (CMS/[facility])    Past Medical History:   Diagnosis Date   • Anxiety disorder    • Cerumen impaction    • COPD (chronic obstructive pulmonary disease) (CMS/[facility])    • Depression    • Hypertension    • Hypothyroidism    • Kidney disease    • Osteoarthritis    • Sinusitis, chronic         Plan:        1. Acute on chronic hypoxic/hypercapneic respiratory failure  2. RUL pulmonary embolus  3. Paroxysmal atrial fibrillation  4. Hypothyroidism  5. Chronic obstructive pulmonary disease  6. Anxiety disorder  7. Chronic kidney disease  8. Multifactorial anemia  9. Gastroesophageal reflux disease  10. Dementia  11. Metabolic  encephalopathy  12. Iron deficiency  13. Hypokalemia - improved  14. Moderate protein calorie malnutrition  15. Occult stool positive    Continue current treatment. Monitor counts. Increase activity. Labs Monday. Continue vent weaning under direction of pulmonology. Aggressive therapies. Maintain patient safety. Continue nutrition support per AMONs.  Monitor renal function. Monitor I&O's closely. Watch for s/s bleeding. Transition to xarelto. Change depakote and pepcid to per g tube.     Electronically signed by MATT Tolentino on 2/14/2020 at 11:17 AM     I have discussed the care of Shawn Turner, including pertinent history and exam findings, with the nurse practitioner.    I have seen and examined the patient and the key elements of all parts of the encounter have been performed by me.  I agree with the assessment, plan and orders as documented by MATT Herrera, after I modified the exam findings and the plan of treatments and the final version is my approved version of the assessment.    To: [facility] Court clerk    This evaluation is to respond to the need for guardianship/conservatorship on LEONIDAS Levy.  Mr. Bear injuries are extensive diffuse and likely lifelong he is on the ventilator with respiratory failure he is suffered pulmonary embolisms atrial fibrillation's and numerous other issues and totality listed in the note in the progress note above.  Which make it extremely unlikely that the patient will be able to ever care for himself or make decisions for himself meaningfully.    B currently his ability to interact intellectualize participate or adapt to changing environments is quite poor all of his care is provided by the nursing and respiratory staff here within the facility and likely will need to continue to do so over the next several weeks and months.  This is potentially a lifelong situation.    C I feel currently and potentially lifelong guardianship or  conservatorship is needed as the patient will need help with all ADLs including decision-making for quite some time if not lifelong.    D the guardianship or conservatorship that is necessary should have full scope of care including all activities of ADL along with all decision-making processes in my opinion.    E the patient is currently in a facility that provides him ventilator support nursing care nutritional support passive and active range of motion therapies and basically all of his ADLs are cared for by another individual at this current time.    F the most appropriate plan going forward for this patient is to remain in some type of facility were all care and decision-making can be provided for him.    G given the fact that the patient is still on the ventilator there is no way he could attend a hearing without extensive resource utilization and monitoring during that transportation.  In fact it would probably be easier that the hearing be held in the patient's room if it is necessary to interact with the patient during the course of the hearing.    H if you would look up in the progress note above there is a current list of the patient's medications and this note that he is actually being given today.  All medications could in fact play some role in his mental or physical condition and behavior however based on my evaluation of the evaluation of multiple other clinicians it is felt he currently is in need of all these medications due to his multitude of medical issues.    I currently I see no dissenting opinion or comment to his care and guardianship status as it currently sets again I see that the patient needs not only currently but potentially lifelong the need to somebody to make decisions for him and most likely care for him throughout the remainder of his life.    Sincerely,    JOSE JUAN Alcantar MD    Electronically signed by Bautista Alcantar MD on 2/14/2020 at 11:53 AM

## 2020-02-15 LAB
GLUCOSE BLDC GLUCOMTR-MCNC: 112 MG/DL (ref 70–130)
GLUCOSE BLDC GLUCOMTR-MCNC: 121 MG/DL (ref 70–130)
GLUCOSE BLDC GLUCOMTR-MCNC: 127 MG/DL (ref 70–130)
HCT VFR BLD AUTO: 22.7 % (ref 37.5–51)
HCT VFR BLD AUTO: 23.4 % (ref 37.5–51)
HEMOCCULT STL QL: NEGATIVE
HGB BLD-MCNC: 7.2 G/DL (ref 13–17.7)
HGB BLD-MCNC: 7.5 G/DL (ref 13–17.7)
INR PPP: 1.5 (ref 0.91–1.09)
PROTHROMBIN TIME: 18.6 SECONDS (ref 11.9–14.6)

## 2020-02-15 PROCEDURE — 85610 PROTHROMBIN TIME: CPT | Performed by: INTERNAL MEDICINE

## 2020-02-15 PROCEDURE — 85018 HEMOGLOBIN: CPT | Performed by: INTERNAL MEDICINE

## 2020-02-15 PROCEDURE — 85014 HEMATOCRIT: CPT | Performed by: INTERNAL MEDICINE

## 2020-02-15 PROCEDURE — 82962 GLUCOSE BLOOD TEST: CPT

## 2020-02-15 PROCEDURE — 82272 OCCULT BLD FECES 1-3 TESTS: CPT | Performed by: INTERNAL MEDICINE

## 2020-02-15 RX ORDER — ACETAZOLAMIDE 250 MG/1
125 TABLET ORAL 2 TIMES DAILY
Status: DISCONTINUED | OUTPATIENT
Start: 2020-02-15 | End: 2020-02-21 | Stop reason: HOSPADM

## 2020-02-15 NOTE — PROGRESS NOTES
Ortiz Alcantar M.D.  MATT Herrera        Internal Medicine Progress Note    2/15/2020   10:00 AM    Name:  Shawn Turner  MRN:    [id number]     Acct:     [id number]   Room:  [address] [place] [address] Day: 0     Admit Date: 1/15/2020 10:17 AM  PCP: Tj Wilson MD    Subjective:     C/C: need for continued ventilator weaning    Interval History: Status: Remains the same. Resting in bed. No family at bedside. Continued with trach to vent at [facility]. Patient continues to open left eye to command. Otherwise, minimal response to treatment.  Continues to require hand mittens.  Afebrile.   Tolerating current vent settings.  Hemoglobin declined 7.5 compared to 9.1 on 2/14/2020.  Hypotensive, BP 80/53 with map of 62. Some green drainage around g tube - addressed by wound care, tenderness noted to site with palpation. I&O +278 over the past 24 hours.  Hold Xarelto, obtain stool for occult blood and close H&H monitoring, every 12 hours.      sReview of Systems   Unable to perform ROS: patient nonverbal       Medications:     Allergies:   Allergies   Allergen Reactions   • Aspirin        Current Meds:   Current Facility-Administered Medications:   •  acetaminophen (TYLENOL) 160 MG/5ML solution 650 mg, 650 mg, Per G Tube, Q4H PRN, Bautista Alcantar MD  •  acetaminophen (TYLENOL) suppository 650 mg, 650 mg, Rectal, Q6H PRN, Selina Augustine MD  •  acetaZOLAMIDE (DIAMOX) tablet 250 mg, 250 mg, Per G Tube, BID, Bautista Alcantar MD  •  albuterol (PROVENTIL) nebulizer solution 0.083% 2.5 mg/3mL, 2.5 mg, Nebulization, Q2H PRN, Selina Augustine MD  •  bacitracin ointment, , Topical, Q12H, Selina Augustine MD  •  bacitracin ointment, , Topical, PRN, Selina Augustine MD  •  bacitracin ointment, , Topical, PRN, Selina Augustine MD  •  bacitracin ointment, , Topical, Q12H, Selina Augustine MD  •  bacitracin ointment, , Topical, Q24H, Dick, Crystal L, APRN  •  dextrose (D50W) 25 g/ 50mL  Intravenous Solution 25 g, 25 g, Intravenous, Q15 Min PRN, Selina Augustine MD  •  dextrose (GLUTOSE) oral gel 15 g, 15 g, Oral, Q15 Min PRN, Selina Augustine MD  •  dilTIAZem (CARDIZEM) injection 5 mg, 5 mg, Intravenous, Q6H PRN, Selina Augustine MD  •  dilTIAZem (CARDIZEM) tablet 30 mg, 30 mg, Per G Tube, Q6H, Bautista Alcantar MD  •  Divalproex Sodium (DEPAKOTE SPRINKLE) capsule 125 mg, 125 mg, Per G Tube, Q12H, Linda Landa APRN  •  donepezil (ARICEPT) tablet 10 mg, 10 mg, Per G Tube, Nightly, Bautista Alcantar MD  •  famotidine (PEPCID) tablet 20 mg, 20 mg, Per G Tube, Q12H, Linda Landa, MATT  •  ferrous sulfate tablet 325 mg, 325 mg, Oral, Daily With Breakfast, Bautista Alcantar MD  •  furosemide (LASIX) tablet 40 mg, 40 mg, Per G Tube, Daily, Bautista Alcantar MD  •  glucagon (human recombinant) (GLUCAGEN DIAGNOSTIC) injection 1 mg, 1 mg, Subcutaneous, Q15 Min PRN, Selina Augustine MD  •  haloperidol lactate (HALDOL) injection 1 mg, 1 mg, Intramuscular, Q4H PRN, Selina Augustine MD  •  hydrALAZINE (APRESOLINE) injection 5 mg, 5 mg, Intravenous, Q6H PRN, Selina Augustine MD  •  hypromellose (ISOPTO TEARS) 0.5 % ophthalmic solution 2 drop, 2 drop, Both Eyes, Q2H PRN, Selina Augustine MD  •  ipratropium (ATROVENT) nebulizer solution 0.5 mg, 0.5 mg, Nebulization, Q6H PRN, Selina Augustine MD  •  ipratropium-albuterol (DUO-NEB) nebulizer solution 3 mL, 3 mL, Nebulization, Q6H - RT, Selina Augustine MD  •  levothyroxine (SYNTHROID, LEVOTHROID) tablet 75 mcg, 75 mcg, Per G Tube, Q AM, Bautista Alcantar MD  •  lisinopril (PRINIVIL,ZESTRIL) tablet 20 mg, 20 mg, Per G Tube, Nightly, Blanca Islas, APRN  •  melatonin tablet 9.75 mg, 9.75 mg, Per G Tube, Nightly, Bautista Alcantar MD  •  memantine (NAMENDA) tablet 10 mg, 10 mg, Per G Tube, Q12H, Bautista Alcantar MD  •  metoprolol tartrate (LOPRESSOR) tablet 12.5 mg, 12.5 mg, Per G Tube, Q12H, Bautista Alcantar  MD Surendra  •  midazolam (VERSED) injection 2 mg, 2 mg, Intravenous, Q1H PRN, Selina Augustine MD  •  Morphine sulfate (PF) injection 2 mg, 2 mg, Intravenous, Q1H PRN, Selina Augustine MD  •  multivitamin w/minerals tablet 1 tablet, 1 tablet, Oral, Daily, Bautista Alcantar MD  •  norepinephrine (LEVOPHED) 16,000 mcg in sodium chloride 0.9 % 250 mL (64 mcg/mL) infusion, 0.02-0.3 mcg/kg/min, Intravenous, Titrated, Selina Augustine MD  •  polyethylene glycol 3350 powder (packet), 17 g, Per G Tube, Q12H PRN, Bautista Alcantar MD  •  Potassium Phosphates 9 mmol in sodium chloride 0.9 % 100 mL infusion, 9 mmol, Intravenous, Once, Blanca Islas, APRN  •  risperiDONE (risperDAL) tablet 0.75 mg, 0.75 mg, Per G Tube, TID, Bautista Alcantar MD  •  rivaroxaban (XARELTO) tablet 20 mg, 20 mg, Oral, Daily With Dinner, Linda Landa, APRN  •  saccharomyces boulardii (FLORASTOR) capsule 250 mg, 250 mg, Per G Tube, Daily, Bautista Alcantar MD  •  sodium chloride 0.9 % flush 10 mL, 10 mL, Intravenous, Q12H, Selina Augustine MD  •  tiZANidine (ZANAFLEX) tablet 4 mg, 4 mg, Per G Tube, Q8H, Bautista Alcantar MD  •  traZODone (DESYREL) tablet 50 mg, 50 mg, Per G Tube, Nightly, Bautista Alcantar MD  •  Vortioxetine HBr tablet 20 mg, 20 mg, Per G Tube, Daily, Bautista Alcantar MD    Data:     Code Status:    There are no questions and answers to display.       No family history on file.    Social History     Socioeconomic History   • Marital status:      Spouse name: Not on file   • Number of children: Not on file   • Years of education: Not on file   • Highest education level: Not on file   Tobacco Use   • Smoking status: Current Every Day Smoker   Substance and Sexual Activity   • Alcohol use: No   • Drug use: Defer       Vitals:  T 98.9 P 63 R 21 BP 80/53 Sp02 98% (vent)      I/O (24Hr):  No intake or output data in the 24 hours ending 02/15/20 1000    Labs and imaging:      Recent  Results (from the past 12 hour(s))   POC Glucose Once    Collection Time: 02/14/20 11:49 PM   Result Value Ref Range    Glucose 121 70 - 130 mg/dL   POC Glucose Once    Collection Time: 02/15/20  4:42 AM   Result Value Ref Range    Glucose 112 70 - 130 mg/dL   Protime-INR    Collection Time: 02/15/20  4:43 AM   Result Value Ref Range    Protime 18.6 (H) 11.9 - 14.6 Seconds    INR 1.50 (H) 0.91 - 1.09   Hemoglobin & Hematocrit, Blood    Collection Time: 02/15/20  4:43 AM   Result Value Ref Range    Hemoglobin 7.5 (L) 13.0 - 17.7 g/dL    Hematocrit 23.4 (L) 37.5 - 51.0 %       Physical Examination:        Physical Exam   Constitutional: He appears well-nourished. He appears cachectic. He is restrained.   HENT:   Head: Normocephalic and atraumatic.   Nose: Nose normal.   Mouth/Throat: Oropharynx is clear and moist.   Eyes: Pupils are equal, round, and reactive to light. EOM are normal.   Neck: Normal range of motion. Neck supple.   Trach to vent   Cardiovascular: Regular rhythm, normal heart sounds and intact distal pulses. Bradycardia present.   Pulmonary/Chest: Effort normal and breath sounds normal.   Abdominal: Soft. Bowel sounds are normal.   g tube  Dressing c.d.i - scant green drainage around tube   Musculoskeletal: Normal range of motion.   Generalized weakness   Neurological: He is alert. He has normal reflexes.   Nonverbal  Confused   Skin: Skin is warm and dry.   Psychiatric: He has a normal mood and affect. His behavior is normal.   Anxious at times   Nursing note and vitals reviewed.        Assessment:             Acute tracheostomy management (CMS/[facility])    Acute respiratory failure with hypoxia and hypercapnia (CMS/[facility])    Ventilator dependence (CMS/[facility])    Past Medical History:   Diagnosis Date   • Anxiety disorder    • Cerumen impaction    • COPD (chronic obstructive pulmonary disease) (CMS/[facility])    • Depression    • Hypertension    • Hypothyroidism    • Kidney disease    • Osteoarthritis    • Sinusitis,  chronic         Plan:        1. Acute on chronic hypoxic/hypercapneic respiratory failure  2. RUL pulmonary embolus  3. Paroxysmal atrial fibrillation  4. Hypothyroidism  5. Chronic obstructive pulmonary disease  6. Anxiety disorder  7. Chronic kidney disease  8. Multifactorial anemia  9. Gastroesophageal reflux disease  10. Dementia  11. Metabolic encephalopathy  12. Iron deficiency  13. Hypokalemia - improved  14. Moderate protein calorie malnutrition  15. Occult stool positive    Continue current treatment. Monitor counts. Increase activity. Labs Monday. Continue vent weaning under direction of pulmonology. Aggressive therapies. Maintain patient safety. Continue nutrition support per AMONs.  Monitor renal function. Monitor I&O's closely. Watch for s/s bleeding.  Hemoglobin dropped to 7.5, hold Xarelto, obtain stool for occult blood and close H&H monitoring, every 12 hours.  Discontinue Lasix and decrease Diamox 225 mg per tube twice daily.       Electronically signed by MATT Alexander on 2/15/2020 at 10:00 AM

## 2020-02-16 LAB
ABO GROUP BLD: NORMAL
BLD GP AB SCN SERPL QL: NEGATIVE
HCT VFR BLD AUTO: 23.6 % (ref 37.5–51)
HGB BLD-MCNC: 7.5 G/DL (ref 13–17.7)
HOLD SPECIMEN: NORMAL
INR PPP: 1.06 (ref 0.91–1.09)
PROTHROMBIN TIME: 14.1 SECONDS (ref 11.9–14.6)
RH BLD: POSITIVE
T&S EXPIRATION DATE: NORMAL

## 2020-02-16 PROCEDURE — 86900 BLOOD TYPING SEROLOGIC ABO: CPT | Performed by: INTERNAL MEDICINE

## 2020-02-16 PROCEDURE — 85018 HEMOGLOBIN: CPT | Performed by: INTERNAL MEDICINE

## 2020-02-16 PROCEDURE — 85014 HEMATOCRIT: CPT | Performed by: INTERNAL MEDICINE

## 2020-02-16 PROCEDURE — 86901 BLOOD TYPING SEROLOGIC RH(D): CPT | Performed by: INTERNAL MEDICINE

## 2020-02-16 PROCEDURE — 85610 PROTHROMBIN TIME: CPT | Performed by: INTERNAL MEDICINE

## 2020-02-16 PROCEDURE — 86850 RBC ANTIBODY SCREEN: CPT | Performed by: INTERNAL MEDICINE

## 2020-02-16 PROCEDURE — 86923 COMPATIBILITY TEST ELECTRIC: CPT

## 2020-02-16 PROCEDURE — P9016 RBC LEUKOCYTES REDUCED: HCPCS

## 2020-02-16 PROCEDURE — 86900 BLOOD TYPING SEROLOGIC ABO: CPT

## 2020-02-16 NOTE — PROGRESS NOTES
Ortiz Alcantar M.D.  MATT Herrera        Internal Medicine Progress Note    2/16/2020   10:04 AM    Name:  Shawn Turner  MRN:    [id number]     Acct:     [id number]   Room:  [address] [place] [address] Day: 0     Admit Date: 1/15/2020 10:17 AM  PCP: Tj Wilson MD    Subjective:     C/C: need for continued ventilator weaning    Interval History: Status: Remains the same. Resting in bed. No family at bedside. Continued with trach to vent at [facility]. Patient continues to open left eye to command. Otherwise, minimal response to treatment.  Continues to require hand mittens.  Afebrile.   Tolerating current vent settings.  Hemoglobin declined 7.5 compared to 9.1 on 2/14/2020 and continuing to have hypotensive and bradycardic episodes.  Will transfuse 1 unit of PRBCs.  Heart rate ranging from 55-81, BP ranging from 84/40 2-1 130/62 and metoprolol and lisinopril have been being held per nurse.  Occult stool negative on 2/15/2020.  Scant green drainage around g tube - addressed by wound care, tenderness noted to site with palpation. I&O + 967 over the past 24 hours.    sReview of Systems   Unable to perform ROS: patient nonverbal       Medications:     Allergies:   Allergies   Allergen Reactions   • Aspirin        Current Meds:   Current Facility-Administered Medications:   •  acetaminophen (TYLENOL) 160 MG/5ML solution 650 mg, 650 mg, Per G Tube, Q4H PRN, Bautista Alcantar MD  •  acetaminophen (TYLENOL) suppository 650 mg, 650 mg, Rectal, Q6H PRN, Selina Augustine MD  •  acetaZOLAMIDE (DIAMOX) tablet 125 mg, 125 mg, Per G Tube, BID, Blanca Islas APRN  •  albuterol (PROVENTIL) nebulizer solution 0.083% 2.5 mg/3mL, 2.5 mg, Nebulization, Q2H PRN, Selina Augustine MD  •  bacitracin ointment, , Topical, Q12H, Selina Augustine MD  •  bacitracin ointment, , Topical, PRN, Selina Augustine MD  •  bacitracin ointment, , Topical, PRN, Selina Augustine MD  •  bacitracin ointment, , Topical, Q12H,  Selina Augustine MD  •  bacitracin ointment, , Topical, Q24H, Sherry Jennings, APRN  •  dilTIAZem (CARDIZEM) injection 5 mg, 5 mg, Intravenous, Q6H PRN, Selina Augustine MD  •  dilTIAZem (CARDIZEM) tablet 30 mg, 30 mg, Per G Tube, Q6H, Bautista Alcantar MD  •  Divalproex Sodium (DEPAKOTE SPRINKLE) capsule 125 mg, 125 mg, Per G Tube, Q12H, Linda Landa APRN  •  donepezil (ARICEPT) tablet 10 mg, 10 mg, Per G Tube, Nightly, Bautista Alcantar MD  •  famotidine (PEPCID) tablet 20 mg, 20 mg, Per G Tube, Q12H, Linda Landa APRN  •  ferrous sulfate tablet 325 mg, 325 mg, Oral, Daily With Breakfast, Bautista Alcantar MD  •  haloperidol lactate (HALDOL) injection 1 mg, 1 mg, Intramuscular, Q4H PRN, Selina Augustine MD  •  hydrALAZINE (APRESOLINE) injection 5 mg, 5 mg, Intravenous, Q6H PRN, Selina Augustine MD  •  hypromellose (ISOPTO TEARS) 0.5 % ophthalmic solution 2 drop, 2 drop, Both Eyes, Q2H PRN, Selina Augustine MD  •  ipratropium (ATROVENT) nebulizer solution 0.5 mg, 0.5 mg, Nebulization, Q6H PRN, Selina Augustine MD  •  ipratropium-albuterol (DUO-NEB) nebulizer solution 3 mL, 3 mL, Nebulization, Q6H - RT, Selina Augustine MD  •  levothyroxine (SYNTHROID, LEVOTHROID) tablet 75 mcg, 75 mcg, Per G Tube, Q AM, Bautista Alcantar MD  •  lisinopril (PRINIVIL,ZESTRIL) tablet 20 mg, 20 mg, Per G Tube, Nightly, Blanca Islas, APRN  •  melatonin tablet 9.75 mg, 9.75 mg, Per G Tube, Nightly, Bautista Alcantar MD  •  memantine (NAMENDA) tablet 10 mg, 10 mg, Per G Tube, Q12H, Bautista Alcantar MD  •  midazolam (VERSED) injection 2 mg, 2 mg, Intravenous, Q1H PRN, Selina Augustine MD  •  Morphine sulfate (PF) injection 2 mg, 2 mg, Intravenous, Q1H PRN, Selina Augustine MD  •  multivitamin w/minerals tablet 1 tablet, 1 tablet, Oral, Daily, Bautista Alcantar MD  •  norepinephrine (LEVOPHED) 16,000 mcg in sodium chloride 0.9 % 250 mL (64 mcg/mL) infusion, 0.02-0.3  mcg/kg/min, Intravenous, Titrated, Bautista Alcantar MD  •  polyethylene glycol 3350 powder (packet), 17 g, Per G Tube, Q12H PRN, Bautista Alcantar MD  •  Potassium Phosphates 9 mmol in sodium chloride 0.9 % 100 mL infusion, 9 mmol, Intravenous, Once, Blanca Islas APRN  •  risperiDONE (risperDAL) tablet 0.75 mg, 0.75 mg, Per G Tube, TID, Bautista Alcantar MD  •  rivaroxaban (XARELTO) tablet 20 mg, 20 mg, Oral, Daily With Dinner, Blanca Islas APRN  •  saccharomyces boulardii (FLORASTOR) capsule 250 mg, 250 mg, Per G Tube, Daily, Bautista Alcantar MD  •  sodium chloride 0.9 % flush 10 mL, 10 mL, Intravenous, Q12H, Selina Augustine MD  •  tiZANidine (ZANAFLEX) tablet 4 mg, 4 mg, Per G Tube, Q8H, Bautista Alcantar MD  •  traZODone (DESYREL) tablet 50 mg, 50 mg, Per G Tube, Nightly, Bautista Alcantar MD  •  Vortioxetine HBr tablet 20 mg, 20 mg, Per G Tube, Daily, Bautista Alcantar MD    Data:     Code Status:    There are no questions and answers to display.       No family history on file.    Social History     Socioeconomic History   • Marital status:      Spouse name: Not on file   • Number of children: Not on file   • Years of education: Not on file   • Highest education level: Not on file   Tobacco Use   • Smoking status: Current Every Day Smoker   Substance and Sexual Activity   • Alcohol use: No   • Drug use: Defer       Vitals:  T 98.0 P 76 R 28 BP 70/86 Sp02 97% (vent FiO2 30%)      I/O (24Hr):  No intake or output data in the 24 hours ending 02/16/20 1004    Labs and imaging:      Recent Results (from the past 12 hour(s))   Hemoglobin & Hematocrit, Blood    Collection Time: 02/16/20  4:28 AM   Result Value Ref Range    Hemoglobin 7.5 (L) 13.0 - 17.7 g/dL    Hematocrit 23.6 (L) 37.5 - 51.0 %   Protime-INR    Collection Time: 02/16/20  4:28 AM   Result Value Ref Range    Protime 14.1 11.9 - 14.6 Seconds    INR 1.06 0.91 - 1.09   Green Top (Gel)    Collection Time:  02/16/20  4:28 AM   Result Value Ref Range    Extra Tube Hold for add-ons.        Physical Examination:        Physical Exam   Constitutional: He appears well-nourished. He appears cachectic. He is restrained.   HENT:   Head: Normocephalic and atraumatic.   Nose: Nose normal.   Mouth/Throat: Oropharynx is clear and moist.   Eyes: Pupils are equal, round, and reactive to light. EOM are normal.   Neck: Normal range of motion. Neck supple.   Trach to vent   Cardiovascular: Regular rhythm, normal heart sounds and intact distal pulses. Bradycardia present.   Pulmonary/Chest: Effort normal and breath sounds normal.   Abdominal: Soft. Bowel sounds are normal.   g tube  Dressing c.d.i - scant green drainage around tube   Musculoskeletal: Normal range of motion.   Generalized weakness   Neurological: He is alert. He has normal reflexes.   Nonverbal  Confused   Skin: Skin is warm and dry.   Psychiatric: He has a normal mood and affect. His behavior is normal.   Anxious at times   Nursing note and vitals reviewed.        Assessment:             Acute tracheostomy management (CMS/[facility])    Acute respiratory failure with hypoxia and hypercapnia (CMS/[facility])    Ventilator dependence (CMS/[facility])    Past Medical History:   Diagnosis Date   • Anxiety disorder    • Cerumen impaction    • COPD (chronic obstructive pulmonary disease) (CMS/[facility])    • Depression    • Hypertension    • Hypothyroidism    • Kidney disease    • Osteoarthritis    • Sinusitis, chronic         Plan:        1. Acute on chronic hypoxic/hypercapneic respiratory failure  2. RUL pulmonary embolus  3. Paroxysmal atrial fibrillation  4. Hypothyroidism  5. Chronic obstructive pulmonary disease  6. Anxiety disorder  7. Chronic kidney disease  8. Multifactorial anemia  9. Gastroesophageal reflux disease  10. Dementia  11. Metabolic encephalopathy  12. Iron deficiency  13. Hypokalemia - improved  14. Moderate protein calorie malnutrition  15. Occult stool positive    Continue  current treatment. Monitor counts. Increase activity. Labs Monday. Continue vent weaning under direction of pulmonology. Aggressive therapies. Maintain patient safety. Continue nutrition support per AMONs.  Monitor renal function. Monitor I&O's closely. Watch for s/s bleeding.  Monitor BP closely, multiple medication changes.  Hemoglobin dropped to 7.5, occult stool negative.  Will transfuse 1 unit of PRBCs and resume Xarelto.  Discontinue metoprolol.  Parameters giving for lisinopril dosing.      Recap of medication changes/interventions over the past 48 hours for hypotension and bradycardia:  Discontinued Lasix 40 mg daily  Changed Diamox to 12.5 mg every 12 hours  Discontinued metoprolol  Parameters written to hold lisinopril, hold for systolic pressure <100 or diastolic pressure <60  Transfused 1 unit of PRBCs on 2/16/2020  Initially held Xarelto and resumed on 2/16/2020 after occult stool was negative      Electronically signed by MATT Alexander on 2/16/2020 at 10:04 AM     I have discussed the care of Shawn Turner, including pertinent history and exam findings, with the nurse practitioner.    I have seen and examined the patient and the key elements of all parts of the encounter have been performed by me.  I agree with the assessment, plan and orders as documented by MATT Prince, after I modified the exam findings and the plan of treatments and the final version is my approved version of the assessment.        Electronically signed by Bautista Alcantar MD on 2/16/2020 at 9:41 PM

## 2020-02-17 VITALS
HEART RATE: 71 BPM | BODY MASS INDEX: 18.48 KG/M2 | DIASTOLIC BLOOD PRESSURE: 54 MMHG | TEMPERATURE: 98.6 F | OXYGEN SATURATION: 98 % | WEIGHT: 115 LBS | SYSTOLIC BLOOD PRESSURE: 124 MMHG | HEIGHT: 66 IN | RESPIRATION RATE: 23 BRPM

## 2020-02-17 LAB
ABO + RH BLD: NORMAL
ANION GAP SERPL CALCULATED.3IONS-SCNC: 10 MMOL/L (ref 5–15)
BASOPHILS # BLD AUTO: 0.03 10*3/MM3 (ref 0–0.2)
BASOPHILS NFR BLD AUTO: 0.2 % (ref 0–1.5)
BH BB BLOOD EXPIRATION DATE: NORMAL
BH BB BLOOD TYPE BARCODE: 5100
BH BB DISPENSE STATUS: NORMAL
BH BB PRODUCT CODE: NORMAL
BH BB UNIT NUMBER: NORMAL
BUN BLD-MCNC: 39 MG/DL (ref 8–23)
BUN/CREAT SERPL: 39.8 (ref 7–25)
CALCIUM SPEC-SCNC: 8.6 MG/DL (ref 8.6–10.5)
CHLORIDE SERPL-SCNC: 101 MMOL/L (ref 98–107)
CO2 SERPL-SCNC: 23 MMOL/L (ref 22–29)
CREAT BLD-MCNC: 0.98 MG/DL (ref 0.76–1.27)
CRP SERPL-MCNC: 7.4 MG/DL (ref 0–0.5)
DEPRECATED RDW RBC AUTO: 48.8 FL (ref 37–54)
EOSINOPHIL # BLD AUTO: 0.06 10*3/MM3 (ref 0–0.4)
EOSINOPHIL NFR BLD AUTO: 0.4 % (ref 0.3–6.2)
ERYTHROCYTE [DISTWIDTH] IN BLOOD BY AUTOMATED COUNT: 15.6 % (ref 12.3–15.4)
ERYTHROCYTE [SEDIMENTATION RATE] IN BLOOD: 34 MM/HR (ref 0–15)
GFR SERPL CREATININE-BSD FRML MDRD: 76 ML/MIN/1.73
GLUCOSE BLD-MCNC: 112 MG/DL (ref 65–99)
GLUCOSE BLDC GLUCOMTR-MCNC: 131 MG/DL (ref 70–130)
HCT VFR BLD AUTO: 26 % (ref 37.5–51)
HGB BLD-MCNC: 8.4 G/DL (ref 13–17.7)
IMM GRANULOCYTES # BLD AUTO: 0.15 10*3/MM3 (ref 0–0.05)
IMM GRANULOCYTES NFR BLD AUTO: 1 % (ref 0–0.5)
INR PPP: 1.08 (ref 0.91–1.09)
LYMPHOCYTES # BLD AUTO: 0.72 10*3/MM3 (ref 0.7–3.1)
LYMPHOCYTES NFR BLD AUTO: 4.8 % (ref 19.6–45.3)
MCH RBC QN AUTO: 28.1 PG (ref 26.6–33)
MCHC RBC AUTO-ENTMCNC: 32.3 G/DL (ref 31.5–35.7)
MCV RBC AUTO: 87 FL (ref 79–97)
MONOCYTES # BLD AUTO: 1.46 10*3/MM3 (ref 0.1–0.9)
MONOCYTES NFR BLD AUTO: 9.7 % (ref 5–12)
NEUTROPHILS # BLD AUTO: 12.56 10*3/MM3 (ref 1.7–7)
NEUTROPHILS NFR BLD AUTO: 83.9 % (ref 42.7–76)
NRBC BLD AUTO-RTO: 0 /100 WBC (ref 0–0.2)
NT-PROBNP SERPL-MCNC: 164 PG/ML (ref 5–900)
PLATELET # BLD AUTO: 314 10*3/MM3 (ref 140–450)
PMV BLD AUTO: 10.7 FL (ref 6–12)
POTASSIUM BLD-SCNC: 3.5 MMOL/L (ref 3.5–5.2)
PROTHROMBIN TIME: 14.3 SECONDS (ref 11.9–14.6)
RBC # BLD AUTO: 2.99 10*6/MM3 (ref 4.14–5.8)
SODIUM BLD-SCNC: 134 MMOL/L (ref 136–145)
UNIT  ABO: NORMAL
UNIT  RH: NORMAL
WBC NRBC COR # BLD: 14.98 10*3/MM3 (ref 3.4–10.8)

## 2020-02-17 PROCEDURE — 80048 BASIC METABOLIC PNL TOTAL CA: CPT | Performed by: INTERNAL MEDICINE

## 2020-02-17 PROCEDURE — 85651 RBC SED RATE NONAUTOMATED: CPT | Performed by: INTERNAL MEDICINE

## 2020-02-17 PROCEDURE — 25010000002 MORPHINE SULFATE (PF) 2 MG/ML SOLUTION: Performed by: SPECIALIST

## 2020-02-17 PROCEDURE — 83880 ASSAY OF NATRIURETIC PEPTIDE: CPT | Performed by: INTERNAL MEDICINE

## 2020-02-17 PROCEDURE — 85025 COMPLETE CBC W/AUTO DIFF WBC: CPT | Performed by: INTERNAL MEDICINE

## 2020-02-17 PROCEDURE — 99233 SBSQ HOSP IP/OBS HIGH 50: CPT | Performed by: INTERNAL MEDICINE

## 2020-02-17 PROCEDURE — 86140 C-REACTIVE PROTEIN: CPT | Performed by: INTERNAL MEDICINE

## 2020-02-17 PROCEDURE — 85610 PROTHROMBIN TIME: CPT | Performed by: INTERNAL MEDICINE

## 2020-02-17 PROCEDURE — 82962 GLUCOSE BLOOD TEST: CPT

## 2020-02-17 NOTE — PROGRESS NOTES
Adult Nutrition  Assessment/PES    Patient Name:  Shawn Turner  YOB: 1950  MRN: [id number]  Admit Date:  1/15/2020    Assessment Date:  2/17/2020    Comments:  Pt continues on [facility] vent at this time, re-assessed nutritional needs based on current vent settings. Current TF regimen is appropriate for meeting nutritional needs. Will continue to follow.     Reason for Assessment     Row Name 02/17/20 1242          Reason for Assessment    Reason For Assessment  follow-up protocol         Nutrition/Diet History     Row Name 02/17/20 1243          Nutrition/Diet History    Typical Food/Fluid Intake  TF running at goal rate. Pt has been tolerating TF well. Pt is noted to be minimally responsive.          Anthropometrics     Row Name 02/17/20 1245          Usual Body Weight (UBW)    Weight Loss Time Frame  current wt--111.8#; 13# loss compared to admission wt         Body Mass Index (BMI)    BMI Assessment  BMI 17-18.4: protein-energy malnutrition grade I         Labs/Tests/Procedures/Meds     Row Name 02/17/20 1245          Labs/Procedures/Meds    Lab Results Reviewed  reviewed, pertinent     Lab Results Comments  glucose; BUN; crp; wbc; Na        Diagnostic Tests/Procedures    Diagnostic Test/Procedure Reviewed  reviewed, pertinent        Medications    Pertinent Medications Reviewed  reviewed, pertinent     Pertinent Medications Comments  cardizem; depakote; pepcid; namenda; MVI; florastor         Physical Findings     Row Name 02/17/20 1250          Physical Findings    Overall Physical Appearance  on ventilator support trach     Gastrointestinal  feeding tube     Tubes  PEG     Skin  other (see comments) marilou Garcia         Estimated/Assessed Needs     Row Name 02/17/20 1327          Calculation Measurements    Weight Used For Calculations  50.7 kg (111 lb 12.8 oz)        Estimated/Assessed Needs    Additional Documentation  Calorie Requirements (Group);Fluid Requirements (Group);DoniphanSt. Sanchez  Equation (Group);[place] Equation (Group);Protein Requirements (Group)        Calorie Requirements    Estimated Calorie Requirement (kcal/day)  1449     Estimated Calorie Need Method  Sharon-St Jeor;[place]     Estimated Calorie Requirement Comment  [id number]        Sharon-St. Jeor Equation    RMR (Sharon-St. Jeor Equation)  1214.87        [place] Equation    Ve (L/Min) for [place] Equation Calculation  11.8        Protein Requirements    Weight Used For Protein Calculations  50.7 kg (111 lb 12.8 oz)     Est Protein Requirement Amount (gms/kg)  1.4 gm protein     Estimated Protein Requirements (gms/day)  71        Fluid Requirements    Estimated Fluid Requirement Method  RDA Method     Lucy-Issa Method (over 20 kg)  2514.24         Nutrition Prescription Ordered     Row Name 02/17/20 1332 02/17/20 1250       Nutrition Prescription PO    Current PO Diet  NPO  NPO       Nutrition Prescription EN    Enteral Route  PEG  PEG    Product  Nutren 2.0 (TwoCal)  Nutren 2.0 (TwoCal)    TF Delivery Method  Continuous  Continuous    Continuous TF Goal Rate (mL/hr)  40 mL/hr  40 mL/hr    Continuous TF Current Rate (mL/hr)  40 mL/hr  40 mL/hr    Continuous TF Goal Volume (mL)  880 mL  880 mL    Water flush (mL)   45 mL  45 mL    Water Flush Frequency  Per hour  Per hour        Evaluation of Received Nutrient/Fluid Intake     Row Name 02/17/20 1251          Nutrient/Fluid Evaluation    Number of Days Evaluated  3 days     Additional Documentation  Fluid Intake Evaluation (Group);Calories Evaluation (Group);Intake Assessment (Group);Protein Evaluation (Group)        Calories Evaluation    Enteral Calories (kcal)  1830     Total Calories (kcal/kg)  35        Protein Evaluation    Enteral Protein (gm)  77     Total Protein (gm/kg)  1.47        Intake Assessment    Energy/Calorie Requirement Assessment  meeting needs     Protein Requirement Assessment  meeting needs     Fluid Requirement Assessment  meeting needs      Average Calorie Intake (days)  3     Average Protein Intake (days)  3     Tolerance  tolerating        Fluid Intake Evaluation    Free Water Flush Fluid (mL)  1146     IV Fluid (mL)  509        EN Evaluation    Number of Days EN Intake Evaluated  3 days     EN Average Volume Delivered (mL/day)  915 mL/day     % Goal Volume   104 %     HOB  Greater than or equal to 30 degress               Problem/Interventions:  Problem 1     Row Name 02/17/20 1252          Nutrition Diagnoses Problem 1    Problem 1  Needs Alternate Route     Etiology (related to)  Medical Diagnosis     Pulmonary/Critical Care  A/C respiratory failure;Hypercapnea;COPD;Pulmonary emboli;Ventilator     Signs/Symptoms (evidenced by)  NPO;EN Intake Delivery     Percent (%) of EN goal  104 %               Intervention Goal     Row Name 02/17/20 1253          Intervention Goal    General  Nutrition support treatment;Reduce/improve symptoms;Meet nutritional needs for age/condition;Disease management/therapy     TF/PN  Maintain TF/PN;Deliver (%) goal;Deliver estimated need (%)     Deliver % of Goal  100 %     Deliver % of Estimated Need  100 %     Transition  TF to PO     Weight  Maintain weight         Nutrition Intervention     Row Name 02/17/20 1253          Nutrition Intervention    RD/Tech Action  Follow Tx progress;Care plan reviewd           Education/Evaluation     Row Name 02/17/20 1253          Education    Education  No discharge needs identified at this time        Monitor/Evaluation    Monitor  Per protocol           Electronically signed by:  Vinita Sultana  02/17/20 1:33 PM

## 2020-02-17 NOTE — PROGRESS NOTES
PULMONARY AND CRITICAL CARE PROGRESS NOTE - [facility]  Patient: Shawn Turner    1950   [id number]  02/17/20   8:24 AM  Referring Provider: Bautista Alcantar MD  Chief Complaint: Vent management  Interval history: Patient remains ventilator dependent.  He has his eyes open however he is not following commands.  He is moving all extremities.  He is tachypneic which is not new for him.  He received a unit of blood yesterday.  Hemoglobin improved.  No chest x-ray or ABG for review.  Saturation is 100%.  No family present.  Review of Systems: Review of Systems   Cannot obtain due to mechanical ventilation.  The patient notably is critically ill and connected to a ventilator.  As such patient cannot communicate and provide any history whatsoever, including any history of present illness or interval history since arrival or review of systems. The interested reviewer may note this fact, as an attempt has been made at collecting and documenting these portions of the patient history, but this information is unobtainable despite attempted review and therefore cannot be documented at this time.   Physical Exam:  SIMV, rate 8, tidal volume 550, pressure support 18, PEEP of 5, FiO2 0.30  Vital signs: T: Temperature 98.0   BP: 146/70   p: 94   R: 42   sat: 100 end-tidal 27  Physical Exam:  Constitutional: He appears well-nourished. He appears cachectic. He has a sickly appearance. No distress. He is intubated and restrained.   HENT:   Head: Normocephalic.   Nose: Nose normal.   Feeding tube-nutrition infusing    Eyes: Pupils are equal, round, and reactive to light. No scleral icterus.   Neck:   Tracheostomy to vent   Cardiovascular: Normal rate and regular rhythm.   No murmur heard.  Pulmonary/Chest: Effort normal. No accessory muscle usage. He is intubated. No respiratory distress. He has no rhonchi. He has no rales.   Abdominal: Soft. He exhibits no distension.   Binder in place.   G-tube in  place   Genitourinary:   Genitourinary Comments: Judd catheter   Musculoskeletal: He exhibits no edema.   Skin: Skin is warm and dry. No rash noted. He is not diaphoretic. No erythema.   Psychiatric: His mood appears not anxious. He is slowed. He is noncommunicative.   Nursing note and vitals reviewed.  Results from last 7 days   Lab Units 02/17/20  0534 02/16/20  0428 02/15/20  1537  02/13/20  0454   WBC 10*3/mm3 14.98*  --   --   --  12.05*   HEMOGLOBIN g/dL 8.4* 7.5* 7.2*   < > 7.5*   PLATELETS 10*3/mm3 314  --   --   --  324    < > = values in this interval not displayed.     Results from last 7 days   Lab Units 02/17/20  0534 02/13/20  0454   SODIUM mmol/L 134* 135*   POTASSIUM mmol/L 3.5 3.6   BUN mg/dL 39* 38*   CREATININE mg/dL 0.98 1.05         No results found for: BLOODCX, URINECX, RESPCX  Recent films:  No radiology results for the last day  Films reviewed personally by me.  My interpretation: None today  Pulmonary Assessment:  1. Respiratory failure requiring mechanical ventilation which appears to be chronic at this point  2. 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 Pulmonary embolism, right treated and stable  4. COPD chronic and compensated with current medications  5. Dementia unimproved, chronic, unlikely to improve  6. Anemia  Recommend:   · No ventilator adjustments made today.  Not ready for liberation.  Would benefit from from transfer to a full-time vent/neuropsychiatric facility  · Follow-up chest x-ray and ABG tomorrow  · Nutrition  · PT/OT efforts  · Stress ulcer and DVT prophylaxis  · Bronchodilators  Electronically signed by MATT Arzate on 2/17/2020 at 8:24 AM    Physician substantive portion:  Patient is without much change.  He is extremely tachypneic and has a high minute ventilation of nearly 20 L/min right now.  He is encephalopathic.  Exam shows to have tachypnea and coarse breath sounds.  He has had to get medications to buffer his pH.  We will get another blood  gas and will get a follow-up chest x-ray.  He appears ventilator dependent at this point.    I have seen and examined patient personally, performing a face-to-face diagnostic evaluation with plan of care reviewed and developed with APRN and nursing staff. I have addended and/or modified the above history of present illness, physical examination, and assessment and plan to reflect my findings and impressions. Essential elements of the care plan were discussed with APRN above.  Agree with findings and assessment/plan as documented above.    Electronically signed by Manuel Hensley MD, on 2/17/2020, 9:01 AM

## 2020-02-18 ENCOUNTER — APPOINTMENT (OUTPATIENT)
Dept: GENERAL RADIOLOGY | Facility: HOSPITAL | Age: 70
End: 2020-02-18

## 2020-02-18 LAB
BASOPHILS # BLD AUTO: 0.03 10*3/MM3 (ref 0–0.2)
BASOPHILS NFR BLD AUTO: 0.3 % (ref 0–1.5)
DEPRECATED RDW RBC AUTO: 50.4 FL (ref 37–54)
EOSINOPHIL # BLD AUTO: 0.1 10*3/MM3 (ref 0–0.4)
EOSINOPHIL NFR BLD AUTO: 0.8 % (ref 0.3–6.2)
ERYTHROCYTE [DISTWIDTH] IN BLOOD BY AUTOMATED COUNT: 15.9 % (ref 12.3–15.4)
GLUCOSE BLDC GLUCOMTR-MCNC: 138 MG/DL (ref 70–130)
HCT VFR BLD AUTO: 22.9 % (ref 37.5–51)
HCT VFR BLD AUTO: 23.9 % (ref 37.5–51)
HGB BLD-MCNC: 7.3 G/DL (ref 13–17.7)
HGB BLD-MCNC: 8 G/DL (ref 13–17.7)
IMM GRANULOCYTES # BLD AUTO: 0.11 10*3/MM3 (ref 0–0.05)
IMM GRANULOCYTES NFR BLD AUTO: 0.9 % (ref 0–0.5)
LYMPHOCYTES # BLD AUTO: 1.05 10*3/MM3 (ref 0.7–3.1)
LYMPHOCYTES NFR BLD AUTO: 8.8 % (ref 19.6–45.3)
MCH RBC QN AUTO: 28 PG (ref 26.6–33)
MCHC RBC AUTO-ENTMCNC: 31.9 G/DL (ref 31.5–35.7)
MCV RBC AUTO: 87.7 FL (ref 79–97)
MONOCYTES # BLD AUTO: 1.09 10*3/MM3 (ref 0.1–0.9)
MONOCYTES NFR BLD AUTO: 9.2 % (ref 5–12)
NEUTROPHILS # BLD AUTO: 9.52 10*3/MM3 (ref 1.7–7)
NEUTROPHILS NFR BLD AUTO: 80 % (ref 42.7–76)
NRBC BLD AUTO-RTO: 0 /100 WBC (ref 0–0.2)
PLATELET # BLD AUTO: 268 10*3/MM3 (ref 140–450)
PMV BLD AUTO: 10.2 FL (ref 6–12)
RBC # BLD AUTO: 2.61 10*6/MM3 (ref 4.14–5.8)
WBC NRBC COR # BLD: 11.9 10*3/MM3 (ref 3.4–10.8)

## 2020-02-18 PROCEDURE — 99233 SBSQ HOSP IP/OBS HIGH 50: CPT | Performed by: INTERNAL MEDICINE

## 2020-02-18 PROCEDURE — 31502 CHANGE OF WINDPIPE AIRWAY: CPT | Performed by: OTOLARYNGOLOGY

## 2020-02-18 PROCEDURE — 82962 GLUCOSE BLOOD TEST: CPT

## 2020-02-18 PROCEDURE — 86900 BLOOD TYPING SEROLOGIC ABO: CPT

## 2020-02-18 PROCEDURE — 31615 TRCHEOBRNCHSC EST TRACHS INC: CPT | Performed by: OTOLARYNGOLOGY

## 2020-02-18 PROCEDURE — 31575 DIAGNOSTIC LARYNGOSCOPY: CPT | Performed by: OTOLARYNGOLOGY

## 2020-02-18 PROCEDURE — 85014 HEMATOCRIT: CPT | Performed by: INTERNAL MEDICINE

## 2020-02-18 PROCEDURE — 85025 COMPLETE CBC W/AUTO DIFF WBC: CPT | Performed by: INTERNAL MEDICINE

## 2020-02-18 PROCEDURE — 99232 SBSQ HOSP IP/OBS MODERATE 35: CPT | Performed by: OTOLARYNGOLOGY

## 2020-02-18 PROCEDURE — 74018 RADEX ABDOMEN 1 VIEW: CPT

## 2020-02-18 PROCEDURE — P9016 RBC LEUKOCYTES REDUCED: HCPCS

## 2020-02-18 PROCEDURE — 71045 X-RAY EXAM CHEST 1 VIEW: CPT

## 2020-02-18 PROCEDURE — 85018 HEMOGLOBIN: CPT | Performed by: INTERNAL MEDICINE

## 2020-02-18 NOTE — PROGRESS NOTES
MICHELLE Chavez APRN        Internal Medicine Progress Note    2/18/2020   11:26 AM    Name:  Shawn Turner  MRN:    [id number]     Acct:     [id number]   Room:  [address] [place] [address] Day: 0     Admit Date: 1/15/2020 10:17 AM  PCP: Tj Wilson MD    Subjective:     C/C: need for continued ventilator weaning    Interval History: Status: Remains the same. Resting in bed. No family at bedside. Tolerating current vent settings (SIMV). Minimal response to stimulation. Opens left eye. Otherwise, nonverbal and not following commands. Counts stable. Discharge planning underway for transfer to long term vent facility. Has developed hemoptysis with clots. Hgb down to 7.3.   sReview of Systems   Unable to perform ROS: patient nonverbal       Medications:     Allergies:   Allergies   Allergen Reactions   • Aspirin        Current Meds:   Current Facility-Administered Medications:   •  acetaminophen (TYLENOL) 160 MG/5ML solution 650 mg, 650 mg, Per G Tube, Q4H PRN, Bautista Alcantar MD  •  acetaminophen (TYLENOL) suppository 650 mg, 650 mg, Rectal, Q6H PRN, Selina Augustine MD  •  acetaZOLAMIDE (DIAMOX) tablet 125 mg, 125 mg, Per G Tube, BID, Blanca Islas, MATT  •  albuterol (PROVENTIL) nebulizer solution 0.083% 2.5 mg/3mL, 2.5 mg, Nebulization, Q2H PRN, Selina Augustine MD  •  bacitracin ointment, , Topical, Q12H, Selina Augustine MD  •  bacitracin ointment, , Topical, PRN, Selina Augustine MD  •  bacitracin ointment, , Topical, PRN, Selina Augustine MD  •  bacitracin ointment, , Topical, Q12H, Selina Augustine MD  •  bacitracin ointment, , Topical, Q24H, Sherry Jennings APRN  •  dilTIAZem (CARDIZEM) injection 5 mg, 5 mg, Intravenous, Q6H PRN, Selina Augustine MD  •  dilTIAZem (CARDIZEM) tablet 30 mg, 30 mg, Per G Tube, Q6H, Bautista Alcantar MD  •  Divalproex Sodium (DEPAKOTE SPRINKLE) capsule 125 mg, 125 mg, Per G Tube, Q12H, Linda Landa, MATT  •   donepezil (ARICEPT) tablet 10 mg, 10 mg, Per G Tube, Nightly, Bautista Alcantar MD  •  famotidine (PEPCID) tablet 20 mg, 20 mg, Per G Tube, Q12H, Linda Landa APRN  •  ferrous sulfate tablet 325 mg, 325 mg, Oral, Daily With Breakfast, Bautista Alcantar MD  •  haloperidol lactate (HALDOL) injection 1 mg, 1 mg, Intramuscular, Q4H PRN, Selina Augustine MD  •  hydrALAZINE (APRESOLINE) injection 5 mg, 5 mg, Intravenous, Q6H PRN, Selina Augustine MD  •  hypromellose (ISOPTO TEARS) 0.5 % ophthalmic solution 2 drop, 2 drop, Both Eyes, Q2H PRN, Selina Augustine MD  •  ipratropium (ATROVENT) nebulizer solution 0.5 mg, 0.5 mg, Nebulization, Q6H PRN, Selina Augustine MD  •  ipratropium-albuterol (DUO-NEB) nebulizer solution 3 mL, 3 mL, Nebulization, Q6H - RT, Selina Augustine MD  •  levothyroxine (SYNTHROID, LEVOTHROID) tablet 75 mcg, 75 mcg, Per G Tube, Q AM, Bautista Alcantar MD  •  lisinopril (PRINIVIL,ZESTRIL) tablet 20 mg, 20 mg, Per G Tube, Nightly, Blanca Islas APRN  •  melatonin tablet 9.75 mg, 9.75 mg, Per G Tube, Nightly, Bautista Alcantar MD  •  memantine (NAMENDA) tablet 10 mg, 10 mg, Per G Tube, Q12H, Bautista Alcantar MD  •  midazolam (VERSED) injection 2 mg, 2 mg, Intravenous, Q1H PRN, Selina Augustine MD  •  Morphine sulfate (PF) injection 2 mg, 2 mg, Intravenous, Q1H PRN, Selina Augustine MD  •  multivitamin w/minerals tablet 1 tablet, 1 tablet, Oral, Daily, Bautista Alcantar MD  •  norepinephrine (LEVOPHED) 16,000 mcg in sodium chloride 0.9 % 250 mL (64 mcg/mL) infusion, 0.02-0.3 mcg/kg/min, Intravenous, Titrated, Bautista Alcantar MD  •  polyethylene glycol 3350 powder (packet), 17 g, Per G Tube, Q12H PRN, Bautista Alcantar MD  •  Potassium Phosphates 9 mmol in sodium chloride 0.9 % 100 mL infusion, 9 mmol, Intravenous, Once, Blanca Islas, MATT  •  risperiDONE (risperDAL) tablet 0.75 mg, 0.75 mg, Per G Tube, TID, Bautista Alcantar MD  •   rivaroxaban (XARELTO) tablet 20 mg, 20 mg, Per G Tube, Daily With Dinner, Blanca Islas APRN  •  saccharomyces boulardii (FLORASTOR) capsule 250 mg, 250 mg, Per G Tube, Daily, Bautista Alcantar MD  •  sodium chloride 0.9 % flush 10 mL, 10 mL, Intravenous, Q12H, Selina Augustine MD  •  tiZANidine (ZANAFLEX) tablet 4 mg, 4 mg, Per G Tube, Q8H, Bautista Alcantar MD  •  traZODone (DESYREL) tablet 50 mg, 50 mg, Per G Tube, Nightly, Bautista Alcantar MD  •  Vortioxetine HBr tablet 20 mg, 20 mg, Per G Tube, Daily, Bautista Alcantar MD    Data:     Code Status:    There are no questions and answers to display.       No family history on file.    Social History     Socioeconomic History   • Marital status:      Spouse name: Not on file   • Number of children: Not on file   • Years of education: Not on file   • Highest education level: Not on file   Tobacco Use   • Smoking status: Current Every Day Smoker   Substance and Sexual Activity   • Alcohol use: No   • Drug use: Defer       Vitals:  T 97.9 P 76 R 10 /71 Sp02 99% (vent)      I/O (24Hr):  No intake or output data in the 24 hours ending 02/18/20 1126    Labs and imaging:      Recent Results (from the past 12 hour(s))   CBC Auto Differential    Collection Time: 02/18/20  5:11 AM   Result Value Ref Range    WBC 11.90 (H) 3.40 - 10.80 10*3/mm3    RBC 2.61 (L) 4.14 - 5.80 10*6/mm3    Hemoglobin 7.3 (L) 13.0 - 17.7 g/dL    Hematocrit 22.9 (L) 37.5 - 51.0 %    MCV 87.7 79.0 - 97.0 fL    MCH 28.0 26.6 - 33.0 pg    MCHC 31.9 31.5 - 35.7 g/dL    RDW 15.9 (H) 12.3 - 15.4 %    RDW-SD 50.4 37.0 - 54.0 fl    MPV 10.2 6.0 - 12.0 fL    Platelets 268 140 - 450 10*3/mm3    Neutrophil % 80.0 (H) 42.7 - 76.0 %    Lymphocyte % 8.8 (L) 19.6 - 45.3 %    Monocyte % 9.2 5.0 - 12.0 %    Eosinophil % 0.8 0.3 - 6.2 %    Basophil % 0.3 0.0 - 1.5 %    Immature Grans % 0.9 (H) 0.0 - 0.5 %    Neutrophils, Absolute 9.52 (H) 1.70 - 7.00 10*3/mm3    Lymphocytes,  Absolute 1.05 0.70 - 3.10 10*3/mm3    Monocytes, Absolute 1.09 (H) 0.10 - 0.90 10*3/mm3    Eosinophils, Absolute 0.10 0.00 - 0.40 10*3/mm3    Basophils, Absolute 0.03 0.00 - 0.20 10*3/mm3    Immature Grans, Absolute 0.11 (H) 0.00 - 0.05 10*3/mm3    nRBC 0.0 0.0 - 0.2 /100 WBC       Physical Examination:        Physical Exam   Constitutional: He appears well-nourished. He appears cachectic. He is restrained.   HENT:   Head: Normocephalic and atraumatic.   Nose: Nose normal.   Mouth/Throat: Oropharynx is clear and moist.   Eyes: Pupils are equal, round, and reactive to light. EOM are normal.   Neck: Normal range of motion. Neck supple.   Trach to vent   Cardiovascular: Normal rate, regular rhythm, normal heart sounds and intact distal pulses.   Pulmonary/Chest: Effort normal and breath sounds normal.   Abdominal: Soft. Bowel sounds are normal.   g tube  Dressing c.d.i - scant green drainage around tube   Musculoskeletal: Normal range of motion.   Generalized weakness   Neurological: He is alert. He has normal reflexes.   Nonverbal  Confused   Skin: Skin is warm and dry.   Psychiatric: He has a normal mood and affect. His behavior is normal.   Anxious at times   Nursing note and vitals reviewed.        Assessment:             Acute tracheostomy management (CMS/[facility])    Acute respiratory failure with hypoxia and hypercapnia (CMS/[facility])    Ventilator dependence (CMS/[facility])    Past Medical History:   Diagnosis Date   • Anxiety disorder    • Cerumen impaction    • COPD (chronic obstructive pulmonary disease) (CMS/[facility])    • Depression    • Hypertension    • Hypothyroidism    • Kidney disease    • Osteoarthritis    • Sinusitis, chronic         Plan:        1. Acute on chronic hypoxic/hypercapneic respiratory failure  2. RUL pulmonary embolus  3. Paroxysmal atrial fibrillation  4. Hypothyroidism  5. Chronic obstructive pulmonary disease  6. Anxiety disorder  7. Chronic kidney disease  8. Multifactorial anemia  9. Gastroesophageal  reflux disease  10. Dementia  11. Metabolic encephalopathy  12. Iron deficiency  13. Hypokalemia - improved  14. Moderate protein calorie malnutrition      Continue current treatment. Monitor counts. Increase activity. Labs Thursday. Continue vent weaning under direction of pulmonology. Aggressive therapies. Maintain patient safety. Continue nutrition support per AMONs.  Monitor renal function. Monitor I&O's closely. Transfuse today. Hold xarelto. ENT to eval.  Discharge planning.        Electronically signed by MATT Tolentino on 2/18/2020 at 11:26 AM     Patient's mental status is certainly very hard to gauge given the fact that he has underlying psychiatric disorders that have been partially treated along with dementia and acute illness which is been going on for quite some time.  It is certainly say that given his multiple comorbidities that his overlying prognosis is poor and given this that if the patient were to go through a resuscitative event he is overall prognosis and quality of life would certainly decline given this I feel that most reasonable people who have this list of comorbidities would want to be a DO NOT RESUSCITATE given the fact that the chance of survival to any meaningful prognosis would be extremely minimal.    I have discussed the care of Shawn Turner, including pertinent history and exam findings, with the nurse practitioner.    I have seen and examined the patient and the key elements of all parts of the encounter have been performed by me.  I agree with the assessment, plan and orders as documented by MATT Herrera, after I modified the exam findings and the plan of treatments and the final version is my approved version of the assessment.        Electronically signed by Bautista Alcantar MD on 2/18/2020 at 6:22 PM

## 2020-02-18 NOTE — PROGRESS NOTES
Rashid Bowen Jr, MD     OTOLARYNGOLOGY, HEAD AND NECK SURGERY PROGRESS NOTE   Referring Provider: Bautista Alcantar MD  Reason for Consultation: Trach management  Location: 586/1  Accompanied by: respiratory  Chief complaint: No chief complaint on file.    Subjective    Interval History:   Since last examined, Shawn Turner has had blood clots coming from trach.  Respiratory requests trach evaluation for bleeding. No bleeding from nose or oral cavity.  Patient seen. Chart reviewed.  Review of Systems:   ROS master CMN: Unobtainable because:   Mechanical ventilation, Trach and Mental staus non-responsive      Objective    Vital Signs     EXAMINATION:   CONSTITUTIONAL:    well nourished, well-developed, alert, oriented, in no acute distress   well developed  Trach present, on vent  Deflects my headlight with hands     BODY HABITUS:    Normal body habitus     COMMUNICATION:    non-verbal     HEAD:     Normocephalic, without obvious abnormality, atraumatic     FACE:    structure normal, no tenderness present, no lesions/masses, no evidence of trauma     SALIVARY GLANDS:    parotid glands with no tenderness, no swelling, no masses, submandibular glands with normal size, nontender      EYE:    eyelids normal, orbits normal, no proptosis, conjunctiva clear, sclera non-icteric, pupils equal, round, reactive to light and accomodation  Color:   brown     HEARING:    not evaluated     NOSE EXTERNAL:    APPEARANCE: normal, straight, with good projection, no tenderness, no lesions, no tenderness, good nasal support, patent nares     ORAL CAVITY:      LIPS:      structure normal, no tenderness on palpation, no lesions, no evidence of trauma, normal mobility and oral competence    TEETH:       edentulous:  upper and lower    GUMS:      gingivae healthy, no lesions    ORAL MUCOSA:       oral mucosa normal, no mucosal lesions    FLOOR OF MOUTH:       Warthin's duct patent, mucosa normal  TONGUE:       intact mucosa, mobility  not tested    PALATE:       hard palate with normal mucosa and structure      soft palate- low thick     OROPHARYNX:    Direct examination  oropharyngeal mucosa normal, tonsil(s) with normal appearance       NECK:     LARYNGEAL POSITION: normal  trach present     CHEST/RESPIRATORY:    Mechanical ventilation     CARDIOVASCULAR:    regular rate and rhythm, no murmurs, gallups, no peripheral edema     NEURO/PSYCHIATRIC :    Not follow commands, deflects exam, CN not well tested, grossly intact     TRACHEOSTOMY SITE:    Tracheostomy tube type: [facility] #8 cuffed DIC     Stoma: mildly granulated, more mature now    Secretions:   Date placed: 12/13/2020  Date last changed: 2/5/2020    Results Review:    Lab Results (last 24 hours)     Procedure Component Value Units Date/Time    CBC & Differential [[id number]] Collected:  02/18/20 0511    Specimen:  Blood Updated:  02/18/20 0535    Narrative:       The following orders were created for panel order CBC & Differential.  Procedure                               Abnormality         Status                     ---------                               -----------         ------                     CBC Auto Differential[[id number]]        Abnormal            Final result                 Please view results for these tests on the individual orders.    CBC Auto Differential [[id number]]  (Abnormal) Collected:  02/18/20 0511    Specimen:  Blood Updated:  02/18/20 0535     WBC 11.90 10*3/mm3      RBC 2.61 10*6/mm3      Hemoglobin 7.3 g/dL      Hematocrit 22.9 %      MCV 87.7 fL      MCH 28.0 pg      MCHC 31.9 g/dL      RDW 15.9 %      RDW-SD 50.4 fl      MPV 10.2 fL      Platelets 268 10*3/mm3      Neutrophil % 80.0 %      Lymphocyte % 8.8 %      Monocyte % 9.2 %      Eosinophil % 0.8 %      Basophil % 0.3 %      Immature Grans % 0.9 %      Neutrophils, Absolute 9.52 10*3/mm3      Lymphocytes, Absolute 1.05 10*3/mm3      Monocytes, Absolute 1.09 10*3/mm3      Eosinophils, Absolute 0.10  10*3/mm3      Basophils, Absolute 0.03 10*3/mm3      Immature Grans, Absolute 0.11 10*3/mm3      nRBC 0.0 /100 WBC         Imaging Results (Last 24 Hours)     Procedure Component Value Units Date/Time    XR Chest 1 View [[id number]] Collected:  02/18/20 0703     Updated:  02/18/20 0707    Narrative:       EXAMINATION: Chest 1 view 02/18/2020     HISTORY: Acute respiratory failure     FINDINGS: Today's exam is compared to previous study one week earlier. A  tracheostomy tube remains in place. There has been development of a  patchy left basilar infiltrate. Lungs are otherwise clear. There is no  effusion or free air present.       Impression:       . Patchy left basilar infiltrate.  This report was finalized on 02/18/2020 07:04 by Dr. Shabbir Moreno MD.          Medications, Allergies and Past History Reviewed      Assessment    ENT ASSESSMENT:     Acute tracheostomy management (CMS/HCC)    Acute respiratory failure with hypoxia and hypercapnia (CMS/HCC)    Ventilator dependence (CMS/HCC)         Plan    PLAN:   Patient has in-dwelling trach. I see no vascular lesions or site of bleeding in trachea, OP, nose or MSB areas.  I will follow closely and re-inspect as neeeded.  He will continue with [facility] #8 trach for chronic ventilation with pulm hygiene.    Following patient as in-patient. Further recommendations will be made based on serial examinations.    Medications/Orders for this encounter: No new medications ordered.  No New orders written.         Rashid Bowen Jr, MD  02/18/20  5:15 PM

## 2020-02-18 NOTE — PROGRESS NOTES
PULMONARY AND CRITICAL CARE PROGRESS NOTE - [facility]  Patient: Shawn Turner    1950   [id number]  02/18/20   8:55 AM  Referring Provider: Bautista Alcantar MD  Chief Complaint: Vent management  Interval history: Patient remains ventilator dependent.  He opens his eyes to voice and moves all extremities however he does not follow commands.  He is receiving nutrition via tube feeds.  He is tachypneic.  Some ventilator adjustments made which helped some although not significantly.  Unit coordinator indicates he was evaluated by the [facility]  as he is a dominguez of the [facility] for approval of a possible transfer to a longer-term vent facility.  They recommended he not be transferred and to pursue comfort measures.  However this has to be signed by 2 physicians who are caring for him.  No family present.  Review of Systems: Review of Systems   Cannot obtain due to mechanical ventilation.  The patient notably is critically ill and connected to a ventilator.  As such patient cannot communicate and provide any history whatsoever, including any history of present illness or interval history since arrival or review of systems. The interested reviewer may note this fact, as an attempt has been made at collecting and documenting these portions of the patient history, but this information is unobtainable despite attempted review and therefore cannot be documented at this time.   Physical Exam:  SIMV, rate 8, tidal volume 550, pressure support 18, PEEP of 5, FiO2 0.30  Vital signs: T: Unavailable, blood pressure 140/67, pulse 88, respirations 34, end-tidal 18, saturation 100%   Physical Exam:  Constitutional: He appears well-nourished. He appears cachectic. He has a sickly appearance. No distress. He is intubated and restrained.   HENT:   Head: Normocephalic.   Nose: Nose normal.   Feeding tube-nutrition infusing    Eyes: Pupils are equal, round, and reactive to light. No scleral  icterus.   Neck:   Tracheostomy to vent   Cardiovascular: Normal rate and regular rhythm.   No murmur heard.  Pulmonary/Chest: Effort normal. No accessory muscle usage. He is intubated. No respiratory distress. He has no rhonchi. He has no rales.  Tachypnea and dyssynchrony  Abdominal: Soft. He exhibits no distension.   Binder in place.   G-tube in place   Genitourinary:   Genitourinary Comments: Judd catheter   Musculoskeletal: He exhibits no edema.   Skin: Skin is warm and dry. No rash noted. He is not diaphoretic. No erythema.   Psychiatric: His mood appears not anxious. He is slowed. He is noncommunicative, does open eyes to voice and look at you, does not follow commands   Nursing note and vitals reviewed.  Results from last 7 days   Lab Units 02/18/20  0511 02/17/20  0534 02/16/20  0428  02/13/20  0454   WBC 10*3/mm3 11.90* 14.98*  --   --  12.05*   HEMOGLOBIN g/dL 7.3* 8.4* 7.5*   < > 7.5*   PLATELETS 10*3/mm3 268 314  --   --  324    < > = values in this interval not displayed.     Results from last 7 days   Lab Units 02/17/20  0534 02/13/20  0454   SODIUM mmol/L 134* 135*   POTASSIUM mmol/L 3.5 3.6   BUN mg/dL 39* 38*   CREATININE mg/dL 0.98 1.05         No results found for: BLOODCX, URINECX, RESPCX  Recent films:  Xr Chest 1 View    Result Date: 2/18/2020  . Patchy left basilar infiltrate. This report was finalized on 02/18/2020 07:04 by Dr. Shabbir Moreno MD.  Films reviewed personally by me.  My interpretation: Tracheostomy in good position, chronic hyperinflation and interstitial changes, minimal left lower lobe atelectasis  Pulmonary Assessment:  1. Respiratory failure requiring mechanical ventilation which appears to be chronic at this point  2. 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 Pulmonary embolism, right treated and stable  4. COPD chronic and compensated with current medications  5. Dementia unimproved, chronic, unlikely to improve  6. Anemia  Recommend:   · SIMV breaths increased  to 24, change to square wave.   · Not ready for liberation.  Would benefit from from transfer to a full-time vent/neuropsychiatric facility.  State guardian has recommended comfort care versus transfer to another facility however they would like his physicians are caring for him to agree and signed a form.  Patient's continued ventilatory need felt to be secondary to underlying neuropsychiatric issues not acute pulmonary complaints.  Dr. Hensley is not agreeable to signing form agreeing with comfort measure until he is received evaluation from a geriatric psychiatrist.  · Nutrition  · PT/OT efforts  · Stress ulcer and DVT prophylaxis  · Bronchodilators  Electronically signed by MATT Arzate on 2/18/2020 at 8:55 AM    Physician substantive portion:  We received communication from the state indicating that withdrawal of support would be preferable.  I have a difficult time accepting that notion in a patient who is medically stable, conscious, but ventilator dependent.  He remains tachypneic.  We have increased his SIMV rate up as his spontaneous tidal volumes are fairly low with him being so tachypneic and that has helped some with his respiratory rate.  Exam shows tachypnea and coarse breath sounds, no distress.    I have seen and examined patient personally, performing a face-to-face diagnostic evaluation with plan of care reviewed and developed with APRN and nursing staff. I have addended and/or modified the above history of present illness, physical examination, and assessment and plan to reflect my findings and impressions. Essential elements of the care plan were discussed with APRN above.  Agree with findings and assessment/plan as documented above.    Electronically signed by Manuel Hensley MD, on 2/18/2020, 10:48 AM

## 2020-02-19 LAB
ABO + RH BLD: NORMAL
ARTERIAL PATENCY WRIST A: POSITIVE
ATMOSPHERIC PRESS: 762 MMHG
BASE EXCESS BLDA CALC-SCNC: -3.2 MMOL/L (ref 0–2)
BDY SITE: ABNORMAL
BH BB BLOOD EXPIRATION DATE: NORMAL
BH BB BLOOD TYPE BARCODE: 5100
BH BB DISPENSE STATUS: NORMAL
BH BB PRODUCT CODE: NORMAL
BH BB UNIT NUMBER: NORMAL
BODY TEMPERATURE: 37 C
HCO3 BLDA-SCNC: 21.3 MMOL/L (ref 20–26)
HCT VFR BLD AUTO: 26.7 % (ref 37.5–51)
HGB BLD-MCNC: 8.7 G/DL (ref 13–17.7)
HOROWITZ INDEX BLD+IHG-RTO: 30 %
Lab: ABNORMAL
MODALITY: ABNORMAL
PCO2 BLDA: 35 MM HG (ref 35–45)
PEEP RESPIRATORY: 5 CM[H2O]
PH BLDA: 7.39 PH UNITS (ref 7.35–7.45)
PO2 BLDA: 79.4 MM HG (ref 83–108)
PSV: 18 CMH2O
SAO2 % BLDCOA: 96.5 % (ref 94–99)
SET MECH RESP RATE: 24
UNIT  ABO: NORMAL
UNIT  RH: NORMAL
VENTILATOR MODE: ABNORMAL
VT ON VENT VENT: 550 ML

## 2020-02-19 PROCEDURE — 25010000002 MORPHINE SULFATE (PF) 2 MG/ML SOLUTION: Performed by: SPECIALIST

## 2020-02-19 PROCEDURE — 99232 SBSQ HOSP IP/OBS MODERATE 35: CPT | Performed by: INTERNAL MEDICINE

## 2020-02-19 PROCEDURE — 85018 HEMOGLOBIN: CPT | Performed by: INTERNAL MEDICINE

## 2020-02-19 PROCEDURE — 85014 HEMATOCRIT: CPT | Performed by: INTERNAL MEDICINE

## 2020-02-19 PROCEDURE — 82803 BLOOD GASES ANY COMBINATION: CPT

## 2020-02-19 NOTE — PROGRESS NOTES
PULMONARY AND CRITICAL CARE PROGRESS NOTE - [facility]  Patient: Shawn Turner    1950   Acct# [id number]  02/19/20   8:37 AM  Referring Provider: Bautista Alcantar MD  Chief Complaint: Vent management  Interval history: Patient remains ventilator dependent.  He opens his eyes to voice and moves all extremities however he does not follow commands this morning.  Nursing indicates she gave him a dose of morphine to see if it would help with his tachypnea.  No new issues.  No family present.  Review of Systems: Review of Systems   Cannot obtain due to mechanical ventilation.  The patient notably is critically ill and connected to a ventilator.  As such patient cannot communicate and provide any history whatsoever, including any history of present illness or interval history since arrival or review of systems. The interested reviewer may note this fact, as an attempt has been made at collecting and documenting these portions of the patient history, but this information is unobtainable despite attempted review and therefore cannot be documented at this time.   Physical Exam:  SIMV, rate 24, tidal volume 550, pressure support 18, PEEP of 5, FiO2 0.30  Vital signs: T: Unavailable, blood pressure 140/67, pulse 88, respirations 34, end-tidal 18, saturation 100%   Physical Exam:  Constitutional: He appears well-nourished. He appears cachectic. He has a sickly appearance. No distress. He is intubated and restrained.   HENT:   Head: Normocephalic.   Nose: Nose normal.   Feeding tube-nutrition infusing    Eyes: Pupils are equal, round, and reactive to light. No scleral icterus.   Neck:   Tracheostomy to vent   Cardiovascular: Normal rate and regular rhythm.   No murmur heard.  Pulmonary/Chest: Effort normal. No accessory muscle usage. He is intubated. No respiratory distress. He has no rhonchi. He has no rales.  Tachypnea and dyssynchrony  Abdominal: Soft. He exhibits no distension.   Binder in  place.   G-tube in place   Genitourinary:   Genitourinary Comments: Judd catheter   Musculoskeletal: He exhibits no edema.   Skin: Skin is warm and dry. No rash noted. He is not diaphoretic. No erythema.   Psychiatric: His mood appears not anxious. He is slowed. He is noncommunicative, does open eyes to voice and look at you, does not follow commands   Nursing note and vitals reviewed.  Results from last 7 days   Lab Units 02/19/20  0509 02/18/20  1838 02/18/20  0511 02/17/20  0534  02/13/20  0454   WBC 10*3/mm3  --   --  11.90* 14.98*  --  12.05*   HEMOGLOBIN g/dL 8.7* 8.0* 7.3* 8.4*   < > 7.5*   PLATELETS 10*3/mm3  --   --  268 314  --  324    < > = values in this interval not displayed.     Results from last 7 days   Lab Units 02/17/20  0534 02/13/20  0454   SODIUM mmol/L 134* 135*   POTASSIUM mmol/L 3.5 3.6   BUN mg/dL 39* 38*   CREATININE mg/dL 0.98 1.05         No results found for: BLOODCX, URINECX, RESPCX  Recent films:  Xr Chest 1 View    Result Date: 2/18/2020  . Patchy left basilar infiltrate. This report was finalized on 02/18/2020 07:04 by Dr. Shabbir Moreno MD.  Films reviewed personally by me.  My interpretation: None today   Pulmonary Assessment:  1. Respiratory failure requiring mechanical ventilation which appears to be chronic at this point  2. 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 Pulmonary embolism, right treated and stable  4. COPD chronic and compensated with current medications  5. Dementia unimproved, chronic, unlikely to improve  6. Anemia  Recommend:   · Continue mechanical ventilation as is   · Awaiting placement at a long-term vent facility   · Nutrition  · PT/OT efforts  · Stress ulcer and DVT prophylaxis  · Bronchodilators  · Encourage nursing to continue to work with medications and find a balance between oversedation versus awake enough to participate in some therapy as he was doing this previously  Electronically signed by MATT Arzate on 2/19/2020 at 8:37  AM    Physician substantive portion:  Patient is pretty sedated looking today.  He is moving his arms a little bit but is not as responsive as the other day.  I am concerned he may be a little bit out of balance once again with his psychiatric medications and a little oversedated.  He remains tachypneic and I discussed that with the respiratory staff and I do not think that is ever going to change for him.  Chest otherwise is fairly clear.  Tracheostomy site okay.  We will continue ventilator as is with SIMV and pressure support.  Appears ventilator dependent.  Consciousness is definitely affected by medications at this point so I do not think we can make any conclusions about his chronic state of consciousness based on his exam today    I have seen and examined patient personally, performing a face-to-face diagnostic evaluation with plan of care reviewed and developed with APRN and nursing staff. I have addended and/or modified the above history of present illness, physical examination, and assessment and plan to reflect my findings and impressions. Essential elements of the care plan were discussed with APRN above.  Agree with findings and assessment/plan as documented above.    Electronically signed by Manuel Hensley MD, on 2/19/2020, 9:12 AM

## 2020-02-19 NOTE — PROGRESS NOTES
MICHELLE Chavez APRN        Internal Medicine Progress Note    2/19/2020   9:47 AM    Name:  Shawn Turner  MRN:    [id number]     Acct:     [id number]   Room:  [address] [place] [address] Day: 0     Admit Date: 1/15/2020 10:17 AM  PCP: Tj Wilson MD    Subjective:     C/C: need for continued ventilator weaning    Interval History: Status: Remains the same. Resting in bed. No family at bedside. Tolerating current vent settings (SIMV). Minimal response to stimulation. Opens left eye. Otherwise, nonverbal and not following commands. Counts stable. Discharge planning underway for transfer to long term vent facility. Trach changed yesterday. No further bleeding noted. Hgb up to 8.7.  sReview of Systems   Unable to perform ROS: patient nonverbal       Medications:     Allergies:   Allergies   Allergen Reactions   • Aspirin        Current Meds:   Current Facility-Administered Medications:   •  acetaminophen (TYLENOL) 160 MG/5ML solution 650 mg, 650 mg, Per G Tube, Q4H PRN, Bautista Alcantar MD  •  acetaminophen (TYLENOL) suppository 650 mg, 650 mg, Rectal, Q6H PRN, Selina Augustine MD  •  acetaZOLAMIDE (DIAMOX) tablet 125 mg, 125 mg, Per G Tube, BID, Blanca Islas, MATT  •  albuterol (PROVENTIL) nebulizer solution 0.083% 2.5 mg/3mL, 2.5 mg, Nebulization, Q2H PRN, Selina Augustine MD  •  bacitracin ointment, , Topical, Q12H, Selina Augustine MD  •  bacitracin ointment, , Topical, PRN, Selina Augustine MD  •  bacitracin ointment, , Topical, Q12H, Selina Augustine MD  •  bacitracin ointment, , Topical, Q24H, Sherry Jennings APRN  •  dilTIAZem (CARDIZEM) injection 5 mg, 5 mg, Intravenous, Q6H PRN, Selina Augustine MD  •  dilTIAZem (CARDIZEM) tablet 30 mg, 30 mg, Per G Tube, Q6H, Bautista Alcantar MD  •  Divalproex Sodium (DEPAKOTE SPRINKLE) capsule 125 mg, 125 mg, Per G Tube, Q12H, Linda Landa APRN  •  donepezil (ARICEPT) tablet 10 mg, 10 mg, Per G Tube,  Nightly, Bautista Alcantar MD  •  famotidine (PEPCID) tablet 20 mg, 20 mg, Per G Tube, Q12H, Linda Landa, MATT  •  ferrous sulfate tablet 325 mg, 325 mg, Oral, Daily With Breakfast, Bautista Alcantar MD  •  haloperidol lactate (HALDOL) injection 1 mg, 1 mg, Intramuscular, Q4H PRN, Selina Augustine MD  •  hydrALAZINE (APRESOLINE) injection 5 mg, 5 mg, Intravenous, Q6H PRN, Selina Augustine MD  •  hypromellose (ISOPTO TEARS) 0.5 % ophthalmic solution 2 drop, 2 drop, Both Eyes, Q2H PRN, Selina Augustine MD  •  ipratropium (ATROVENT) nebulizer solution 0.5 mg, 0.5 mg, Nebulization, Q6H PRN, Selina Augustine MD  •  ipratropium-albuterol (DUO-NEB) nebulizer solution 3 mL, 3 mL, Nebulization, Q6H - RT, Selina Augustine MD  •  levothyroxine (SYNTHROID, LEVOTHROID) tablet 75 mcg, 75 mcg, Per G Tube, Q AM, Bautista Alcantar MD  •  lisinopril (PRINIVIL,ZESTRIL) tablet 20 mg, 20 mg, Per G Tube, Nightly, Blanca Islas, MATT  •  melatonin tablet 9.75 mg, 9.75 mg, Per G Tube, Nightly, Bautista Alcantar MD  •  memantine (NAMENDA) tablet 10 mg, 10 mg, Per G Tube, Q12H, Bautista Alcantar MD  •  midazolam (VERSED) injection 2 mg, 2 mg, Intravenous, Q1H PRN, Selina Augustine MD  •  Morphine sulfate (PF) injection 2 mg, 2 mg, Intravenous, Q1H PRN, Selina Augustine MD  •  multivitamin w/minerals tablet 1 tablet, 1 tablet, Oral, Daily, Bautista Alcantar MD  •  norepinephrine (LEVOPHED) 16,000 mcg in sodium chloride 0.9 % 250 mL (64 mcg/mL) infusion, 0.02-0.3 mcg/kg/min, Intravenous, Titrated, Bautista Alcantar MD  •  polyethylene glycol 3350 powder (packet), 17 g, Per G Tube, Q12H PRN, Bautista Alcantar MD  •  Potassium Phosphates 9 mmol in sodium chloride 0.9 % 100 mL infusion, 9 mmol, Intravenous, Once, Blanca Islas, APRN  •  risperiDONE (risperDAL) tablet 0.75 mg, 0.75 mg, Per G Tube, TID, Bautista Alcantar MD  •  saccharomyces boulardii (FLORASTOR) capsule 250 mg, 250 mg, Per  G Tube, Daily, Bautista Alcantar MD  •  sodium chloride 0.9 % flush 10 mL, 10 mL, Intravenous, Q12H, Selina Augustine MD  •  tiZANidine (ZANAFLEX) tablet 4 mg, 4 mg, Per G Tube, Q8H, Bautista Alcantar MD  •  traZODone (DESYREL) tablet 50 mg, 50 mg, Per G Tube, Nightly, Bautista Alcantar MD  •  Vortioxetine HBr tablet 20 mg, 20 mg, Per G Tube, Daily, Bautista Alcantar MD    Data:     Code Status:    There are no questions and answers to display.       No family history on file.    Social History     Socioeconomic History   • Marital status:      Spouse name: Not on file   • Number of children: Not on file   • Years of education: Not on file   • Highest education level: Not on file   Tobacco Use   • Smoking status: Current Every Day Smoker   Substance and Sexual Activity   • Alcohol use: No   • Drug use: Defer       Vitals:  T 98.6 P 85 R 24 /62 Sp02 99% (vent)      I/O (24Hr):  No intake or output data in the 24 hours ending 02/19/20 0947    Labs and imaging:      Recent Results (from the past 12 hour(s))   Hemoglobin & Hematocrit, Blood    Collection Time: 02/19/20  5:09 AM   Result Value Ref Range    Hemoglobin 8.7 (L) 13.0 - 17.7 g/dL    Hematocrit 26.7 (L) 37.5 - 51.0 %   Prepare RBC, 1 Units    Collection Time: 02/19/20  5:40 AM   Result Value Ref Range    Product Code [id number]     Unit Number [id number]     UNIT  ABO O     UNIT  RH POS     Dispense Status PT     Blood Type OPOS     Blood Expiration Date 186432011608     Blood Type Barcode 5100        Physical Examination:        Physical Exam   Constitutional: He appears well-nourished. He appears cachectic. He is restrained.   HENT:   Head: Normocephalic and atraumatic.   Nose: Nose normal.   Mouth/Throat: Oropharynx is clear and moist.   Eyes: Pupils are equal, round, and reactive to light. EOM are normal.   Neck: Normal range of motion. Neck supple.   Trach to vent   Cardiovascular: Normal rate, regular rhythm, normal  heart sounds and intact distal pulses.   Pulmonary/Chest: Effort normal and breath sounds normal.   Abdominal: Soft. Bowel sounds are normal.   g tube  Dressing c.d.i - scant green drainage around tube   Musculoskeletal: Normal range of motion.   Generalized weakness   Neurological: He is alert. He has normal reflexes.   Nonverbal  Confused   Skin: Skin is warm and dry.   Psychiatric: He has a normal mood and affect. His behavior is normal.   Anxious at times   Nursing note and vitals reviewed.        Assessment:             Acute tracheostomy management (CMS/[facility])    Acute respiratory failure with hypoxia and hypercapnia (CMS/[facility])    Ventilator dependence (CMS/[facility])    Past Medical History:   Diagnosis Date   • Anxiety disorder    • Cerumen impaction    • COPD (chronic obstructive pulmonary disease) (CMS/[facility])    • Depression    • Hypertension    • Hypothyroidism    • Kidney disease    • Osteoarthritis    • Sinusitis, chronic         Plan:        1. Acute on chronic hypoxic/hypercapneic respiratory failure  2. RUL pulmonary embolus  3. Paroxysmal atrial fibrillation  4. Hypothyroidism  5. Chronic obstructive pulmonary disease  6. Anxiety disorder  7. Chronic kidney disease  8. Multifactorial anemia  9. Gastroesophageal reflux disease  10. Dementia  11. Metabolic encephalopathy  12. Iron deficiency  13. Hypokalemia - improved  14. Moderate protein calorie malnutrition      Continue current treatment. Monitor counts. Increase activity. Labs in am. Continue vent weaning under direction of pulmonology. Aggressive therapies. Maintain patient safety. Continue nutrition support per AMONs.  Monitor renal function. Monitor I&O's closely. DC serial h/h. Resume xarelto in am if no further bleeding and counts stable. Discharge planning.        Electronically signed by MATT Tolentino on 2/19/2020 at 9:47 AM     I have discussed the care of Shawn Turner, including pertinent history and exam findings, with the nurse  practitioner.    I have seen and examined the patient and the key elements of all parts of the encounter have been performed by me.  I agree with the assessment, plan and orders as documented by MATT Herrera, after I modified the exam findings and the plan of treatments and the final version is my approved version of the assessment.        Electronically signed by Bautista Alcantar MD on 2/19/2020 at 9:05 PM

## 2020-02-20 LAB
ANION GAP SERPL CALCULATED.3IONS-SCNC: 10 MMOL/L (ref 5–15)
BASOPHILS # BLD AUTO: 0.02 10*3/MM3 (ref 0–0.2)
BASOPHILS NFR BLD AUTO: 0.2 % (ref 0–1.5)
BUN BLD-MCNC: 39 MG/DL (ref 8–23)
BUN/CREAT SERPL: 42.9 (ref 7–25)
CALCIUM SPEC-SCNC: 8.6 MG/DL (ref 8.6–10.5)
CHLORIDE SERPL-SCNC: 102 MMOL/L (ref 98–107)
CO2 SERPL-SCNC: 21 MMOL/L (ref 22–29)
CREAT BLD-MCNC: 0.91 MG/DL (ref 0.76–1.27)
CRP SERPL-MCNC: 5.21 MG/DL (ref 0–0.5)
DEPRECATED RDW RBC AUTO: 48.9 FL (ref 37–54)
EOSINOPHIL # BLD AUTO: 0.18 10*3/MM3 (ref 0–0.4)
EOSINOPHIL NFR BLD AUTO: 1.5 % (ref 0.3–6.2)
ERYTHROCYTE [DISTWIDTH] IN BLOOD BY AUTOMATED COUNT: 15.3 % (ref 12.3–15.4)
ERYTHROCYTE [SEDIMENTATION RATE] IN BLOOD: 45 MM/HR (ref 0–15)
GFR SERPL CREATININE-BSD FRML MDRD: 83 ML/MIN/1.73
GLUCOSE BLD-MCNC: 119 MG/DL (ref 65–99)
HCT VFR BLD AUTO: 27.1 % (ref 37.5–51)
HGB BLD-MCNC: 8.8 G/DL (ref 13–17.7)
IMM GRANULOCYTES # BLD AUTO: 0.1 10*3/MM3 (ref 0–0.05)
IMM GRANULOCYTES NFR BLD AUTO: 0.8 % (ref 0–0.5)
LYMPHOCYTES # BLD AUTO: 0.84 10*3/MM3 (ref 0.7–3.1)
LYMPHOCYTES NFR BLD AUTO: 6.8 % (ref 19.6–45.3)
MCH RBC QN AUTO: 28.3 PG (ref 26.6–33)
MCHC RBC AUTO-ENTMCNC: 32.5 G/DL (ref 31.5–35.7)
MCV RBC AUTO: 87.1 FL (ref 79–97)
MONOCYTES # BLD AUTO: 1.14 10*3/MM3 (ref 0.1–0.9)
MONOCYTES NFR BLD AUTO: 9.2 % (ref 5–12)
NEUTROPHILS # BLD AUTO: 10.1 10*3/MM3 (ref 1.7–7)
NEUTROPHILS NFR BLD AUTO: 81.5 % (ref 42.7–76)
NRBC BLD AUTO-RTO: 0 /100 WBC (ref 0–0.2)
NT-PROBNP SERPL-MCNC: 145 PG/ML (ref 5–900)
PLATELET # BLD AUTO: 266 10*3/MM3 (ref 140–450)
PMV BLD AUTO: 10.4 FL (ref 6–12)
POTASSIUM BLD-SCNC: 4.2 MMOL/L (ref 3.5–5.2)
RBC # BLD AUTO: 3.11 10*6/MM3 (ref 4.14–5.8)
SODIUM BLD-SCNC: 133 MMOL/L (ref 136–145)
WBC NRBC COR # BLD: 12.38 10*3/MM3 (ref 3.4–10.8)

## 2020-02-20 PROCEDURE — 85651 RBC SED RATE NONAUTOMATED: CPT | Performed by: INTERNAL MEDICINE

## 2020-02-20 PROCEDURE — 85025 COMPLETE CBC W/AUTO DIFF WBC: CPT | Performed by: INTERNAL MEDICINE

## 2020-02-20 PROCEDURE — 87147 CULTURE TYPE IMMUNOLOGIC: CPT | Performed by: PLASTIC SURGERY

## 2020-02-20 PROCEDURE — 87205 SMEAR GRAM STAIN: CPT | Performed by: PLASTIC SURGERY

## 2020-02-20 PROCEDURE — 87186 SC STD MICRODIL/AGAR DIL: CPT | Performed by: PLASTIC SURGERY

## 2020-02-20 PROCEDURE — 99232 SBSQ HOSP IP/OBS MODERATE 35: CPT | Performed by: INTERNAL MEDICINE

## 2020-02-20 PROCEDURE — 86140 C-REACTIVE PROTEIN: CPT | Performed by: INTERNAL MEDICINE

## 2020-02-20 PROCEDURE — 87070 CULTURE OTHR SPECIMN AEROBIC: CPT | Performed by: PLASTIC SURGERY

## 2020-02-20 PROCEDURE — 83880 ASSAY OF NATRIURETIC PEPTIDE: CPT | Performed by: INTERNAL MEDICINE

## 2020-02-20 PROCEDURE — 25010000002 MORPHINE SULFATE (PF) 2 MG/ML SOLUTION: Performed by: SPECIALIST

## 2020-02-20 PROCEDURE — 80048 BASIC METABOLIC PNL TOTAL CA: CPT | Performed by: INTERNAL MEDICINE

## 2020-02-20 NOTE — PROGRESS NOTES
PULMONARY AND CRITICAL CARE PROGRESS NOTE - Formerly [facility]  Patient: Shawn MANZANO Ordavis    1950   [id number]  02/20/20   9:04 AM  Referring Provider: Bautista Alcantar MD  Chief Complaint: Vent management  Interval history: Patient remains ventilator dependent.  He is more sedate this morning.  He is passively breathing with the ventilator.  He still receiving nutrition via tube feeds.  Saturation 95% on SIMV settings.  No new issues.  No family at the bedside. No family present.  Review of Systems: Review of Systems   Cannot obtain due to mechanical ventilation.  The patient notably is critically ill and connected to a ventilator.  As such patient cannot communicate and provide any history whatsoever, including any history of present illness or interval history since arrival or review of systems. The interested reviewer may note this fact, as an attempt has been made at collecting and documenting these portions of the patient history, but this information is unobtainable despite attempted review and therefore cannot be documented at this time.   Physical Exam:  SIMV, rate 24, tidal volume 550, pressure support 18, PEEP of 5, FiO2 0.30  Vital signs: T: Unavailable, blood pressure 104/60, pulse 76, respirations 24, end-tidal 31, saturation 95   Physical Exam:  Constitutional: He appears well-nourished. He appears cachectic. He has a sickly appearance. No distress. He is intubated and restrained.   HENT:   Head: Normocephalic.   Nose: Nose normal.   Feeding tube-nutrition infusing    Eyes: Pupils are equal, round, and reactive to light. No scleral icterus.   Neck:   Tracheostomy to vent   Cardiovascular: Normal rate and regular rhythm.   No murmur heard.  Pulmonary/Chest: Effort normal. No accessory muscle usage. He is intubated. No respiratory distress. He has no rhonchi. He has no rales.    Abdominal: Soft. He exhibits no distension.   Binder in place.   G-tube in place   Genitourinary:    Genitourinary Comments: Judd catheter   Musculoskeletal: He exhibits no edema.   Skin: Skin is warm and dry. No rash noted. He is not diaphoretic. No erythema.   Psychiatric: His mood appears not anxious. He is slowed. He is noncommunicative   Nursing note and vitals reviewed.  Results from last 7 days   Lab Units 02/20/20  0533 02/19/20  0509 02/18/20  1838 02/18/20  0511 02/17/20  0534   WBC 10*3/mm3 12.38*  --   --  11.90* 14.98*   HEMOGLOBIN g/dL 8.8* 8.7* 8.0* 7.3* 8.4*   PLATELETS 10*3/mm3 266  --   --  268 314     Results from last 7 days   Lab Units 02/20/20  0533 02/17/20  0534   SODIUM mmol/L 133* 134*   POTASSIUM mmol/L 4.2 3.5   BUN mg/dL 39* 39*   CREATININE mg/dL 0.91 0.98     Results from last 7 days   Lab Units 02/19/20  1325   PH, ARTERIAL pH units 7.392   PCO2, ARTERIAL mm Hg 35.0   PO2 ART mm Hg 79.4*   FIO2 % 30     No results found for: BLOODCX, URINECX, RESPCX  Recent films:  No radiology results for the last dayFilms reviewed personally by me.  My interpretation: None today   Pulmonary Assessment:  1. Respiratory failure requiring mechanical ventilation which appears to be chronic at this point  2. 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 Pulmonary embolism, right treated and stable  4. COPD chronic and compensated with current medications  5. Dementia unimproved, chronic, unlikely to improve  6. Anemia  Recommend:   · Continue mechanical ventilation as is.  Discussed concerns with nursing and respiratory therapy about him passively ventilating as he is typically quite tachypneic and has been this way since admission.  He has had changes in pH, PCO2 and bicarb on ABGs.  Have reduced Diamox dosing to daily.  This may need to be discontinued  · Awaiting placement at a long-term vent facility   · Nutrition  · PT/OT efforts  · Stress ulcer and DVT prophylaxis  · Bronchodilators  · Encourage nursing to continue to work with medications and find a balance between oversedation versus awake  enough to participate in some therapy as he was doing this previously.  Presently he is not arousable and passively breathing  Electronically signed by MATT Arzate on 2/20/2020 at 9:04 AM    Physician substantive portion:  Patient remains ventilator dependent.  He is awake but somewhat drowsy this morning.  And still restrained to prevent self-harm.  Tachypnea is improved today.  He does have good respiratory effort and I have increased his inspiratory flow to meet his demand.  Breath sounds are diminished, no accessory muscle use no paradox.  Plan to long-term facility, ventilator dependent.  No vent changes today other than the flow.  Okay with me to [facility] as planned.  We will sign off, but available if needed.    I have seen and examined patient personally, performing a face-to-face diagnostic evaluation with plan of care reviewed and developed with APRN and nursing staff. I have addended and/or modified the above history of present illness, physical examination, and assessment and plan to reflect my findings and impressions. Essential elements of the care plan were discussed with APRN above.  Agree with findings and assessment/plan as documented above.    Electronically signed by Manuel Hensley MD, on 2/20/2020, 10:39 AM

## 2020-02-20 NOTE — DISCHARGE SUMMARY
MICHELLE Chavez APRN      Internal Medicine Discharge Summary    Patient ID: Shawn Turner  MRN: [id number]     Acct:  [id number]       Patient's PCP: Tj Wilson MD    Admit Date: 1/15/2020     Discharge Date:   2/20/20    Admitting Physician: Bautista Alcantar MD    Discharge Physician: MATT Tolentino     Active Discharge Diagnoses:  1. Acute on chronic hypoxic/hypercapneic respiratory failure  2. RUL pulmonary embolus  3. Paroxysmal atrial fibrillation  4. Hypothyroidism  5. Chronic obstructive pulmonary disease  6. Anxiety disorder  7. Chronic kidney disease  8. Multifactorial anemia  9. Gastroesophageal reflux disease  10. Dementia  11. Metabolic encephalopathy  12. Iron deficiency  13. Hypokalemia - improved  14. Moderate protein calorie malnutrition    Hospital Problems    Acute tracheostomy management (CMS/[facility])    Acute respiratory failure with hypoxia and hypercapnia (CMS/[facility])    Ventilator dependence (CMS/[facility])     Past Medical History:   Diagnosis Date   • Anxiety disorder    • Cerumen impaction    • COPD (chronic obstructive pulmonary disease) (CMS/[facility])    • Depression    • Hypertension    • Hypothyroidism    • Kidney disease    • Osteoarthritis    • Sinusitis, chronic        The patient was seen and examined on the day of discharge and this discharge summary is in conjunction with any daily progress note from day of discharge.    Code Status:    There are no questions and answers to display.       Hospital Course: Shawn Turner is a  69 y.o.  male who presents with need for vent weaning and rehabilitation following recent acute care stay at an outside facility. The patient had been in his usual state of health at the SNF where he resides. Of note, the patient had a recent acute care stay in [place] [facility] [facility] and was hospitalized in acute care with respiratory distress just prior to this, as well. He remained in  nehemias-psych December 9-26 prior to return to SNF. He developed respiratory distress and was given lasix and nebulizer treatments at the facility before EMS transferred to an outlying facility. The patient was emergently intubated and placed on ventilator with a ph of 7.11 pC02 139 and p02 340. He was noted to have leukocytosis with left shift.  He also had an elevated d dimer and CT confirmed RUL PE. He was initially anticoagulated with lovenox. He was extubated, but require reintubation and from that point, was unable to wean and underwent tracheostomy and peg tube placement on 1/13. The patient developed SVT/atrial fibrillation and required pressor support for hypotension. He is currently sinus rhythm with PVCs. Due to his need for continued vent weaning, nutrition support and rehabilitation efforts, he was transferred to our facility.   He was seen in consultation by ENT and pulmonology for ventilator management. Shortly after arrival to our facility, his dislodged his peg tube. A LOTTIE tube was placed. He was weaned to pressure support and tolerated without difficulty. The patient then pulled on his LOTTIE tube and there was tube feeding leaking around it. His hemoglobin dropped to 7.7. The patient then fully dislodged the LOTTIE tube despite being in bilateral wrist and mitten restraints. General surgery recommended allowing the site to heal and providing AMONs per TPN. We attempted NGT tube and enteral feedings, but the patient was having drainage from the old peg tube site. Tube feedings were discontinued and the patient was started on TPN. The patient had issues with electrolyte imbalance related to diuresis. Electrolytes were replaced. Stool was positive for occult blood and anticoagulants were held. He was transfused with 2 units PRBC with stabilization of hgb. He was found to have evidence of a bladder outlet obstruction and required burleson catheter placement. After the old peg tube site healed, general surgery was  reconsulted for g tube placement. The patient has an extensive psychiatric history and many of his medications were unable to be administered via IV. G tube was placed per Dr. Selina Augustine on 1/31. The patient tolerated without difficulty. Unfortunately, hgb dropped to 6.0 and the patient required transfusion. The patient since that time has largely remained in AC/SIMV mode on the vent with no success with weaning. He has continued decline in neurologic status. He will open his left eye to verbal stimulus, but does not track or follow commands. He will move extremities nonpurposefully. He developed issues with bradycardia and hypotension and medications were adjusted. Patient has tolerated well. Counts have stabilized and he has had no further bleeding. Xarelto has been resumed. Due to inability to wean, the patient is now being transferred to a long term ventilator facility. He is a dominguez of the [facility] with a court appointed guardian.     Consults:  Dr. Bowen (ENT)  Dr. Hensley (pulmonology)  Dr. Augustine (general surgery)  Dr. Spicer (general surgery)    Disposition: Long term vent facility     Physical Exam   Constitutional: He appears well-nourished. He appears cachectic. He is restrained.   HENT:   Head: Normocephalic and atraumatic.   Nose: Nose normal.   Mouth/Throat: Oropharynx is clear and moist.   Eyes: Pupils are equal, round, and reactive to light. EOM are normal.   Neck: Normal range of motion. Neck supple.   Trach to vent   Cardiovascular: Normal rate, regular rhythm, normal heart sounds and intact distal pulses.   Pulmonary/Chest: Effort normal and breath sounds normal.   Abdominal: Soft. Bowel sounds are normal.   g tube  Dressing c.d.i - scant green drainage around tube   Musculoskeletal: Normal range of motion.   Generalized weakness   Neurological: He is alert. He has normal reflexes.   Nonverbal  Confused   Skin: Skin is warm and dry.   Psychiatric: He has a normal mood and affect.  His behavior is normal.   Anxious at times   Nursing note and vitals reviewed.  Discharged Condition: Stable    Follow Up: No follow-up provider specified.    Diet: NPO Diet    Discharge Medications:   See computer generated medication reconciliation form    Time Spent on discharge is  32 minutes in patient examination, evaluation, patient/family counseling as well as medication reconciliation, prescriptions for required medications, discharge plan and follow up.     Electronically signed by MATT Tolentino on 2/20/2020 at 11:58 AM     I have discussed the care of Shawn Turner, including pertinent history and exam findings, with the nurse practitioner.    I have seen and examined the patient and the key elements of all parts of the encounter have been performed by me.  I agree with the assessment, plan and orders as documented by MATT Herrera, after I modified the exam findings and the plan of treatments and the final version is my approved version of the assessment.        Electronically signed by Bautista Alcantar MD on 2/20/2020 at 6:53 PM

## 2020-02-20 NOTE — PROGRESS NOTES
MICHELLE Chavez APRN        Internal Medicine Progress Note    2/20/2020   11:50 AM    Name:  Shawn Turner  MRN:    [id number]     Acct:     [id number]   Room:  [address] [place] [address] Day: 0     Admit Date: 1/15/2020 10:17 AM  PCP: Tj Wilson MD    Subjective:     C/C: need for continued ventilator weaning    Interval History: Status: Remains the same. Resting in bed. No family at bedside. Tolerating current vent settings (SIMV). Minimal response to stimulation. Opens left eye. Otherwise, nonverbal and not following commands. Counts stable. No further bleeding noted. Hgb stable. Plans for discharge to [facility] in am.   sReview of Systems   Unable to perform ROS: patient nonverbal       Medications:     Allergies:   Allergies   Allergen Reactions   • Aspirin        Current Meds:   Current Facility-Administered Medications:   •  acetaminophen (TYLENOL) 160 MG/5ML solution 650 mg, 650 mg, Per G Tube, Q4H PRN, Bautista Alcantar MD  •  acetaminophen (TYLENOL) suppository 650 mg, 650 mg, Rectal, Q6H PRN, Selina Augustine MD  •  acetaZOLAMIDE (DIAMOX) tablet 125 mg, 125 mg, Per G Tube, BID, Blanca Islas APRN  •  albuterol (PROVENTIL) nebulizer solution 0.083% 2.5 mg/3mL, 2.5 mg, Nebulization, Q2H PRN, Selina Augustine MD  •  bacitracin ointment, , Topical, Q12H, Selina Augustine MD  •  bacitracin ointment, , Topical, PRN, Selina Augustine MD  •  bacitracin ointment, , Topical, Q12H, Selina Augustine MD  •  bacitracin ointment, , Topical, Q24H, Sherry Jennings APRN  •  dilTIAZem (CARDIZEM) injection 5 mg, 5 mg, Intravenous, Q6H PRN, Selina Augustine MD  •  dilTIAZem (CARDIZEM) tablet 30 mg, 30 mg, Per G Tube, Q6H, Bautista Alcantar MD  •  Divalproex Sodium (DEPAKOTE SPRINKLE) capsule 125 mg, 125 mg, Per G Tube, Q12H, Linda Landa APRN  •  donepezil (ARICEPT) tablet 10 mg, 10 mg, Per G Tube, Nightly, Bautista Alcantar MD  •  famotidine (PEPCID)  tablet 20 mg, 20 mg, Per G Tube, Q12H, Linda Landa, MATT  •  ferrous sulfate tablet 325 mg, 325 mg, Oral, Daily With Breakfast, Bautista Alcantar MD  •  haloperidol lactate (HALDOL) injection 1 mg, 1 mg, Intramuscular, Q4H PRN, Selina Augustine MD  •  hydrALAZINE (APRESOLINE) injection 5 mg, 5 mg, Intravenous, Q6H PRN, Selina Augustine MD  •  hypromellose (ISOPTO TEARS) 0.5 % ophthalmic solution 2 drop, 2 drop, Both Eyes, Q2H PRN, Selina Augustine MD  •  ipratropium (ATROVENT) nebulizer solution 0.5 mg, 0.5 mg, Nebulization, Q6H PRN, Selina Augustine MD  •  ipratropium-albuterol (DUO-NEB) nebulizer solution 3 mL, 3 mL, Nebulization, Q6H - RT, Selina Augustine MD  •  levothyroxine (SYNTHROID, LEVOTHROID) tablet 75 mcg, 75 mcg, Per G Tube, Q AM, Bautista Alcantar MD  •  lisinopril (PRINIVIL,ZESTRIL) tablet 20 mg, 20 mg, Per G Tube, Nightly, Blanca Islas, MATT  •  melatonin tablet 9.75 mg, 9.75 mg, Per G Tube, Nightly, Bautista Alcantar MD  •  memantine (NAMENDA) tablet 10 mg, 10 mg, Per G Tube, Q12H, Bautista Alcantar MD  •  midazolam (VERSED) injection 2 mg, 2 mg, Intravenous, Q1H PRN, Selina Augustine MD  •  Morphine sulfate (PF) injection 2 mg, 2 mg, Intravenous, Q1H PRN, Selian Augustine MD  •  multivitamin w/minerals tablet 1 tablet, 1 tablet, Oral, Daily, Bautista Alcantar MD  •  norepinephrine (LEVOPHED) 16,000 mcg in sodium chloride 0.9 % 250 mL (64 mcg/mL) infusion, 0.02-0.3 mcg/kg/min, Intravenous, Titrated, Bautista Alcantar MD  •  polyethylene glycol 3350 powder (packet), 17 g, Per G Tube, Q12H PRN, Bautista Alcantar MD  •  risperiDONE (risperDAL) tablet 0.75 mg, 0.75 mg, Per G Tube, TID, Bautista Alcantar MD  •  saccharomyces boulardii (FLORASTOR) capsule 250 mg, 250 mg, Per G Tube, Daily, Bautista Alcantar MD  •  sodium chloride 0.9 % flush 10 mL, 10 mL, Intravenous, Q12H, Selina Augustine MD  •  tiZANidine (ZANAFLEX) tablet 4 mg, 4 mg, Per G  Tube, Q8H, Bautista Alcantar MD  •  traZODone (DESYREL) tablet 50 mg, 50 mg, Per G Tube, Nightly, Bautista Alcantar MD  •  Vortioxetine HBr tablet 20 mg, 20 mg, Per G Tube, Daily, Bautista Alcantar MD  •  Bharti's amazing butt cream, , Topical, TID, Sherry Jennings, APRN  •  Bharti's amazing butt cream, , Topical, PRN, Sherry Jennings, APRN    Data:     Code Status:    There are no questions and answers to display.       No family history on file.    Social History     Socioeconomic History   • Marital status:      Spouse name: Not on file   • Number of children: Not on file   • Years of education: Not on file   • Highest education level: Not on file   Tobacco Use   • Smoking status: Current Every Day Smoker   Substance and Sexual Activity   • Alcohol use: No   • Drug use: Defer       Vitals:  T 97.9 P 80 R 25 /60 Sp02 95% (vent)      I/O (24Hr):  No intake or output data in the 24 hours ending 02/20/20 1150    Labs and imaging:      Recent Results (from the past 12 hour(s))   Basic Metabolic Panel    Collection Time: 02/20/20  5:33 AM   Result Value Ref Range    Glucose 119 (H) 65 - 99 mg/dL    BUN 39 (H) 8 - 23 mg/dL    Creatinine 0.91 0.76 - 1.27 mg/dL    Sodium 133 (L) 136 - 145 mmol/L    Potassium 4.2 3.5 - 5.2 mmol/L    Chloride 102 98 - 107 mmol/L    CO2 21.0 (L) 22.0 - 29.0 mmol/L    Calcium 8.6 8.6 - 10.5 mg/dL    eGFR Non African Amer 83 >60 mL/min/1.73    BUN/Creatinine Ratio 42.9 (H) 7.0 - 25.0    Anion Gap 10.0 5.0 - 15.0 mmol/L   BNP    Collection Time: 02/20/20  5:33 AM   Result Value Ref Range    proBNP 145.0 5.0 - 900.0 pg/mL   Sedimentation Rate    Collection Time: 02/20/20  5:33 AM   Result Value Ref Range    Sed Rate 45 (H) 0 - 15 mm/hr   C-reactive Protein    Collection Time: 02/20/20  5:33 AM   Result Value Ref Range    C-Reactive Protein 5.21 (H) 0.00 - 0.50 mg/dL   CBC Auto Differential    Collection Time: 02/20/20  5:33 AM   Result Value Ref Range    WBC  12.38 (H) 3.40 - 10.80 10*3/mm3    RBC 3.11 (L) 4.14 - 5.80 10*6/mm3    Hemoglobin 8.8 (L) 13.0 - 17.7 g/dL    Hematocrit 27.1 (L) 37.5 - 51.0 %    MCV 87.1 79.0 - 97.0 fL    MCH 28.3 26.6 - 33.0 pg    MCHC 32.5 31.5 - 35.7 g/dL    RDW 15.3 12.3 - 15.4 %    RDW-SD 48.9 37.0 - 54.0 fl    MPV 10.4 6.0 - 12.0 fL    Platelets 266 140 - 450 10*3/mm3    Neutrophil % 81.5 (H) 42.7 - 76.0 %    Lymphocyte % 6.8 (L) 19.6 - 45.3 %    Monocyte % 9.2 5.0 - 12.0 %    Eosinophil % 1.5 0.3 - 6.2 %    Basophil % 0.2 0.0 - 1.5 %    Immature Grans % 0.8 (H) 0.0 - 0.5 %    Neutrophils, Absolute 10.10 (H) 1.70 - 7.00 10*3/mm3    Lymphocytes, Absolute 0.84 0.70 - 3.10 10*3/mm3    Monocytes, Absolute 1.14 (H) 0.10 - 0.90 10*3/mm3    Eosinophils, Absolute 0.18 0.00 - 0.40 10*3/mm3    Basophils, Absolute 0.02 0.00 - 0.20 10*3/mm3    Immature Grans, Absolute 0.10 (H) 0.00 - 0.05 10*3/mm3    nRBC 0.0 0.0 - 0.2 /100 WBC       Physical Examination:        Physical Exam   Constitutional: He appears well-nourished. He appears cachectic. He is restrained.   HENT:   Head: Normocephalic and atraumatic.   Nose: Nose normal.   Mouth/Throat: Oropharynx is clear and moist.   Eyes: Pupils are equal, round, and reactive to light. EOM are normal.   Neck: Normal range of motion. Neck supple.   Trach to vent   Cardiovascular: Normal rate, regular rhythm, normal heart sounds and intact distal pulses.   Pulmonary/Chest: Effort normal and breath sounds normal.   Abdominal: Soft. Bowel sounds are normal.   g tube  Dressing c.d.i - scant green drainage around tube   Musculoskeletal: Normal range of motion.   Generalized weakness   Neurological: He is alert. He has normal reflexes.   Nonverbal  Confused   Skin: Skin is warm and dry.   Psychiatric: He has a normal mood and affect. His behavior is normal.   Anxious at times   Nursing note and vitals reviewed.        Assessment:             Acute tracheostomy management (CMS/[facility])    Acute respiratory failure with  hypoxia and hypercapnia (CMS/[facility])    Ventilator dependence (CMS/[facility])    Past Medical History:   Diagnosis Date   • Anxiety disorder    • Cerumen impaction    • COPD (chronic obstructive pulmonary disease) (CMS/[facility])    • Depression    • Hypertension    • Hypothyroidism    • Kidney disease    • Osteoarthritis    • Sinusitis, chronic         Plan:        1. Acute on chronic hypoxic/hypercapneic respiratory failure  2. RUL pulmonary embolus  3. Paroxysmal atrial fibrillation  4. Hypothyroidism  5. Chronic obstructive pulmonary disease  6. Anxiety disorder  7. Chronic kidney disease  8. Multifactorial anemia  9. Gastroesophageal reflux disease  10. Dementia  11. Metabolic encephalopathy  12. Iron deficiency  13. Hypokalemia - improved  14. Moderate protein calorie malnutrition      Continue current treatment. Monitor counts. Increase activity. Labs in am. Continue vent weaning under direction of pulmonology. Aggressive therapies. Maintain patient safety. Continue nutrition support per AMAmando.  Monitor renal function. Monitor I&O's closely.  Resume xarelto.  [facility].        Electronically signed by MATT Tolentino on 2/20/2020 at 11:50 AM

## 2020-02-22 LAB
BACTERIA SPEC AEROBE CULT: ABNORMAL
GRAM STN SPEC: ABNORMAL
GRAM STN SPEC: ABNORMAL

## 2020-08-18 ENCOUNTER — TELEPHONE (OUTPATIENT)
Dept: PRIMARY CARE CLINIC | Age: 70
End: 2020-08-18

## 2020-08-18 NOTE — PROGRESS NOTES
[facility]  MICHELLE Vance APRN        Internal Medicine Progress Note    2/17/2020   1:51 PM    Name:  Shawn Turner  MRN:    [id number]     Acct:     [id number]   Room:  [address] [place] [address] Day: 0     Admit Date: 1/15/2020 10:17 AM  PCP: Tj Wilson MD    Subjective:     C/C: need for continued ventilator weaning    Interval History: Status: Remains the same. Resting in bed. No family at bedside. Tolerating current vent settings (SIMV). Minimal response to stimulation. Opens left eye. Otherwise, nonverbal and not following commands. Counts stable. Discharge planning underway for transfer to long term vent facility.   sReview of Systems   Unable to perform ROS: patient nonverbal       Medications:     Allergies:   Allergies   Allergen Reactions   • Aspirin        Current Meds:   Current Facility-Administered Medications:   •  acetaminophen (TYLENOL) 160 MG/5ML solution 650 mg, 650 mg, Per G Tube, Q4H PRN, Bautista Alcantar MD  •  acetaminophen (TYLENOL) suppository 650 mg, 650 mg, Rectal, Q6H PRN, Selina Augustine MD  •  acetaZOLAMIDE (DIAMOX) tablet 125 mg, 125 mg, Per G Tube, BID, Blanca Islas, MATT  •  albuterol (PROVENTIL) nebulizer solution 0.083% 2.5 mg/3mL, 2.5 mg, Nebulization, Q2H PRN, Selina Augustine MD  •  bacitracin ointment, , Topical, Q12H, Selina Augustine MD  •  bacitracin ointment, , Topical, PRN, Selina Augustine MD  •  bacitracin ointment, , Topical, PRN, Selina Augustine MD  •  bacitracin ointment, , Topical, Q12H, Selina Augustine MD  •  bacitracin ointment, , Topical, Q24H, Sherry Jennings APRN  •  dilTIAZem (CARDIZEM) injection 5 mg, 5 mg, Intravenous, Q6H PRN, Selina Augustine MD  •  dilTIAZem (CARDIZEM) tablet 30 mg, 30 mg, Per G Tube, Q6H, Bautista Alcantar MD  •  Divalproex Sodium (DEPAKOTE SPRINKLE) capsule 125 mg, 125 mg, Per G Tube, Q12H, Linda Landa APRN  •  donepezil (ARICEPT) tablet 10 mg, 10 mg, Per G Tube, Nightly,  Situation: Routine follow up call with patient.       Key Assessments: She feels good today. She had a doctors appointment yesterday and stated that the doctor was happy with where she was. He encouraged her to eat more regular meals to work to increase he calories. She also has been working to stay hydrated.     She has labs completed and is waiting for the results of those labs.     She has no new concerns at this time.       Actions Taken: Review of systems completed with patient.     RNCC reviewed patients goals.    Plan: RNCC will follow up in 1 week.       See hyperlinks within encounter for full documentation   Bautista Alcantar MD  •  famotidine (PEPCID) tablet 20 mg, 20 mg, Per G Tube, Q12H, Linda Landa APRN  •  ferrous sulfate tablet 325 mg, 325 mg, Oral, Daily With Breakfast, Bautista Alcantar MD  •  haloperidol lactate (HALDOL) injection 1 mg, 1 mg, Intramuscular, Q4H PRN, Selina Augustine MD  •  hydrALAZINE (APRESOLINE) injection 5 mg, 5 mg, Intravenous, Q6H PRN, Selina Augustine MD  •  hypromellose (ISOPTO TEARS) 0.5 % ophthalmic solution 2 drop, 2 drop, Both Eyes, Q2H PRN, Selina Augustine MD  •  ipratropium (ATROVENT) nebulizer solution 0.5 mg, 0.5 mg, Nebulization, Q6H PRN, Selina Augustine MD  •  ipratropium-albuterol (DUO-NEB) nebulizer solution 3 mL, 3 mL, Nebulization, Q6H - RT, Selina Augustine MD  •  levothyroxine (SYNTHROID, LEVOTHROID) tablet 75 mcg, 75 mcg, Per G Tube, Q AM, Bautista Alcantar MD  •  lisinopril (PRINIVIL,ZESTRIL) tablet 20 mg, 20 mg, Per G Tube, Nightly, Blanca Islas APRN  •  melatonin tablet 9.75 mg, 9.75 mg, Per G Tube, Nightly, Bautista Alcantar MD  •  memantine (NAMENDA) tablet 10 mg, 10 mg, Per G Tube, Q12H, Bautista Alcantar MD  •  midazolam (VERSED) injection 2 mg, 2 mg, Intravenous, Q1H PRN, Selina Augustine MD  •  Morphine sulfate (PF) injection 2 mg, 2 mg, Intravenous, Q1H PRN, Selina Augustine MD  •  multivitamin w/minerals tablet 1 tablet, 1 tablet, Oral, Daily, Bautista Alcantar MD  •  norepinephrine (LEVOPHED) 16,000 mcg in sodium chloride 0.9 % 250 mL (64 mcg/mL) infusion, 0.02-0.3 mcg/kg/min, Intravenous, Titrated, Bautista Alcantar MD  •  polyethylene glycol 3350 powder (packet), 17 g, Per G Tube, Q12H PRN, Bautista Alcantar MD  •  Potassium Phosphates 9 mmol in sodium chloride 0.9 % 100 mL infusion, 9 mmol, Intravenous, Once, Blanca Islas APRN  •  risperiDONE (risperDAL) tablet 0.75 mg, 0.75 mg, Per G Tube, TID, Bautista Alcantar MD  •  rivaroxaban (XARELTO) tablet 20 mg, 20 mg, Per G Tube, Daily With  Azael, Blanca Islas, MATT  •  saccharomyces boulardii (FLORASTOR) capsule 250 mg, 250 mg, Per G Tube, Daily, Bautista Alcantar MD  •  sodium chloride 0.9 % flush 10 mL, 10 mL, Intravenous, Q12H, Selina Augustine MD  •  tiZANidine (ZANAFLEX) tablet 4 mg, 4 mg, Per G Tube, Q8H, Bautista Alcantar MD  •  traZODone (DESYREL) tablet 50 mg, 50 mg, Per G Tube, Nightly, Bautista Alcantar MD  •  Vortioxetine HBr tablet 20 mg, 20 mg, Per G Tube, Daily, Bautista Alcantar MD    Data:     Code Status:    There are no questions and answers to display.       No family history on file.    Social History     Socioeconomic History   • Marital status:      Spouse name: Not on file   • Number of children: Not on file   • Years of education: Not on file   • Highest education level: Not on file   Tobacco Use   • Smoking status: Current Every Day Smoker   Substance and Sexual Activity   • Alcohol use: No   • Drug use: Defer       Vitals:  T 98.7 P 100 R 34 /70 Sp02 92% (vent)      I/O (24Hr):  No intake or output data in the 24 hours ending 02/17/20 1351    Labs and imaging:      Recent Results (from the past 12 hour(s))   Protime-INR    Collection Time: 02/17/20  5:34 AM   Result Value Ref Range    Protime 14.3 11.9 - 14.6 Seconds    INR 1.08 0.91 - 1.09   Basic Metabolic Panel    Collection Time: 02/17/20  5:34 AM   Result Value Ref Range    Glucose 112 (H) 65 - 99 mg/dL    BUN 39 (H) 8 - 23 mg/dL    Creatinine 0.98 0.76 - 1.27 mg/dL    Sodium 134 (L) 136 - 145 mmol/L    Potassium 3.5 3.5 - 5.2 mmol/L    Chloride 101 98 - 107 mmol/L    CO2 23.0 22.0 - 29.0 mmol/L    Calcium 8.6 8.6 - 10.5 mg/dL    eGFR Non African Amer 76 >60 mL/min/1.73    BUN/Creatinine Ratio 39.8 (H) 7.0 - 25.0    Anion Gap 10.0 5.0 - 15.0 mmol/L   Sedimentation Rate    Collection Time: 02/17/20  5:34 AM   Result Value Ref Range    Sed Rate 34 (H) 0 - 15 mm/hr   C-reactive Protein    Collection Time: 02/17/20  5:34 AM   Result Value  Ref Range    C-Reactive Protein 7.40 (H) 0.00 - 0.50 mg/dL   BNP    Collection Time: 02/17/20  5:34 AM   Result Value Ref Range    proBNP 164.0 5.0 - 900.0 pg/mL   CBC Auto Differential    Collection Time: 02/17/20  5:34 AM   Result Value Ref Range    WBC 14.98 (H) 3.40 - 10.80 10*3/mm3    RBC 2.99 (L) 4.14 - 5.80 10*6/mm3    Hemoglobin 8.4 (L) 13.0 - 17.7 g/dL    Hematocrit 26.0 (L) 37.5 - 51.0 %    MCV 87.0 79.0 - 97.0 fL    MCH 28.1 26.6 - 33.0 pg    MCHC 32.3 31.5 - 35.7 g/dL    RDW 15.6 (H) 12.3 - 15.4 %    RDW-SD 48.8 37.0 - 54.0 fl    MPV 10.7 6.0 - 12.0 fL    Platelets 314 140 - 450 10*3/mm3    Neutrophil % 83.9 (H) 42.7 - 76.0 %    Lymphocyte % 4.8 (L) 19.6 - 45.3 %    Monocyte % 9.7 5.0 - 12.0 %    Eosinophil % 0.4 0.3 - 6.2 %    Basophil % 0.2 0.0 - 1.5 %    Immature Grans % 1.0 (H) 0.0 - 0.5 %    Neutrophils, Absolute 12.56 (H) 1.70 - 7.00 10*3/mm3    Lymphocytes, Absolute 0.72 0.70 - 3.10 10*3/mm3    Monocytes, Absolute 1.46 (H) 0.10 - 0.90 10*3/mm3    Eosinophils, Absolute 0.06 0.00 - 0.40 10*3/mm3    Basophils, Absolute 0.03 0.00 - 0.20 10*3/mm3    Immature Grans, Absolute 0.15 (H) 0.00 - 0.05 10*3/mm3    nRBC 0.0 0.0 - 0.2 /100 WBC   Prepare RBC, 1 Units    Collection Time: 02/17/20  5:40 AM   Result Value Ref Range    Product Code G3265C74     Unit Number [id number]     UNIT  ABO O     UNIT  RH POS     Dispense Status PT     Blood Type OPOS     Blood Expiration Date [id number]     Blood Type Barcode 5100    POC Glucose Once    Collection Time: 02/17/20 11:13 AM   Result Value Ref Range    Glucose 131 (H) 70 - 130 mg/dL       Physical Examination:        Physical Exam   Constitutional: He appears well-nourished. He appears cachectic. He is restrained.   HENT:   Head: Normocephalic and atraumatic.   Nose: Nose normal.   Mouth/Throat: Oropharynx is clear and moist.   Eyes: Pupils are equal, round, and reactive to light. EOM are normal.   Neck: Normal range of motion. Neck supple.   Trach to vent    Cardiovascular: Normal rate, regular rhythm, normal heart sounds and intact distal pulses.   Pulmonary/Chest: Effort normal and breath sounds normal.   Abdominal: Soft. Bowel sounds are normal.   g tube  Dressing c.d.i - scant green drainage around tube   Musculoskeletal: Normal range of motion.   Generalized weakness   Neurological: He is alert. He has normal reflexes.   Nonverbal  Confused   Skin: Skin is warm and dry.   Psychiatric: He has a normal mood and affect. His behavior is normal.   Anxious at times   Nursing note and vitals reviewed.        Assessment:             Acute tracheostomy management (CMS/[facility])    Acute respiratory failure with hypoxia and hypercapnia (CMS/[facility])    Ventilator dependence (CMS/[facility])    Past Medical History:   Diagnosis Date   • Anxiety disorder    • Cerumen impaction    • COPD (chronic obstructive pulmonary disease) (CMS/[facility])    • Depression    • Hypertension    • Hypothyroidism    • Kidney disease    • Osteoarthritis    • Sinusitis, chronic         Plan:        1. Acute on chronic hypoxic/hypercapneic respiratory failure  2. RUL pulmonary embolus  3. Paroxysmal atrial fibrillation  4. Hypothyroidism  5. Chronic obstructive pulmonary disease  6. Anxiety disorder  7. Chronic kidney disease  8. Multifactorial anemia  9. Gastroesophageal reflux disease  10. Dementia  11. Metabolic encephalopathy  12. Iron deficiency  13. Hypokalemia - improved  14. Moderate protein calorie malnutrition      Continue current treatment. Monitor counts. Increase activity. Labs Thursday. Continue vent weaning under direction of pulmonology. Aggressive therapies. Maintain patient safety. Continue nutrition support per AMONs.  Monitor renal function. Monitor I&O's closely. Watch for s/s bleeding. Monitor HR/BP after medications over the weekend. Discharge planning.            Electronically signed by MATT Tolentino on 2/17/2020 at 1:51 PM

## 2022-09-20 NOTE — CARE COORDINATION
[address] Transitions Interview     2019    Patient: Bowen Reynoso Patient : 1950   MRN: [id number]    RARS: Readmission Risk Score: 12         Spoke with: Bowen Reynoso      Readmission Risk  Patient Active Problem List   Diagnosis    Hypertension    Hypothyroidism    Osteoporosis    CKD (chronic kidney disease), stage II    Vitamin D deficiency    Lumbar spine pain    Cervical spine disease    COPD (chronic obstructive pulmonary disease) (HCC)    Anxiety    Depression    OA (osteoarthritis of spine)    Neck and shoulder pain    Frequent headaches    Mixed hyperlipidemia    Chronic daily headache    Anemia    Irregular heart rhythm    Elevated brain natriuretic peptide (BNP) level    Hypercapnic respiratory failure (HCC)    Acute on chronic respiratory failure (HCC)    LINO (acute kidney injury) ([facility] [address]    Palliative care patient       Inpatient Assessment  Care Transitions Summary    Care Transitions Inpatient Review  Medication Review  Do you have all of your prescriptions and are they filled?:  Yes   Barriers to Medication Adherence:  None  Are you able to afford your medications?:  With Assistance  What assistance programs is the patient using?:  Patient assistance with pharmaceutical company  How often do you have difficulty taking your medications?:  Sometimes I take them as prescribed. Housing Review  Who do you live with?:  Parent(s)  Are you an active caregiver in your home?:  Yes  For whom are you the caregiver?:  mother  [facility] you care for [facility]  Yes  Who is the PCP of the person that you care for?:  Idalmis Gonzalez, DO  Social Support  Durable Medical Equipment  Patient Home Equipment:  Oxygen  Functional Review  Ability to seek help/take action for Emergent/Urgent situations i.e. fire, crime, inclement weather or health crisis. :  Independent  Ability handle personal hygiene needs (bathing/dressing/grooming): Returned to room post procedure. Alert and oriented. Neuro WNL. HOB flat. RN

## 2024-10-11 NOTE — PLAN OF CARE
Problem: Risk for Impaired Skin Integrity  Goal: Tissue integrity - skin and mucous membranes  Description  Structural intactness and normal physiological function of skin and  mucous membranes.   Outcome: Ongoing     Problem: Falls - Risk of:  Goal: Will remain free from falls  Description  Will remain free from falls  Outcome: Ongoing  Goal: Absence of physical injury  Description  Absence of physical injury  Outcome: Ongoing     Problem: OXYGENATION/RESPIRATORY FUNCTION  Goal: Patient will maintain patent airway  Outcome: Ongoing  Goal: Patient will achieve/maintain normal respiratory rate/effort  Description  Respiratory rate and effort will be within normal limits for the patient  Outcome: Ongoing     Problem: SKIN INTEGRITY  Goal: Skin integrity is maintained or improved  Outcome: Ongoing Procedures  [facility]  Department of Otorhinolaryngology and Communication Sciences    Last injection: 6/2024, 2.5 U bilateral TAs  Duration of breathiness: 3 days  Duration of dysphagia: minor, about 3 days none  Duration of good voice:  3-4 weeks, followed by significant deterioration; on perceptual assessment today, 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 Adductor spasmodic dypshonia.  2. Laryngeal spasm.    Postoperative Diagnosis: 1. Adductor spasmodic dypshonia.  2. Laryngeal spasm.    Procedure: Chemodenervation of larynx, percutaneous, under guidance of needle electromyography, bilateral thyroarytenoid/lateral cricoarytenoid complex.    Toxin serotype used: Botox.    Total units employed:   1. Left TA/LCA - 2.5 units.  2. Right TA/LCA - 2.5 units.  3.  45 units wasted.    Surgeon: Yanick Dobbs M.D.     Estimated blood loss: None.    Anesthesia: Local.     Complications: None.    Procedure in detail: Madison Francis was positively identified with two identifiers and was seated upright in a comfortable position. Final time-out was performed for verification purposes. Local cutaneous and subcutaneous anesthesia was achieved with 1 mL of 1% lidocaine with epinephrine 1:100,000, injected into the skin and subcutaneous tissues at the level of the cricothyroid membrane. Surface electrodes were connected. Botulinum toxin was reconstituted with sterile normal saline at a concentration of 25 units/mL. A 1 mL tuberculin syringe pre-loaded with botulinum toxin was connected to a 37 mm 26-gauge injectable needle electrode. The needle was advanced percutaneously into the targeted muscle groups. Appropriate insertional activity and recruitment were noted based on visual and auditory feedback of electromyography. Under electromyographic guidance, botulinum toxin was administered, with the quantity of toxin used and muscle groups targeted outlined above.  The equipment was removed. The patient tolerated the procedure well without complication. All needle counts were correct at the completion of the procedure. The patient was observed briefly before being discharged home in good condition.    Yanick Dobbs M.D.  [facility]  Department of Otorhinolaryngology and Communication Sciences

## 2024-10-18 NOTE — PATIENT INSTRUCTIONS
Taking the Symbicort 160-4.5 mg inhaler refill provided for her rescue inhalers           write down the kind of care you want at the end of your life. It is used by the health professionals who will treat you if you aren't able to decide for yourself. If you put your wishes in writing, your loved ones and others will know what kind of care you want. They won't need to guess. This can ease your mind and be helpful to others. A living will is not the same as an estate or property will. An estate will explains what you want to happen with your money and property after you die. Is a living will a legal document? A living will is a legal document. Each state has its own laws about living chow. If you move to another state, make sure that your living will is legal in the state where you now live. Or you might use a universal form that has been approved by many states. This kind of form can sometimes be completed and stored online. Your electronic copy will then be available wherever you have a connection to the Internet. In most cases, doctors will respect your wishes even if you have a form from a different state. You don't need an  to complete a living will. But legal advice can be helpful if your state's laws are unclear, your health history is complicated, or your family can't agree on what should be in your living will. You can change your living will at any time. Some people find that their wishes about end-of-life care change as their health changes. In addition to making a living will, think about completing a medical power of  form. This form lets you name the person you want to make end-of-life treatment decisions for you (your \"health care agent\") if you're not able to. Many hospitals and nursing homes will give you the forms you need to complete a living will and a medical power of . Your living will is used only if you can't make or communicate decisions for yourself anymore.  If you become able to make decisions again, you can accept or refuse any treatment, no click on the \"Sign Up Now\" link. Current as of: October 6, 2017  Content Version: 11.7  © 9647-7714 [employer]. Care instructions adapted under license by [facility] [employer] [facility] ([facility]). If you have questions about a medical condition or this instruction, always ask your healthcare professional. [address] any warranty or liability for your use of this information. Patient Education        Advance Directives: Care Instructions  Your Care Instructions  An advance directive is a legal way to state your wishes at the end of your life. It tells your family and your doctor what to do if you can no longer say what you want. There are two main types of advance directives. You can change them any time that your wishes change. A living will tells your family and your doctor your wishes about life support and other treatment. A durable power of  for health care lets you name a person to make treatment decisions for you when you can't speak for yourself. This person is called a health care agent. If you do not have an advance directive, decisions about your medical care may be made by a doctor or a  who doesn't know you. It may help to think of an advance directive as a gift to the people who care for you. If you have one, they won't have to make tough decisions by themselves. Follow-up care is a key part of your treatment and safety. Be sure to make and go to all appointments, and call your doctor if you are having problems. It's also a good idea to know your test results and keep a list of the medicines you take. How can you care for yourself at home? Discuss your wishes with your loved ones and your doctor. This way, there are no surprises. Many states have a unique form. Or you might use a universal form that has been approved by many states. This kind of form can sometimes be completed and stored online.  Your electronic copy will then be available wherever you have a stay healthy. Your doctor has checked your overall health and may have suggested ways to take good care of yourself. He or she also may have recommended tests. At home, you can help prevent illness with healthy eating, regular exercise, and other steps. Follow-up care is a key part of your treatment and safety. Be sure to make and go to all appointments, and call your doctor if you are having problems. It's also a good idea to know your test results and keep a list of the medicines you take. How can you care for yourself at home? Reach and stay at a healthy weight. This will lower your risk for many problems, such as obesity, diabetes, heart disease, and high blood pressure. Get at least 30 minutes of exercise on most days of the week. Walking is a good choice. You also may want to do other activities, such as running, swimming, cycling, or playing tennis or team sports. Do not smoke. Smoking can make health problems worse. If you need help quitting, talk to your doctor about stop-smoking programs and medicines. These can increase your chances of quitting for good. Protect your skin from too much sun. When you're outdoors from 10 a.m. to 4 p.m., stay in the shade or cover up with clothing and a hat with a wide brim. Wear sunglasses that block UV rays. Even when it's cloudy, put broad-spectrum sunscreen (SPF 30 or higher) on any exposed skin. See a dentist one or two times a year for checkups and to have your teeth cleaned. Wear a seat belt in the car. Limit alcohol to 2 drinks a day for men and 1 drink a day for women. Too much alcohol can cause health problems. Follow your doctor's advice about when to have certain tests. These tests can spot problems early. For men and women  Cholesterol. Your doctor will tell you how often to have this done based on your overall health and other things that can increase your risk for heart attack and stroke. Blood pressure.  Have your blood pressure checked during a should have a test to check for an aneurysm. You may need a test if you ever smoked or if your parent, brother, sister, or child has had an aneurysm. When should you call for help? Watch closely for changes in your health, and be sure to contact your doctor if you have any problems or symptoms that concern you. Where can you learn more? Go to https://chpepiceweb.[employer]. org and sign in to your [employer] account. Enter D821 in the [employer] box to learn more about \"Well Visit, Over 65: Care Instructions. \"     If you do not have an account, please click on the \"Sign Up Now\" link. Current as of: May 16, 2017  Content Version: 11.7  © 1420-2882 [employer]. Care instructions adapted under license by [facility] ([facility]). If you have questions about a medical condition or this instruction, always ask your healthcare professional. [place] [address] any warranty or liability for your use of this information. Patient Education        Learning About Physical Activity  What is physical activity? Physical activity is any kind of activity that gets your body moving. The types of physical activity that can help you get fit and stay healthy include:  Aerobic or \"cardio\" activities that make your heart beat faster and make you breathe harder, such as brisk walking, riding a bike, or running. Aerobic activities strengthen your heart and lungs and build up your endurance. Strength training activities that make your muscles work against, or \"resist,\" something, such as lifting weights or doing push-ups. These activities help tone and strengthen your muscles. Stretches that allow you to move your joints and muscles through their full range of motion. Stretching helps you be more flexible and avoid injury. What are the benefits of physical activity? Being active is one of the best things you can do to get fit and stay healthy.  It helps you to:  Feel stronger and have more diary. For each food group, add up what you have eaten and then divide the total by the number of days. This will give you an idea of how much you are eating from each food group. See if you can find some ways to change your diet to make it more healthy. Start small  Do not try to make dramatic changes to your diet all at once. You might feel that you are missing out on your favorite foods and then be more likely to fail. Start slowly, and gradually change your habits. Try some of the following:  Use whole wheat bread instead of white bread. Use nonfat or low-fat milk instead of whole milk. Eat brown rice instead of white rice, and eat whole wheat pasta instead of white-flour pasta. Try low-fat cheeses and low-fat yogurt. Add more fruits and vegetables to meals and have them for snacks. Add lettuce, tomato, cucumber, and onion to sandwiches. Add fruit to yogurt and cereal.  Enjoy food  You can still eat your favorite foods. You just may need to eat less of them. If your favorite foods are high in fat, salt, and sugar, limit how often you eat them, but do not cut them out entirely. Eat a wide variety of foods. Make healthy choices when eating out  The type of restaurant you choose can help you make healthy choices. Even fast-food chains are now offering more low-fat or healthier choices on the menu. Choose smaller portions, or take half of your meal home. When eating out, try:  A veggie pizza with a whole wheat crust or grilled chicken (instead of sausage or pepperoni). Pasta with roasted vegetables, grilled chicken, or marinara sauce instead of cream sauce. A vegetable wrap or grilled chicken wrap. Broiled or poached food instead of fried or breaded items. Make healthy choices easy  Buy packaged, prewashed, ready-to-eat fresh vegetables and fruits, such as baby carrots, salad mixes, and chopped or shredded broccoli and cauliflower. Buy packaged, presliced fruits, such as melon or pineapple.   Choose you can steady their head against your arm as you reach around to floss and brush their teeth. Choose a place that has good lighting and is comfortable for both of you. Before you begin, gather your supplies. You will need gloves, floss, a toothbrush, and a container to hold water if you are not near a sink. Wash and dry your hands well and put on gloves. Start by flossing:  Gently work a piece of floss between each of the teeth toward the gums. A plastic flossing tool may make this easier, and they are available at most [facility]. Curve the floss around each tooth into a U-shape and gently slide it under the gum line. Move the floss firmly up and down several times to scrape off the plaque. After you've finished flossing, throw away the used floss and begin brushing:  Wet the brush and apply toothpaste. Place the brush at a 45-degree angle where the teeth meet the gums. Press firmly, and move the brush in small circles over the surface of the teeth. Be careful not to brush too hard. Vigorous brushing can make the gums pull away from the teeth and can scratch the tooth enamel. Brush all surfaces of the teeth, on the tongue side and on the cheek side. Pay special attention to the front teeth and all surfaces of the back teeth. Brush chewing surfaces with short back-and-forth strokes. After you've finished, help the person rinse the remaining toothpaste from their mouth. Where can you learn more? Go to [email or web address] [email or web address] org and sign in to your [employer] account. Enter K237 in the [facility] box to learn more about \"Learning About Dental Care for Older Adults. \"     If you do not have an account, please click on the \"Sign Up Now\" link. Current as of: October 6, 2017  Content Version: 11.7  © 7937-1528 [employer]. Care instructions adapted under license by [address]  If you have questions about a medical condition or this instruction, always ask your healthcare professional. [address] any warranty or liability for your use of this information. Patient Education        Learning About Dentures  What are dentures? Dentures are sets of artificial teeth that replace missing teeth. Some removable dentures replace all the teeth in the upper or lower jaw, or both. This type is called a complete denture. Other dentures may replace just some teeth. This type is called a partial denture or a bridge. Complete dentures rest on top of the gums. Another type of denture, called dental implants, are attached to metal posts that are set beneath the gum, in the bones of the jaw. Implants offer a stronger bite and won't slip. They are usually put in by dentists and oral surgeons who have special training. Dentures give you many of the benefits of a full set of teeth. They help restore your ability to bite and chew food. They can help you speak more clearly. And having dentures can improve the shape of your jawline and give you a natural smile. Getting dentures can take time. Your damaged or decayed teeth will be removed. The gums and the jaw will need to heal. Then your dentist will make a model of your teeth to make sure your dentures fit well in your mouth. Getting used to dentures can take a while. At first, you'll be aware of a new feeling in your mouth. Biting and chewing your food and even talking may seem a little different. But soon they'll feel like a natural part of your mouth. Your dentist will adjust your dentures from time to time to help them continue to fit well. How do you care for your dentures? Care for your dentures as you would care for your natural teeth. Plaque, a thin film of bacteria, can form on the surface of your dentures and gums. Keeping your dentures and gums clean can help prevent discomfort, infection, and bad breath.   To care for your dentures every day:  Stand over a folded towel or bowl of water when you or your

## (undated) DEVICE — SUT SILK 2/0 SH CR8 18IN CR8 C012D

## (undated) DEVICE — DRSNG SURESITE WNDW 4X4.5

## (undated) DEVICE — SPNG GZ WOVN 4X4IN 12PLY 10/BX STRL

## (undated) DEVICE — SUT PROLN 2/0 CT2 30IN 8411H

## (undated) DEVICE — BNDR ABD 4PANEL 12IN 46 TO 62IN

## (undated) DEVICE — ADHS LIQ MASTISOL 2/3ML

## (undated) DEVICE — ANTIBACTERIAL UNDYED BRAIDED (POLYGLACTIN 910), SYNTHETIC ABSORBABLE SUTURE: Brand: COATED VICRYL

## (undated) DEVICE — TRY PREP SCRB VAG PVP

## (undated) DEVICE — GLV SURG BIOGEL LTX PF 6 1/2

## (undated) DEVICE — TB FEED GASTRO MIC 22F7TO10ML

## (undated) DEVICE — PK TURNOVER RM ADV

## (undated) DEVICE — 3M™ STERI-STRIP™ REINFORCED ADHESIVE SKIN CLOSURES, R1547, 1/2 IN X 4 IN (12 MM X 100 MM), 6 STRIPS/ENVELOPE: Brand: 3M™ STERI-STRIP™

## (undated) DEVICE — BARRIER, ABSORBABLE, ADHESION: Brand: SEPRAFILM®

## (undated) DEVICE — PAD MAJOR: Brand: MEDLINE INDUSTRIES, INC.

## (undated) DEVICE — ELECTRD BLD EDGE/INSUL1P 2.4X5.1MM STRL

## (undated) DEVICE — SUT PDS LP 1 TP1 96IN VIO PDP880GA